# Patient Record
Sex: FEMALE | Race: WHITE | Employment: OTHER | URBAN - METROPOLITAN AREA
[De-identification: names, ages, dates, MRNs, and addresses within clinical notes are randomized per-mention and may not be internally consistent; named-entity substitution may affect disease eponyms.]

---

## 2017-01-19 RX ORDER — METHADONE HYDROCHLORIDE 10 MG/1
50 TABLET ORAL EVERY 8 HOURS PRN
Qty: 450 TABLET | Refills: 0 | Status: SHIPPED | OUTPATIENT
Start: 2017-01-20 | End: 2017-02-19

## 2017-01-24 ENCOUNTER — APPOINTMENT (OUTPATIENT)
Dept: LAB | Age: 77
End: 2017-01-24
Attending: INTERNAL MEDICINE
Payer: MEDICARE

## 2017-01-24 DIAGNOSIS — E78.00 HYPERCHOLESTEREMIA: ICD-10-CM

## 2017-01-24 DIAGNOSIS — E87.6 HYPOKALEMIA: ICD-10-CM

## 2017-01-24 DIAGNOSIS — E11.9 TYPE 2 DIABETES MELLITUS WITHOUT COMPLICATION, WITHOUT LONG-TERM CURRENT USE OF INSULIN (HCC): ICD-10-CM

## 2017-01-24 DIAGNOSIS — E55.9 VITAMIN D DEFICIENCY: ICD-10-CM

## 2017-01-24 LAB
ALT SERPL-CCNC: 26 U/L (ref 14–54)
ANION GAP SERPL CALC-SCNC: 8 MMOL/L (ref 0–18)
AST SERPL-CCNC: 26 U/L (ref 15–41)
BUN SERPL-MCNC: 13 MG/DL (ref 8–20)
BUN/CREAT SERPL: 16.9 (ref 10–20)
CALCIUM SERPL-MCNC: 9.3 MG/DL (ref 8.5–10.5)
CHLORIDE SERPL-SCNC: 102 MMOL/L (ref 95–110)
CHOLEST SERPL-MCNC: 156 MG/DL (ref 110–200)
CO2 SERPL-SCNC: 30 MMOL/L (ref 22–32)
CREAT SERPL-MCNC: 0.77 MG/DL (ref 0.5–1.5)
GLUCOSE SERPL-MCNC: 118 MG/DL (ref 70–99)
HBA1C MFR BLD: 6.8 % (ref 4–6)
HDLC SERPL-MCNC: 35 MG/DL
LDLC SERPL CALC-MCNC: 82 MG/DL (ref 0–99)
NONHDLC SERPL-MCNC: 121 MG/DL
OSMOLALITY UR CALC.SUM OF ELEC: 291 MOSM/KG (ref 275–295)
POTASSIUM SERPL-SCNC: 4.4 MMOL/L (ref 3.3–5.1)
SODIUM SERPL-SCNC: 140 MMOL/L (ref 136–144)
TRIGL SERPL-MCNC: 196 MG/DL (ref 1–149)

## 2017-01-24 PROCEDURE — 84450 TRANSFERASE (AST) (SGOT): CPT

## 2017-01-24 PROCEDURE — 80048 BASIC METABOLIC PNL TOTAL CA: CPT

## 2017-01-24 PROCEDURE — 84460 ALANINE AMINO (ALT) (SGPT): CPT

## 2017-01-24 PROCEDURE — 80061 LIPID PANEL: CPT

## 2017-01-24 PROCEDURE — 82306 VITAMIN D 25 HYDROXY: CPT

## 2017-01-24 PROCEDURE — 36415 COLL VENOUS BLD VENIPUNCTURE: CPT

## 2017-01-24 PROCEDURE — 83036 HEMOGLOBIN GLYCOSYLATED A1C: CPT

## 2017-01-25 LAB — 25(OH)D3 SERPL-MCNC: 23.8 NG/ML

## 2017-02-01 ENCOUNTER — OFFICE VISIT (OUTPATIENT)
Dept: INTERNAL MEDICINE CLINIC | Facility: CLINIC | Age: 77
End: 2017-02-01

## 2017-02-01 VITALS
TEMPERATURE: 98 F | WEIGHT: 204 LBS | BODY MASS INDEX: 40 KG/M2 | SYSTOLIC BLOOD PRESSURE: 120 MMHG | HEART RATE: 84 BPM | OXYGEN SATURATION: 95 % | DIASTOLIC BLOOD PRESSURE: 58 MMHG

## 2017-02-01 DIAGNOSIS — E11.9 TYPE 2 DIABETES MELLITUS WITHOUT COMPLICATION, WITHOUT LONG-TERM CURRENT USE OF INSULIN (HCC): ICD-10-CM

## 2017-02-01 DIAGNOSIS — M15.9 PRIMARY OSTEOARTHRITIS INVOLVING MULTIPLE JOINTS: Primary | ICD-10-CM

## 2017-02-01 DIAGNOSIS — E78.1 HYPERTRIGLYCERIDEMIA: ICD-10-CM

## 2017-02-01 DIAGNOSIS — E55.9 VITAMIN D DEFICIENCY: ICD-10-CM

## 2017-02-01 DIAGNOSIS — I87.2 VENOUS INSUFFICIENCY: ICD-10-CM

## 2017-02-01 DIAGNOSIS — K22.70 BARRETT'S ESOPHAGUS WITHOUT DYSPLASIA: ICD-10-CM

## 2017-02-01 DIAGNOSIS — E87.6 HYPOKALEMIA: ICD-10-CM

## 2017-02-01 DIAGNOSIS — R53.83 FATIGUE, UNSPECIFIED TYPE: ICD-10-CM

## 2017-02-01 DIAGNOSIS — E78.00 HYPERCHOLESTEREMIA: ICD-10-CM

## 2017-02-01 DIAGNOSIS — Z98.890 HISTORY OF BACK SURGERY: ICD-10-CM

## 2017-02-01 DIAGNOSIS — G47.30 SLEEP APNEA, UNSPECIFIED TYPE: ICD-10-CM

## 2017-02-01 PROCEDURE — 99214 OFFICE O/P EST MOD 30 MIN: CPT | Performed by: INTERNAL MEDICINE

## 2017-02-01 PROCEDURE — G0463 HOSPITAL OUTPT CLINIC VISIT: HCPCS | Performed by: INTERNAL MEDICINE

## 2017-02-01 NOTE — PATIENT INSTRUCTIONS
1.  Patient is to continue her current diet, medications and activity. 2.  Patient is to increase her vitamin D to 2000 units orally every morning.   3.  I will see the patient back in 3 months with blood tests which will include a BMP, hemoglobin A1c, lip

## 2017-02-01 NOTE — PROGRESS NOTES
Pamela Davison is a 78year old female. Patient presents with:  Checkup: 3 mo f/u  Arthritis  Hyperlipidemia  Diabetes  Fatigue    HPI:   Patient presents with:  Checkup: 3 mo f/u  Arthritis  Hyperlipidemia  Diabetes  Fatigue    Pt feels OK.   She c/o her v (VITAMIN D) 1000 UNITS Oral Tab 1000 units   #100  Sig-1000 units orally daily     3 Refills Disp: 100 tablet Rfl: 3   HYDROcodone-acetaminophen (NORCO)  MG Oral Tab Take 1 tablet by mouth every 6 (six) hours as needed.    Disp:  Rfl: 0   Polyethylene breasts were not examined today. LUNGS: clear to auscultation  CARDIO: RRR, normal S1S2, without murmur   GI:Protuberant, BS are present, no organomegaly or palpable masses  EXTREMITIES: no edema  NEURO: alert and oriented  ASSESSMENT AND PLAN:   1.  Prima lead reading was 196. Patient to continue to watch her diet especially her carbs and sweets. I will see the patient back in 3 months with blood tests as noted above. The patient indicates understanding of these issues and agrees to the plan.   The pa

## 2017-02-22 RX ORDER — METHADONE HYDROCHLORIDE 10 MG/1
50 TABLET ORAL EVERY 8 HOURS PRN
Qty: 450 TABLET | Refills: 0 | Status: SHIPPED | OUTPATIENT
Start: 2017-02-22 | End: 2017-03-24

## 2017-02-28 ENCOUNTER — TELEPHONE (OUTPATIENT)
Dept: INTERNAL MEDICINE CLINIC | Facility: CLINIC | Age: 77
End: 2017-02-28

## 2017-02-28 NOTE — PROGRESS NOTES
-------------------------------------------------------------------------------------------------------------------------   Patient's Personal History/Story    PT OF [facility] SINCE 2004 FOR LEFT GROIN AND L HIP PAIN. HAD BACK SURGERY WITH   HARDWARE.  RE-OPERATE

## 2017-02-28 NOTE — TELEPHONE ENCOUNTER
Spoke with patient. She reports flu-like symptoms x 4 days--cough, sneezing, mild fevers, achy. Appetite poor--just \"picking at things. \" Denies SOB. States today she broke out in a cold sweat and had diarrhea. Reports feeling nauseous.  Recommended patien

## 2017-02-28 NOTE — TELEPHONE ENCOUNTER
Patient has been sick for about 4 days with flu  Using over the counter thera flu - today feels worse & wanted to be seen

## 2017-03-01 RX ORDER — AMOXICILLIN AND CLAVULANATE POTASSIUM 875; 125 MG/1; MG/1
1 TABLET, FILM COATED ORAL 2 TIMES DAILY
Qty: 20 TABLET | Refills: 1 | Status: SHIPPED | OUTPATIENT
Start: 2017-03-01 | End: 2017-03-11

## 2017-03-02 NOTE — TELEPHONE ENCOUNTER
Noted.  Discussed with patient. Patient did not go to urgent care. I recommended that she come and see me tomorrow. She does not think that she can make it here tomorrow. I have told her to push fluids.   She can use Tylenol as necessary for any aches o

## 2017-03-07 NOTE — TELEPHONE ENCOUNTER
I spoke to the patient who states that she is feeling better since starting the Augmentin but still has a cough and for the last couple of days also having diarrhea.  Patient states that the stool is soft, not liquidy but yesterday it was worse and when she

## 2017-03-07 NOTE — TELEPHONE ENCOUNTER
Pt had left a message with the service that she would like to speak to  regarding the meds he prescribed for the virus. T: [phone].                 Tasked to Nursing

## 2017-03-07 NOTE — TELEPHONE ENCOUNTER
Discussed with patient. I have advised the patient to stop her Augmentin due to the diarrhea. She may  some Imodium a.d. over-the-counter and take 2 tablets every 4-6 hours as she needs to for the diarrhea.   Patient is to continue to take the Jeris Blood

## 2017-03-10 RX ORDER — FLUCONAZOLE 150 MG/1
150 TABLET ORAL ONCE
Qty: 1 TABLET | Refills: 1 | Status: SHIPPED | OUTPATIENT
Start: 2017-03-10 | End: 2017-03-10

## 2017-03-10 NOTE — TELEPHONE ENCOUNTER
Patient feels she has a yeast infection     Possibly from the Augmentin and is asking for something     Pharm using - [facility] [address]     [phone]

## 2017-03-10 NOTE — TELEPHONE ENCOUNTER
Telephone call to patient. Patient feels she is getting over her recent cough, chest congestion and head congestion. She now feels that she may have a yeast infection. She also feels fatigued.   I told her that it may take a few more weeks to get over th

## 2017-03-13 NOTE — TELEPHONE ENCOUNTER
Discussed with patient. I told her that is okay for her to take her second pill of Diflucan. She will call me back if the yeast infection does not improve.

## 2017-03-13 NOTE — TELEPHONE ENCOUNTER
[phone]  Pt took pill on Saturday. Pt had a little relief but not completely healed.  Pt wants to know when she can take another pill since Dr TOTH gave her a refill  To clinical

## 2017-03-22 ENCOUNTER — TELEPHONE (OUTPATIENT)
Dept: PAIN CLINIC | Facility: HOSPITAL | Age: 77
End: 2017-03-22

## 2017-03-22 ENCOUNTER — TELEPHONE (OUTPATIENT)
Dept: INTERNAL MEDICINE CLINIC | Facility: CLINIC | Age: 77
End: 2017-03-22

## 2017-03-22 RX ORDER — OMEPRAZOLE 20 MG/1
20 CAPSULE, DELAYED RELEASE ORAL EVERY MORNING
Qty: 90 CAPSULE | Refills: 3 | Status: SHIPPED | OUTPATIENT
Start: 2017-03-22 | End: 2017-04-04

## 2017-03-22 NOTE — TELEPHONE ENCOUNTER
Discussed with patient. I told her that her Lansoprazole is no longer covered by her insurance. I told her that I would put her on omeprazole 20 mg orally daily and see how that goes. She is fine with doing that.   I will send a prescription to her pharm

## 2017-03-22 NOTE — TELEPHONE ENCOUNTER
Maryanne is calling to inform Dr. TOTH that Lansoprazole is no longer on pt.'s formulary  They are requesting a refill as well ph.  # [phone]  Routed to Rx

## 2017-03-27 NOTE — TELEPHONE ENCOUNTER
Maryanne faxed a note - \"pt is requesting capsule po bid, please call/fax a new rx\". Placed in purple folder.                    Tasked to Rx

## 2017-04-03 RX ORDER — METHADONE HYDROCHLORIDE 10 MG/1
50 TABLET ORAL EVERY 8 HOURS PRN
Qty: 450 TABLET | Refills: 0 | Status: SHIPPED | OUTPATIENT
Start: 2017-04-03 | End: 2017-05-05

## 2017-04-04 RX ORDER — OMEPRAZOLE 20 MG/1
20 CAPSULE, DELAYED RELEASE ORAL
Qty: 180 CAPSULE | Refills: 3 | Status: ON HOLD | OUTPATIENT
Start: 2017-04-04 | End: 2017-08-28

## 2017-04-06 ENCOUNTER — OFFICE VISIT (OUTPATIENT)
Dept: PAIN CLINIC | Facility: HOSPITAL | Age: 77
End: 2017-04-06
Attending: NURSE PRACTITIONER
Payer: MEDICARE

## 2017-04-06 VITALS
HEIGHT: 60 IN | RESPIRATION RATE: 18 BRPM | BODY MASS INDEX: 36.32 KG/M2 | WEIGHT: 185 LBS | HEART RATE: 71 BPM | SYSTOLIC BLOOD PRESSURE: 116 MMHG | DIASTOLIC BLOOD PRESSURE: 66 MMHG

## 2017-04-06 DIAGNOSIS — M96.1 FAILED BACK SURGICAL SYNDROME: ICD-10-CM

## 2017-04-06 DIAGNOSIS — Z98.890 HISTORY OF BACK SURGERY: Primary | ICD-10-CM

## 2017-04-06 PROCEDURE — 99211 OFF/OP EST MAY X REQ PHY/QHP: CPT

## 2017-04-06 RX ORDER — HYDROCODONE BITARTRATE AND ACETAMINOPHEN 10; 325 MG/1; MG/1
1 TABLET ORAL EVERY 6 HOURS PRN
Qty: 30 TABLET | Refills: 0 | Status: SHIPPED | OUTPATIENT
Start: 2017-04-06 | End: 2017-05-17

## 2017-04-06 NOTE — CHRONIC PAIN
Follow-up Note    HISTORY OF PRESENT ILLNESS:  Raffaele Benton is a 78year old old female with history of chronic back pain returns for medication evaluation. She continues to report back pain. She reports a recent fall.  She now reports left side thoracic by mouth nightly. Disp:  Rfl:    HYDROcodone-acetaminophen 5-325 MG Oral Tab Take 1 tablet by mouth every 6 (six) hours as needed for Pain. Disp:  Rfl:    Levothyroxine Sodium (LEVOTHROID) 100 MCG Oral Tab Take 1 tablet (100 mcg total) by mouth once daily. 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

## 2017-04-06 NOTE — PROGRESS NOTES
RECOMMENDATIONS: 1) Will provide RX for PRN norco for breakthrough pain.  Will consider Xrays if acute pain continues or worsens 2) recommended decreasing afternoon and PM dose of Methadone.  3) Continue Voltaren gel

## 2017-04-25 ENCOUNTER — APPOINTMENT (OUTPATIENT)
Dept: LAB | Age: 77
End: 2017-04-25
Attending: INTERNAL MEDICINE
Payer: MEDICARE

## 2017-04-25 DIAGNOSIS — E55.9 VITAMIN D DEFICIENCY: ICD-10-CM

## 2017-04-25 DIAGNOSIS — E87.6 HYPOKALEMIA: ICD-10-CM

## 2017-04-25 DIAGNOSIS — E11.9 TYPE 2 DIABETES MELLITUS WITHOUT COMPLICATION, WITHOUT LONG-TERM CURRENT USE OF INSULIN (HCC): ICD-10-CM

## 2017-04-25 DIAGNOSIS — E78.00 HYPERCHOLESTEREMIA: ICD-10-CM

## 2017-04-25 PROCEDURE — 36415 COLL VENOUS BLD VENIPUNCTURE: CPT

## 2017-04-25 PROCEDURE — 83036 HEMOGLOBIN GLYCOSYLATED A1C: CPT

## 2017-04-25 PROCEDURE — 80048 BASIC METABOLIC PNL TOTAL CA: CPT

## 2017-04-25 PROCEDURE — 84460 ALANINE AMINO (ALT) (SGPT): CPT

## 2017-04-25 PROCEDURE — 82306 VITAMIN D 25 HYDROXY: CPT

## 2017-04-25 PROCEDURE — 84450 TRANSFERASE (AST) (SGOT): CPT

## 2017-04-25 PROCEDURE — 80061 LIPID PANEL: CPT

## 2017-05-06 RX ORDER — METHADONE HYDROCHLORIDE 10 MG/1
50 TABLET ORAL EVERY 8 HOURS PRN
Qty: 450 TABLET | Refills: 0 | OUTPATIENT
Start: 2017-05-08 | End: 2017-05-08

## 2017-05-08 RX ORDER — METHADONE HYDROCHLORIDE 10 MG/1
50 TABLET ORAL EVERY 8 HOURS PRN
Qty: 450 TABLET | Refills: 0 | Status: SHIPPED | OUTPATIENT
Start: 2017-05-08 | End: 2017-06-12

## 2017-05-12 ENCOUNTER — OFFICE VISIT (OUTPATIENT)
Dept: INTERNAL MEDICINE CLINIC | Facility: CLINIC | Age: 77
End: 2017-05-12

## 2017-05-12 VITALS
SYSTOLIC BLOOD PRESSURE: 160 MMHG | OXYGEN SATURATION: 95 % | HEART RATE: 76 BPM | WEIGHT: 211.19 LBS | BODY MASS INDEX: 41.46 KG/M2 | TEMPERATURE: 98 F | DIASTOLIC BLOOD PRESSURE: 76 MMHG | HEIGHT: 59.75 IN

## 2017-05-12 DIAGNOSIS — Z00.00 ANNUAL PHYSICAL EXAM: ICD-10-CM

## 2017-05-12 DIAGNOSIS — K22.70 BARRETT'S ESOPHAGUS WITHOUT DYSPLASIA: ICD-10-CM

## 2017-05-12 DIAGNOSIS — E11.9 TYPE 2 DIABETES MELLITUS WITHOUT COMPLICATION, WITHOUT LONG-TERM CURRENT USE OF INSULIN (HCC): ICD-10-CM

## 2017-05-12 DIAGNOSIS — I87.2 VENOUS INSUFFICIENCY: ICD-10-CM

## 2017-05-12 DIAGNOSIS — R53.83 FATIGUE, UNSPECIFIED TYPE: ICD-10-CM

## 2017-05-12 DIAGNOSIS — E78.00 HYPERCHOLESTEREMIA: ICD-10-CM

## 2017-05-12 DIAGNOSIS — E78.1 HYPERTRIGLYCERIDEMIA: ICD-10-CM

## 2017-05-12 DIAGNOSIS — Z98.890 HISTORY OF BACK SURGERY: ICD-10-CM

## 2017-05-12 DIAGNOSIS — E87.6 HYPOKALEMIA: ICD-10-CM

## 2017-05-12 DIAGNOSIS — G47.30 SLEEP APNEA, UNSPECIFIED TYPE: ICD-10-CM

## 2017-05-12 DIAGNOSIS — M15.9 PRIMARY OSTEOARTHRITIS INVOLVING MULTIPLE JOINTS: Primary | ICD-10-CM

## 2017-05-12 DIAGNOSIS — M25.512 ACUTE PAIN OF LEFT SHOULDER: ICD-10-CM

## 2017-05-12 DIAGNOSIS — E55.9 VITAMIN D DEFICIENCY: ICD-10-CM

## 2017-05-12 PROCEDURE — 99214 OFFICE O/P EST MOD 30 MIN: CPT | Performed by: INTERNAL MEDICINE

## 2017-05-12 PROCEDURE — G0463 HOSPITAL OUTPT CLINIC VISIT: HCPCS | Performed by: INTERNAL MEDICINE

## 2017-05-12 NOTE — PROGRESS NOTES
Marcio Ascencio is a 78year old female. Patient presents with:  Checkup: 3 month checkup  Fall: Patient had two falls about a month ago. Both times patient fell onto her back. She denies hitting her head  Shoulder Pain: Left shoulder pain since falls.  Diff Oral Tab Take 40 mg by mouth nightly. Disp:  Rfl:    HYDROcodone-acetaminophen 5-325 MG Oral Tab Take 1 tablet by mouth every 6 (six) hours as needed for Pain.  Disp:  Rfl:    Levothyroxine Sodium (LEVOTHROID) 100 MCG Oral Tab Take 1 tablet (100 mcg total) of wine per week       Comment: occasionally       REVIEW OF SYSTEMS:   GENERAL HEALTH: feels well otherwise  RESPIRATORY:No cough or SOB  CARDIOVASCULAR: No chest pain  GI: No abdominal pain, nausea, vomiting, diarrhea, or constipation  :No Urinary comp CPM.  As above. 6. Fatigue, unspecified type  Stable. CPM.    7. Venous insufficiency  Stable. CPM.    8. Sleep apnea, unspecified type  Stable. CPM.    9. Urban's esophagus without dysplasia  Stable. CPM.    10. History of back surgery  Stable.

## 2017-05-12 NOTE — PATIENT INSTRUCTIONS
1.  Patient is to continue her current diet, medications and activity. 2.  I will obtain an x-ray of patient's left shoulder to evaluate her left shoulder pain.   3.  I will plan to see the patient back in 3 months with blood tests, urinalysis and EKG for

## 2017-05-15 ENCOUNTER — HOSPITAL ENCOUNTER (OUTPATIENT)
Dept: GENERAL RADIOLOGY | Age: 77
Discharge: HOME | End: 2017-05-15
Attending: INTERNAL MEDICINE
Payer: MEDICARE

## 2017-05-15 DIAGNOSIS — M25.512 ACUTE PAIN OF LEFT SHOULDER: ICD-10-CM

## 2017-05-15 PROCEDURE — 73030 X-RAY EXAM OF SHOULDER: CPT | Performed by: INTERNAL MEDICINE

## 2017-05-18 RX ORDER — HYDROCODONE BITARTRATE AND ACETAMINOPHEN 10; 325 MG/1; MG/1
1 TABLET ORAL EVERY 6 HOURS PRN
Qty: 30 TABLET | Refills: 0 | Status: SHIPPED | OUTPATIENT
Start: 2017-05-18 | End: 2017-07-12

## 2017-05-19 ENCOUNTER — TELEPHONE (OUTPATIENT)
Dept: INTERNAL MEDICINE CLINIC | Facility: CLINIC | Age: 77
End: 2017-05-19

## 2017-05-20 NOTE — TELEPHONE ENCOUNTER
Telephone call to patient and message left. The patient's recent x-ray of of her left shoulder shows her to have significant arthritis in the left shoulder. She also has evidence of prior rotator cuff injury.   I have left a message for the patient to aliyah

## 2017-06-01 NOTE — TELEPHONE ENCOUNTER
Discussed with patient. She got my previous message and has been following up with Dr. Tariq Muse, an orthopedic physician.

## 2017-06-13 ENCOUNTER — TELEPHONE (OUTPATIENT)
Dept: PAIN CLINIC | Facility: HOSPITAL | Age: 77
End: 2017-06-13

## 2017-06-13 RX ORDER — METHADONE HYDROCHLORIDE 10 MG/1
50 TABLET ORAL EVERY 8 HOURS PRN
Qty: 450 TABLET | Refills: 0 | Status: SHIPPED | OUTPATIENT
Start: 2017-06-13 | End: 2017-07-12

## 2017-06-21 RX ORDER — FUROSEMIDE 40 MG/1
TABLET ORAL
Qty: 180 TABLET | Refills: 3 | Status: ON HOLD | OUTPATIENT
Start: 2017-06-21 | End: 2017-08-28

## 2017-06-21 RX ORDER — ATORVASTATIN CALCIUM 40 MG/1
TABLET, FILM COATED ORAL
Qty: 90 TABLET | Refills: 3 | Status: SHIPPED | OUTPATIENT
Start: 2017-06-21 | End: 2018-02-21 | Stop reason: ALTCHOICE

## 2017-07-12 RX ORDER — METHADONE HYDROCHLORIDE 10 MG/1
50 TABLET ORAL EVERY 8 HOURS PRN
Qty: 450 TABLET | Refills: 0 | Status: SHIPPED | OUTPATIENT
Start: 2017-07-13 | End: 2017-08-14

## 2017-07-12 RX ORDER — HYDROCODONE BITARTRATE AND ACETAMINOPHEN 10; 325 MG/1; MG/1
1 TABLET ORAL EVERY 6 HOURS PRN
Qty: 30 TABLET | Refills: 0 | Status: SHIPPED | OUTPATIENT
Start: 2017-07-13 | End: 2017-08-14

## 2017-08-14 RX ORDER — METHADONE HYDROCHLORIDE 10 MG/1
50 TABLET ORAL EVERY 8 HOURS PRN
Qty: 450 TABLET | Refills: 0 | Status: ON HOLD | OUTPATIENT
Start: 2017-08-15 | End: 2017-09-15

## 2017-08-14 RX ORDER — POTASSIUM CHLORIDE 20 MEQ/1
TABLET, EXTENDED RELEASE ORAL
Qty: 120 TABLET | Refills: 5 | Status: SHIPPED | OUTPATIENT
Start: 2017-08-14 | End: 2017-10-09

## 2017-08-14 RX ORDER — HYDROCODONE BITARTRATE AND ACETAMINOPHEN 10; 325 MG/1; MG/1
1 TABLET ORAL EVERY 6 HOURS PRN
Qty: 30 TABLET | Refills: 0 | Status: SHIPPED | OUTPATIENT
Start: 2017-08-15 | End: 2017-09-15

## 2017-08-21 ENCOUNTER — TELEPHONE (OUTPATIENT)
Dept: INTERNAL MEDICINE CLINIC | Facility: CLINIC | Age: 77
End: 2017-08-21

## 2017-08-21 NOTE — TELEPHONE ENCOUNTER
Patient states she does not feel good and wishes to speak to doctor     Pt can not sleep and in pain and does not feel good     Pt is nauseated     [phone]

## 2017-08-21 NOTE — TELEPHONE ENCOUNTER
Please advise - called patient who states she has pain all over ( but comes from her back pain) , feeling nauseated , can,t eat, no diarrhea , denies fever , no vomiting , cant sleep , cant come to office today - to DR. TOTH

## 2017-08-22 ENCOUNTER — APPOINTMENT (OUTPATIENT)
Dept: GENERAL RADIOLOGY | Facility: HOSPITAL | Age: 77
DRG: 418 | End: 2017-08-22
Attending: EMERGENCY MEDICINE
Payer: MEDICARE

## 2017-08-22 ENCOUNTER — HOSPITAL ENCOUNTER (INPATIENT)
Facility: HOSPITAL | Age: 77
LOS: 3 days | Discharge: HOME HEALTH | DRG: 418 | End: 2017-08-28
Attending: EMERGENCY MEDICINE | Admitting: HOSPITALIST
Payer: MEDICARE

## 2017-08-22 ENCOUNTER — APPOINTMENT (OUTPATIENT)
Dept: CT IMAGING | Facility: HOSPITAL | Age: 77
DRG: 418 | End: 2017-08-22
Attending: EMERGENCY MEDICINE
Payer: MEDICARE

## 2017-08-22 DIAGNOSIS — R11.0 NAUSEA: Primary | ICD-10-CM

## 2017-08-22 DIAGNOSIS — I49.9 CARDIAC ARRHYTHMIA, UNSPECIFIED CARDIAC ARRHYTHMIA TYPE: ICD-10-CM

## 2017-08-22 DIAGNOSIS — K81.0 ACUTE CHOLECYSTITIS: ICD-10-CM

## 2017-08-22 DIAGNOSIS — R79.89 ELEVATED SERUM CREATININE: ICD-10-CM

## 2017-08-22 DIAGNOSIS — R42 DIZZINESS: ICD-10-CM

## 2017-08-22 DIAGNOSIS — R11.2 NAUSEA & VOMITING: ICD-10-CM

## 2017-08-22 PROBLEM — R73.9 HYPERGLYCEMIA: Status: ACTIVE | Noted: 2017-08-22

## 2017-08-22 LAB
ALBUMIN SERPL BCP-MCNC: 4 G/DL (ref 3.5–4.8)
ALP SERPL-CCNC: 79 U/L (ref 32–100)
ALT SERPL-CCNC: 30 U/L (ref 14–54)
ANION GAP SERPL CALC-SCNC: 9 MMOL/L (ref 0–18)
AST SERPL-CCNC: 32 U/L (ref 15–41)
BACTERIA UR QL AUTO: NEGATIVE /HPF
BASOPHILS # BLD: 0.1 K/UL (ref 0–0.2)
BASOPHILS NFR BLD: 1 %
BILIRUB DIRECT SERPL-MCNC: 0.2 MG/DL (ref 0–0.2)
BILIRUB SERPL-MCNC: 0.8 MG/DL (ref 0.3–1.2)
BILIRUB UR QL: NEGATIVE
BUN SERPL-MCNC: 10 MG/DL (ref 8–20)
BUN/CREAT SERPL: 11.1 (ref 10–20)
CALCIUM SERPL-MCNC: 9.5 MG/DL (ref 8.5–10.5)
CHLORIDE SERPL-SCNC: 98 MMOL/L (ref 95–110)
CLARITY UR: CLEAR
CO2 SERPL-SCNC: 30 MMOL/L (ref 22–32)
COLOR UR: YELLOW
CREAT SERPL-MCNC: 0.9 MG/DL (ref 0.5–1.5)
EOSINOPHIL # BLD: 0 K/UL (ref 0–0.7)
EOSINOPHIL NFR BLD: 0 %
ERYTHROCYTE [DISTWIDTH] IN BLOOD BY AUTOMATED COUNT: 14.5 % (ref 11–15)
GLUCOSE BLDC GLUCOMTR-MCNC: 122 MG/DL (ref 70–99)
GLUCOSE BLDC GLUCOMTR-MCNC: 183 MG/DL (ref 70–99)
GLUCOSE SERPL-MCNC: 133 MG/DL (ref 70–99)
GLUCOSE UR-MCNC: NEGATIVE MG/DL
HCT VFR BLD AUTO: 43.8 % (ref 35–48)
HGB BLD-MCNC: 14.2 G/DL (ref 12–16)
HGB UR QL STRIP.AUTO: NEGATIVE
KETONES UR-MCNC: NEGATIVE MG/DL
LIPASE SERPL-CCNC: 19 U/L (ref 22–51)
LYMPHOCYTES # BLD: 1.1 K/UL (ref 1–4)
LYMPHOCYTES NFR BLD: 11 %
MAGNESIUM SERPL-MCNC: 1.7 MG/DL (ref 1.8–2.5)
MCH RBC QN AUTO: 28.4 PG (ref 27–32)
MCHC RBC AUTO-ENTMCNC: 32.5 G/DL (ref 32–37)
MCV RBC AUTO: 87.4 FL (ref 80–100)
MONOCYTES # BLD: 0.4 K/UL (ref 0–1)
MONOCYTES NFR BLD: 4 %
NEUTROPHILS # BLD AUTO: 8.5 K/UL (ref 1.8–7.7)
NEUTROPHILS NFR BLD: 85 %
NITRITE UR QL STRIP.AUTO: NEGATIVE
OSMOLALITY UR CALC.SUM OF ELEC: 285 MOSM/KG (ref 275–295)
PH UR: 8 [PH] (ref 5–8)
PLATELET # BLD AUTO: 229 K/UL (ref 140–400)
PMV BLD AUTO: 9.6 FL (ref 7.4–10.3)
POTASSIUM SERPL-SCNC: 3.2 MMOL/L (ref 3.3–5.1)
PROT SERPL-MCNC: 7 G/DL (ref 5.9–8.4)
PROT UR-MCNC: NEGATIVE MG/DL
RBC # BLD AUTO: 5.01 M/UL (ref 3.7–5.4)
RBC #/AREA URNS AUTO: 1 /HPF
SODIUM SERPL-SCNC: 137 MMOL/L (ref 136–144)
SP GR UR STRIP: 1 (ref 1–1.03)
TROPONIN I SERPL-MCNC: 0.01 NG/ML (ref ?–0.03)
UROBILINOGEN UR STRIP-ACNC: <2
VIT C UR-MCNC: NEGATIVE MG/DL
WBC # BLD AUTO: 10 K/UL (ref 4–11)
WBC #/AREA URNS AUTO: 1 /HPF

## 2017-08-22 PROCEDURE — 70450 CT HEAD/BRAIN W/O DYE: CPT | Performed by: EMERGENCY MEDICINE

## 2017-08-22 PROCEDURE — 71010 XR CHEST AP PORTABLE  (CPT=71010): CPT | Performed by: EMERGENCY MEDICINE

## 2017-08-22 RX ORDER — MAGNESIUM OXIDE 400 MG (241.3 MG MAGNESIUM) TABLET
400 TABLET ONCE
Status: COMPLETED | OUTPATIENT
Start: 2017-08-22 | End: 2017-08-22

## 2017-08-22 RX ORDER — ALLOPURINOL 300 MG/1
300 TABLET ORAL
Status: DISCONTINUED | OUTPATIENT
Start: 2017-08-22 | End: 2017-08-25

## 2017-08-22 RX ORDER — ONDANSETRON 2 MG/ML
4 INJECTION INTRAMUSCULAR; INTRAVENOUS ONCE
Status: COMPLETED | OUTPATIENT
Start: 2017-08-22 | End: 2017-08-22

## 2017-08-22 RX ORDER — SODIUM CHLORIDE 0.9 % (FLUSH) 0.9 %
3 SYRINGE (ML) INJECTION AS NEEDED
Status: DISCONTINUED | OUTPATIENT
Start: 2017-08-22 | End: 2017-08-28

## 2017-08-22 RX ORDER — DEXTROSE MONOHYDRATE 25 G/50ML
50 INJECTION, SOLUTION INTRAVENOUS AS NEEDED
Status: DISCONTINUED | OUTPATIENT
Start: 2017-08-22 | End: 2017-08-28

## 2017-08-22 RX ORDER — CETIRIZINE HYDROCHLORIDE 10 MG/1
10 TABLET ORAL DAILY
Status: DISCONTINUED | OUTPATIENT
Start: 2017-08-23 | End: 2017-08-23

## 2017-08-22 RX ORDER — LISINOPRIL 20 MG/1
20 TABLET ORAL DAILY
Status: DISCONTINUED | OUTPATIENT
Start: 2017-08-22 | End: 2017-08-28

## 2017-08-22 RX ORDER — FUROSEMIDE 10 MG/ML
60 INJECTION INTRAMUSCULAR; INTRAVENOUS
Status: DISCONTINUED | OUTPATIENT
Start: 2017-08-22 | End: 2017-08-24

## 2017-08-22 RX ORDER — HEPARIN SODIUM 5000 [USP'U]/ML
5000 INJECTION, SOLUTION INTRAVENOUS; SUBCUTANEOUS EVERY 12 HOURS SCHEDULED
Status: DISPENSED | OUTPATIENT
Start: 2017-08-22 | End: 2017-08-24

## 2017-08-22 RX ORDER — ACETAMINOPHEN 325 MG/1
650 TABLET ORAL EVERY 6 HOURS PRN
Status: DISCONTINUED | OUTPATIENT
Start: 2017-08-22 | End: 2017-08-25

## 2017-08-22 RX ORDER — HYDROCODONE BITARTRATE AND ACETAMINOPHEN 5; 325 MG/1; MG/1
1 TABLET ORAL EVERY 6 HOURS PRN
Status: DISCONTINUED | OUTPATIENT
Start: 2017-08-22 | End: 2017-08-24

## 2017-08-22 RX ORDER — SODIUM CHLORIDE 9 MG/ML
INJECTION, SOLUTION INTRAVENOUS CONTINUOUS
Status: DISCONTINUED | OUTPATIENT
Start: 2017-08-22 | End: 2017-08-22

## 2017-08-22 RX ORDER — HYDROCODONE BITARTRATE AND ACETAMINOPHEN 5; 325 MG/1; MG/1
2 TABLET ORAL EVERY 4 HOURS PRN
Status: DISCONTINUED | OUTPATIENT
Start: 2017-08-22 | End: 2017-08-24

## 2017-08-22 RX ORDER — ONDANSETRON 2 MG/ML
4 INJECTION INTRAMUSCULAR; INTRAVENOUS EVERY 6 HOURS PRN
Status: DISCONTINUED | OUTPATIENT
Start: 2017-08-22 | End: 2017-08-25

## 2017-08-22 RX ORDER — LABETALOL HYDROCHLORIDE 5 MG/ML
10 INJECTION, SOLUTION INTRAVENOUS ONCE
Status: COMPLETED | OUTPATIENT
Start: 2017-08-22 | End: 2017-08-22

## 2017-08-22 RX ORDER — ONDANSETRON 4 MG/1
4 TABLET, FILM COATED ORAL 2 TIMES DAILY PRN
Status: DISCONTINUED | OUTPATIENT
Start: 2017-08-22 | End: 2017-08-25

## 2017-08-22 RX ORDER — LEVOTHYROXINE SODIUM 0.1 MG/1
100 TABLET ORAL
Status: DISCONTINUED | OUTPATIENT
Start: 2017-08-23 | End: 2017-08-28

## 2017-08-22 RX ORDER — HYDROMORPHONE HYDROCHLORIDE 1 MG/ML
0.5 INJECTION, SOLUTION INTRAMUSCULAR; INTRAVENOUS; SUBCUTANEOUS ONCE
Status: COMPLETED | OUTPATIENT
Start: 2017-08-22 | End: 2017-08-22

## 2017-08-22 RX ORDER — POTASSIUM CHLORIDE 20 MEQ/1
40 TABLET, EXTENDED RELEASE ORAL EVERY 4 HOURS
Status: COMPLETED | OUTPATIENT
Start: 2017-08-22 | End: 2017-08-23

## 2017-08-22 RX ORDER — HYDROCODONE BITARTRATE AND ACETAMINOPHEN 5; 325 MG/1; MG/1
1 TABLET ORAL EVERY 4 HOURS PRN
Status: DISCONTINUED | OUTPATIENT
Start: 2017-08-22 | End: 2017-08-24

## 2017-08-22 RX ORDER — SENNOSIDES 8.6 MG
17.2 TABLET ORAL DAILY PRN
Status: DISCONTINUED | OUTPATIENT
Start: 2017-08-22 | End: 2017-08-28

## 2017-08-22 RX ORDER — METHADONE HYDROCHLORIDE 10 MG/1
50 TABLET ORAL EVERY 8 HOURS PRN
Status: DISCONTINUED | OUTPATIENT
Start: 2017-08-22 | End: 2017-08-24

## 2017-08-22 RX ORDER — ATORVASTATIN CALCIUM 40 MG/1
40 TABLET, FILM COATED ORAL
Status: DISCONTINUED | OUTPATIENT
Start: 2017-08-23 | End: 2017-08-25

## 2017-08-22 RX ORDER — HYDROCODONE BITARTRATE AND ACETAMINOPHEN 10; 325 MG/1; MG/1
1 TABLET ORAL EVERY 6 HOURS PRN
Status: DISCONTINUED | OUTPATIENT
Start: 2017-08-22 | End: 2017-08-25

## 2017-08-22 RX ORDER — SODIUM CHLORIDE 9 MG/ML
INJECTION, SOLUTION INTRAVENOUS ONCE
Status: COMPLETED | OUTPATIENT
Start: 2017-08-22 | End: 2017-08-22

## 2017-08-22 RX ORDER — ACETAMINOPHEN 325 MG/1
650 TABLET ORAL EVERY 4 HOURS PRN
Status: DISCONTINUED | OUTPATIENT
Start: 2017-08-22 | End: 2017-08-24

## 2017-08-22 NOTE — H&P
[facility]    PATIENT'S NAME: Mayra Garrison   ATTENDING PHYSICIAN: Amadou Nassar MD   PATIENT ACCOUNT#:   [de-identified]    LOCATION:  [place] [address]  MEDICAL RECORD #:   [id number]       YOB: 1940  ADMISSION DATE:       08/22/ diarrhea. No abdominal pain. No chest pain. Other 12-point review of systems negative. Symptoms have been going on for the last week. The patient reported taking Naprosyn 2 to 3 times a week because of headaches. She denies any other NSAID use.

## 2017-08-22 NOTE — CONSULTS
[facility]    Report of Consultation    Jing Hamilton Patient Status:  Observation    11/3/1940 MRN [id number]   Location [facility] 3W/SW Attending Maday Covington MD   Hosp Day # 0 PCP Rani Solis MD     Reason for Con Heart Disease Other    • Diabetes Other    • Eye Problems Neg       reports that she has never smoked. She has never used smokeless tobacco. She reports that she drinks about 1.2 oz of alcohol per week . She reports that she does not use drugs.     Nikkie Sodium (SYNTHROID) tab 100 mcg, 100 mcg, Oral, Daily  •  Methadone HCl (DOLOPHINE) tab 50 mg, 50 mg, Oral, Q8H PRN  •  Ondansetron HCl (ZOFRAN) tab 4 mg, 4 mg, Oral, BID PRN  •  sennosides (SENOKOT) tab 2 tablet, 2 tablet, Oral, Daily PRN  •  Potassium Chl 08/22/2017   CL 98 08/22/2017   CO2 30 08/22/2017    08/22/2017   CA 9.5 08/22/2017   ALB 4.0 08/22/2017   ALKPHO 79 08/22/2017   BILT 0.8 08/22/2017   TP 7.0 08/22/2017   AST 32 08/22/2017   ALT 30 08/22/2017   LIP 19 08/22/2017   TROP 0.01 08/22/2

## 2017-08-22 NOTE — ED NOTES
Pt up to the washroom again to void, steady gait with walker. Pt requesting pain medication for chronic shoulder and back pain. MD aware. Orders received.

## 2017-08-22 NOTE — CONSULTS
[facility] - [facility]    Cardiology Consultation    Monse Stevenson Location: [address]    11/3/1940 MRN [id number]   Consulting Date 2017 [facility] [id number]   Consulting Physician Dandre Dubois MD Attending Physician B never smoked. She has never used smokeless tobacco. She reports that she drinks about 1.2 oz of alcohol per week . She reports that she does not use drugs.     Allergies:    Compazine               Anaphylaxis    Medications:    No current facility-administ ) Disp: 40 tablet Rfl: 3   Ondansetron HCl (ZOFRAN) 4 mg tablet Take 1 tablet (4 mg total) by mouth 2 (two) times daily as needed for Nausea.  Disp: 30 tablet Rfl: 3   Cholecalciferol (VITAMIN D) 1000 UNITS Oral Tab 1000 units   #100  Sig-1000 units orally 08/22/2017   BUN 10 08/22/2017    08/22/2017   K 3.2 08/22/2017   CL 98 08/22/2017   CO2 30 08/22/2017    08/22/2017   CA 9.5 08/22/2017   ALB 4.0 08/22/2017   ALKPHO 79 08/22/2017   BILT 0.8 08/22/2017   TP 7.0 08/22/2017   AST 32 08/22/2017

## 2017-08-22 NOTE — TELEPHONE ENCOUNTER
Discussed with patient at length. Patient feels weak. Her appetite is poor. She has been drinking tea. Patient has order for blood tests which she was given in May. She is planning to do these tomorrow.   I have told the patient that I should see her l

## 2017-08-22 NOTE — ED INITIAL ASSESSMENT (HPI)
Pt c/o nausea, vomiting and just not feeling well x 3 wks, was scheduled to have have outpatient lab work done today and PMD appt this afternoon but was no feeling well.

## 2017-08-22 NOTE — PLAN OF CARE
Problem: CARDIOVASCULAR - ADULT  Goal: Maintains optimal cardiac output and hemodynamic stability  INTERVENTIONS:  - Monitor vital signs, rhythm, and trends  - Monitor for bleeding, hypotension and signs of decreased cardiac output  - Evaluate effectivenes optimal renal function maintained  INTERVENTIONS:  - Monitor labs and assess for signs and symptoms of volume excess or deficit  - Monitor intake, output and patient weight  - Monitor urine specific gravity, serum osmolarity and serum sodium as indicated o

## 2017-08-22 NOTE — HISTORICAL OFFICE NOTE
Dmitriy Tay  : 1940  ACCOUNT:  [id number]  [phone]  PCP: Dr. Farhan Pride     TODAY'S DATE: 10/17/2011  DICTATED BY:  Real Santoro M.D.]    CHIEF COMPLAINT: [Followup of .  Heart failure, diastolic, Followup of Hypertension and benign normal rate and rhythm, clear to auscultation. MS: inadequate gait for exercise/testing. NEURO/PSYCH: alert and oriented to time, place and person and normal affect. CV: PALP: PMI not displaced, no lifts and thrills or rub.  AUSC:  regular rhythm, norm 08/19/10 Prevacid              30MG      1 by mouth daily                         08/19/10 Lyrica                50MG      1 by mouth daily                         02/22/10 Synthroid             100MCG    1 by mouth daily                         12/17/09

## 2017-08-22 NOTE — ED PROVIDER NOTES
Patient Seen in: [facility] AND [facility] Emergency Department    History   Patient presents with:  Nausea/Vomiting/Diarrhea (gastrointestinal)    Stated Complaint: nausea and vomiting x 3 wks.      HPI    Patient presents the emergency department complaining of needed for Pain. FUROSEMIDE 40 MG Oral Tab,  TAKE 1 TABLET BY MOUTH TWICE DAILY   ATORVASTATIN 40 MG Oral Tab,  TAKE 1 TABLET BY MOUTH DAILY   Diclofenac Sodium (VOLTAREN) 1 % Transdermal Gel,  Apply 2 g topically 3 (three) times daily as needed.    omepr Alcohol use: Yes           1.2 oz/week     Glasses of wine: 2 per week     Comment: occasionally      Review of Systems    Positive for stated complaint: nausea and vomiting x 3 wks. Other systems are as noted in HPI.   Constitutional and vital signs All other components within normal limits   CBC W/ DIFFERENTIAL - Abnormal; Notable for the following:     Neutrophil Absolute 8.5 (*)     All other components within normal limits   TROPONIN I, 0 HOUR - Normal   HEPATIC FUNCTION PANEL (7) - Normal   CBC W patient including need for follow up  Discussed with Dr. Talia Pineda will evaluate the patient as well for irregular heart rhythm.     Disposition and Plan     Clinical Impression:  Nausea  (primary encounter diagnosis)  Dizziness  Card

## 2017-08-23 ENCOUNTER — APPOINTMENT (OUTPATIENT)
Dept: CV DIAGNOSTICS | Facility: HOSPITAL | Age: 77
DRG: 418 | End: 2017-08-23
Attending: HOSPITALIST
Payer: MEDICARE

## 2017-08-23 ENCOUNTER — APPOINTMENT (OUTPATIENT)
Dept: ULTRASOUND IMAGING | Facility: HOSPITAL | Age: 77
DRG: 418 | End: 2017-08-23
Attending: INTERNAL MEDICINE
Payer: MEDICARE

## 2017-08-23 ENCOUNTER — SURGERY (OUTPATIENT)
Age: 77
End: 2017-08-23

## 2017-08-23 LAB
ANION GAP SERPL CALC-SCNC: 9 MMOL/L (ref 0–18)
BASOPHILS # BLD: 0.1 K/UL (ref 0–0.2)
BASOPHILS NFR BLD: 1 %
BUN SERPL-MCNC: 12 MG/DL (ref 8–20)
BUN/CREAT SERPL: 10.2 (ref 10–20)
CALCIUM SERPL-MCNC: 9.5 MG/DL (ref 8.5–10.5)
CHLORIDE SERPL-SCNC: 100 MMOL/L (ref 95–110)
CO2 SERPL-SCNC: 30 MMOL/L (ref 22–32)
CREAT SERPL-MCNC: 1.18 MG/DL (ref 0.5–1.5)
EOSINOPHIL # BLD: 0.1 K/UL (ref 0–0.7)
EOSINOPHIL NFR BLD: 1 %
ERYTHROCYTE [DISTWIDTH] IN BLOOD BY AUTOMATED COUNT: 14.3 % (ref 11–15)
GLUCOSE BLDC GLUCOMTR-MCNC: 102 MG/DL (ref 70–99)
GLUCOSE BLDC GLUCOMTR-MCNC: 112 MG/DL (ref 70–99)
GLUCOSE BLDC GLUCOMTR-MCNC: 121 MG/DL (ref 70–99)
GLUCOSE BLDC GLUCOMTR-MCNC: 136 MG/DL (ref 70–99)
GLUCOSE SERPL-MCNC: 134 MG/DL (ref 70–99)
HBA1C MFR BLD: 7.2 % (ref 4–6)
HCT VFR BLD AUTO: 40.2 % (ref 35–48)
HGB BLD-MCNC: 13.2 G/DL (ref 12–16)
LYMPHOCYTES # BLD: 2.2 K/UL (ref 1–4)
LYMPHOCYTES NFR BLD: 23 %
MAGNESIUM SERPL-MCNC: 2 MG/DL (ref 1.8–2.5)
MCH RBC QN AUTO: 28.7 PG (ref 27–32)
MCHC RBC AUTO-ENTMCNC: 32.8 G/DL (ref 32–37)
MCV RBC AUTO: 87.4 FL (ref 80–100)
MONOCYTES # BLD: 0.6 K/UL (ref 0–1)
MONOCYTES NFR BLD: 7 %
NEUTROPHILS # BLD AUTO: 6.6 K/UL (ref 1.8–7.7)
NEUTROPHILS NFR BLD: 69 %
OSMOLALITY UR CALC.SUM OF ELEC: 290 MOSM/KG (ref 275–295)
PLATELET # BLD AUTO: 234 K/UL (ref 140–400)
PMV BLD AUTO: 9.3 FL (ref 7.4–10.3)
POTASSIUM SERPL-SCNC: 4.8 MMOL/L (ref 3.3–5.1)
RBC # BLD AUTO: 4.6 M/UL (ref 3.7–5.4)
SODIUM SERPL-SCNC: 139 MMOL/L (ref 136–144)
TSH SERPL-ACNC: 1.44 UIU/ML (ref 0.45–5.33)
WBC # BLD AUTO: 9.7 K/UL (ref 4–11)

## 2017-08-23 PROCEDURE — 76700 US EXAM ABDOM COMPLETE: CPT | Performed by: INTERNAL MEDICINE

## 2017-08-23 PROCEDURE — 93306 TTE W/DOPPLER COMPLETE: CPT | Performed by: HOSPITALIST

## 2017-08-23 PROCEDURE — 0DB68ZX EXCISION OF STOMACH, VIA NATURAL OR ARTIFICIAL OPENING ENDOSCOPIC, DIAGNOSTIC: ICD-10-PCS | Performed by: INTERNAL MEDICINE

## 2017-08-23 PROCEDURE — 99233 SBSQ HOSP IP/OBS HIGH 50: CPT | Performed by: HOSPITALIST

## 2017-08-23 RX ORDER — 0.9 % SODIUM CHLORIDE 0.9 %
VIAL (ML) INJECTION
Status: COMPLETED
Start: 2017-08-23 | End: 2017-08-23

## 2017-08-23 RX ORDER — PANTOPRAZOLE SODIUM 40 MG/1
40 TABLET, DELAYED RELEASE ORAL
Status: DISCONTINUED | OUTPATIENT
Start: 2017-08-23 | End: 2017-08-28

## 2017-08-23 RX ORDER — ENALAPRILAT 2.5 MG/2ML
1.25 INJECTION INTRAVENOUS EVERY 6 HOURS PRN
Status: DISCONTINUED | OUTPATIENT
Start: 2017-08-23 | End: 2017-08-28

## 2017-08-23 RX ORDER — MIDAZOLAM HYDROCHLORIDE 1 MG/ML
INJECTION INTRAMUSCULAR; INTRAVENOUS
Status: DISCONTINUED | OUTPATIENT
Start: 2017-08-23 | End: 2017-08-23 | Stop reason: HOSPADM

## 2017-08-23 RX ORDER — AMLODIPINE BESYLATE 5 MG/1
5 TABLET ORAL DAILY
Status: DISCONTINUED | OUTPATIENT
Start: 2017-08-23 | End: 2017-08-25

## 2017-08-23 RX ORDER — CETIRIZINE HYDROCHLORIDE 10 MG/1
5 TABLET ORAL DAILY
Status: DISCONTINUED | OUTPATIENT
Start: 2017-08-24 | End: 2017-08-28

## 2017-08-23 NOTE — H&P
[facility] Patient Status:  Observation    11/3/1940 MRN [id number]   Location [facility] ENDOSCOPY LAB SUITES Attending Yanci Mejias MD   Hosp Day # 0 PCP Rani Solis MD     Gt Neg        Allergies/Medications:    Allergies:   Compazine               Anaphylaxis    Prescriptions Prior to Admission:  POTASSIUM CHLORIDE ER 20 MEQ Oral Tab CR TAKE TWO TABLETS BY MOUTH TWICE DAILY   HYDROcodone-acetaminophen  MG Oral Tab Take 1 auscultation bilaterally. Cardiovascular: S1, S2 normal, no murmur, click, rub or gallop, regular rate and rhythm  Abdominal: normal findings.        Results:     Lab Results  Component Value Date   WBC 9.7 08/23/2017   HGB 13.2 08/23/2017   HCT 40.2 08/2

## 2017-08-23 NOTE — PLAN OF CARE
Patient had an abdominal US that shows cholelithiasis and findings to suggest chronic cholecystitis. Bile ducts slightly dilated as well. CONCLUSION:             1. Limited study. 2. Aorta obscured. 3. Fatty liver. 4. Cholelithiasis.   5. Thickened ga

## 2017-08-23 NOTE — PROGRESS NOTES
[facility] Pharmacy Note:  Renal Dose Adjustment for Cetirizine (ZYRTEC)    Pamela Davison has been prescribed Cetirizine (Zyrtec) 10 mg orally daily. Estimated Creatinine Clearance: 30 mL/min (based on SCr of 1.18 mg/dL).     Her calculated creatinine clearan

## 2017-08-23 NOTE — PLAN OF CARE
Problem: CARDIOVASCULAR - ADULT  Goal: Maintains optimal cardiac output and hemodynamic stability  INTERVENTIONS:  - Monitor vital signs, rhythm, and trends  - Monitor for bleeding, hypotension and signs of decreased cardiac output  - Evaluate effectivenes and optimal renal function maintained  INTERVENTIONS:  - Monitor labs and assess for signs and symptoms of volume excess or deficit  - Monitor intake, output and patient weight  - Monitor urine specific gravity, serum osmolarity and serum sodium as indicat

## 2017-08-23 NOTE — PROGRESS NOTES
[facility]    Progress Note    Anna Vaughn Patient Status:  Observation    11/3/1940 MRN [id number]   Location [facility] 3W/SW Attending Nithya Massey MD   Hosp Day # 0 PCP Angie Knight MD         Assessment and Plan lisinopril  20 mg Oral Daily   • allopurinol  300 mg Oral Daily   • atorvastatin  40 mg Oral Daily   • Levothyroxine Sodium  100 mcg Oral Daily             Results:     Lab Results  Component Value Date   WBC 9.7 08/23/2017   HGB 13.2 08/23/2017   HCT 40. 2 08/22/2017 at 14:27 by Tc Mota MD    Ekg 12-lead    Result Date: 8/22/2017  ECG Report  Interpretation  -------------------------- Sinus Rhythm - Poor R wave progression consider old anterior wall MI; may be normal variant or related to lead placem

## 2017-08-23 NOTE — OPERATIVE REPORT
[facility]    Esophagogastroduodenoscopy Report    Daniel Shepard Patient Status:  Observation    11/3/1940 MRN [id number]   Location [facility] ENDOSCOPY LAB SUITES Attending David Mancia MD      DATE OF OPERATION:

## 2017-08-23 NOTE — DIETARY NOTE
ADULT NUTRITION INITIAL ASSESSMENT    Pt is at moderate nutrition risk. Pt does not meet malnutrition criteria.       RECOMMENDATIONS TO MD: order follow up in [address] given hx of DM and morbid obesity     NUTRITION DIAGNOSIS/PROBLEM:  Terrence Sidhu is 39.06 kg/m².   BMI CLASSIFICATION: 35-39.9 kg/m2 - obesity class II  IBW: 100 lbs        200% IBW  Usual Body Wt: patient unsure    WEIGHT HISTORY:  Patient Weight(s) for the past 336 hrs:   Weight   08/23/17 0947 90.7 kg (200 lb)   08/23/17 0700 92.9 kg

## 2017-08-24 ENCOUNTER — APPOINTMENT (OUTPATIENT)
Dept: NUCLEAR MEDICINE | Facility: HOSPITAL | Age: 77
DRG: 418 | End: 2017-08-24
Attending: INTERNAL MEDICINE
Payer: MEDICARE

## 2017-08-24 LAB
ANION GAP SERPL CALC-SCNC: 7 MMOL/L (ref 0–18)
BUN SERPL-MCNC: 10 MG/DL (ref 8–20)
BUN/CREAT SERPL: 8.5 (ref 10–20)
CALCIUM SERPL-MCNC: 9.4 MG/DL (ref 8.5–10.5)
CHLORIDE SERPL-SCNC: 101 MMOL/L (ref 95–110)
CO2 SERPL-SCNC: 31 MMOL/L (ref 22–32)
CREAT SERPL-MCNC: 1.17 MG/DL (ref 0.5–1.5)
GLUCOSE BLDC GLUCOMTR-MCNC: 111 MG/DL (ref 70–99)
GLUCOSE BLDC GLUCOMTR-MCNC: 128 MG/DL (ref 70–99)
GLUCOSE BLDC GLUCOMTR-MCNC: 133 MG/DL (ref 70–99)
GLUCOSE BLDC GLUCOMTR-MCNC: 93 MG/DL (ref 70–99)
GLUCOSE SERPL-MCNC: 116 MG/DL (ref 70–99)
OSMOLALITY UR CALC.SUM OF ELEC: 288 MOSM/KG (ref 275–295)
POTASSIUM SERPL-SCNC: 3.5 MMOL/L (ref 3.3–5.1)
SODIUM SERPL-SCNC: 139 MMOL/L (ref 136–144)

## 2017-08-24 PROCEDURE — 78227 HEPATOBIL SYST IMAGE W/DRUG: CPT | Performed by: INTERNAL MEDICINE

## 2017-08-24 PROCEDURE — 99233 SBSQ HOSP IP/OBS HIGH 50: CPT | Performed by: HOSPITALIST

## 2017-08-24 RX ORDER — FUROSEMIDE 40 MG/1
40 TABLET ORAL 2 TIMES DAILY
Status: DISCONTINUED | OUTPATIENT
Start: 2017-08-25 | End: 2017-08-28

## 2017-08-24 RX ORDER — LORAZEPAM 2 MG/ML
1 INJECTION INTRAMUSCULAR ONCE
Status: COMPLETED | OUTPATIENT
Start: 2017-08-24 | End: 2017-08-24

## 2017-08-24 RX ORDER — METOCLOPRAMIDE HYDROCHLORIDE 5 MG/ML
10 INJECTION INTRAMUSCULAR; INTRAVENOUS EVERY 6 HOURS PRN
Status: DISCONTINUED | OUTPATIENT
Start: 2017-08-24 | End: 2017-08-27

## 2017-08-24 RX ORDER — POTASSIUM CHLORIDE 20 MEQ/1
40 TABLET, EXTENDED RELEASE ORAL EVERY 4 HOURS
Status: COMPLETED | OUTPATIENT
Start: 2017-08-24 | End: 2017-08-24

## 2017-08-24 NOTE — PROGRESS NOTES
[facility] Heart Cardiology  Progress Note    Jemaljosh Yap Patient Status:  Observation    11/3/1940 MRN [id number]   Location [facility] 3W/SW Attending Carlin Choi MD   Hosp Day # 0 ROXIE Murrell 08/22/2017   CRP 2.5 (H) 03/16/2016   MG 2.0 08/23/2017   TROP 0.01 08/22/2017       Wt Readings from Last 4 Encounters:  08/24/17 : 203 lb 8 oz (92.3 kg)  05/12/17 : 211 lb 3.2 oz (95.8 kg)  04/06/17 : 185 lb (83.9 kg)  02/01/17 : 204 lb (92.5 kg)      Us

## 2017-08-24 NOTE — CONSULTS
[facility] REPORT    Refugioelio Collins  ZPE:[birth year]  TZL:[id number]  LOS:0    Date of Admission:  8/22/2017  Date of Consult:  8/24/2017     Reason for Consultation: Nausea, cholelithiasis      History of Present Illness: TOTAL HIP REPLACEMENT  No date: TOTAL KNEE REPLACEMENT   Family History   Problem Relation Age of Onset   • Heart Disorder Father    • Heart Disorder Mother    • Heart Disease Sister    • Hypertension Brother    • Melanoma Other    • Cancer Other    • Janis Massey PRN  •  Levothyroxine Sodium (SYNTHROID) tab 100 mcg, 100 mcg, Oral, Daily  •  Ondansetron HCl (ZOFRAN) tab 4 mg, 4 mg, Oral, BID PRN  •  Senna (SENOKOT) tab TABS 17.2 mg, 17.2 mg, Oral, Daily PRN    Review of Systems:   Pertinent items are noted in HPI.  08/24/2017   CA 9.4 08/24/2017       Us Abdomen Complete (cpt=76700)    Result Date: 8/23/2017  CONCLUSION:  1. Limited study. 2. Aorta obscured. 3. Fatty liver. 4. Cholelithiasis. 5. Thickened gallbladder wall.  6. Mildly dilated common bile duct

## 2017-08-24 NOTE — PROGRESS NOTES
[facility]    GI Progress Note      Refugio Dennis Patient Status:  Observation    11/3/1940 MRN [id number]   Location [facility] 3W/SW Attending Checo Calhoun MD   Hosp Day # 0 PCP Farhan Pride MD          SUBJECTIVE:

## 2017-08-24 NOTE — PROGRESS NOTES
[facility]    Progress Note    Myron Fall Patient Status:  Observation    11/3/1940 MRN [id number]   Location [facility] 3W/SW Attending Sandy Alexandre MD   Hosp Day # 0 PCP Jenni Gold MD       Subjective:     Pt no demonstrating no radiographically evident acute intrathoracic process.          Ekg 12-lead    Result Date: 8/22/2017  ECG Report  Interpretation  -------------------------- Normal sinus rhythm with frequnt PAC's Poor R wave progression consider old anterio

## 2017-08-24 NOTE — PROGRESS NOTES
[facility]    Progress Note    Margot Correa Patient Status:  Observation    11/3/1940 MRN [id number]   Location [facility] 3W/SW Attending Rc Mueller MD   Hosp Day # 0 PCP Stacy Segura MD       Subjective:     Pt co lasix     Hypokalemia.    - replace per protocol     HTN  - cont norvasc, lasix, lisinopril, metoprolol     dvt proph:   heparin     Code status:   Full     >35 minutes spent     Ryan Zaidi MD  8/24/2017

## 2017-08-25 ENCOUNTER — ANESTHESIA EVENT (OUTPATIENT)
Dept: SURGERY | Facility: HOSPITAL | Age: 77
DRG: 418 | End: 2017-08-25
Payer: MEDICARE

## 2017-08-25 ENCOUNTER — APPOINTMENT (OUTPATIENT)
Dept: MRI IMAGING | Facility: HOSPITAL | Age: 77
DRG: 418 | End: 2017-08-25
Attending: SURGERY
Payer: MEDICARE

## 2017-08-25 ENCOUNTER — ANESTHESIA (OUTPATIENT)
Dept: SURGERY | Facility: HOSPITAL | Age: 77
DRG: 418 | End: 2017-08-25
Payer: MEDICARE

## 2017-08-25 ENCOUNTER — APPOINTMENT (OUTPATIENT)
Dept: GENERAL RADIOLOGY | Facility: HOSPITAL | Age: 77
DRG: 418 | End: 2017-08-25
Attending: SURGERY
Payer: MEDICARE

## 2017-08-25 ENCOUNTER — SURGERY (OUTPATIENT)
Age: 77
End: 2017-08-25

## 2017-08-25 LAB
ALBUMIN SERPL BCP-MCNC: 3.9 G/DL (ref 3.5–4.8)
ALP SERPL-CCNC: 77 U/L (ref 32–100)
ALT SERPL-CCNC: 26 U/L (ref 14–54)
ANION GAP SERPL CALC-SCNC: 6 MMOL/L (ref 0–18)
APTT PPP: 25.8 SECONDS (ref 23.2–35.3)
AST SERPL-CCNC: 22 U/L (ref 15–41)
BASOPHILS # BLD: 0.1 K/UL (ref 0–0.2)
BASOPHILS NFR BLD: 1 %
BILIRUB DIRECT SERPL-MCNC: 0.2 MG/DL (ref 0–0.2)
BILIRUB SERPL-MCNC: 1 MG/DL (ref 0.3–1.2)
BUN SERPL-MCNC: 12 MG/DL (ref 8–20)
BUN/CREAT SERPL: 10.2 (ref 10–20)
CALCIUM SERPL-MCNC: 10.1 MG/DL (ref 8.5–10.5)
CHLORIDE SERPL-SCNC: 101 MMOL/L (ref 95–110)
CO2 SERPL-SCNC: 33 MMOL/L (ref 22–32)
CREAT SERPL-MCNC: 1.18 MG/DL (ref 0.5–1.5)
EOSINOPHIL # BLD: 0.2 K/UL (ref 0–0.7)
EOSINOPHIL NFR BLD: 2 %
ERYTHROCYTE [DISTWIDTH] IN BLOOD BY AUTOMATED COUNT: 14.9 % (ref 11–15)
GLUCOSE BLDC GLUCOMTR-MCNC: 116 MG/DL (ref 70–99)
GLUCOSE BLDC GLUCOMTR-MCNC: 117 MG/DL (ref 70–99)
GLUCOSE BLDC GLUCOMTR-MCNC: 141 MG/DL (ref 70–99)
GLUCOSE BLDC GLUCOMTR-MCNC: 146 MG/DL (ref 70–99)
GLUCOSE SERPL-MCNC: 127 MG/DL (ref 70–99)
HCT VFR BLD AUTO: 44.6 % (ref 35–48)
HGB BLD-MCNC: 14.3 G/DL (ref 12–16)
INR BLD: 1 (ref 0.9–1.2)
LYMPHOCYTES # BLD: 2.4 K/UL (ref 1–4)
LYMPHOCYTES NFR BLD: 25 %
MAGNESIUM SERPL-MCNC: 1.9 MG/DL (ref 1.8–2.5)
MCH RBC QN AUTO: 28.2 PG (ref 27–32)
MCHC RBC AUTO-ENTMCNC: 32 G/DL (ref 32–37)
MCV RBC AUTO: 88.1 FL (ref 80–100)
MONOCYTES # BLD: 0.6 K/UL (ref 0–1)
MONOCYTES NFR BLD: 6 %
NEUTROPHILS # BLD AUTO: 6.4 K/UL (ref 1.8–7.7)
NEUTROPHILS NFR BLD: 67 %
OSMOLALITY UR CALC.SUM OF ELEC: 291 MOSM/KG (ref 275–295)
PHOSPHATE SERPL-MCNC: 3.8 MG/DL (ref 2.4–4.7)
PLATELET # BLD AUTO: 237 K/UL (ref 140–400)
PMV BLD AUTO: 9.9 FL (ref 7.4–10.3)
POTASSIUM SERPL-SCNC: 4.5 MMOL/L (ref 3.3–5.1)
PROT SERPL-MCNC: 7.1 G/DL (ref 5.9–8.4)
PROTHROMBIN TIME: 12.9 SECONDS (ref 11.8–14.5)
RBC # BLD AUTO: 5.06 M/UL (ref 3.7–5.4)
SODIUM SERPL-SCNC: 140 MMOL/L (ref 136–144)
WBC # BLD AUTO: 9.6 K/UL (ref 4–11)

## 2017-08-25 PROCEDURE — 99233 SBSQ HOSP IP/OBS HIGH 50: CPT | Performed by: HOSPITALIST

## 2017-08-25 PROCEDURE — BF101ZZ FLUOROSCOPY OF BILE DUCTS USING LOW OSMOLAR CONTRAST: ICD-10-PCS | Performed by: SURGERY

## 2017-08-25 PROCEDURE — 0FT44ZZ RESECTION OF GALLBLADDER, PERCUTANEOUS ENDOSCOPIC APPROACH: ICD-10-PCS | Performed by: SURGERY

## 2017-08-25 PROCEDURE — 74300 X-RAY BILE DUCTS/PANCREAS: CPT | Performed by: SURGERY

## 2017-08-25 RX ORDER — ONDANSETRON 2 MG/ML
4 INJECTION INTRAMUSCULAR; INTRAVENOUS ONCE AS NEEDED
Status: DISCONTINUED | OUTPATIENT
Start: 2017-08-25 | End: 2017-08-25 | Stop reason: HOSPADM

## 2017-08-25 RX ORDER — SODIUM CHLORIDE, SODIUM LACTATE, POTASSIUM CHLORIDE, CALCIUM CHLORIDE 600; 310; 30; 20 MG/100ML; MG/100ML; MG/100ML; MG/100ML
INJECTION, SOLUTION INTRAVENOUS CONTINUOUS PRN
Status: DISCONTINUED | OUTPATIENT
Start: 2017-08-25 | End: 2017-08-25 | Stop reason: SURG

## 2017-08-25 RX ORDER — HYDROCODONE BITARTRATE AND ACETAMINOPHEN 7.5; 325 MG/1; MG/1
2 TABLET ORAL EVERY 4 HOURS PRN
Status: DISCONTINUED | OUTPATIENT
Start: 2017-08-25 | End: 2017-08-28

## 2017-08-25 RX ORDER — MORPHINE SULFATE 2 MG/ML
2 INJECTION, SOLUTION INTRAMUSCULAR; INTRAVENOUS EVERY 10 MIN PRN
Status: DISCONTINUED | OUTPATIENT
Start: 2017-08-25 | End: 2017-08-25 | Stop reason: HOSPADM

## 2017-08-25 RX ORDER — NEOSTIGMINE METHYLSULFATE 0.5 MG/ML
INJECTION INTRAVENOUS AS NEEDED
Status: DISCONTINUED | OUTPATIENT
Start: 2017-08-25 | End: 2017-08-25 | Stop reason: SURG

## 2017-08-25 RX ORDER — HYDROMORPHONE HYDROCHLORIDE 1 MG/ML
0.8 INJECTION, SOLUTION INTRAMUSCULAR; INTRAVENOUS; SUBCUTANEOUS EVERY 2 HOUR PRN
Status: DISCONTINUED | OUTPATIENT
Start: 2017-08-25 | End: 2017-08-26

## 2017-08-25 RX ORDER — HEPARIN SODIUM 5000 [USP'U]/ML
5000 INJECTION, SOLUTION INTRAVENOUS; SUBCUTANEOUS EVERY 8 HOURS SCHEDULED
Status: DISCONTINUED | OUTPATIENT
Start: 2017-08-25 | End: 2017-08-28

## 2017-08-25 RX ORDER — SCOLOPAMINE TRANSDERMAL SYSTEM 1 MG/1
1 PATCH, EXTENDED RELEASE TRANSDERMAL
Status: DISCONTINUED | OUTPATIENT
Start: 2017-08-25 | End: 2017-08-28

## 2017-08-25 RX ORDER — SODIUM CHLORIDE AND POTASSIUM CHLORIDE .9; .15 G/100ML; G/100ML
SOLUTION INTRAVENOUS CONTINUOUS
Status: DISCONTINUED | OUTPATIENT
Start: 2017-08-25 | End: 2017-08-26

## 2017-08-25 RX ORDER — NALOXONE HYDROCHLORIDE 0.4 MG/ML
80 INJECTION, SOLUTION INTRAMUSCULAR; INTRAVENOUS; SUBCUTANEOUS AS NEEDED
Status: DISCONTINUED | OUTPATIENT
Start: 2017-08-25 | End: 2017-08-25 | Stop reason: HOSPADM

## 2017-08-25 RX ORDER — GLYCOPYRROLATE 0.2 MG/ML
INJECTION INTRAMUSCULAR; INTRAVENOUS AS NEEDED
Status: DISCONTINUED | OUTPATIENT
Start: 2017-08-25 | End: 2017-08-25 | Stop reason: SURG

## 2017-08-25 RX ORDER — BUPIVACAINE HYDROCHLORIDE AND EPINEPHRINE 2.5; 5 MG/ML; UG/ML
INJECTION, SOLUTION INFILTRATION; PERINEURAL AS NEEDED
Status: DISCONTINUED | OUTPATIENT
Start: 2017-08-25 | End: 2017-08-25 | Stop reason: HOSPADM

## 2017-08-25 RX ORDER — SODIUM CHLORIDE, SODIUM LACTATE, POTASSIUM CHLORIDE, CALCIUM CHLORIDE 600; 310; 30; 20 MG/100ML; MG/100ML; MG/100ML; MG/100ML
INJECTION, SOLUTION INTRAVENOUS CONTINUOUS
Status: DISCONTINUED | OUTPATIENT
Start: 2017-08-25 | End: 2017-08-25

## 2017-08-25 RX ORDER — HYDROMORPHONE HYDROCHLORIDE 1 MG/ML
0.6 INJECTION, SOLUTION INTRAMUSCULAR; INTRAVENOUS; SUBCUTANEOUS EVERY 5 MIN PRN
Status: DISCONTINUED | OUTPATIENT
Start: 2017-08-25 | End: 2017-08-25 | Stop reason: HOSPADM

## 2017-08-25 RX ORDER — HEPARIN SODIUM 5000 [USP'U]/ML
5000 INJECTION, SOLUTION INTRAVENOUS; SUBCUTANEOUS EVERY 12 HOURS SCHEDULED
Status: DISCONTINUED | OUTPATIENT
Start: 2017-08-25 | End: 2017-08-25

## 2017-08-25 RX ORDER — DEXAMETHASONE SODIUM PHOSPHATE 4 MG/ML
VIAL (ML) INJECTION AS NEEDED
Status: DISCONTINUED | OUTPATIENT
Start: 2017-08-25 | End: 2017-08-25 | Stop reason: SURG

## 2017-08-25 RX ORDER — ONDANSETRON 2 MG/ML
INJECTION INTRAMUSCULAR; INTRAVENOUS AS NEEDED
Status: DISCONTINUED | OUTPATIENT
Start: 2017-08-25 | End: 2017-08-25 | Stop reason: SURG

## 2017-08-25 RX ORDER — ROCURONIUM BROMIDE 10 MG/ML
INJECTION, SOLUTION INTRAVENOUS AS NEEDED
Status: DISCONTINUED | OUTPATIENT
Start: 2017-08-25 | End: 2017-08-25 | Stop reason: SURG

## 2017-08-25 RX ORDER — HYDROCODONE BITARTRATE AND ACETAMINOPHEN 7.5; 325 MG/1; MG/1
1 TABLET ORAL EVERY 4 HOURS PRN
Status: DISCONTINUED | OUTPATIENT
Start: 2017-08-25 | End: 2017-08-28

## 2017-08-25 RX ORDER — HYDROMORPHONE HYDROCHLORIDE 1 MG/ML
0.4 INJECTION, SOLUTION INTRAMUSCULAR; INTRAVENOUS; SUBCUTANEOUS EVERY 5 MIN PRN
Status: DISCONTINUED | OUTPATIENT
Start: 2017-08-25 | End: 2017-08-25 | Stop reason: HOSPADM

## 2017-08-25 RX ORDER — MORPHINE SULFATE 4 MG/ML
4 INJECTION, SOLUTION INTRAMUSCULAR; INTRAVENOUS EVERY 10 MIN PRN
Status: DISCONTINUED | OUTPATIENT
Start: 2017-08-25 | End: 2017-08-25 | Stop reason: HOSPADM

## 2017-08-25 RX ORDER — ACETAMINOPHEN 325 MG/1
650 TABLET ORAL EVERY 4 HOURS PRN
Status: DISCONTINUED | OUTPATIENT
Start: 2017-08-25 | End: 2017-08-28

## 2017-08-25 RX ORDER — HYDROCODONE BITARTRATE AND ACETAMINOPHEN 5; 325 MG/1; MG/1
1 TABLET ORAL AS NEEDED
Status: DISCONTINUED | OUTPATIENT
Start: 2017-08-25 | End: 2017-08-25 | Stop reason: HOSPADM

## 2017-08-25 RX ORDER — LIDOCAINE HYDROCHLORIDE 10 MG/ML
INJECTION, SOLUTION EPIDURAL; INFILTRATION; INTRACAUDAL; PERINEURAL AS NEEDED
Status: DISCONTINUED | OUTPATIENT
Start: 2017-08-25 | End: 2017-08-25 | Stop reason: SURG

## 2017-08-25 RX ORDER — HYDROCODONE BITARTRATE AND ACETAMINOPHEN 5; 325 MG/1; MG/1
2 TABLET ORAL AS NEEDED
Status: DISCONTINUED | OUTPATIENT
Start: 2017-08-25 | End: 2017-08-25 | Stop reason: HOSPADM

## 2017-08-25 RX ORDER — HYDROMORPHONE HYDROCHLORIDE 1 MG/ML
0.2 INJECTION, SOLUTION INTRAMUSCULAR; INTRAVENOUS; SUBCUTANEOUS EVERY 5 MIN PRN
Status: DISCONTINUED | OUTPATIENT
Start: 2017-08-25 | End: 2017-08-25 | Stop reason: HOSPADM

## 2017-08-25 RX ORDER — HYDROMORPHONE HYDROCHLORIDE 1 MG/ML
1.2 INJECTION, SOLUTION INTRAMUSCULAR; INTRAVENOUS; SUBCUTANEOUS EVERY 2 HOUR PRN
Status: DISCONTINUED | OUTPATIENT
Start: 2017-08-25 | End: 2017-08-26

## 2017-08-25 RX ORDER — ONDANSETRON 2 MG/ML
4 INJECTION INTRAMUSCULAR; INTRAVENOUS EVERY 4 HOURS PRN
Status: DISCONTINUED | OUTPATIENT
Start: 2017-08-25 | End: 2017-08-28

## 2017-08-25 RX ORDER — MORPHINE SULFATE 4 MG/ML
6 INJECTION, SOLUTION INTRAMUSCULAR; INTRAVENOUS EVERY 10 MIN PRN
Status: DISCONTINUED | OUTPATIENT
Start: 2017-08-25 | End: 2017-08-25 | Stop reason: HOSPADM

## 2017-08-25 RX ORDER — HYDROMORPHONE HYDROCHLORIDE 1 MG/ML
0.4 INJECTION, SOLUTION INTRAMUSCULAR; INTRAVENOUS; SUBCUTANEOUS EVERY 2 HOUR PRN
Status: DISCONTINUED | OUTPATIENT
Start: 2017-08-25 | End: 2017-08-26

## 2017-08-25 RX ADMIN — GLYCOPYRROLATE 0.6 MG: 0.2 INJECTION INTRAMUSCULAR; INTRAVENOUS at 15:16:00

## 2017-08-25 RX ADMIN — SODIUM CHLORIDE, SODIUM LACTATE, POTASSIUM CHLORIDE, CALCIUM CHLORIDE: 600; 310; 30; 20 INJECTION, SOLUTION INTRAVENOUS at 13:53:00

## 2017-08-25 RX ADMIN — ONDANSETRON 4 MG: 2 INJECTION INTRAMUSCULAR; INTRAVENOUS at 14:02:00

## 2017-08-25 RX ADMIN — NEOSTIGMINE METHYLSULFATE 4 MG: 0.5 INJECTION INTRAVENOUS at 15:16:00

## 2017-08-25 RX ADMIN — DEXAMETHASONE SODIUM PHOSPHATE 4 MG: 4 MG/ML VIAL (ML) INJECTION at 14:02:00

## 2017-08-25 RX ADMIN — ROCURONIUM BROMIDE 40 MG: 10 INJECTION, SOLUTION INTRAVENOUS at 13:58:00

## 2017-08-25 RX ADMIN — SODIUM CHLORIDE, SODIUM LACTATE, POTASSIUM CHLORIDE, CALCIUM CHLORIDE: 600; 310; 30; 20 INJECTION, SOLUTION INTRAVENOUS at 15:22:00

## 2017-08-25 RX ADMIN — LIDOCAINE HYDROCHLORIDE 50 MG: 10 INJECTION, SOLUTION EPIDURAL; INFILTRATION; INTRACAUDAL; PERINEURAL at 13:55:00

## 2017-08-25 NOTE — PROGRESS NOTES
[facility]OSP - [facility]    GI Progress Note      Richard Allen Patient Status:  Observation    11/3/1940 MRN [id number]   Location [facility] 3W/SW Attending Boom Hedrick MD   Hosp Day # 0 PCP Adrián Aragon MD          SUBJECTIVE: secondary to the above    PLAN:    Agree with Abe Gillespie with IOC per Dr. Jeanette Michelle  Need cardiology clearance  Will follow peripherally.  Please contact me if further assistance needed    Stefania Mondragon MD  8/25/2017  10:22 AM

## 2017-08-25 NOTE — PROGRESS NOTES
08/25/17 1044   Clinical Encounter Type   Visited With Patient   Routine Visit Introduction   Referral From Nurse   Referral To    Patient Spiritual Encounters   Spiritual Assessment Completed 1   Spiritual Interventions exploration and affirmat

## 2017-08-25 NOTE — OPERATIVE REPORT
OPERATIVE REPORT:     PATIENT NAME: Chani Everett  : 11/3/1940    [facility]: [id number]     DATE OF OPERATION:   17    PREOPERATIVE DIAGNOSIS: Chronic Cholecystitis, Cholelithiasis      POSTOPERATIVE DIAGNOSIS: Chronic Cholecystitis, Cholelithiasis markedly dilated CBD but with flow into the duodenum. The patient had spine hardware that obscured the cholangiogram but no filling defects were noted. The cholangiogram catheter was advanced into the duodenum to ensure no obstruction to flow of bile.   The

## 2017-08-25 NOTE — PROGRESS NOTES
[facility]    Progress Note    Jose Maria Buchanan Patient Status:  Observation    11/3/1940 MRN [id number]   Location [facility] 3W/SW Attending Kenneth Jaime MD   Hosp Day # 0 PCP Chandrakant Marin MD       Subjective:     Pt c/ ultrasound showed gallstones. Cholelithiasis and chronic cholecystitis likely causing above. - HIDA scan is negative  - pt refused MRCP  - discussed with general surgery on consult. Pt to go for lap anatoliy today.   - cont scopolamine patch  - GI on consu

## 2017-08-25 NOTE — PROGRESS NOTES
[facility]    General Surgery Progress Note  Chani Everett  VXB:[id number]  HD# 0       Subjective:   Complains of nausea, denies abdominal pain and vomiting , poor appetite  Passing flatus and BM(s)      Exam:     General: awake and alert Lab  08/23/17   0505  08/24/17   0522  08/25/17   0534   GLU  134*  116*  127*   BUN  12  10  12   CREATSERUM  1.18  1.17  1.18   GFRAA  54*  55*  54*   GFRNAA  45*  45*  45*   CA  9.5  9.4  10.1   NA  139  139  140   K  4.8  3.5  4.5   CL  100  101  101

## 2017-08-25 NOTE — ANESTHESIA PREPROCEDURE EVALUATION
Anesthesia PreOp Note    HPI:     Elena Swanson is a 78year old female who presents for preoperative consultation requested by: Michelle Abel MD    Date of Surgery: 8/22/2017 - 8/25/2017    Procedure(s):  LAPAROSCOPIC CHOLECYSTECTOMY  Indication: A site    • Other and unspecified hyperlipidemia    • PONV (postoperative nausea and vomiting)    • Renal insufficiency    • Scoliosis    • SEASONAL ALLERGIES    • Type II or unspecified type diabetes mellitus without mention of complication, not stated as u Wednesday and Friday (Patient taking differently: 2.5 mg    #40  Sig-1 tab PO on  Monday, Wednesday and Friday if needed ) Disp: 40 tablet Rfl: 3 Taking   Ondansetron HCl (ZOFRAN) 4 mg tablet Take 1 tablet (4 mg total) by mouth 2 (two) times daily as neede 1186   [MAR Hold] furosemide (LASIX) tab 40 mg 40 mg Oral BID CHRIS Slater 40 mg at 08/25/17 1022   [MAR Hold] enalaprilat (VASOTEC) injection 1.25 mg 1.25 mg Intravenous Q6H PRN Yanci Mejias MD 1.25 mg at 08/24/17 0650   Pantoprazole Sodium (DC Problem Relation Age of Onset   • Heart Disorder Father    • Heart Disorder Mother    • Heart Disease Sister    • Hypertension Brother    • Melanoma Other    • Cancer Other    • Stroke Other    • Heart Disease Other    • Diabetes Other    • Eye Problems Temp: 97.7 °F (36.5 °C)  98 °F (36.7 °C) 97.8 °F (36.6 °C)   TempSrc: Oral  Oral Oral   SpO2: 95%  96% 96%   Weight:  90.8 kg (200 lb 2 oz)     Height:            Anesthesia ROS/Med Hx and Physical Exam     Patient summary reviewed and Nursing notes revi

## 2017-08-25 NOTE — ANESTHESIA POSTPROCEDURE EVALUATION
Patient:  Jackie Garrison    Procedure Summary     Date:  08/25/17 Room / Location:  [facility] 08 / [facility]    Anesthesia Start:  1345 Anesthesia Stop:      Procedure:  LAPAROSCOPIC CHOLECYSTECTOMY (N/A ) Diagnosis:       Acute cholecystitis      (chr

## 2017-08-25 NOTE — PLAN OF CARE
Patient/Family Goals    • Patient/Family Short Term Goal Not Progressing          CARDIOVASCULAR - ADULT    • Maintains optimal cardiac output and hemodynamic stability Progressing    • Absence of cardiac arrhythmias or at baseline Progressing        Diabe

## 2017-08-25 NOTE — PROGRESS NOTES
[facility]    Progress Note    Pamela Davison Patient Status:  Observation    11/3/1940 MRN [id number]   Location [facility] 3W/SW Attending Sharon Doyle MD   Hosp Day # 0 PCP Hugo Topete MD         Assessment and Plan Besylate  5 mg Oral Daily   • Insulin Aspart Pen  1-5 Units Subcutaneous TID CC   • metoprolol Tartrate  12.5 mg Oral 2x Daily(Beta Blocker)   • lisinopril  20 mg Oral Daily   • allopurinol  300 mg Oral Daily   • atorvastatin  40 mg Oral Daily   • Levothyr

## 2017-08-26 LAB
ANION GAP SERPL CALC-SCNC: 9 MMOL/L (ref 0–18)
BASOPHILS # BLD: 0.1 K/UL (ref 0–0.2)
BASOPHILS NFR BLD: 1 %
BUN SERPL-MCNC: 12 MG/DL (ref 8–20)
BUN/CREAT SERPL: 10.5 (ref 10–20)
CALCIUM SERPL-MCNC: 9.2 MG/DL (ref 8.5–10.5)
CHLORIDE SERPL-SCNC: 100 MMOL/L (ref 95–110)
CO2 SERPL-SCNC: 30 MMOL/L (ref 22–32)
CREAT SERPL-MCNC: 1.14 MG/DL (ref 0.5–1.5)
EOSINOPHIL # BLD: 0 K/UL (ref 0–0.7)
EOSINOPHIL NFR BLD: 0 %
ERYTHROCYTE [DISTWIDTH] IN BLOOD BY AUTOMATED COUNT: 14.4 % (ref 11–15)
GLUCOSE BLDC GLUCOMTR-MCNC: 118 MG/DL (ref 70–99)
GLUCOSE BLDC GLUCOMTR-MCNC: 127 MG/DL (ref 70–99)
GLUCOSE BLDC GLUCOMTR-MCNC: 129 MG/DL (ref 70–99)
GLUCOSE BLDC GLUCOMTR-MCNC: 97 MG/DL (ref 70–99)
GLUCOSE SERPL-MCNC: 100 MG/DL (ref 70–99)
HCT VFR BLD AUTO: 38.4 % (ref 35–48)
HGB BLD-MCNC: 12.6 G/DL (ref 12–16)
LYMPHOCYTES # BLD: 2 K/UL (ref 1–4)
LYMPHOCYTES NFR BLD: 18 %
MAGNESIUM SERPL-MCNC: 1.7 MG/DL (ref 1.8–2.5)
MCH RBC QN AUTO: 28.5 PG (ref 27–32)
MCHC RBC AUTO-ENTMCNC: 32.9 G/DL (ref 32–37)
MCV RBC AUTO: 86.5 FL (ref 80–100)
MONOCYTES # BLD: 0.6 K/UL (ref 0–1)
MONOCYTES NFR BLD: 6 %
NEUTROPHILS # BLD AUTO: 8.3 K/UL (ref 1.8–7.7)
NEUTROPHILS NFR BLD: 76 %
OSMOLALITY UR CALC.SUM OF ELEC: 288 MOSM/KG (ref 275–295)
PHOSPHATE SERPL-MCNC: 5.2 MG/DL (ref 2.4–4.7)
PLATELET # BLD AUTO: 210 K/UL (ref 140–400)
PMV BLD AUTO: 9.3 FL (ref 7.4–10.3)
POTASSIUM SERPL-SCNC: 3.3 MMOL/L (ref 3.3–5.1)
RBC # BLD AUTO: 4.44 M/UL (ref 3.7–5.4)
SODIUM SERPL-SCNC: 139 MMOL/L (ref 136–144)
WBC # BLD AUTO: 11.1 K/UL (ref 4–11)

## 2017-08-26 PROCEDURE — 99233 SBSQ HOSP IP/OBS HIGH 50: CPT | Performed by: HOSPITALIST

## 2017-08-26 RX ORDER — POTASSIUM CHLORIDE 20 MEQ/1
40 TABLET, EXTENDED RELEASE ORAL EVERY 4 HOURS
Status: COMPLETED | OUTPATIENT
Start: 2017-08-26 | End: 2017-08-26

## 2017-08-26 RX ORDER — MAGNESIUM OXIDE 400 MG (241.3 MG MAGNESIUM) TABLET
400 TABLET ONCE
Status: COMPLETED | OUTPATIENT
Start: 2017-08-26 | End: 2017-08-26

## 2017-08-26 RX ORDER — MAGNESIUM SULFATE HEPTAHYDRATE 40 MG/ML
2 INJECTION, SOLUTION INTRAVENOUS ONCE
Status: DISCONTINUED | OUTPATIENT
Start: 2017-08-26 | End: 2017-08-26

## 2017-08-26 RX ORDER — 0.9 % SODIUM CHLORIDE 0.9 %
VIAL (ML) INJECTION
Status: DISPENSED
Start: 2017-08-26 | End: 2017-08-27

## 2017-08-26 RX ORDER — DIAZEPAM 5 MG/1
5 TABLET ORAL EVERY 6 HOURS PRN
Status: DISCONTINUED | OUTPATIENT
Start: 2017-08-26 | End: 2017-08-28

## 2017-08-26 RX ORDER — ZOLPIDEM TARTRATE 5 MG/1
5 TABLET ORAL NIGHTLY PRN
Status: DISCONTINUED | OUTPATIENT
Start: 2017-08-26 | End: 2017-08-28

## 2017-08-26 RX ORDER — ONDANSETRON 4 MG/1
4 TABLET, ORALLY DISINTEGRATING ORAL EVERY 6 HOURS PRN
Status: DISCONTINUED | OUTPATIENT
Start: 2017-08-26 | End: 2017-08-28

## 2017-08-26 NOTE — PROGRESS NOTES
Cardio  Pt seen  Tolerated surgery well  On po metoprolol. bp a little labile but ok.  Rhythm stable.; will follow

## 2017-08-26 NOTE — PROGRESS NOTES
[place] [facility] - [facility]    Progress Note    Willkushalan Gamma Patient Status:  Inpatient    11/3/1940 MRN [id number]   Location [facility] 3W/SW Attending Holly Nettles MD   Hosp Day # 1 PCP Davida Olivarez MD            Subjective:     Pt CO2  31  33*  30   ALKPHO   --   77   --    AST   --   22   --    ALT   --   26   --    BILT   --   1.0   --    TP   --   7.1   --              Xr Oper Cholangiogram (cpt=74300)    Result Date: 8/25/2017  CONCLUSION:  1.  Dilatation of the common bile grady

## 2017-08-26 NOTE — PROGRESS NOTES
[facility]    Progress Note    Chani Everett Patient Status:  Inpatient    11/3/1940 MRN [id number]   Location [facility] 3W/SW Attending Brook Samuel MD   Hosp Day # 1 PCP Onesimo Patrick MD       Subjective:     Pt stil Mobilize pt. - cont scopolamine patch  - GI was on consult  - stop IVF  - cardiology on consult  - cont protonix  - zofran and reglan prn. Try zofran ODT.     Abnormal rhythm.  PACs.     - cardiology on consult  - cont metoprolol  - echo with EF of 50-55

## 2017-08-26 NOTE — PHYSICAL THERAPY NOTE
PHYSICAL THERAPY EVALUATION - INPATIENT     Room Number: 502/640-X  Evaluation Date: 8/26/2017  Type of Evaluation: Initial  Physician Order: PT Eval and Treat    Presenting Problem: Nausea, Vomitting, Cholelithiasis   Reason for Therapy: Mobility Dysf diverticulosis    • Congestive heart disease ([facility] [address]    • Disorder of thyroid    • Esophageal reflux    • Fatigue    • High blood pressure    • HYPOTHYROIDISM    • Iron deficiency anemia    • Osteoarthrosis, unspecified whether generalized or localized, unsp Sitting: Good  Static Standing: Fair -  Dynamic Standing: Poor +    ADDITIONAL TESTS  Additional Tests: None                                 NEUROLOGICAL FINDINGS  Neurological Findings: None                   ACTIVITY TOLERANCE  O2 Saturation: 96%  Room a Stand at assistance level: modified independent with none     Goal #2  Current Status    Goal #3 Patient is able to ambulate 400 feet with assist device: walker - rolling at assistance level: modified independent   Goal #3   Current Status    Goal #4 Humberto

## 2017-08-27 LAB
ANION GAP SERPL CALC-SCNC: 9 MMOL/L (ref 0–18)
BASOPHILS # BLD: 0.1 K/UL (ref 0–0.2)
BASOPHILS NFR BLD: 1 %
BUN SERPL-MCNC: 12 MG/DL (ref 8–20)
BUN/CREAT SERPL: 9.2 (ref 10–20)
CALCIUM SERPL-MCNC: 9.6 MG/DL (ref 8.5–10.5)
CHLORIDE SERPL-SCNC: 100 MMOL/L (ref 95–110)
CO2 SERPL-SCNC: 30 MMOL/L (ref 22–32)
CREAT SERPL-MCNC: 1.31 MG/DL (ref 0.5–1.5)
EOSINOPHIL # BLD: 0.2 K/UL (ref 0–0.7)
EOSINOPHIL NFR BLD: 3 %
ERYTHROCYTE [DISTWIDTH] IN BLOOD BY AUTOMATED COUNT: 14.8 % (ref 11–15)
GLUCOSE BLDC GLUCOMTR-MCNC: 105 MG/DL (ref 70–99)
GLUCOSE BLDC GLUCOMTR-MCNC: 125 MG/DL (ref 70–99)
GLUCOSE BLDC GLUCOMTR-MCNC: 130 MG/DL (ref 70–99)
GLUCOSE BLDC GLUCOMTR-MCNC: 136 MG/DL (ref 70–99)
GLUCOSE SERPL-MCNC: 105 MG/DL (ref 70–99)
HCT VFR BLD AUTO: 40.4 % (ref 35–48)
HGB BLD-MCNC: 13.2 G/DL (ref 12–16)
LYMPHOCYTES # BLD: 2.3 K/UL (ref 1–4)
LYMPHOCYTES NFR BLD: 27 %
MAGNESIUM SERPL-MCNC: 1.9 MG/DL (ref 1.8–2.5)
MCH RBC QN AUTO: 28.5 PG (ref 27–32)
MCHC RBC AUTO-ENTMCNC: 32.8 G/DL (ref 32–37)
MCV RBC AUTO: 86.8 FL (ref 80–100)
MONOCYTES # BLD: 0.5 K/UL (ref 0–1)
MONOCYTES NFR BLD: 6 %
NEUTROPHILS # BLD AUTO: 5.4 K/UL (ref 1.8–7.7)
NEUTROPHILS NFR BLD: 63 %
OSMOLALITY UR CALC.SUM OF ELEC: 288 MOSM/KG (ref 275–295)
PLATELET # BLD AUTO: 223 K/UL (ref 140–400)
PMV BLD AUTO: 9.3 FL (ref 7.4–10.3)
POTASSIUM SERPL-SCNC: 3.7 MMOL/L (ref 3.3–5.1)
POTASSIUM SERPL-SCNC: 3.7 MMOL/L (ref 3.3–5.1)
RBC # BLD AUTO: 4.65 M/UL (ref 3.7–5.4)
SODIUM SERPL-SCNC: 139 MMOL/L (ref 136–144)
WBC # BLD AUTO: 8.6 K/UL (ref 4–11)

## 2017-08-27 PROCEDURE — 99233 SBSQ HOSP IP/OBS HIGH 50: CPT | Performed by: HOSPITALIST

## 2017-08-27 RX ORDER — METHADONE HYDROCHLORIDE 10 MG/1
50 TABLET ORAL EVERY 8 HOURS PRN
Status: DISCONTINUED | OUTPATIENT
Start: 2017-08-27 | End: 2017-08-28

## 2017-08-27 RX ORDER — POTASSIUM CHLORIDE 20 MEQ/1
40 TABLET, EXTENDED RELEASE ORAL ONCE
Status: COMPLETED | OUTPATIENT
Start: 2017-08-27 | End: 2017-08-27

## 2017-08-27 RX ORDER — METOCLOPRAMIDE HYDROCHLORIDE 5 MG/ML
5 INJECTION INTRAMUSCULAR; INTRAVENOUS EVERY 6 HOURS PRN
Status: DISCONTINUED | OUTPATIENT
Start: 2017-08-27 | End: 2017-08-28

## 2017-08-27 NOTE — PROGRESS NOTES
[facility]    Cardiology Progress Note  [facility]    Lainey Mo Patient Status:  Inpatient    11/3/1940 MRN [id number]   Location [facility] 3W/SW Attending Cee Yadav, [address] [facility] Se Day # 2 PCP Michael 08/25/2017   TP 7.1 08/25/2017   AST 22 08/25/2017   ALT 26 08/25/2017   PTT 25.8 08/25/2017   INR 1.0 08/25/2017   TSH 1.44 08/23/2017   LIP 19 (L) 08/22/2017   CRP 2.5 (H) 03/16/2016   MG 1.9 08/27/2017   PHOS 5.2 (H) 08/26/2017   TROP 0.01 08/22/2017

## 2017-08-27 NOTE — PROGRESS NOTES
[facility]    Progress Note    Jorge Schneider Patient Status:  Inpatient    11/3/1940 MRN [id number]   Location [facility] 3W/SW Attending Samia Vázquez MD   Hosp Day # 2 PCP Mona Coy MD       Subjective:     Pt c/o echo with EF of 33-49%, grade 3 diastolic dysfunction     Chronic diastolic CHF  - cont po lasix  - home zaroxolyn on hold     Hypokalemia.    - replace per protocol     HTN  - cont norvasc, lasix, lisinopril, metoprolol     dvt proph:   heparin     Code st

## 2017-08-27 NOTE — PROGRESS NOTES
Pt tolerating prescribed diet, ate one half of roast beef sandwich, 1/4 cup of cottage cheese w/ peach and all liquids.

## 2017-08-27 NOTE — PROGRESS NOTES
[facility] Pharm[facility] Note:  Renal Dose Adjustment for Metoclopramide (REGLAN)    Richard Allen has been prescribed Metoclopramide (REGLAN) 10 mg every 6 hours as needed for nausea/vomiting.     Estimated Creatinine Clearance: 27.1 mL/min (based on SCr of 1.31 mg/

## 2017-08-27 NOTE — PLAN OF CARE
Patient tolerating prescribed diet, ate one banana, 1 half of grilled cheese sandwich and all liquids

## 2017-08-27 NOTE — PROGRESS NOTES
Patient expressed not wanting to go home due to living alone and will not have the same \"great care\" as she does while an in patient at [place].

## 2017-08-28 VITALS
TEMPERATURE: 97 F | SYSTOLIC BLOOD PRESSURE: 133 MMHG | RESPIRATION RATE: 18 BRPM | OXYGEN SATURATION: 96 % | HEIGHT: 60 IN | WEIGHT: 198 LBS | HEART RATE: 61 BPM | BODY MASS INDEX: 38.87 KG/M2 | DIASTOLIC BLOOD PRESSURE: 53 MMHG

## 2017-08-28 LAB
ANION GAP SERPL CALC-SCNC: 9 MMOL/L (ref 0–18)
BASOPHILS # BLD: 0.1 K/UL (ref 0–0.2)
BASOPHILS NFR BLD: 1 %
BUN SERPL-MCNC: 20 MG/DL (ref 8–20)
BUN/CREAT SERPL: 11.7 (ref 10–20)
CALCIUM SERPL-MCNC: 9.6 MG/DL (ref 8.5–10.5)
CHLORIDE SERPL-SCNC: 99 MMOL/L (ref 95–110)
CO2 SERPL-SCNC: 28 MMOL/L (ref 22–32)
CREAT SERPL-MCNC: 1.71 MG/DL (ref 0.5–1.5)
EOSINOPHIL # BLD: 0.3 K/UL (ref 0–0.7)
EOSINOPHIL NFR BLD: 4 %
ERYTHROCYTE [DISTWIDTH] IN BLOOD BY AUTOMATED COUNT: 15.1 % (ref 11–15)
GLUCOSE BLDC GLUCOMTR-MCNC: 128 MG/DL (ref 70–99)
GLUCOSE BLDC GLUCOMTR-MCNC: 337 MG/DL (ref 70–99)
GLUCOSE BLDC GLUCOMTR-MCNC: 95 MG/DL (ref 70–99)
GLUCOSE SERPL-MCNC: 113 MG/DL (ref 70–99)
HCT VFR BLD AUTO: 40.5 % (ref 35–48)
HGB BLD-MCNC: 13.1 G/DL (ref 12–16)
LYMPHOCYTES # BLD: 2.4 K/UL (ref 1–4)
LYMPHOCYTES NFR BLD: 30 %
MCH RBC QN AUTO: 28.8 PG (ref 27–32)
MCHC RBC AUTO-ENTMCNC: 32.2 G/DL (ref 32–37)
MCV RBC AUTO: 89.3 FL (ref 80–100)
MONOCYTES # BLD: 0.7 K/UL (ref 0–1)
MONOCYTES NFR BLD: 9 %
NEUTROPHILS # BLD AUTO: 4.6 K/UL (ref 1.8–7.7)
NEUTROPHILS NFR BLD: 56 %
OSMOLALITY UR CALC.SUM OF ELEC: 285 MOSM/KG (ref 275–295)
PLATELET # BLD AUTO: 194 K/UL (ref 140–400)
PMV BLD AUTO: 9.8 FL (ref 7.4–10.3)
POTASSIUM SERPL-SCNC: 3.9 MMOL/L (ref 3.3–5.1)
POTASSIUM SERPL-SCNC: 3.9 MMOL/L (ref 3.3–5.1)
RBC # BLD AUTO: 4.54 M/UL (ref 3.7–5.4)
SODIUM SERPL-SCNC: 136 MMOL/L (ref 136–144)
WBC # BLD AUTO: 8.1 K/UL (ref 4–11)

## 2017-08-28 PROCEDURE — 99239 HOSP IP/OBS DSCHRG MGMT >30: CPT | Performed by: HOSPITALIST

## 2017-08-28 RX ORDER — FUROSEMIDE 40 MG/1
TABLET ORAL
Qty: 180 TABLET | Refills: 3 | Status: ON HOLD | OUTPATIENT
Start: 2017-08-28 | End: 2017-09-12

## 2017-08-28 RX ORDER — ONDANSETRON 4 MG/1
4 TABLET, ORALLY DISINTEGRATING ORAL EVERY 6 HOURS PRN
Qty: 30 TABLET | Refills: 0 | Status: SHIPPED | OUTPATIENT
Start: 2017-08-28 | End: 2017-11-13

## 2017-08-28 RX ORDER — LISINOPRIL 20 MG/1
20 TABLET ORAL DAILY
Qty: 30 TABLET | Refills: 1 | Status: SHIPPED | OUTPATIENT
Start: 2017-08-28 | End: 2017-10-17

## 2017-08-28 RX ORDER — DIAZEPAM 5 MG/1
5 TABLET ORAL EVERY 6 HOURS PRN
Qty: 20 TABLET | Refills: 0 | Status: SHIPPED | OUTPATIENT
Start: 2017-08-28 | End: 2017-09-15

## 2017-08-28 RX ORDER — SCOLOPAMINE TRANSDERMAL SYSTEM 1 MG/1
1 PATCH, EXTENDED RELEASE TRANSDERMAL
Qty: 10 PATCH | Refills: 0 | Status: SHIPPED | OUTPATIENT
Start: 2017-08-28 | End: 2017-08-31

## 2017-08-28 RX ORDER — PANTOPRAZOLE SODIUM 40 MG/1
40 TABLET, DELAYED RELEASE ORAL
Qty: 30 TABLET | Refills: 0 | Status: SHIPPED | OUTPATIENT
Start: 2017-08-28 | End: 2017-11-17

## 2017-08-28 NOTE — PROGRESS NOTES
[facility]    Progress Note    Grace Messing Patient Status:  Inpatient    11/3/1940 MRN [id number]   Location [facility] 3W/SW Attending Kaleigh Cespedes MD   Hosp Day # 2 PCP Neto Karimi MD            Subjective:     Pt --    --    NA  140  139  139   K  4.5  3.3  3.7  3.7   CL  101  100  100   CO2  33*  30  30   ALKPHO  77   --    --    AST  22   --    --    ALT  26   --    --    BILT  1.0   --    --    TP  7.1   --    --                          Assessment and Plan:

## 2017-08-28 NOTE — PROGRESS NOTES
GI  PROGRESS NOTE    SUBJECTIVE: tolerating diet per staff. C/o nausea.      OBJECTIVE:  Temp:  [97.6 °F (36.4 °C)] 97.6 °F (36.4 °C)  Pulse:  [58-77] 58  Resp:  [18-20] 20  BP: (126-161)/(47-71) 161/71  Exam  Gen: No acute distress, alert and oriented x3 No objective evidence of organic pathology presently to explain persistent nausea except for gastritis noted at EGD. PLAN: 1.) Favor no further w/u presently as pt is tolerating cardiac diet.     2.) advised pt judicious use of anti-nausea meds -->

## 2017-08-28 NOTE — DISCHARGE PLANNING
8/2/17 CM Discharge planning / MDO for home health   Pt s/p reyes cope.  Referral made to [facility] for home RN and Horacio Stevenson  [phone]

## 2017-08-28 NOTE — PROGRESS NOTES
[facility]    Progress Note    Elena Swanson Patient Status:  Inpatient    11/3/1940 MRN [id number]   Location [facility] 3W/SW Attending Shawanda Campos, [address] [facility] Se Day # 3 PCP Steve Cruz MD         Assessment and Plan: CREATSERUM 1.71 (H) 08/28/2017   BUN 20 08/28/2017    08/28/2017   K 3.9 08/28/2017   K 3.9 08/28/2017   CL 99 08/28/2017   CO2 28 08/28/2017    (H) 08/28/2017   CA 9.6 08/28/2017   ALB 3.9 08/25/2017   ALKPHO 77 08/25/2017   BILT 1.0 08/25/

## 2017-08-28 NOTE — PROGRESS NOTES
[facility]    Progress Note    Michelle Jordan Patient Status:  Inpatient    11/3/1940 MRN [id number]   Location [facility] 3W/SW Attending Waldemar Sharma MD   Hosp Day # 3 PCP Jamil Barragan MD            Subjective:     Pt NA  139  139  136   K  3.3  3.7  3.7  3.9  3.9   CL  100  100  99   CO2  30  30  28                         Assessment and Plan:   Nausea    Pt post op lap choly  Still nauseated but improved   No complication of cholecystectomy    OK to d/c home from [facility]

## 2017-08-29 ENCOUNTER — TELEPHONE (OUTPATIENT)
Dept: INTERNAL MEDICINE UNIT | Facility: HOSPITAL | Age: 77
End: 2017-08-29

## 2017-08-29 ENCOUNTER — TELEPHONE (OUTPATIENT)
Dept: CARDIOLOGY UNIT | Facility: HOSPITAL | Age: 77
End: 2017-08-29

## 2017-08-29 NOTE — DISCHARGE SUMMARY
[place] [facility]    Discharge Summary    Layla Booth Patient Status:  Inpatient    11/3/1940 MRN [id number]   Location [facility] 3W/SW Attending No att. providers found   Hosp Day # 3 PCP Charmayne Applebaum, MD     Date of Admissi headache and lightheadedness. CT scan of the brain and chest x-ray revealed no acute findings. The patient was started on IV Protonix and she will be admitted to the hospital for further management.   Reviewing her EKG, there is a regular rhythm with some by mouth daily. Quantity:  30 tablet  Refills:  1     metoprolol Tartrate 25 MG Tabs  Commonly known as:  LOPRESSOR      Take 0.5 tablets (12.5 mg total) by mouth 2x Daily(Beta Blocker).    Quantity:  30 tablet  Refills:  1     ondansetron 4 MG Tbdp  Comm once daily. Quantity:  90 tablet  Refills:  3     Methadone HCl 10 MG Tabs  Commonly known as:  DOLOPHINE      Take 5 tablets (50 mg total) by mouth every 8 (eight) hours as needed for Pain.    Stop taking on:  9/14/2017  Quantity:  450 tablet  Refills: information:  [facility] [address] [place] [address]  [phone]             Rogelio Schofield MD In 2 weeks.     Specialty:  GASTROENTEROLOGY  Contact information:  [address] [facility]  [address] [place] [address] [phone]

## 2017-08-29 NOTE — PROGRESS NOTES
Patient was cleared for discharge per Dr. Sima Ascencio this afternoon.  Pt tolerated ambulation with 1 assist and her walker that she currently uses at home quite well with internittent periods of rest and she stated her \"knees felt weak at time

## 2017-08-29 NOTE — TELEPHONE ENCOUNTER
Pt discharged from [facility] on 8/28/17 . Please call to schedule follow up with Primary Care Physician.    Thanks

## 2017-08-29 NOTE — PROGRESS NOTES
Spoke with pt regarding test results as noted per Dr. Artur Estrada. Pt agreed. Pt verbalized understanding.

## 2017-08-30 ENCOUNTER — PATIENT OUTREACH (OUTPATIENT)
Dept: INTERNAL MEDICINE CLINIC | Facility: CLINIC | Age: 77
End: 2017-08-30

## 2017-08-30 ENCOUNTER — TELEPHONE (OUTPATIENT)
Dept: INTERNAL MEDICINE CLINIC | Facility: CLINIC | Age: 77
End: 2017-08-30

## 2017-08-30 NOTE — TELEPHONE ENCOUNTER
[facility] Re: Scopolamine 72hr patch. Placed in purple folder.                Tasked to [facility]

## 2017-08-30 NOTE — TELEPHONE ENCOUNTER
HH like to know if  will be signing orders for this pt. Like to add [facility] aid to help with showers. There is a medication interaction Potasium / scopolamine patch.   Methadone/ Zofran

## 2017-08-30 NOTE — PROGRESS NOTES
Called patient to initiate TCM encounter. Patient asked that she call our office back to review meds/questions and also to schedule a f/u hospital visit with PCP. Patient discharged 8/28/17.

## 2017-08-31 ENCOUNTER — LAB REQUISITION (OUTPATIENT)
Dept: LAB | Facility: HOSPITAL | Age: 77
End: 2017-08-31
Payer: MEDICARE

## 2017-08-31 DIAGNOSIS — Z48.815 ENCOUNTER FOR SURGICAL AFTERCARE FOLLOWING SURGERY OF DIGESTIVE SYSTEM: ICD-10-CM

## 2017-08-31 DIAGNOSIS — K81.0 ACUTE CHOLECYSTITIS: ICD-10-CM

## 2017-08-31 DIAGNOSIS — Z90.49 ACQUIRED ABSENCE OF OTHER SPECIFIED PARTS OF DIGESTIVE TRACT: ICD-10-CM

## 2017-08-31 LAB
ANION GAP SERPL CALC-SCNC: 10 MMOL/L (ref 0–18)
BUN SERPL-MCNC: 21 MG/DL (ref 8–20)
BUN/CREAT SERPL: 16.5 (ref 10–20)
CALCIUM SERPL-MCNC: 9.7 MG/DL (ref 8.5–10.5)
CHLORIDE SERPL-SCNC: 100 MMOL/L (ref 95–110)
CO2 SERPL-SCNC: 28 MMOL/L (ref 22–32)
CREAT SERPL-MCNC: 1.27 MG/DL (ref 0.5–1.5)
GLUCOSE SERPL-MCNC: 101 MG/DL (ref 70–99)
OSMOLALITY UR CALC.SUM OF ELEC: 289 MOSM/KG (ref 275–295)
POTASSIUM SERPL-SCNC: 4.2 MMOL/L (ref 3.3–5.1)
SODIUM SERPL-SCNC: 138 MMOL/L (ref 136–144)

## 2017-08-31 PROCEDURE — 80048 BASIC METABOLIC PNL TOTAL CA: CPT | Performed by: INTERNAL MEDICINE

## 2017-08-31 RX ORDER — SCOLOPAMINE TRANSDERMAL SYSTEM 1 MG/1
1 PATCH, EXTENDED RELEASE TRANSDERMAL
Qty: 10 PATCH | Refills: 0 | Status: ON HOLD | OUTPATIENT
Start: 2017-08-31 | End: 2017-09-12

## 2017-08-31 NOTE — TELEPHONE ENCOUNTER
Yelena / Sourav Rodas calling pt didn't receive script for Scopolamine 1 mg, please send to Gulshan Mcbride    Tasked to nursing

## 2017-08-31 NOTE — TELEPHONE ENCOUNTER
See additional 8/30/17 encounter - PA needed for scopolamine - this was relayed to [address] [place] [address] Tino Molina who will relay to [place].    To Rebekah Ricketts

## 2017-08-31 NOTE — TELEPHONE ENCOUNTER
Verified message with Lin Cunningham in [facility]. She verbalized understanding and states she will send orders in the next few days. To Dr. Henry Torres.

## 2017-08-31 NOTE — TELEPHONE ENCOUNTER
Telephone message left to home health that I will be signing the patient's home health orders as requested. I also told her that I think is fine for her to get home health aide for the patient.   I will route this to nursing the call the the nurse on Thurs

## 2017-08-31 NOTE — TELEPHONE ENCOUNTER
[phone] Yelena from [address] on Rx status.  Pt uses [facility]  I explained the PA process and they would like to be notified once the PA is complete  To Rx

## 2017-09-01 ENCOUNTER — TELEPHONE (OUTPATIENT)
Dept: INTERNAL MEDICINE CLINIC | Facility: CLINIC | Age: 77
End: 2017-09-01

## 2017-09-01 RX ORDER — FLUCONAZOLE 100 MG/1
100 TABLET ORAL DAILY
Qty: 1 TABLET | Refills: 0 | Status: ON HOLD | OUTPATIENT
Start: 2017-09-01 | End: 2017-09-12

## 2017-09-01 NOTE — TELEPHONE ENCOUNTER
PATIENT JUST CAME OUT OF THE HOSPITAL BUT NOTICED SHE IS NOW HAVING A YEAST INFECTION       SHE IS ASKING FOR SOMETHING TO TAKE CARE OF THIS AND ASKING THE DOCTOR ON CALL SINCE DR TOTH NOT HERE     PT STATES SHE USUALLY NEEDS TWO FOR THIS TO [address] [place] [address]

## 2017-09-01 NOTE — TELEPHONE ENCOUNTER
Done.  I approved the order that was initially requested. I will route this to [employer] to check up on this.   Thank you!!

## 2017-09-01 NOTE — TELEPHONE ENCOUNTER
Letter from [facility] in purple folder     To further review request scopolamine patch 72hr   Need more information

## 2017-09-01 NOTE — TELEPHONE ENCOUNTER
To Dr. Franck Aguilera - pt discharged Wednesday (cholecystectomy). Vaginal itching, no discharge. Pt states she has had these sx before, successfully treated with diflucan - usually takes 2. No fever/chills.

## 2017-09-04 ENCOUNTER — TELEPHONE (OUTPATIENT)
Dept: INTERNAL MEDICINE CLINIC | Facility: CLINIC | Age: 77
End: 2017-09-04

## 2017-09-05 ENCOUNTER — TELEPHONE (OUTPATIENT)
Dept: INTERNAL MEDICINE CLINIC | Facility: CLINIC | Age: 77
End: 2017-09-05

## 2017-09-05 NOTE — TELEPHONE ENCOUNTER
Yelena / Delia calling they need order for medical social worker to give pt information for a caregiver, pt had 2 falls over the weekend.     They are trying to get her medical bracelet to work if they can't they will put in an order for their medical

## 2017-09-05 NOTE — TELEPHONE ENCOUNTER
[facility]     [facility]    Form in purple folder   Should be faxed back to [phone]    ID # [id number]

## 2017-09-05 NOTE — TELEPHONE ENCOUNTER
Discussed with pt. Her recent BMP shows improved renal function from when she was hospitalized. Pt is going to see me later this week.

## 2017-09-05 NOTE — TELEPHONE ENCOUNTER
[facility] sent a response for scopolamine patch     PA been approved from 6/3/17 - 9/1/18     Sent to scanning

## 2017-09-06 ENCOUNTER — TELEPHONE (OUTPATIENT)
Dept: INTERNAL MEDICINE CLINIC | Facility: CLINIC | Age: 77
End: 2017-09-06

## 2017-09-06 NOTE — TELEPHONE ENCOUNTER
As FYI to DR. TOTH - called Yelena from [facility] and relayed DR. TOTH message - she verbalized understanding and stated that she will also put in order for speech therapy

## 2017-09-06 NOTE — TELEPHONE ENCOUNTER
Please call Yelena as requested and give her an order to have a medical social worker see pt to assist her as requested. Will route to nursing.   Thank you!!

## 2017-09-06 NOTE — TELEPHONE ENCOUNTER
Marilyn Verdin, physical therapist with [facility], called requesting orders for Occupational Therapy and Speech Therapy. Per Marilyn Verdin, the pt lives alone with no family in the area and has been experiencing increasing difficulty with ADLs.  She report

## 2017-09-06 NOTE — TELEPHONE ENCOUNTER
Noted. Please call Jacklyn White back and tell her that it is fine with me to order occupational therapy and speech therapy to see the patient in addition to physical therapy and a visiting nurse.   I will route this to nursing who can call Jacklyn White back on [facility]

## 2017-09-07 ENCOUNTER — TELEPHONE (OUTPATIENT)
Dept: INTERNAL MEDICINE CLINIC | Facility: CLINIC | Age: 77
End: 2017-09-07

## 2017-09-07 NOTE — TELEPHONE ENCOUNTER
Relayed MD message to [facility] PT. [facility] verbalized understanding.

## 2017-09-07 NOTE — TELEPHONE ENCOUNTER
Brother in law marii calling     plz call him back at [phone] or      Wants to discuss about patient with him

## 2017-09-08 NOTE — TELEPHONE ENCOUNTER
Noted.  Discussed with patient's brother-in-law, Mr. Macy Patel. He is concerned about patient and what is going on with visiting nurses etc.  Patient has an appointment to see me tomorrow.   I will evaluate patient tomorrow and discuss with mirta

## 2017-09-12 ENCOUNTER — APPOINTMENT (OUTPATIENT)
Dept: GENERAL RADIOLOGY | Facility: HOSPITAL | Age: 77
DRG: 291 | End: 2017-09-12
Attending: EMERGENCY MEDICINE
Payer: MEDICARE

## 2017-09-12 ENCOUNTER — TELEPHONE (OUTPATIENT)
Dept: INTERNAL MEDICINE CLINIC | Facility: CLINIC | Age: 77
End: 2017-09-12

## 2017-09-12 ENCOUNTER — HOSPITAL ENCOUNTER (INPATIENT)
Facility: HOSPITAL | Age: 77
LOS: 3 days | Discharge: NURSING, REHAB OR LONG-TERM CARE | DRG: 291 | End: 2017-09-15
Attending: EMERGENCY MEDICINE | Admitting: HOSPITALIST
Payer: MEDICARE

## 2017-09-12 ENCOUNTER — APPOINTMENT (OUTPATIENT)
Dept: ULTRASOUND IMAGING | Facility: HOSPITAL | Age: 77
DRG: 291 | End: 2017-09-12
Attending: EMERGENCY MEDICINE
Payer: MEDICARE

## 2017-09-12 DIAGNOSIS — R26.2 INABILITY TO AMBULATE DUE TO ANKLE OR FOOT: ICD-10-CM

## 2017-09-12 DIAGNOSIS — R60.0 LOWER EXTREMITY EDEMA: Primary | ICD-10-CM

## 2017-09-12 LAB
ANION GAP SERPL CALC-SCNC: 12 MMOL/L (ref 0–18)
BASOPHILS # BLD: 0.1 K/UL (ref 0–0.2)
BASOPHILS NFR BLD: 1 %
BILIRUB UR QL: NEGATIVE
BNP SERPL-MCNC: 174 PG/ML (ref 0–100)
BUN SERPL-MCNC: 21 MG/DL (ref 8–20)
BUN/CREAT SERPL: 16.2 (ref 10–20)
CALCIUM SERPL-MCNC: 9.5 MG/DL (ref 8.5–10.5)
CHLORIDE SERPL-SCNC: 98 MMOL/L (ref 95–110)
CLARITY UR: CLEAR
CO2 SERPL-SCNC: 26 MMOL/L (ref 22–32)
COLOR UR: YELLOW
CREAT SERPL-MCNC: 1.3 MG/DL (ref 0.5–1.5)
EOSINOPHIL # BLD: 0.2 K/UL (ref 0–0.7)
EOSINOPHIL NFR BLD: 2 %
ERYTHROCYTE [DISTWIDTH] IN BLOOD BY AUTOMATED COUNT: 14.1 % (ref 11–15)
GLUCOSE BLDC GLUCOMTR-MCNC: 97 MG/DL (ref 70–99)
GLUCOSE SERPL-MCNC: 112 MG/DL (ref 70–99)
HCT VFR BLD AUTO: 37 % (ref 35–48)
HGB BLD-MCNC: 12 G/DL (ref 12–16)
KETONES UR-MCNC: NEGATIVE MG/DL
LEUKOCYTE ESTERASE UR QL STRIP.AUTO: NEGATIVE
LYMPHOCYTES # BLD: 1.6 K/UL (ref 1–4)
LYMPHOCYTES NFR BLD: 15 %
MCH RBC QN AUTO: 28.7 PG (ref 27–32)
MCHC RBC AUTO-ENTMCNC: 32.5 G/DL (ref 32–37)
MCV RBC AUTO: 88.3 FL (ref 80–100)
MONOCYTES # BLD: 0.8 K/UL (ref 0–1)
MONOCYTES NFR BLD: 8 %
NEUTROPHILS # BLD AUTO: 7.6 K/UL (ref 1.8–7.7)
NEUTROPHILS NFR BLD: 75 %
NITRITE UR QL STRIP.AUTO: NEGATIVE
OSMOLALITY UR CALC.SUM OF ELEC: 286 MOSM/KG (ref 275–295)
PH UR: 6 [PH] (ref 5–8)
PLATELET # BLD AUTO: 204 K/UL (ref 140–400)
PMV BLD AUTO: 9.3 FL (ref 7.4–10.3)
POTASSIUM SERPL-SCNC: 4.9 MMOL/L (ref 3.3–5.1)
PROT UR-MCNC: NEGATIVE MG/DL
RBC # BLD AUTO: 4.19 M/UL (ref 3.7–5.4)
RBC #/AREA URNS AUTO: 2 /HPF
SODIUM SERPL-SCNC: 136 MMOL/L (ref 136–144)
SP GR UR STRIP: 1.01 (ref 1–1.03)
UROBILINOGEN UR STRIP-ACNC: <2
VIT C UR-MCNC: NEGATIVE MG/DL
WBC # BLD AUTO: 10.2 K/UL (ref 4–11)
WBC #/AREA URNS AUTO: <1 /HPF

## 2017-09-12 PROCEDURE — 99223 1ST HOSP IP/OBS HIGH 75: CPT | Performed by: HOSPITALIST

## 2017-09-12 PROCEDURE — 93970 EXTREMITY STUDY: CPT | Performed by: EMERGENCY MEDICINE

## 2017-09-12 PROCEDURE — 71020 XR CHEST PA + LAT CHEST (CPT=71020): CPT | Performed by: EMERGENCY MEDICINE

## 2017-09-12 RX ORDER — DEXTROSE MONOHYDRATE 25 G/50ML
50 INJECTION, SOLUTION INTRAVENOUS AS NEEDED
Status: DISCONTINUED | OUTPATIENT
Start: 2017-09-12 | End: 2017-09-15

## 2017-09-12 RX ORDER — ACETAMINOPHEN 325 MG/1
650 TABLET ORAL EVERY 6 HOURS PRN
Status: DISCONTINUED | OUTPATIENT
Start: 2017-09-12 | End: 2017-09-15

## 2017-09-12 RX ORDER — FUROSEMIDE 10 MG/ML
40 INJECTION INTRAMUSCULAR; INTRAVENOUS ONCE
Status: COMPLETED | OUTPATIENT
Start: 2017-09-12 | End: 2017-09-12

## 2017-09-12 RX ORDER — ONDANSETRON 2 MG/ML
4 INJECTION INTRAMUSCULAR; INTRAVENOUS EVERY 6 HOURS PRN
Status: DISCONTINUED | OUTPATIENT
Start: 2017-09-12 | End: 2017-09-15

## 2017-09-12 RX ORDER — SODIUM CHLORIDE 0.9 % (FLUSH) 0.9 %
3 SYRINGE (ML) INJECTION AS NEEDED
Status: DISCONTINUED | OUTPATIENT
Start: 2017-09-12 | End: 2017-09-15

## 2017-09-12 RX ORDER — HEPARIN SODIUM 5000 [USP'U]/ML
5000 INJECTION, SOLUTION INTRAVENOUS; SUBCUTANEOUS EVERY 12 HOURS SCHEDULED
Status: DISCONTINUED | OUTPATIENT
Start: 2017-09-12 | End: 2017-09-15

## 2017-09-12 NOTE — ED PROVIDER NOTES
Patient Seen in: [facility] AND [facility] Emergency Department    History   Patient presents with:  Swelling Edema (cardiovascular, metabolic)    Stated Complaint:     HPI  Patient is a 53-year-old female status post cholecystectomy 2 weeks ago who presents wit Relation Age of Onset   • Heart Disorder Father    • Heart Disorder Mother    • Heart Disease Sister    • Hypertension Brother    • Melanoma Other    • Cancer Other    • Stroke Other    • Heart Disease Other    • Diabetes Other    • Eye Problems Neg Peptide 174 (*)     All other components within normal limits   URINALYSIS WITH CULTURE REFLEX - Abnormal; Notable for the following:     Glucose Urine Trace (*)     Blood Urine Trace (*)     Bacteria Urine Few (*)     All other components within normal li unable to ambulate well at home due to lower extremity edema. D/w dr Effie Carey, states that her diuretic was discontinued while in hospital last month. Will start IV lasix. D/w patient for admission for further diuresis and therapy.        Disposition and Pl

## 2017-09-12 NOTE — TELEPHONE ENCOUNTER
HAYDEE This is the home health nurse who was covering for the regular nurse. She found patient sitting on the toilet with lid down & had been there for quite some time.   Tried to get patient to take a few steps, but patient needed a chair to rest.    Tom Ruiz

## 2017-09-12 NOTE — TELEPHONE ENCOUNTER
To Dr. Cathy Baker - see below - LMTCB for pt, both phones, malena Salas (lives out of [place]) and [facility].   Corrine Cervantes (friend listed above who just got out of hospital) states since gallbladder removed she hasn't been herself, \"kind

## 2017-09-12 NOTE — ED INITIAL ASSESSMENT (HPI)
Pt arrived per EMS. Pt states legs are more swollen than usual. Rt leg is weeping. Redness in Biliateral legs. Has been having trouble walking and getting out of her chair. Pain in back. Denies cough, fevers, SOB.

## 2017-09-13 LAB
ANION GAP SERPL CALC-SCNC: 10 MMOL/L (ref 0–18)
BASOPHILS # BLD: 0 K/UL (ref 0–0.2)
BASOPHILS NFR BLD: 1 %
BUN SERPL-MCNC: 18 MG/DL (ref 8–20)
BUN/CREAT SERPL: 13.5 (ref 10–20)
CALCIUM SERPL-MCNC: 9.3 MG/DL (ref 8.5–10.5)
CHLORIDE SERPL-SCNC: 100 MMOL/L (ref 95–110)
CO2 SERPL-SCNC: 29 MMOL/L (ref 22–32)
CREAT SERPL-MCNC: 1.33 MG/DL (ref 0.5–1.5)
EOSINOPHIL # BLD: 0.2 K/UL (ref 0–0.7)
EOSINOPHIL NFR BLD: 3 %
ERYTHROCYTE [DISTWIDTH] IN BLOOD BY AUTOMATED COUNT: 14.3 % (ref 11–15)
GLUCOSE BLDC GLUCOMTR-MCNC: 104 MG/DL (ref 70–99)
GLUCOSE BLDC GLUCOMTR-MCNC: 120 MG/DL (ref 70–99)
GLUCOSE BLDC GLUCOMTR-MCNC: 162 MG/DL (ref 70–99)
GLUCOSE BLDC GLUCOMTR-MCNC: 96 MG/DL (ref 70–99)
GLUCOSE SERPL-MCNC: 97 MG/DL (ref 70–99)
HBA1C MFR BLD: 6.8 % (ref 4–6)
HCT VFR BLD AUTO: 32.6 % (ref 35–48)
HGB BLD-MCNC: 10.7 G/DL (ref 12–16)
LYMPHOCYTES # BLD: 1.7 K/UL (ref 1–4)
LYMPHOCYTES NFR BLD: 23 %
MCH RBC QN AUTO: 28.5 PG (ref 27–32)
MCHC RBC AUTO-ENTMCNC: 32.7 G/DL (ref 32–37)
MCV RBC AUTO: 87.1 FL (ref 80–100)
MONOCYTES # BLD: 0.6 K/UL (ref 0–1)
MONOCYTES NFR BLD: 8 %
NEUTROPHILS # BLD AUTO: 4.9 K/UL (ref 1.8–7.7)
NEUTROPHILS NFR BLD: 65 %
OSMOLALITY UR CALC.SUM OF ELEC: 290 MOSM/KG (ref 275–295)
PLATELET # BLD AUTO: 161 K/UL (ref 140–400)
PMV BLD AUTO: 10 FL (ref 7.4–10.3)
POTASSIUM SERPL-SCNC: 3.7 MMOL/L (ref 3.3–5.1)
RBC # BLD AUTO: 3.74 M/UL (ref 3.7–5.4)
SODIUM SERPL-SCNC: 139 MMOL/L (ref 136–144)
WBC # BLD AUTO: 7.5 K/UL (ref 4–11)

## 2017-09-13 PROCEDURE — 99233 SBSQ HOSP IP/OBS HIGH 50: CPT | Performed by: HOSPITALIST

## 2017-09-13 RX ORDER — LEVOTHYROXINE SODIUM 0.05 MG/1
100 TABLET ORAL DAILY
Status: DISCONTINUED | OUTPATIENT
Start: 2017-09-13 | End: 2017-09-15

## 2017-09-13 RX ORDER — CETIRIZINE HYDROCHLORIDE 10 MG/1
10 TABLET ORAL DAILY
Status: DISCONTINUED | OUTPATIENT
Start: 2017-09-13 | End: 2017-09-15

## 2017-09-13 RX ORDER — FUROSEMIDE 10 MG/ML
40 INJECTION INTRAMUSCULAR; INTRAVENOUS ONCE
Status: COMPLETED | OUTPATIENT
Start: 2017-09-13 | End: 2017-09-13

## 2017-09-13 RX ORDER — PANTOPRAZOLE SODIUM 40 MG/1
40 TABLET, DELAYED RELEASE ORAL
Status: DISCONTINUED | OUTPATIENT
Start: 2017-09-13 | End: 2017-09-15

## 2017-09-13 RX ORDER — LISINOPRIL 20 MG/1
20 TABLET ORAL DAILY
Status: DISCONTINUED | OUTPATIENT
Start: 2017-09-13 | End: 2017-09-15

## 2017-09-13 RX ORDER — ATORVASTATIN CALCIUM 40 MG/1
40 TABLET, FILM COATED ORAL
Status: DISCONTINUED | OUTPATIENT
Start: 2017-09-13 | End: 2017-09-15

## 2017-09-13 NOTE — DISCHARGE PLANNING
2:17pm Update- ADELA confirmed with [facility] that the pt meets inpatient status. Therapy recommendation is for snf. SW informed that a preference was indicated for [facility] or [facility]. ADELA initiated referrals via [employer], awaiting review.  DON screen requ

## 2017-09-13 NOTE — PHYSICAL THERAPY NOTE
PHYSICAL THERAPY EVALUATION - INPATIENT     Room Number: 562/562-A  Evaluation Date: 9/13/2017  Type of Evaluation: Initial  Physician Order: PT Eval and Treat    Presenting Problem:  (LE Edema, Inability to ambulate)  Reason for Therapy: Mobility Dysf discharge. Recommend continuation of skilled PT in a rehab setting so pt can maximize functional potential and achieve PLF. Pt is not safe to be home alone due to decreased cognition, being forgetful and decrease safety.  If pt discharges home, recommend Medical History:   Diagnosis Date   • Anemia    • Anesthesia complication    • Arthritis     feet   • BACK PAIN    • Back problem    • Urban's esophagus    • Blood disorder    • CHF (congestive heart failure) (HCC)    • Colon, diverticulosis    • Congest BEARING RESTRICTION  Weight Bearing Restriction: None                PAIN ASSESSMENT  Ratin  Location:  (denies pain and seemed comfortable)       COGNITION  · Pt is alert,oriented to name and place and was able to follow commands but seemed forgetful training  Patient education and PT evaluation    Patient End of Session: Up in chair;Needs met;Call light within reach;RN aware of session/findings; All patient questions and concerns addressed; Alarm set    CURRENT GOALS    Goals to be met by: 9/20/2017  Pa

## 2017-09-13 NOTE — TELEPHONE ENCOUNTER
Patient noted to be admitted to hospital. Will follow up with patient after discharge from hospital.  Will close this encounter.

## 2017-09-13 NOTE — PLAN OF CARE
Problem: Patient/Family Goals  Goal: Patient/Family Long Term Goal  Patient's Long Term Goal: maintain pain below 2/10 for 6 months  Interventions:  - prescribed medications  - See additional Care Plan goals for specific interventions    Outcome: Progressi nutrition restrictions as appropriate   Outcome: Progressing      Problem: SKIN/TISSUE INTEGRITY - ADULT  Goal: Incision(s), wounds(s) or drain site(s) healing without S/S of infection  INTERVENTIONS:  - Assess and document risk factors for pressure ulcer fall precautions as indicated by assessment.  - Educate pt/family on patient safety including physical limitations  - Instruct pt to call for assistance with activity based on assessment  - Modify environment to reduce risk of injury  - Provide assistive d

## 2017-09-13 NOTE — OCCUPATIONAL THERAPY NOTE
OCCUPATIONAL THERAPY EVALUATION - INPATIENT     Room Number: 562/562-A  Evaluation Date: 9/13/2017  Type of Evaluation: Initial  Presenting Problem: recent lap anatoliy, general weakness    Physician Order: IP Consult to Occupational Therapy  Reason for KOMAL R [facility] Device Recommendations: TBD    PLAN  OT Treatment Plan: Balance activities; ADL training;Functional transfer training;UE strengthening/ROM; Endurance training;Cognitive reorientation;Patient/Family education         OCCUPATIONAL THERAPY MEDICAL/SOCIAL HISTOR of Assistive Device(s): rollator     Prior Level of [place]: Pt lives alone in a condo. She reports independence with ADLs and ambulation using rollator. She reports she does her own laundry, grocery shopping (with a group of people from her condo).  Donn Edward Short Form  How much help from another person does the patient currently need…  -   Putting on and taking off regular lower body clothing?: A Little  -   Bathing (including washing, rinsing, drying)?: A Little  -   Toileting, which includes using toilet, b

## 2017-09-13 NOTE — H&P
[facility]    PATIENT'S NAME: Urban Minder   ATTENDING PHYSICIAN: Kaya Tirado MD   PATIENT ACCOUNT#:   [id number]    LOCATION:  [place] [address]  MEDICAL RECORD #:   [id number]       YOB: 1940  ADMISSION DATE:       09/12/ reconciliation list.    ALLERGIES:  No known drug allergies. Side effects to Compazine. FAMILY HISTORY:  Both parents  of heart disease. SOCIAL HISTORY:  No tobacco, alcohol, or drug use. Uses a walker to ambulate around. She lives by herself. daily and perhaps resume her Zaroxolyn for her lower extremity edema. Physical and occupational therapy. Continue her home medications.  to evaluate the patient for short rehab stay. Fall precautions. Further recommendations to follow.

## 2017-09-13 NOTE — PROGRESS NOTES
[facility]  Progress Note     Richard Allen  : 11/3/1940    Status: Inpatient  Day #: 1    Attending: Jere Cronin MD  PCP: Adrián Aragon MD      Assessment and Plan     Acute on chronic diastolic CHF  - LE combo of CHF and LE stasis 9.5  9.3   NA  136  139   K  4.9  3.7   CL  98  100   CO2  26  29   GLU  112*  97       Xr Chest Pa + Lat Chest ([id number]=[id number])    Result Date: 9/12/2017  CONCLUSION: Underinflated lungs, otherwise no acute cardiopulmonary abnormality and unchanged since 8/22/

## 2017-09-14 LAB
ANION GAP SERPL CALC-SCNC: 8 MMOL/L (ref 0–18)
BUN SERPL-MCNC: 23 MG/DL (ref 8–20)
BUN/CREAT SERPL: 18.1 (ref 10–20)
CALCIUM SERPL-MCNC: 9 MG/DL (ref 8.5–10.5)
CHLORIDE SERPL-SCNC: 99 MMOL/L (ref 95–110)
CO2 SERPL-SCNC: 31 MMOL/L (ref 22–32)
CREAT SERPL-MCNC: 1.27 MG/DL (ref 0.5–1.5)
GLUCOSE BLDC GLUCOMTR-MCNC: 114 MG/DL (ref 70–99)
GLUCOSE BLDC GLUCOMTR-MCNC: 138 MG/DL (ref 70–99)
GLUCOSE BLDC GLUCOMTR-MCNC: 143 MG/DL (ref 70–99)
GLUCOSE BLDC GLUCOMTR-MCNC: 156 MG/DL (ref 70–99)
GLUCOSE SERPL-MCNC: 100 MG/DL (ref 70–99)
OSMOLALITY UR CALC.SUM OF ELEC: 290 MOSM/KG (ref 275–295)
POTASSIUM SERPL-SCNC: 3.4 MMOL/L (ref 3.3–5.1)
POTASSIUM SERPL-SCNC: 4.4 MMOL/L (ref 3.3–5.1)
SODIUM SERPL-SCNC: 138 MMOL/L (ref 136–144)

## 2017-09-14 PROCEDURE — 99233 SBSQ HOSP IP/OBS HIGH 50: CPT | Performed by: HOSPITALIST

## 2017-09-14 RX ORDER — METHADONE HYDROCHLORIDE 10 MG/1
50 TABLET ORAL EVERY 8 HOURS PRN
Status: DISCONTINUED | OUTPATIENT
Start: 2017-09-14 | End: 2017-09-15

## 2017-09-14 RX ORDER — POTASSIUM CHLORIDE 20 MEQ/1
40 TABLET, EXTENDED RELEASE ORAL EVERY 4 HOURS
Status: COMPLETED | OUTPATIENT
Start: 2017-09-14 | End: 2017-09-14

## 2017-09-14 NOTE — PHYSICAL THERAPY NOTE
PHYSICAL THERAPY TREATMENT NOTE - INPATIENT    Room Number: 562/562-A       Presenting Problem:  (LE Edema, Inability to ambulate)    Problem List  Principal Problem:    Lower extremity edema  Active Problems:    Inability to ambulate due to ankle or foot Good  Dynamic Sitting: Good           Static Standing: Fair -  Dynamic Standing: Poor +    ACTIVITY TOLERANCE  Room air    AM-PAC '6-Clicks' INPATIENT SHORT FORM - BASIC MOBILITY  How much difficulty does the patient currently have. ..  -   Turning over in SBAx1   Goal #3   Current Status Pt AMB 60' with the RW min A for safety. Goal #4   Current Status    Goal #4 Patient to demonstrate independence with home activity/exercise instructions provided to patient in preparation for discharge.    Goal #5

## 2017-09-14 NOTE — PROGRESS NOTES
[facility]  Progress Note     Shelly Yap  : 11/3/1940    Status: Inpatient  Day #: 2    Attending: Norma Vela MD  PCP: Dean Prasad MD      Assessment and Plan     Acute on chronic diastolic CHF  - LE combo of CHF and LE stasis 1. 27   GFRAA  48*  47*  50*   GFRNAA  40*  39*  41*   CA  9.5  9.3  9.0   NA  136  139  138   K  4.9  3.7  3.4   CL  98  100  99   CO2  26  29  31   GLU  112*  97  100*       Xr Chest Pa + Lat Chest (bxt=[id number])    Result Date: 9/12/2017  CONCLUSION: Perry Kelly

## 2017-09-14 NOTE — DISCHARGE PLANNING
[facility]    Magnus [place], [address]

## 2017-09-14 NOTE — PLAN OF CARE
Problem: Patient/Family Goals  Goal: Patient/Family Long Term Goal  Patient's Long Term Goal: maintain pain below 2/10 for 6 months  Interventions:  - prescribed medications  - See additional Care Plan goals for specific interventions    Outcome: Progressi appropriate   Outcome: Progressing      Problem: SKIN/TISSUE INTEGRITY - ADULT  Goal: Incision(s), wounds(s) or drain site(s) healing without S/S of infection  INTERVENTIONS:  - Assess and document risk factors for pressure ulcer development  - Assess and limitations  - Instruct pt to call for assistance with activity based on assessment  - Modify environment to reduce risk of injury  - Provide assistive devices as appropriate  - Consider OT/PT consult to assist with strengthening/mobility  - Encourage toil

## 2017-09-14 NOTE — PLAN OF CARE
METABOLIC/FLUID AND ELECTROLYTES - ADULT    • Glucose maintained within prescribed range Progressing    • Electrolytes maintained within normal limits Progressing    • Hemodynamic stability and optimal renal function maintained Progressing        MUSCULOSK

## 2017-09-15 ENCOUNTER — SNF VISIT (OUTPATIENT)
Dept: INTERNAL MEDICINE CLINIC | Facility: SKILLED NURSING FACILITY | Age: 77
End: 2017-09-15

## 2017-09-15 VITALS
TEMPERATURE: 98 F | HEART RATE: 62 BPM | RESPIRATION RATE: 18 BRPM | DIASTOLIC BLOOD PRESSURE: 54 MMHG | SYSTOLIC BLOOD PRESSURE: 155 MMHG | WEIGHT: 207.81 LBS | BODY MASS INDEX: 41 KG/M2 | OXYGEN SATURATION: 92 %

## 2017-09-15 DIAGNOSIS — E03.9 HYPOTHYROIDISM, UNSPECIFIED TYPE: ICD-10-CM

## 2017-09-15 DIAGNOSIS — E11.9 TYPE 2 DIABETES MELLITUS WITHOUT COMPLICATION, WITHOUT LONG-TERM CURRENT USE OF INSULIN (HCC): ICD-10-CM

## 2017-09-15 DIAGNOSIS — I50.9 CONGESTIVE HEART FAILURE, UNSPECIFIED CONGESTIVE HEART FAILURE CHRONICITY, UNSPECIFIED CONGESTIVE HEART FAILURE TYPE: ICD-10-CM

## 2017-09-15 DIAGNOSIS — I87.2 VENOUS INSUFFICIENCY: ICD-10-CM

## 2017-09-15 DIAGNOSIS — R60.0 LOWER EXTREMITY EDEMA: ICD-10-CM

## 2017-09-15 LAB
GLUCOSE BLDC GLUCOMTR-MCNC: 108 MG/DL (ref 70–99)
GLUCOSE BLDC GLUCOMTR-MCNC: 111 MG/DL (ref 70–99)

## 2017-09-15 PROCEDURE — 99239 HOSP IP/OBS DSCHRG MGMT >30: CPT | Performed by: HOSPITALIST

## 2017-09-15 PROCEDURE — 99310 SBSQ NF CARE HIGH MDM 45: CPT | Performed by: NURSE PRACTITIONER

## 2017-09-15 RX ORDER — METHADONE HYDROCHLORIDE 10 MG/1
50 TABLET ORAL EVERY 8 HOURS PRN
Qty: 450 TABLET | Refills: 0 | Status: SHIPPED | COMMUNITY
Start: 2017-09-15 | End: 2017-09-27

## 2017-09-15 NOTE — DISCHARGE SUMMARY
[facility]  Discharge Summary     Mavis Gan  : 11/3/1940    Status: Inpatient  Day #: 3    Attending: Luc Moe MD  PCP: Heidi Abdullahi MD     Date of Admission: 2017  Date of Discharge: 9/15/2017     [facility] Discharge Anahi Em lesion         Discharge Medications      CONTINUE taking these medications      Instructions Prescription details   atorvastatin 40 MG Tabs  Commonly known as:  LIPITOR      TAKE 1 TABLET BY MOUTH DAILY   Quantity:  90 tablet  Refills:  3     cetirizine 1 MG Tabs  Commonly known as:  VALIUM        HYDROcodone-acetaminophen  MG Tabs  Commonly known as:  [facility] Discharge Diagnoses: CHF    TCM Diagnosis at discharge from Hospital: Congestive Heart Failure    Please note that only

## 2017-09-15 NOTE — DISCHARGE PLANNING
RN told SW that pt is cleared to discharge today and will need medicar transport. SW spoke w/ Zunilda Solano from [employer] and arranged medicar tranpsort to [facility] at 1330. SW spoke w/ pt and explained medicar fees; pt agreeable w/ the fees.  . Pt s

## 2017-09-15 NOTE — PROGRESS NOTES
HPI: Hugo Hurst is a 78year old female who was recently hospitalized for acute cholecystitis who underwent laparoscopic cholecystectomy. Prior to her discharge her zaroxolyn was discontinued due to hypokalemia.  She presented to [address] [place] [address] 9/12/17 with Emily Awad edematous but improved per patient. She has not been discharged to the rehab on diuretics. Reports that she takes lasix 40mg po qd at home in addition to zaroxolyn which has been discontinued. No hematuria/dysuria/frequency.      PHYSICAL EXAM:  GENERAL H qhs    9. Mild thalassemia. Monitor. F/U with hematology outpatient. 10. Hx. Gout. Stable. Monitor. 11. Gastroesophageal reflux disease. Continue protonix 40mg po qd    12. Hypothyroidism. Continue levothyroxine 100mcg po qd    13.  Chronic kidney di

## 2017-09-15 NOTE — PLAN OF CARE
Problem: METABOLIC/FLUID AND ELECTROLYTES - ADULT  Goal: Glucose maintained within prescribed range  INTERVENTIONS:  - Monitor Blood Glucose as ordered  - Assess for signs and symptoms of hyperglycemia and hypoglycemia  - Administer ordered medications to Initiate Pressure Ulcer prevention bundle as indicated   Outcome: Progressing    Goal: Oral mucous membranes remain intact  INTERVENTIONS  - Assess oral mucosa and hygiene practices  - Implement preventative oral hygiene regimen  - Implement oral medicated to know as we care for you?  Patient wants to be kept updated  - Provide timely, complete, and accurate information to patient/family  - Incorporate patient and family knowledge, values, beliefs, and cultural backgrounds into the planning and delivery of ca

## 2017-09-15 NOTE — PROGRESS NOTES
Report [facility] to [facility] [employer]. No c/o voiced.

## 2017-09-16 ENCOUNTER — TELEPHONE (OUTPATIENT)
Dept: MEDSURG UNIT | Facility: HOSPITAL | Age: 77
End: 2017-09-16

## 2017-09-16 PROCEDURE — 99305 1ST NF CARE MODERATE MDM 35: CPT | Performed by: INTERNAL MEDICINE

## 2017-09-17 ENCOUNTER — TELEPHONE (OUTPATIENT)
Dept: MEDSURG UNIT | Facility: HOSPITAL | Age: 77
End: 2017-09-17

## 2017-09-18 PROCEDURE — 99307 SBSQ NF CARE SF MDM 10: CPT | Performed by: INTERNAL MEDICINE

## 2017-09-19 ENCOUNTER — SNF VISIT (OUTPATIENT)
Dept: INTERNAL MEDICINE CLINIC | Facility: SKILLED NURSING FACILITY | Age: 77
End: 2017-09-19

## 2017-09-19 ENCOUNTER — SNF VISIT (OUTPATIENT)
Dept: INTERNAL MEDICINE CLINIC | Facility: CLINIC | Age: 77
End: 2017-09-19

## 2017-09-19 DIAGNOSIS — R53.83 FATIGUE, UNSPECIFIED TYPE: ICD-10-CM

## 2017-09-19 DIAGNOSIS — E11.9 TYPE 2 DIABETES MELLITUS WITHOUT COMPLICATION, WITHOUT LONG-TERM CURRENT USE OF INSULIN (HCC): ICD-10-CM

## 2017-09-19 DIAGNOSIS — R60.0 LOWER EXTREMITY EDEMA: ICD-10-CM

## 2017-09-19 DIAGNOSIS — G47.30 SLEEP APNEA, UNSPECIFIED TYPE: ICD-10-CM

## 2017-09-19 DIAGNOSIS — R03.0 ELEVATED BLOOD PRESSURE READING: ICD-10-CM

## 2017-09-19 DIAGNOSIS — I50.9 CONGESTIVE HEART FAILURE, UNSPECIFIED CONGESTIVE HEART FAILURE CHRONICITY, UNSPECIFIED CONGESTIVE HEART FAILURE TYPE: ICD-10-CM

## 2017-09-19 DIAGNOSIS — I15.9 SECONDARY HYPERTENSION: ICD-10-CM

## 2017-09-19 DIAGNOSIS — M96.1 FAILED BACK SURGICAL SYNDROME: ICD-10-CM

## 2017-09-19 PROCEDURE — 99309 SBSQ NF CARE MODERATE MDM 30: CPT | Performed by: NURSE PRACTITIONER

## 2017-09-19 NOTE — PROGRESS NOTES
HPI: Elena Swanson is a 78year old female who was recently hospitalized for acute cholecystitis who underwent laparoscopic cholecystectomy. Prior to her discharge her zaroxolyn was discontinued due to hypokalemia.  She presented to [address] [place] [address] 9/12/17 with Cindy Bean cardiology 9/18/17 and norvasc was added. BP elevated again this am. Denies headache. Denies acute vision changes. Denies dizziness/lightheadedness. She denies sob/chest pain/palpitations. Lower extremities edema improved. Reports normal BMs. Denies n/v/ not take insulin/antyglycemics. Diet controlled. Continue with Novolog sliding scale TID   -180 1u, 181-210 2u, 211-240 3u, 241-270 4u, 271-300 5u, 301-330 6u, 331-360 7u, >361 notify MD/NP   Accuchecks TID for now and if WNL we can DC     6.  Chron

## 2017-09-20 PROCEDURE — 99308 SBSQ NF CARE LOW MDM 20: CPT | Performed by: INTERNAL MEDICINE

## 2017-09-21 ENCOUNTER — SNF VISIT (OUTPATIENT)
Dept: INTERNAL MEDICINE CLINIC | Facility: CLINIC | Age: 77
End: 2017-09-21

## 2017-09-21 DIAGNOSIS — R60.0 LOWER EXTREMITY EDEMA: ICD-10-CM

## 2017-09-21 DIAGNOSIS — I10 ESSENTIAL HYPERTENSION: ICD-10-CM

## 2017-09-21 DIAGNOSIS — E11.9 TYPE 2 DIABETES MELLITUS WITHOUT COMPLICATION, WITHOUT LONG-TERM CURRENT USE OF INSULIN (HCC): ICD-10-CM

## 2017-09-21 DIAGNOSIS — K22.70 BARRETT'S ESOPHAGUS WITHOUT DYSPLASIA: ICD-10-CM

## 2017-09-22 ENCOUNTER — SNF VISIT (OUTPATIENT)
Dept: INTERNAL MEDICINE CLINIC | Facility: SKILLED NURSING FACILITY | Age: 77
End: 2017-09-22

## 2017-09-22 DIAGNOSIS — R60.0 LOWER EXTREMITY EDEMA: ICD-10-CM

## 2017-09-22 DIAGNOSIS — I50.9 CONGESTIVE HEART FAILURE, UNSPECIFIED CONGESTIVE HEART FAILURE CHRONICITY, UNSPECIFIED CONGESTIVE HEART FAILURE TYPE: ICD-10-CM

## 2017-09-22 DIAGNOSIS — I10 HYPERTENSION, UNSPECIFIED TYPE: ICD-10-CM

## 2017-09-22 DIAGNOSIS — E11.9 TYPE 2 DIABETES MELLITUS WITHOUT COMPLICATION, WITHOUT LONG-TERM CURRENT USE OF INSULIN (HCC): ICD-10-CM

## 2017-09-22 PROCEDURE — 99308 SBSQ NF CARE LOW MDM 20: CPT | Performed by: NURSE PRACTITIONER

## 2017-09-22 NOTE — PROGRESS NOTES
HPI: Suzan Crespo a 78year old female who was recently hospitalized for acute cholecystitis who underwent laparoscopic cholecystectomy. Prior to her discharge her zaroxolyn was discontinued due to hypokalemia.  She presented to [address] [place] [address] 9/12/17 with Araceli Brooks dizziness/lightheadedness. She denies sob/chest pain/palpitations. Lower extremity edema improved. Reports normal BMs. Denies n/v/d.  Denies hematuria/dysuria/frequency.         PHYSICAL EXAM:  GENERAL HEALTH: NAD  HEENT: atraumatic/normocephalic, mucous me venous stasis dermatitis. Stable. Continue present management.      7. Hypertension.  Continue metoprolol tartrate 12.5mg po bid, increased dosing of  lisinopril 40mg po qd, and norvasc 5mg po bid added per cardiology 9/18/17.      8. Hyperlipidemia.  Stabl

## 2017-09-25 ENCOUNTER — TELEPHONE (OUTPATIENT)
Dept: INTERNAL MEDICINE CLINIC | Facility: CLINIC | Age: 77
End: 2017-09-25

## 2017-09-25 ENCOUNTER — SNF VISIT (OUTPATIENT)
Dept: INTERNAL MEDICINE CLINIC | Facility: CLINIC | Age: 77
End: 2017-09-25

## 2017-09-25 ENCOUNTER — SNF/IP PROF CHARGE ONLY (OUTPATIENT)
Dept: INTERNAL MEDICINE CLINIC | Facility: CLINIC | Age: 77
End: 2017-09-25

## 2017-09-25 DIAGNOSIS — I50.32 CHRONIC DIASTOLIC CONGESTIVE HEART FAILURE (HCC): ICD-10-CM

## 2017-09-25 DIAGNOSIS — E11.9 TYPE 2 DIABETES MELLITUS WITHOUT COMPLICATION, WITHOUT LONG-TERM CURRENT USE OF INSULIN (HCC): ICD-10-CM

## 2017-09-25 DIAGNOSIS — M96.1 FAILED BACK SURGICAL SYNDROME: ICD-10-CM

## 2017-09-25 DIAGNOSIS — M15.9 PRIMARY OSTEOARTHRITIS INVOLVING MULTIPLE JOINTS: ICD-10-CM

## 2017-09-25 DIAGNOSIS — Z98.890 HISTORY OF BACK SURGERY: ICD-10-CM

## 2017-09-25 DIAGNOSIS — R26.2 INABILITY TO AMBULATE DUE TO ANKLE OR FOOT: ICD-10-CM

## 2017-09-25 DIAGNOSIS — Z91.81 RISK FOR FALLS: ICD-10-CM

## 2017-09-25 DIAGNOSIS — M79.7 FIBROMYALGIA: ICD-10-CM

## 2017-09-25 DIAGNOSIS — E78.00 HYPERCHOLESTEREMIA: ICD-10-CM

## 2017-09-25 DIAGNOSIS — I10 ESSENTIAL HYPERTENSION: ICD-10-CM

## 2017-09-25 DIAGNOSIS — N17.9 AKI (ACUTE KIDNEY INJURY) (HCC): ICD-10-CM

## 2017-09-25 PROCEDURE — 99308 SBSQ NF CARE LOW MDM 20: CPT | Performed by: INTERNAL MEDICINE

## 2017-09-25 NOTE — TELEPHONE ENCOUNTER
Pt was released from the hospital on 9/15. She is now in [facility]. She will be released on 10/5. Does she need to follow up with ?     Tasked to Nursing

## 2017-09-25 NOTE — TELEPHONE ENCOUNTER
Pt discharged from [place] [address] on 9-15  Now in [facility] will be released 10-5    Does she need a f/u ? And what would be available with you?

## 2017-09-26 ENCOUNTER — SNF VISIT (OUTPATIENT)
Dept: INTERNAL MEDICINE CLINIC | Facility: SKILLED NURSING FACILITY | Age: 77
End: 2017-09-26

## 2017-09-26 DIAGNOSIS — N18.9 CHRONIC KIDNEY DISEASE, UNSPECIFIED CKD STAGE: ICD-10-CM

## 2017-09-26 DIAGNOSIS — I50.9 CONGESTIVE HEART FAILURE, UNSPECIFIED CONGESTIVE HEART FAILURE CHRONICITY, UNSPECIFIED CONGESTIVE HEART FAILURE TYPE: ICD-10-CM

## 2017-09-26 DIAGNOSIS — E11.9 TYPE 2 DIABETES MELLITUS WITHOUT COMPLICATION, WITHOUT LONG-TERM CURRENT USE OF INSULIN (HCC): ICD-10-CM

## 2017-09-26 DIAGNOSIS — R60.0 LOWER EXTREMITY EDEMA: ICD-10-CM

## 2017-09-26 PROCEDURE — 99308 SBSQ NF CARE LOW MDM 20: CPT | Performed by: NURSE PRACTITIONER

## 2017-09-26 NOTE — TELEPHONE ENCOUNTER
Dr. Stalin Morales message relayed to [facility] after visit  Mauricio Quach had gone home), understanding verbalized.

## 2017-09-26 NOTE — PROGRESS NOTES
HPI: Ike Holt a 78year old female who was recently hospitalized for acute cholecystitis who underwent laparoscopic cholecystectomy. Prior to her discharge her zaroxolyn was discontinued due to hypokalemia.  She presented to [address] [place] [address] 9/12/17 with Virginia Mcarthur yesterday due to elevated bun/creatinine. Patient's weights are stable. LE with improved edema. She denies She denies sob/chest pain/palpitations. Reports normal BMs. Denies n/v/d. Denies hematuria/dysuria/frequency.  Scheduled to go home next week but cary controlled. hgb a1c 6.8 9/12/17  Continue with Novolog sliding scale TID , DC on discharge      6. Chronic lower extremity venous stasis dermatitis. Stable. Continue present management.      7. Hypertension.  Controlled.  Continue metoprolol tartrate 12.5mg

## 2017-09-27 ENCOUNTER — TELEPHONE (OUTPATIENT)
Dept: PAIN CLINIC | Facility: HOSPITAL | Age: 77
End: 2017-09-27

## 2017-09-27 PROCEDURE — 99308 SBSQ NF CARE LOW MDM 20: CPT | Performed by: INTERNAL MEDICINE

## 2017-09-28 RX ORDER — HYDROCODONE BITARTRATE AND ACETAMINOPHEN 10; 325 MG/1; MG/1
1 TABLET ORAL EVERY 6 HOURS PRN
Qty: 30 TABLET | Refills: 0 | Status: ON HOLD | OUTPATIENT
Start: 2017-09-28 | End: 2017-10-16

## 2017-09-28 RX ORDER — METHADONE HYDROCHLORIDE 10 MG/1
50 TABLET ORAL EVERY 8 HOURS PRN
Qty: 450 TABLET | Refills: 0 | Status: SHIPPED | OUTPATIENT
Start: 2017-09-28 | End: 2017-10-17

## 2017-09-29 ENCOUNTER — SNF VISIT (OUTPATIENT)
Dept: INTERNAL MEDICINE CLINIC | Facility: CLINIC | Age: 77
End: 2017-09-29

## 2017-09-29 ENCOUNTER — SNF VISIT (OUTPATIENT)
Dept: INTERNAL MEDICINE CLINIC | Facility: SKILLED NURSING FACILITY | Age: 77
End: 2017-09-29

## 2017-09-29 DIAGNOSIS — E11.9 TYPE 2 DIABETES MELLITUS WITHOUT COMPLICATION, WITHOUT LONG-TERM CURRENT USE OF INSULIN (HCC): ICD-10-CM

## 2017-09-29 DIAGNOSIS — I10 ESSENTIAL HYPERTENSION: ICD-10-CM

## 2017-09-29 DIAGNOSIS — I50.9 CONGESTIVE HEART FAILURE, UNSPECIFIED CONGESTIVE HEART FAILURE CHRONICITY, UNSPECIFIED CONGESTIVE HEART FAILURE TYPE: ICD-10-CM

## 2017-09-29 DIAGNOSIS — N18.9 CHRONIC KIDNEY DISEASE, UNSPECIFIED CKD STAGE: ICD-10-CM

## 2017-09-29 DIAGNOSIS — K22.70 BARRETT'S ESOPHAGUS WITHOUT DYSPLASIA: ICD-10-CM

## 2017-09-29 DIAGNOSIS — R26.2 INABILITY TO AMBULATE DUE TO ANKLE OR FOOT: ICD-10-CM

## 2017-09-29 DIAGNOSIS — R60.0 LOWER EXTREMITY EDEMA: ICD-10-CM

## 2017-09-29 PROCEDURE — 99308 SBSQ NF CARE LOW MDM 20: CPT | Performed by: NURSE PRACTITIONER

## 2017-09-29 NOTE — PROGRESS NOTES
HPI: Rao Share a 78year old female who was recently hospitalized for acute cholecystitis who underwent laparoscopic cholecystectomy. Prior to her discharge her zaroxolyn was discontinued due to hypokalemia.  She presented to [facility] 9/12/17 with Francia Jones denies She denies sob/chest pain/palpitations. Reports normal BMs.  Denies n/v/d. Denies hematuria/dysuria/frequency.       PHYSICAL EXAM:  GENERAL HEALTH: NAD  HEENT: atraumatic/normocephalic, mucous membranes pink and moist, PERRLA, EOMI, sclera anicteric metoprolol tartrate 12.5mg po bid, increased dosing of  lisinopril 40mg po qd, and norvasc 5mg po bid added per cardiology 9/18/17. Monitor BP     8. Hyperlipidemia. Stable. Continue atorvastatin 40mg po qhs     9. Mild thalassemia.  Monitor.  F/U with he

## 2017-10-02 PROCEDURE — 99307 SBSQ NF CARE SF MDM 10: CPT | Performed by: INTERNAL MEDICINE

## 2017-10-04 ENCOUNTER — SNF/IP PROF CHARGE ONLY (OUTPATIENT)
Dept: INTERNAL MEDICINE CLINIC | Facility: CLINIC | Age: 77
End: 2017-10-04

## 2017-10-04 DIAGNOSIS — K22.70 BARRETT'S ESOPHAGUS WITHOUT DYSPLASIA: ICD-10-CM

## 2017-10-04 DIAGNOSIS — Z90.49 S/P LAPAROSCOPIC CHOLECYSTECTOMY: ICD-10-CM

## 2017-10-04 DIAGNOSIS — M79.7 FIBROMYALGIA: ICD-10-CM

## 2017-10-04 DIAGNOSIS — R60.0 LOWER EXTREMITY EDEMA: ICD-10-CM

## 2017-10-04 DIAGNOSIS — E78.00 HYPERCHOLESTEREMIA: ICD-10-CM

## 2017-10-04 PROCEDURE — 99307 SBSQ NF CARE SF MDM 10: CPT | Performed by: INTERNAL MEDICINE

## 2017-10-09 ENCOUNTER — OFFICE VISIT (OUTPATIENT)
Dept: INTERNAL MEDICINE CLINIC | Facility: CLINIC | Age: 77
End: 2017-10-09

## 2017-10-09 VITALS
HEART RATE: 60 BPM | TEMPERATURE: 98 F | HEIGHT: 59 IN | DIASTOLIC BLOOD PRESSURE: 60 MMHG | OXYGEN SATURATION: 96 % | SYSTOLIC BLOOD PRESSURE: 140 MMHG

## 2017-10-09 DIAGNOSIS — E55.9 VITAMIN D DEFICIENCY: ICD-10-CM

## 2017-10-09 DIAGNOSIS — Z98.890 HISTORY OF BACK SURGERY: ICD-10-CM

## 2017-10-09 DIAGNOSIS — I87.2 VENOUS INSUFFICIENCY: ICD-10-CM

## 2017-10-09 DIAGNOSIS — Z90.49 S/P LAPAROSCOPIC CHOLECYSTECTOMY: ICD-10-CM

## 2017-10-09 DIAGNOSIS — M96.1 FAILED BACK SURGICAL SYNDROME: ICD-10-CM

## 2017-10-09 DIAGNOSIS — E87.6 HYPOKALEMIA: ICD-10-CM

## 2017-10-09 DIAGNOSIS — R53.83 FATIGUE, UNSPECIFIED TYPE: Primary | ICD-10-CM

## 2017-10-09 DIAGNOSIS — E78.1 HYPERTRIGLYCERIDEMIA: ICD-10-CM

## 2017-10-09 DIAGNOSIS — E78.00 HYPERCHOLESTEREMIA: ICD-10-CM

## 2017-10-09 DIAGNOSIS — K22.70 BARRETT'S ESOPHAGUS WITHOUT DYSPLASIA: ICD-10-CM

## 2017-10-09 DIAGNOSIS — E11.9 TYPE 2 DIABETES MELLITUS WITHOUT COMPLICATION, WITHOUT LONG-TERM CURRENT USE OF INSULIN (HCC): ICD-10-CM

## 2017-10-09 DIAGNOSIS — R41.3 MEMORY PROBLEM: ICD-10-CM

## 2017-10-09 PROBLEM — I50.41 ACUTE COMBINED SYSTOLIC AND DIASTOLIC CONGESTIVE HEART FAILURE (HCC): Status: RESOLVED | Noted: 2017-10-09 | Resolved: 2017-10-09

## 2017-10-09 PROBLEM — I50.41 ACUTE COMBINED SYSTOLIC AND DIASTOLIC CONGESTIVE HEART FAILURE (HCC): Status: ACTIVE | Noted: 2017-10-09

## 2017-10-09 PROCEDURE — G0463 HOSPITAL OUTPT CLINIC VISIT: HCPCS | Performed by: INTERNAL MEDICINE

## 2017-10-09 PROCEDURE — 99214 OFFICE O/P EST MOD 30 MIN: CPT | Performed by: INTERNAL MEDICINE

## 2017-10-09 RX ORDER — AMLODIPINE BESYLATE 5 MG/1
1 TABLET ORAL DAILY
COMMUNITY
Start: 2017-10-04 | End: 2017-10-09

## 2017-10-09 RX ORDER — ALLOPURINOL 300 MG/1
1 TABLET ORAL DAILY
Refills: 3 | COMMUNITY
Start: 2017-08-07 | End: 2018-04-03

## 2017-10-09 RX ORDER — FUROSEMIDE 20 MG/1
1 TABLET ORAL DAILY
COMMUNITY
Start: 2017-10-04 | End: 2018-01-19

## 2017-10-09 RX ORDER — POTASSIUM CHLORIDE 750 MG/1
1 CAPSULE, EXTENDED RELEASE ORAL 2 TIMES DAILY
Status: ON HOLD | COMMUNITY
Start: 2017-10-04 | End: 2017-10-14

## 2017-10-09 RX ORDER — METOLAZONE 2.5 MG/1
TABLET ORAL
Refills: 3 | COMMUNITY
Start: 2017-08-13 | End: 2017-10-09

## 2017-10-09 NOTE — PROGRESS NOTES
Richard Allen is a 78year old female. Patient presents with:  TCM (Transition Of Care Management): Hospital follow up. admitted 9/12-9/15 for weakness and increased edema to BLE. Recieved IV lasix for CHF. Completed rehab at [facility].   Fatigue  Diabete Take 1 tablet by mouth daily. Disp:  Rfl: 3   furosemide 20 MG Oral Tab Take 1 tablet by mouth. 3 times a week  Disp:  Rfl:    Methadone HCl 10 MG Oral Tab Take 5 tablets (50 mg total) by mouth every 8 (eight) hours as needed for Pain.  Disp: 450 tablet Rfl disease ([facility].)    • Disorder of thyroid    • Esophageal reflux    • Fatigue    • High blood pressure    • HYPOTHYROIDISM    • Iron deficiency anemia    • Osteoarthrosis, unspecified whether generalized or localized, unspecified site    • Other and unspecifi having troubles with her memory over the past few weeks following to hospitalization at [facility] in rehabilitation on 2 occasions at [facility]. Patient is seen today with her sister.   I reviewed the patient's medications and attempted update the follow up with us in the future. 9. Urban's esophagus without dysplasia  Stable. CPM.    10. History of back surgery  Stable. CPM.    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   CPM.    12. Memory Problem  Patient's family is concerned because of he

## 2017-10-09 NOTE — PATIENT INSTRUCTIONS
1.  Patient is to continue her current diet and medications as adjusted today. She is also to continue her activity. 2.  I have stopped the patient's amlodipine metolazone and some of her potassium tablets.   3.  I have given the patient order to have her

## 2017-10-10 ENCOUNTER — LAB REQUISITION (OUTPATIENT)
Dept: LAB | Facility: HOSPITAL | Age: 77
DRG: 558 | End: 2017-10-10
Payer: MEDICARE

## 2017-10-10 ENCOUNTER — TELEPHONE (OUTPATIENT)
Dept: INTERNAL MEDICINE CLINIC | Facility: CLINIC | Age: 77
End: 2017-10-10

## 2017-10-10 DIAGNOSIS — E11.9 TYPE 2 DIABETES MELLITUS WITHOUT COMPLICATIONS (HCC): ICD-10-CM

## 2017-10-10 DIAGNOSIS — I11.0 HYPERTENSIVE HEART DISEASE WITH HEART FAILURE (HCC): ICD-10-CM

## 2017-10-10 DIAGNOSIS — I50.9 HEART FAILURE (HCC): ICD-10-CM

## 2017-10-10 PROCEDURE — 83036 HEMOGLOBIN GLYCOSYLATED A1C: CPT | Performed by: INTERNAL MEDICINE

## 2017-10-10 PROCEDURE — 80053 COMPREHEN METABOLIC PANEL: CPT | Performed by: INTERNAL MEDICINE

## 2017-10-10 PROCEDURE — 85025 COMPLETE CBC W/AUTO DIFF WBC: CPT | Performed by: INTERNAL MEDICINE

## 2017-10-11 ENCOUNTER — TELEPHONE (OUTPATIENT)
Dept: INTERNAL MEDICINE CLINIC | Facility: CLINIC | Age: 77
End: 2017-10-11

## 2017-10-11 NOTE — TELEPHONE ENCOUNTER
Yelena from [facility] called back. She has not been able to reach the pt. She has called her three times and left a voicemail, and has not received a return call. She will stop by the pt's home today and see if she can catch her at home.   She wanted Dr. Edy Aguillon.

## 2017-10-11 NOTE — TELEPHONE ENCOUNTER
Yelena - [facility]  Requesting an order for an extra nurse visit - to help pt with meds - she dropped pills on the floor and needs assistance with her meds

## 2017-10-11 NOTE — TELEPHONE ENCOUNTER
Yelena / Delia calling pt dropped her pills on the floor, Lauren Pickens is requesting an order for an extra nurse visit to go and help her pt is unable to get her medication ready alone.    Tasked to nursing

## 2017-10-11 NOTE — TELEPHONE ENCOUNTER
Discussed with Carolyn Orta, visiting nurse. As noted below. I have given her permission to have an extra nurse visit to assist the patient with her medications and evaluate the patient as to how she is doing.

## 2017-10-12 ENCOUNTER — TELEPHONE (OUTPATIENT)
Dept: INTERNAL MEDICINE CLINIC | Facility: CLINIC | Age: 77
End: 2017-10-12

## 2017-10-12 NOTE — TELEPHONE ENCOUNTER
Please call Ida Party @ [facility]  Needs approval to have occupational therapy evaluation moved to next week  Call naye at [phone] - ok to leave voice mail message

## 2017-10-13 ENCOUNTER — APPOINTMENT (OUTPATIENT)
Dept: CT IMAGING | Facility: HOSPITAL | Age: 77
DRG: 558 | End: 2017-10-13
Attending: EMERGENCY MEDICINE
Payer: MEDICARE

## 2017-10-13 ENCOUNTER — HOSPITAL ENCOUNTER (INPATIENT)
Facility: HOSPITAL | Age: 77
LOS: 4 days | Discharge: NURSING, REHAB OR LONG-TERM CARE | DRG: 558 | End: 2017-10-17
Attending: EMERGENCY MEDICINE | Admitting: HOSPITALIST
Payer: MEDICARE

## 2017-10-13 DIAGNOSIS — R82.71 BACTERIURIA: ICD-10-CM

## 2017-10-13 DIAGNOSIS — T07.XXXA ABRASIONS OF MULTIPLE SITES: ICD-10-CM

## 2017-10-13 DIAGNOSIS — M62.82 NON-TRAUMATIC RHABDOMYOLYSIS: Primary | ICD-10-CM

## 2017-10-13 PROBLEM — N39.0 UTI (URINARY TRACT INFECTION): Status: ACTIVE | Noted: 2017-10-13

## 2017-10-13 PROCEDURE — 70486 CT MAXILLOFACIAL W/O DYE: CPT | Performed by: EMERGENCY MEDICINE

## 2017-10-13 PROCEDURE — 72125 CT NECK SPINE W/O DYE: CPT | Performed by: EMERGENCY MEDICINE

## 2017-10-13 PROCEDURE — 99223 1ST HOSP IP/OBS HIGH 75: CPT | Performed by: HOSPITALIST

## 2017-10-13 PROCEDURE — 70450 CT HEAD/BRAIN W/O DYE: CPT | Performed by: EMERGENCY MEDICINE

## 2017-10-13 RX ORDER — LISINOPRIL 20 MG/1
20 TABLET ORAL DAILY
Status: DISCONTINUED | OUTPATIENT
Start: 2017-10-14 | End: 2017-10-17

## 2017-10-13 RX ORDER — DEXTROSE MONOHYDRATE 25 G/50ML
50 INJECTION, SOLUTION INTRAVENOUS AS NEEDED
Status: DISCONTINUED | OUTPATIENT
Start: 2017-10-13 | End: 2017-10-17

## 2017-10-13 RX ORDER — SODIUM CHLORIDE 0.9 % (FLUSH) 0.9 %
3 SYRINGE (ML) INJECTION AS NEEDED
Status: DISCONTINUED | OUTPATIENT
Start: 2017-10-13 | End: 2017-10-17

## 2017-10-13 RX ORDER — ALLOPURINOL 300 MG/1
300 TABLET ORAL DAILY
Status: DISCONTINUED | OUTPATIENT
Start: 2017-10-14 | End: 2017-10-17

## 2017-10-13 RX ORDER — CIPROFLOXACIN HYDROCHLORIDE 3.5 MG/ML
1 SOLUTION/ DROPS TOPICAL
Status: DISCONTINUED | OUTPATIENT
Start: 2017-10-13 | End: 2017-10-16

## 2017-10-13 RX ORDER — ONDANSETRON 4 MG/1
4 TABLET, ORALLY DISINTEGRATING ORAL EVERY 6 HOURS PRN
Status: DISCONTINUED | OUTPATIENT
Start: 2017-10-13 | End: 2017-10-17

## 2017-10-13 RX ORDER — HEPARIN SODIUM 5000 [USP'U]/ML
5000 INJECTION, SOLUTION INTRAVENOUS; SUBCUTANEOUS EVERY 12 HOURS SCHEDULED
Status: DISCONTINUED | OUTPATIENT
Start: 2017-10-13 | End: 2017-10-17

## 2017-10-13 RX ORDER — LEVOTHYROXINE SODIUM 0.1 MG/1
100 TABLET ORAL
Status: DISCONTINUED | OUTPATIENT
Start: 2017-10-14 | End: 2017-10-17

## 2017-10-13 RX ORDER — CETIRIZINE HYDROCHLORIDE 10 MG/1
10 TABLET ORAL DAILY
Status: DISCONTINUED | OUTPATIENT
Start: 2017-10-14 | End: 2017-10-17

## 2017-10-13 RX ORDER — HYDROCODONE BITARTRATE AND ACETAMINOPHEN 5; 325 MG/1; MG/1
2 TABLET ORAL EVERY 4 HOURS PRN
Status: DISCONTINUED | OUTPATIENT
Start: 2017-10-13 | End: 2017-10-17

## 2017-10-13 RX ORDER — ONDANSETRON 2 MG/ML
4 INJECTION INTRAMUSCULAR; INTRAVENOUS EVERY 6 HOURS PRN
Status: DISCONTINUED | OUTPATIENT
Start: 2017-10-13 | End: 2017-10-17

## 2017-10-13 RX ORDER — PANTOPRAZOLE SODIUM 40 MG/1
40 TABLET, DELAYED RELEASE ORAL
Status: DISCONTINUED | OUTPATIENT
Start: 2017-10-14 | End: 2017-10-17

## 2017-10-13 RX ORDER — SODIUM CHLORIDE 9 MG/ML
INJECTION, SOLUTION INTRAVENOUS CONTINUOUS
Status: DISCONTINUED | OUTPATIENT
Start: 2017-10-13 | End: 2017-10-15

## 2017-10-13 RX ORDER — HYDROCODONE BITARTRATE AND ACETAMINOPHEN 5; 325 MG/1; MG/1
1 TABLET ORAL EVERY 4 HOURS PRN
Status: DISCONTINUED | OUTPATIENT
Start: 2017-10-13 | End: 2017-10-17

## 2017-10-13 RX ORDER — POLYETHYLENE GLYCOL 3350 17 G/17G
17 POWDER, FOR SOLUTION ORAL AS NEEDED
Status: DISCONTINUED | OUTPATIENT
Start: 2017-10-13 | End: 2017-10-17

## 2017-10-13 RX ORDER — ACETAMINOPHEN 325 MG/1
650 TABLET ORAL EVERY 4 HOURS PRN
Status: DISCONTINUED | OUTPATIENT
Start: 2017-10-13 | End: 2017-10-17

## 2017-10-13 RX ORDER — ACETAMINOPHEN 325 MG/1
650 TABLET ORAL EVERY 6 HOURS PRN
Status: DISCONTINUED | OUTPATIENT
Start: 2017-10-13 | End: 2017-10-17

## 2017-10-13 RX ORDER — ATORVASTATIN CALCIUM 40 MG/1
40 TABLET, FILM COATED ORAL NIGHTLY
Status: DISCONTINUED | OUTPATIENT
Start: 2017-10-14 | End: 2017-10-17

## 2017-10-13 RX ORDER — SENNOSIDES 8.6 MG
17.2 TABLET ORAL DAILY PRN
Status: DISCONTINUED | OUTPATIENT
Start: 2017-10-13 | End: 2017-10-17

## 2017-10-13 NOTE — H&P
[facility]    PATIENT'S NAME: Urban Minder   ATTENDING PHYSICIAN: Kaya Tirado MD   PATIENT ACCOUNT#:   [id number]    LOCATION:  [place] [address]  MEDICAL RECORD #:   [id number]       YOB: 1940  ADMISSION DATE:       10/13/ No known drug allergies. Side effects to Compazine. FAMILY HISTORY:  Both parents  of heart disease. SOCIAL HISTORY:  Lives by herself, uses a walker to ambulate around, usually independent for basic activities of daily living.   Mobility has b conjunctivitis noted on exam.      PLAN:  Patient will be admitted to the general medical floor. Remote telemetry, IV fluids, IV Rocephin, Cipro ophthalmic drops. Monitor hemodynamic status closely. Place her on fall precautions.   Physical and Occupatio

## 2017-10-13 NOTE — TELEPHONE ENCOUNTER
Noted. Please call Travis Sevilla at [facility] and tell her that it is okay for her to do her OT assessment next week as requested. I will route this to nursing.   Thank you!!

## 2017-10-13 NOTE — ED PROVIDER NOTES
Patient Seen in: [facility] AND [facility] Emergency Department    History   Patient presents with:  Contusion (musculoskeletal)    Stated Complaint: Fall     HPI    78year old female with multiple past medical problems including anemia, CHF, diverticulosis, th No date: HIP REPLACEMENT SURGERY Left  No date: HYSTERECTOMY  2006: KNEE REPLACEMENT SURGERY  No date: SPINE SURGERY PROCEDURE UNLISTED  No date: TOTAL HIP REPLACEMENT  No date: TOTAL KNEE REPLACEMENT    Family History   Problem Relation Age of Onset   • H Pulmonary/Chest: Effort normal and breath sounds normal. No respiratory distress. She has no wheezes. Abdominal: Soft. She exhibits no distension. There is no tenderness. There is no guarding. Musculoskeletal: Normal range of motion.  She exhibits no ed All other components within normal limits   CBC WITH DIFFERENTIAL WITH PLATELET    Narrative: The following orders were created for panel order CBC WITH DIFFERENTIAL WITH PLATELET.   Procedure                               Abnormality         Status Ct Spine Cervical (cpt=72125)    Result Date: 10/13/2017  CONCLUSION:  1. No acute fracture or subluxation of the cervical spine. 2. Multilevel degenerative changes are apparent. 3. Lesser incidental findings as above.           Radiology exams  Viewed an
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
## 2017-10-13 NOTE — ED INITIAL ASSESSMENT (HPI)
Pt from home, found on floor by sister. Pt states she fell out of bed trying to reach for glasses. Pt currently lives alone but family is trying to set up home care.   Found laying on left side, swelling and bruising noted to left face, c/o left sided liu

## 2017-10-13 NOTE — TELEPHONE ENCOUNTER
Called Travis Close and Relayed MD's message---verbalized understanding. OT eval to be done next week.

## 2017-10-14 ENCOUNTER — APPOINTMENT (OUTPATIENT)
Dept: CV DIAGNOSTICS | Facility: HOSPITAL | Age: 77
DRG: 558 | End: 2017-10-14
Attending: HOSPITALIST
Payer: MEDICARE

## 2017-10-14 ENCOUNTER — APPOINTMENT (OUTPATIENT)
Dept: ULTRASOUND IMAGING | Facility: HOSPITAL | Age: 77
DRG: 558 | End: 2017-10-14
Attending: HOSPITALIST
Payer: MEDICARE

## 2017-10-14 PROCEDURE — 93306 TTE W/DOPPLER COMPLETE: CPT | Performed by: HOSPITALIST

## 2017-10-14 PROCEDURE — 99233 SBSQ HOSP IP/OBS HIGH 50: CPT | Performed by: HOSPITALIST

## 2017-10-14 PROCEDURE — 93880 EXTRACRANIAL BILAT STUDY: CPT | Performed by: HOSPITALIST

## 2017-10-14 RX ORDER — HYDRALAZINE HYDROCHLORIDE 20 MG/ML
10 INJECTION INTRAMUSCULAR; INTRAVENOUS EVERY 4 HOURS PRN
Status: DISCONTINUED | OUTPATIENT
Start: 2017-10-14 | End: 2017-10-17

## 2017-10-14 RX ORDER — 0.9 % SODIUM CHLORIDE 0.9 %
VIAL (ML) INJECTION
Status: COMPLETED
Start: 2017-10-14 | End: 2017-10-14

## 2017-10-14 RX ORDER — AMLODIPINE BESYLATE 5 MG/1
5 TABLET ORAL DAILY
Status: DISCONTINUED | OUTPATIENT
Start: 2017-10-14 | End: 2017-10-16

## 2017-10-14 NOTE — CM/SW NOTE
SW confirmed that pt is current w/ [facility]. Will need orders to resume services prior to d/c from hospital.    PT/OT to evaluate - SW to follow up on recommendations.     Stacy Price, [address]

## 2017-10-14 NOTE — PLAN OF CARE
78year old female patient admitted for Rhabdomyolysis and UTI. Patient drowsy but arousable to touch. No SOB nor distress noted. Denies any pain but has some grimacing while moving the arms. Patient has existing skin tears, bruising and open blisters.

## 2017-10-14 NOTE — PLAN OF CARE
Problem: Diabetes/Glucose Control  Goal: Glucose maintained within prescribed range  INTERVENTIONS:  - Monitor Blood Glucose as ordered  - Assess for signs and symptoms of hyperglycemia and hypoglycemia  - Administer ordered medications to maintain glucose Administer ordered medications to maintain glucose within target range  - Assess barriers to adequate nutritional intake and initiate nutrition consult as needed  - Instruct patient on self management of diabetes    Outcome: Progressing   this laurel PT/OT consults as needed  - Advance activity as appropriate  - Communicate ordered activity level and limitations with patient/family   Outcome: Progressing  Pt will work with PT/OT today to determine stability/mobility   Goal: Maintain proper alignment of non-pharmacological measures as appropriate and evaluate response  - Consider cultural and social influences on pain and pain management  - Manage/alleviate anxiety  - Utilize distraction and/or relaxation techniques  - Monitor for opioid side effects  - N Outcome: Progressing  Plans for D/C will include [facility], awaiting PT/OT for further evaluation

## 2017-10-14 NOTE — RESPIRATORY THERAPY NOTE
MISTY ASSESSMENT:    Pt does not have a previous diagnosis of MISTY. Pt does not routinely use a CPAP device at home. This pt is not suspected to be at high risk for MISTY and sleep lab packet was not provided to patient for outpatient follow-up.     CPAP INITIAT

## 2017-10-14 NOTE — PHYSICAL THERAPY NOTE
PHYSICAL THERAPY EVALUATION - INPATIENT     Room Number: 543/543-A  Evaluation Date: 10/14/2017  Type of Evaluation: Initial  Physician Order: PT Eval and Treat    Presenting Problem: UTI, rhabdo  Reason for Therapy: Mobility Dysfunction and Discharge rhabdomyolysis  Active Problems:    Rhabdomyolysis    Bacteriuria    Abrasions of multiple sites    UTI (urinary tract infection)      Past Medical History  Past Medical History:   Diagnosis Date   • Anemia    • Anesthesia complication    • Arthritis     f arms.     PHYSICAL THERAPY EXAMINATION     OBJECTIVE     Fall Risk: High fall risk    WEIGHT BEARING RESTRICTION  Weight Bearing Restriction: None              PAIN ASSESSMENT  Rating: Unable to rate  Location: both arms  Management Techniques:  Activity pr placement    Exercise/Education Provided:  Bed mobility  Energy conservation  Gait training  ROM  Strengthening  Transfer training    Patient End of Session: Up in chair;Needs met;Call light within reach;RN aware of session/findings; All patient questions a

## 2017-10-14 NOTE — PROGRESS NOTES
[facility]  Hospitalist Progress  Note     Radhames Gomez Patient Status:  Inpatient      78year old [facility] [id number]   Location 543/543-A Attending Marquis Chris MD   Hosp Day # 1 PCP Neri Amador MD     ASSESSMENT/PLAN [18-22] 18  BP: (155-198)/() 155/66    Physical Exam:    Gen: A+Ox3. No distress. HEENT: NCAT, neck supple, no carotid bruit.   Significant abrasion on the left side of her face along with mild conjunctival discharge of the left  CV: RRR, S1S2, and acetaminophen **OR** HYDROcodone-acetaminophen **OR** HYDROcodone-acetaminophen, ondansetron HCl, dextrose 50%, Glucose-Vitamin C, ondansetron, PEG 3350, Senna    >35min spent, >50% spent counseling and coordinating care in the form of educating pt/family

## 2017-10-15 PROCEDURE — 99233 SBSQ HOSP IP/OBS HIGH 50: CPT | Performed by: HOSPITALIST

## 2017-10-15 RX ORDER — POTASSIUM CHLORIDE 20 MEQ/1
40 TABLET, EXTENDED RELEASE ORAL EVERY 4 HOURS
Status: COMPLETED | OUTPATIENT
Start: 2017-10-15 | End: 2017-10-15

## 2017-10-15 RX ORDER — MAGNESIUM OXIDE 400 MG (241.3 MG MAGNESIUM) TABLET
400 TABLET ONCE
Status: COMPLETED | OUTPATIENT
Start: 2017-10-15 | End: 2017-10-15

## 2017-10-15 NOTE — PROGRESS NOTES
[facility]  Hospitalist Progress  Note     Gene Hint Patient Status:  Inpatient      78year old CSN [id number]   Location 543/543-A Attending Oxana Vázquez MD   Hosp Day # 2 PCP Alma Rosa Mcghee MD     ASSESSMENT/PLAN discharge of the left  CV: RRR, S1S2, and intact distal pulses. No gallop, rub, murmur. Pulm: Effort and breath sounds normal. No distress, wheezes, rales, rhonchi. Abd: Soft, NTND, BS normal, no mass, no HSM, no rebound/guarding.    Neuro: Normal reflexe Flush, acetaminophen, acetaminophen **OR** HYDROcodone-acetaminophen **OR** HYDROcodone-acetaminophen, ondansetron HCl, dextrose 50%, Glucose-Vitamin C, ondansetron, PEG 3350, Senna    >35min spent, >50% spent counseling and coordinating care in the form o

## 2017-10-15 NOTE — PLAN OF CARE
Problem: Diabetes/Glucose Control  Goal: Glucose maintained within prescribed range  INTERVENTIONS:  - Monitor Blood Glucose as ordered  - Assess for signs and symptoms of hyperglycemia and hypoglycemia  - Administer ordered medications to maintain glucose intact  INTERVENTIONS  - Assess and document risk factors for pressure ulcer development  - Assess and document skin integrity  - Monitor for areas of redness and/or skin breakdown  - Initiate interventions, skin care algorithm/standards of care as needed patient's functional ability and stability  - Promote increasing activity/tolerance for mobility and gait  - Educate and engage patient/family in tolerated activity level and precautions    Outcome: Progressing      Problem: Impaired Activities of Daily Robby Munoz DISCHARGE PLANNING  Goal: Discharge to home or other facility with appropriate resources  INTERVENTIONS:  - Identify barriers to discharge w/pt and caregiver  - Include patient/family/discharge partner in discharge planning  - Arrange for needed discharge

## 2017-10-15 NOTE — OCCUPATIONAL THERAPY NOTE
OCCUPATIONAL THERAPY EVALUATION - INPATIENT     Room Number: 543/543-A  Evaluation Date: 10/15/2017  Type of Evaluation: Initial  Presenting Problem: s/p fall, UTI, rhabdo    Physician Order: IP Consult to Occupational Therapy  Reason for Therapy: ADL/IADL inpatient OT to address the above deficits, maximizing patient's ability to return to prior level of function.     DISCHARGE RECOMMENDATIONS  OT Discharge Recommendations: Cont skilled therapy in a supervised setting;Sub-acute rehabilitation  OT Device Everett date:  TOTAL HIP REPLACEMENT  No date: TOTAL KNEE REPLACEMENT    HOME SITUATION  Type of Home: House  Home Layout: One level  Lives With: Alone     Toilet and Equipment: Standard height toilet     Other Equipment:  (RW)          Drives: No  Patient Regularl person does the patient currently need…  -   Putting on and taking off regular lower body clothing?: A Lot  -   Bathing (including washing, rinsing, drying)?: A Lot  -   Toileting, which includes using toilet, bedpan or urinal? : A Little  -   Putting on a

## 2017-10-15 NOTE — PLAN OF CARE
Problem: Diabetes/Glucose Control  Goal: Glucose maintained within prescribed range  INTERVENTIONS:  - Monitor Blood Glucose as ordered  - Assess for signs and symptoms of hyperglycemia and hypoglycemia  - Administer ordered medications to maintain glucose intake and initiate nutrition consult as needed  - Instruct patient on self management of diabetes    Outcome: Progressing  BS 76 this morning, no insulin needed.     Problem: SKIN/TISSUE INTEGRITY - ADULT  Goal: Skin integrity remains intact  INTERVENTIONS with patient/family   Outcome: Progressing  Pt able to stand with walker and shuffle.  PT/OT needed for further eval.  Goal: Maintain proper alignment of affected body part  INTERVENTIONS:  - Support and protect limb and body alignment per provider's orders patient reports new pain   Outcome: Progressing  Pt reports no pain at this time    Problem: SAFETY ADULT - FALL  Goal: Free from fall injury  INTERVENTIONS:  - Assess pt frequently for physical needs  - Identify cognitive and physical deficits and behavio

## 2017-10-16 ENCOUNTER — TELEPHONE (OUTPATIENT)
Dept: INTERNAL MEDICINE CLINIC | Facility: CLINIC | Age: 77
End: 2017-10-16

## 2017-10-16 PROCEDURE — 99233 SBSQ HOSP IP/OBS HIGH 50: CPT | Performed by: HOSPITALIST

## 2017-10-16 RX ORDER — SODIUM CHLORIDE 9 MG/ML
INJECTION, SOLUTION INTRAVENOUS CONTINUOUS
Status: DISPENSED | OUTPATIENT
Start: 2017-10-16 | End: 2017-10-17

## 2017-10-16 RX ORDER — MAGNESIUM OXIDE 400 MG (241.3 MG MAGNESIUM) TABLET
800 TABLET ONCE
Status: COMPLETED | OUTPATIENT
Start: 2017-10-16 | End: 2017-10-16

## 2017-10-16 RX ORDER — CEPHALEXIN 500 MG/1
500 CAPSULE ORAL 3 TIMES DAILY
Qty: 12 CAPSULE | Refills: 0 | Status: SHIPPED | OUTPATIENT
Start: 2017-10-16 | End: 2017-10-20

## 2017-10-16 RX ORDER — METHADONE HYDROCHLORIDE 10 MG/1
50 TABLET ORAL EVERY 8 HOURS PRN
Status: DISCONTINUED | OUTPATIENT
Start: 2017-10-16 | End: 2017-10-17

## 2017-10-16 RX ORDER — AMLODIPINE BESYLATE 10 MG/1
10 TABLET ORAL DAILY
Qty: 30 TABLET | Refills: 0 | Status: SHIPPED | OUTPATIENT
Start: 2017-10-17 | End: 2017-11-17

## 2017-10-16 RX ORDER — HYDROCODONE BITARTRATE AND ACETAMINOPHEN 10; 325 MG/1; MG/1
1 TABLET ORAL EVERY 6 HOURS PRN
Qty: 30 TABLET | Refills: 0 | Status: SHIPPED | OUTPATIENT
Start: 2017-10-16 | End: 2017-11-13

## 2017-10-16 RX ORDER — CIPROFLOXACIN HYDROCHLORIDE 3.5 MG/ML
1 SOLUTION/ DROPS TOPICAL EVERY 6 HOURS
Status: DISCONTINUED | OUTPATIENT
Start: 2017-10-16 | End: 2017-10-17

## 2017-10-16 RX ORDER — CIPROFLOXACIN HYDROCHLORIDE 3.5 MG/ML
1 SOLUTION/ DROPS TOPICAL EVERY 6 HOURS
Refills: 0 | Status: SHIPPED | COMMUNITY
Start: 2017-10-16 | End: 2017-11-03 | Stop reason: ALTCHOICE

## 2017-10-16 RX ORDER — LISINOPRIL 20 MG/1
20 TABLET ORAL DAILY
Qty: 30 TABLET | Refills: 0 | Status: SHIPPED | OUTPATIENT
Start: 2017-10-17 | End: 2017-11-22

## 2017-10-16 RX ORDER — AMLODIPINE BESYLATE 10 MG/1
10 TABLET ORAL DAILY
Status: DISCONTINUED | OUTPATIENT
Start: 2017-10-17 | End: 2017-10-17

## 2017-10-16 NOTE — PHYSICAL THERAPY NOTE
PHYSICAL THERAPY TREATMENT NOTE - INPATIENT    Room Number: 085/857-L       Presenting Problem: UTI, rhabdo    Problem List  Principal Problem:    Non-traumatic rhabdomyolysis  Active Problems:    Rhabdomyolysis    Bacteriuria    Abrasions of multiple sit Static Sitting: Fair -  Dynamic Sitting: Poor +           Static Standing: Poor  Dynamic Standing: Poor    AM-PAC '6-Clicks' INPATIENT SHORT FORM - BASIC MOBILITY  How much difficulty does the patient currently have facilitate hip extension and proper postural awareness   Goal #3 Patient is able to ambulate 400 feet with assist device: walker - rolling at assistance level: modified independent   Goal #3   Current Status Min A for ambulation with a RW for 5'x 2 attempt

## 2017-10-16 NOTE — PLAN OF CARE
Problem: Diabetes/Glucose Control  Goal: Glucose maintained within prescribed range  INTERVENTIONS:  - Monitor Blood Glucose as ordered  - Assess for signs and symptoms of hyperglycemia and hypoglycemia  - Administer ordered medications to maintain glucose Glucose as ordered  - Assess for signs and symptoms of hyperglycemia and hypoglycemia  - Administer ordered medications to maintain glucose within target range  - Assess barriers to adequate nutritional intake and initiate nutrition consult as needed  - In protection for wounds/incisions during mobilization  - Obtain PT/OT consults as needed  - Advance activity as appropriate  - Communicate ordered activity level and limitations with patient/family   Outcome: Progressing  Working with Pt.  2x assist with walk Utilize distraction and/or relaxation techniques  - Monitor for opioid side effects  - Notify MD/LIP if interventions unsuccessful or patient reports new pain   Outcome: Progressing  Pain managed with scheduled and PRN pain medications.      Problem: SAFETY

## 2017-10-16 NOTE — PLAN OF CARE
Problem: Diabetes/Glucose Control  Goal: Glucose maintained within prescribed range  INTERVENTIONS:  - Monitor Blood Glucose as ordered  - Assess for signs and symptoms of hyperglycemia and hypoglycemia  - Administer ordered medications to maintain glucose intact  INTERVENTIONS  - Assess and document risk factors for pressure ulcer development  - Assess and document skin integrity  - Monitor for areas of redness and/or skin breakdown  - Initiate interventions, skin care algorithm/standards of care as needed patient's functional ability and stability  - Promote increasing activity/tolerance for mobility and gait  - Educate and engage patient/family in tolerated activity level and precautions     Outcome: Progressing      Problem: Impaired Activities of Daily L Progressing      Problem: DISCHARGE PLANNING  Goal: Discharge to home or other facility with appropriate resources  INTERVENTIONS:  - Identify barriers to discharge w/pt and caregiver  - Include patient/family/discharge partner in discharge planning  - Enriqueta Cooper

## 2017-10-17 ENCOUNTER — APPOINTMENT (OUTPATIENT)
Dept: GENERAL RADIOLOGY | Facility: HOSPITAL | Age: 77
DRG: 558 | End: 2017-10-17
Attending: HOSPITALIST
Payer: MEDICARE

## 2017-10-17 ENCOUNTER — APPOINTMENT (OUTPATIENT)
Dept: ULTRASOUND IMAGING | Facility: HOSPITAL | Age: 77
DRG: 558 | End: 2017-10-17
Attending: HOSPITALIST
Payer: MEDICARE

## 2017-10-17 VITALS
WEIGHT: 200.81 LBS | BODY MASS INDEX: 39.43 KG/M2 | TEMPERATURE: 98 F | DIASTOLIC BLOOD PRESSURE: 58 MMHG | HEART RATE: 68 BPM | HEIGHT: 60 IN | OXYGEN SATURATION: 95 % | RESPIRATION RATE: 18 BRPM | SYSTOLIC BLOOD PRESSURE: 163 MMHG

## 2017-10-17 PROCEDURE — 93971 EXTREMITY STUDY: CPT | Performed by: HOSPITALIST

## 2017-10-17 PROCEDURE — 99239 HOSP IP/OBS DSCHRG MGMT >30: CPT | Performed by: HOSPITALIST

## 2017-10-17 PROCEDURE — 73090 X-RAY EXAM OF FOREARM: CPT | Performed by: HOSPITALIST

## 2017-10-17 PROCEDURE — 73060 X-RAY EXAM OF HUMERUS: CPT | Performed by: HOSPITALIST

## 2017-10-17 RX ORDER — MAGNESIUM OXIDE 400 MG (241.3 MG MAGNESIUM) TABLET
400 TABLET ONCE
Status: COMPLETED | OUTPATIENT
Start: 2017-10-17 | End: 2017-10-17

## 2017-10-17 RX ORDER — POTASSIUM CHLORIDE 20 MEQ/1
40 TABLET, EXTENDED RELEASE ORAL EVERY 4 HOURS
Status: COMPLETED | OUTPATIENT
Start: 2017-10-17 | End: 2017-10-17

## 2017-10-17 NOTE — DISCHARGE SUMMARY
[facility] HOSPITALIST  DISCHARGE SUMMARY     Cami Washburn Patient Status:  Inpatient    11/3/1940 MRN [id number]   Location [facility] 5SW/SE Attending Jonathan Jimenez MD   Hosp Day # 4 PCP Val Sagastume MD     DATE OF ADMISSION: 10/13/2017 °F (36.8 °C)  Pulse:  [68-90] 68  Resp:  [16-18] 18  BP: (148-163)/(51-58) 163/58  Gen: A+Ox3. No distress. HEENT: NCAT, neck supple, no carotid bruit.   Significant abrasion on the left side of her face along with mild conjunctival discharge of the left 1 tablet by mouth. 3 times a week   Refills:  0     HYDROcodone-acetaminophen  MG Tabs  Commonly known as:  NORCO      Take 1 tablet by mouth every 6 (six) hours as needed for Pain.    Quantity:  30 tablet  Refills:  0     Levothyroxine Sodium 100 MCG appointment. Please call Dr. STARKS________ within 2-3 days (list the date) for a ______(Diabetes,) management follow-up appointment in 1-2 weeks. \"      The above plan and follow-up instructions were reviewed with the patient and they verbalized under

## 2017-10-17 NOTE — PROGRESS NOTES
[facility]  Hospitalist Progress  Note     Fatuma Delude Patient Status:  Inpatient      78year old [facility] [id number]   Location 543/543-A Attending Marilyn Bowie MD   Hosp Day # 4 PCP Neel Renteria MD     ASSESSMENT/PLAN murmur. Pulm: Effort and breath sounds normal. No distress, wheezes, rales, rhonchi. Abd: Soft, NTND, BS normal, no mass, no HSM, no rebound/guarding. Neuro: Normal reflexes, CN. Sensory/motor exams grossly normal deficit.  Coordination  and gait normal Glucose-Vitamin C, ondansetron, PEG 3350, Senna    >35min spent, >50% spent counseling and coordinating care in the form of educating pt/family and d/w consultants and staff. Most of the time spent discussing need for rehab.

## 2017-10-17 NOTE — PLAN OF CARE
Problem: Diabetes/Glucose Control  Goal: Glucose maintained within prescribed range  INTERVENTIONS:  - Monitor Blood Glucose as ordered  - Assess for signs and symptoms of hyperglycemia and hypoglycemia  - Administer ordered medications to maintain glucose medications to maintain glucose within target range  - Assess barriers to adequate nutritional intake and initiate nutrition consult as needed  - Instruct patient on self management of diabetes    Outcome: Adequate for Discharge  No s/s of hyper/hypoglycem appropriate  - Communicate ordered activity level and limitations with patient/family   Outcome: Adequate for Discharge  Patient working with PT/OT. Turned as tolerated. Up to the chair with 2x assistance and walker.   Goal: Maintain proper alignment of aff relaxation techniques  - Monitor for opioid side effects  - Notify MD/LIP if interventions unsuccessful or patient reports new pain   Outcome: Adequate for Discharge  Patient's pain controlled with PO medication and repositioning.      Problem: SAFETY ADULT

## 2017-10-17 NOTE — PLAN OF CARE
Problem: Diabetes/Glucose Control  Goal: Glucose maintained within prescribed range  INTERVENTIONS:  - Monitor Blood Glucose as ordered  - Assess for signs and symptoms of hyperglycemia and hypoglycemia  - Administer ordered medications to maintain glucose intact  INTERVENTIONS  - Assess and document risk factors for pressure ulcer development  - Assess and document skin integrity  - Monitor for areas of redness and/or skin breakdown  - Initiate interventions, skin care algorithm/standards of care as needed patient's functional ability and stability  - Promote increasing activity/tolerance for mobility and gait  - Educate and engage patient/family in tolerated activity level and precautions    Outcome: Not Progressing      Problem: Impaired Activities of Finesse Progressing      Problem: DISCHARGE PLANNING  Goal: Discharge to home or other facility with appropriate resources  INTERVENTIONS:  - Identify barriers to discharge w/pt and caregiver  - Include patient/family/discharge partner in discharge planning  - Kenya Martinez

## 2017-10-17 NOTE — CM/SW NOTE
Met with patient at bedside to explain the BPCI/Medicare program. Patient agreed with phone follow up for 3 months from [address] [place] [address] after discharge from [address] [place] [address]. Patient was enrolled under DR[facility]      558  . BPCI/Medicare letter and brochure provided.

## 2017-10-17 NOTE — TELEPHONE ENCOUNTER
Okay to change speech therapy evaluation from last week to this week. Please notify the visiting nurse and speech therapist.  I will route this to nursing.   Thank you!!

## 2017-10-17 NOTE — CM/SW NOTE
1:48pm Update- Pt has been arranged for dc to [facility] today 10/17 at 3:15pm via [address], per pt's request. Cost is $30 plus $3/mile. SW placed call to the pt's primary contact, Sherry Dominguez [phone] with the transfer arrangements.  Update given to DCS

## 2017-10-17 NOTE — OCCUPATIONAL THERAPY NOTE
Pt's OT session held this am as pt with UE xrays pending. Pt is tentatively scheduled for discharge to subacute today.

## 2017-10-18 PROCEDURE — 99308 SBSQ NF CARE LOW MDM 20: CPT | Performed by: NURSE PRACTITIONER

## 2017-10-20 ENCOUNTER — SNF VISIT (OUTPATIENT)
Dept: INTERNAL MEDICINE CLINIC | Facility: SKILLED NURSING FACILITY | Age: 77
End: 2017-10-20

## 2017-10-20 DIAGNOSIS — Z91.81 RISK FOR FALLS: ICD-10-CM

## 2017-10-20 DIAGNOSIS — E11.9 TYPE 2 DIABETES MELLITUS WITHOUT COMPLICATION, WITHOUT LONG-TERM CURRENT USE OF INSULIN (HCC): ICD-10-CM

## 2017-10-20 DIAGNOSIS — T07.XXXA ABRASIONS OF MULTIPLE SITES: ICD-10-CM

## 2017-10-20 DIAGNOSIS — N39.0 URINARY TRACT INFECTION WITHOUT COMPLICATION: ICD-10-CM

## 2017-10-20 NOTE — PROGRESS NOTES
APN visit at [facility] on 10/18/17    Patient was seen at bedside with her long time friend, Ashley Barone. Patient was enrolled in the Medicare BPCI program at [address] [place] [address] under  (tendonitis) and Urinary Tract Infection. Estimated LOS 15-20 Rehab days.     PCP:  Dr. Mahin Soliman

## 2017-10-24 ENCOUNTER — TELEPHONE (OUTPATIENT)
Dept: INTERNAL MEDICINE CLINIC | Facility: CLINIC | Age: 77
End: 2017-10-24

## 2017-10-25 PROCEDURE — 99308 SBSQ NF CARE LOW MDM 20: CPT | Performed by: NURSE PRACTITIONER

## 2017-10-27 ENCOUNTER — SNF VISIT (OUTPATIENT)
Dept: INTERNAL MEDICINE CLINIC | Facility: SKILLED NURSING FACILITY | Age: 77
End: 2017-10-27

## 2017-10-27 DIAGNOSIS — R60.0 LOWER EXTREMITY EDEMA: ICD-10-CM

## 2017-10-27 DIAGNOSIS — M62.82 NON-TRAUMATIC RHABDOMYOLYSIS: ICD-10-CM

## 2017-10-27 DIAGNOSIS — T07.XXXA ABRASIONS OF MULTIPLE SITES: ICD-10-CM

## 2017-10-27 DIAGNOSIS — E11.9 TYPE 2 DIABETES MELLITUS WITHOUT COMPLICATION, WITHOUT LONG-TERM CURRENT USE OF INSULIN (HCC): ICD-10-CM

## 2017-10-27 DIAGNOSIS — Z91.81 RISK FOR FALLS: ICD-10-CM

## 2017-10-27 DIAGNOSIS — R53.83 FATIGUE, UNSPECIFIED TYPE: Primary | ICD-10-CM

## 2017-10-27 NOTE — PROGRESS NOTES
APN visit at [facility] on 10/25/17    Patient seen and examined for assessment, progression of care and discharge planning. Patient is enrolled in the Medicare BPCI program under  (ortho) and UTI.   Estimated length of stay is 15-20 salena

## 2017-11-01 PROCEDURE — 99308 SBSQ NF CARE LOW MDM 20: CPT | Performed by: NURSE PRACTITIONER

## 2017-11-03 ENCOUNTER — TELEPHONE (OUTPATIENT)
Dept: INTERNAL MEDICINE CLINIC | Facility: CLINIC | Age: 77
End: 2017-11-03

## 2017-11-03 DIAGNOSIS — G56.92 NEUROPATHY, ARM, LEFT: Primary | ICD-10-CM

## 2017-11-05 ENCOUNTER — SNF VISIT (OUTPATIENT)
Dept: INTERNAL MEDICINE CLINIC | Facility: SKILLED NURSING FACILITY | Age: 77
End: 2017-11-05

## 2017-11-05 DIAGNOSIS — E11.9 TYPE 2 DIABETES MELLITUS WITHOUT COMPLICATION, WITHOUT LONG-TERM CURRENT USE OF INSULIN (HCC): ICD-10-CM

## 2017-11-05 DIAGNOSIS — M62.82 NON-TRAUMATIC RHABDOMYOLYSIS: ICD-10-CM

## 2017-11-05 DIAGNOSIS — T07.XXXA ABRASIONS OF MULTIPLE SITES: ICD-10-CM

## 2017-11-05 DIAGNOSIS — R41.3 MEMORY PROBLEM: ICD-10-CM

## 2017-11-05 DIAGNOSIS — Z91.81 RISK FOR FALLS: ICD-10-CM

## 2017-11-05 NOTE — PROGRESS NOTES
APN visit at [facility] on 11/1/17 approximately 11:00 am; late entry. Mrs. Mario Ramos was seen with her friend Rob Carvajalan then later in her room privately to discuss her progression of care and discharge plans. PCP: Dr. Kiley Chamorro.  Ortho per patient: Dr. Ivory Zuñiga PT/OT  Local Wound Care    Therapy and  recommendin hour supervision or  Assisted Living or Long Term Care. All options reviewed with the patient and her friend Destiny Figueroa. [facility]    n

## 2017-11-06 ENCOUNTER — TELEPHONE (OUTPATIENT)
Dept: INTERNAL MEDICINE CLINIC | Facility: CLINIC | Age: 77
End: 2017-11-06

## 2017-11-06 NOTE — TELEPHONE ENCOUNTER
Shaylee williamson wishes to speak to dr. Wilburt Ahumada     Wants to talk to dr. kenny in regards to pt at [facility]    [phone]

## 2017-11-07 NOTE — TELEPHONE ENCOUNTER
Discussed at length with patient's sister. Patient's sister feels that patient is having some psychological issues.   Patient was supposed to see Dr. Amalia Martínez for neurology evaluation and some person from a home care situation was with the patient and aske

## 2017-11-07 NOTE — TELEPHONE ENCOUNTER
Noted, good, be very careful discussing anything on this patient with anyone besides herself, there is some issue between the sister and her, in which there is a change in power of , and access to records, this is all very new just from yesterday, f

## 2017-11-08 PROCEDURE — 99308 SBSQ NF CARE LOW MDM 20: CPT | Performed by: NURSE PRACTITIONER

## 2017-11-09 ENCOUNTER — TELEPHONE (OUTPATIENT)
Dept: INTERNAL MEDICINE CLINIC | Facility: CLINIC | Age: 77
End: 2017-11-09

## 2017-11-09 DIAGNOSIS — G56.92 NEUROPATHY, ARM, LEFT: Primary | ICD-10-CM

## 2017-11-10 NOTE — TELEPHONE ENCOUNTER
Patient has a conflict of interest with [address] [place] [address], will need outside neurologist, order created    nrusing mail

## 2017-11-13 ENCOUNTER — PATIENT OUTREACH (OUTPATIENT)
Dept: INTERNAL MEDICINE CLINIC | Facility: CLINIC | Age: 77
End: 2017-11-13

## 2017-11-13 RX ORDER — METHADONE HYDROCHLORIDE 10 MG/1
10 TABLET ORAL EVERY 8 HOURS
COMMUNITY
End: 2017-11-28

## 2017-11-13 RX ORDER — CETIRIZINE HYDROCHLORIDE 10 MG/1
10 TABLET ORAL DAILY
COMMUNITY
End: 2017-11-16 | Stop reason: ALTCHOICE

## 2017-11-13 NOTE — PROGRESS NOTES
Was speaking on phone with patient to complete TCM questions. Patient asked me if she could call me back. She was out in the hallway when I called and did not realize I had a lot of questions for her.    I told patient this would be no problem and gave her

## 2017-11-14 ENCOUNTER — TELEPHONE (OUTPATIENT)
Dept: INTERNAL MEDICINE CLINIC | Facility: CLINIC | Age: 77
End: 2017-11-14

## 2017-11-15 ENCOUNTER — SNF VISIT (OUTPATIENT)
Dept: INTERNAL MEDICINE CLINIC | Facility: SKILLED NURSING FACILITY | Age: 77
End: 2017-11-15

## 2017-11-15 DIAGNOSIS — Z91.81 RISK FOR FALLS: ICD-10-CM

## 2017-11-15 DIAGNOSIS — R60.0 LOWER EXTREMITY EDEMA: ICD-10-CM

## 2017-11-15 DIAGNOSIS — T07.XXXA ABRASIONS OF MULTIPLE SITES: ICD-10-CM

## 2017-11-15 DIAGNOSIS — N39.0 URINARY TRACT INFECTION WITHOUT HEMATURIA, SITE UNSPECIFIED: ICD-10-CM

## 2017-11-15 DIAGNOSIS — M62.82 NON-TRAUMATIC RHABDOMYOLYSIS: ICD-10-CM

## 2017-11-15 DIAGNOSIS — E11.9 TYPE 2 DIABETES MELLITUS WITHOUT COMPLICATION, WITHOUT LONG-TERM CURRENT USE OF INSULIN (HCC): ICD-10-CM

## 2017-11-15 NOTE — PROGRESS NOTES
APN Visit 11/8/17 (late entry)    APN visit at [facility] on 11/8/17 in the patient's room. Patient seen for assessment, progression of care and discharge plans.     Patient is enrolled in the Medicare BPCI program under  (ortho injury) a 11/11/17    Debbie ([address]) [facility] APN  [phone] /

## 2017-11-16 ENCOUNTER — OFFICE VISIT (OUTPATIENT)
Dept: INTERNAL MEDICINE CLINIC | Facility: CLINIC | Age: 77
End: 2017-11-16

## 2017-11-16 VITALS
WEIGHT: 200 LBS | BODY MASS INDEX: 39.27 KG/M2 | TEMPERATURE: 98 F | SYSTOLIC BLOOD PRESSURE: 136 MMHG | DIASTOLIC BLOOD PRESSURE: 60 MMHG | HEART RATE: 72 BPM | HEIGHT: 60 IN

## 2017-11-16 DIAGNOSIS — I87.2 VENOUS INSUFFICIENCY: ICD-10-CM

## 2017-11-16 DIAGNOSIS — E55.9 VITAMIN D DEFICIENCY: ICD-10-CM

## 2017-11-16 DIAGNOSIS — E87.6 HYPOKALEMIA: ICD-10-CM

## 2017-11-16 DIAGNOSIS — E11.9 TYPE 2 DIABETES MELLITUS WITHOUT COMPLICATION, WITHOUT LONG-TERM CURRENT USE OF INSULIN (HCC): ICD-10-CM

## 2017-11-16 DIAGNOSIS — R29.898 LEFT HAND WEAKNESS: ICD-10-CM

## 2017-11-16 DIAGNOSIS — M15.9 PRIMARY OSTEOARTHRITIS INVOLVING MULTIPLE JOINTS: ICD-10-CM

## 2017-11-16 DIAGNOSIS — K22.70 BARRETT'S ESOPHAGUS WITHOUT DYSPLASIA: ICD-10-CM

## 2017-11-16 DIAGNOSIS — R53.83 FATIGUE, UNSPECIFIED TYPE: Primary | ICD-10-CM

## 2017-11-16 DIAGNOSIS — E78.1 HYPERTRIGLYCERIDEMIA: ICD-10-CM

## 2017-11-16 DIAGNOSIS — E78.00 HYPERCHOLESTEREMIA: ICD-10-CM

## 2017-11-16 DIAGNOSIS — Z90.49 S/P LAPAROSCOPIC CHOLECYSTECTOMY: ICD-10-CM

## 2017-11-16 DIAGNOSIS — N39.0 URINARY TRACT INFECTION WITHOUT HEMATURIA, SITE UNSPECIFIED: ICD-10-CM

## 2017-11-16 DIAGNOSIS — Z98.890 HISTORY OF BACK SURGERY: ICD-10-CM

## 2017-11-16 PROCEDURE — 99496 TRANSJ CARE MGMT HIGH F2F 7D: CPT | Performed by: INTERNAL MEDICINE

## 2017-11-16 RX ORDER — POTASSIUM CHLORIDE 20 MEQ/1
20 TABLET, EXTENDED RELEASE ORAL DAILY
Qty: 30 TABLET | Refills: 5 | Status: ON HOLD | OUTPATIENT
Start: 2017-11-16 | End: 2017-12-07

## 2017-11-16 NOTE — PROGRESS NOTES
Richard Allen is a 78year old female. Patient presents with:  Hospital F/U: patient had a fall ,states she is feeling better today   Fatigue  Arthritis  UTI  Diabetes    HPI:   Patient presents with:  Hospital F/U: patient had a fall ,states she is feeli Oral Tab Take 1 tablet (10 mg total) by mouth daily. Disp: 30 tablet Rfl: 0   allopurinol 300 MG Oral Tab Take 1 tablet by mouth daily. Disp:  Rfl: 3   furosemide 20 MG Oral Tab Take 1 tablet by mouth. 3 times a week.  Monday, Wed, Fri  Disp:  Rfl:    Panto tobacco: Never Used                      Alcohol use: Yes           1.2 oz/week     Glasses of wine: 2 per week     Comment: occasionally       REVIEW OF SYSTEMS:   GENERAL HEALTH: feels well otherwise  RESPIRATORY:No cough or SOB  CARDIOVASCULAR: No chest necessary. 2. Primary osteoarthritis involving multiple joints  Stable. CPM.  Patient is gradually improving following her recent hospitalization and rehab stay.   Much was due to profound weakness when she was found on the floor after an unknown period today for hospital follow up.    She was discharged from [facility], [address] [place] [address] [place] to Home   Admission Date: 10/17/17  Discharge Date: 11/11/17  Hospital Discharge Diagnosis: CHF  TCM Diagnosis:  Other: Rhabdomyolysis ; still recommend for TCM follow-up    I November 11, 2017. She presents to the office today for follow-up evaluation. Patient is seen with the ladbabar who is the head of the home care agency that is helping provide in home health care assistance for the patient.   This is for nonmedical things suc Anemia; Anesthesia complication; Arthritis; BACK PAIN; Back problem; Urban's esophagus; Blood disorder; CHF (congestive heart failure) ([facility] Colon, diverticulosis; Congestive heart disease ([facility] Disorder of thyroid; Esophageal reflux; Fatigue;  High blo extremities  NEURO: denies headaches, denies dizziness, denies weakness  PSYCHE: denies depression or anxiety  HEMATOLOGIC: denies hx of anemia, or bruising, denies bleeding  ENDOCRINE: denies thyroid history  ALL/ASTHMA: denies hx of allergy or asthma METABOLIC PANEL (14); Future  -     LIPID PANEL; Future    Hypertriglyceridemia  -     COMP METABOLIC PANEL (14); Future  -     LIPID PANEL;  Future    S/P laparoscopic cholecystectomy    Hypokalemia    Type 2 diabetes mellitus without complication, without discharge to 30 days:   · Number of Possible Diagnoses and/or Management Options: severe  · Amount and/or Complexity of Data to Be Reviewed: severe  · Risk of Significant Complications, Morbidity, and/or Mortality: severe    Overall Risk:   severe    Patie

## 2017-11-16 NOTE — PATIENT INSTRUCTIONS
1.  Patient is to continue her current diet, medications and activity. 2.  Patient is to continue her rehab with her visiting nurse, physical therapy and Occupational Therapy.   Patient is also to continue her with daily activities as she is receiving from

## 2017-11-17 ENCOUNTER — TELEPHONE (OUTPATIENT)
Dept: INTERNAL MEDICINE CLINIC | Facility: CLINIC | Age: 77
End: 2017-11-17

## 2017-11-17 RX ORDER — AMLODIPINE BESYLATE 10 MG/1
TABLET ORAL
Qty: 30 TABLET | Refills: 11 | Status: SHIPPED | OUTPATIENT
Start: 2017-11-17 | End: 2018-02-21 | Stop reason: DRUGHIGH

## 2017-11-17 RX ORDER — OMEPRAZOLE 20 MG/1
20 CAPSULE, DELAYED RELEASE ORAL
Qty: 90 CAPSULE | Refills: 3 | Status: ON HOLD | OUTPATIENT
Start: 2017-11-17 | End: 2017-12-07

## 2017-11-17 NOTE — TELEPHONE ENCOUNTER
Telephone call to Noah Gilmore, supervisor for occupational therapy at [place] [address]. I have left a message for Noah Gilmore that it is fine with me for the patient, Jacobo Chowdhury, to have occupational therapy starting next week.

## 2017-11-17 NOTE — TELEPHONE ENCOUNTER
Noted.  I have returned the call to Erica Hoyos, visiting nurse, with [facility]. I left a message for Erica Hoyos when I called. Patient has been receiving methadone from the [facility] pain clinic for some time.   According to my records she currently

## 2017-11-17 NOTE — TELEPHONE ENCOUNTER
[phone] [facility]  Would like verbal order for OT next week  To clinical

## 2017-11-17 NOTE — TELEPHONE ENCOUNTER
In light of change in Rx from ; spoke to pt and she confirmed she is taking Amlodipine 10 mg daily  Rx sent

## 2017-11-20 ENCOUNTER — TELEPHONE (OUTPATIENT)
Dept: INTERNAL MEDICINE CLINIC | Facility: CLINIC | Age: 77
End: 2017-11-20

## 2017-11-20 ENCOUNTER — LAB ENCOUNTER (OUTPATIENT)
Dept: LAB | Age: 77
End: 2017-11-20
Attending: INTERNAL MEDICINE
Payer: MEDICARE

## 2017-11-20 DIAGNOSIS — E78.1 HYPERTRIGLYCERIDEMIA: ICD-10-CM

## 2017-11-20 DIAGNOSIS — R53.83 FATIGUE, UNSPECIFIED TYPE: ICD-10-CM

## 2017-11-20 DIAGNOSIS — E11.9 TYPE 2 DIABETES MELLITUS WITHOUT COMPLICATION, WITHOUT LONG-TERM CURRENT USE OF INSULIN (HCC): ICD-10-CM

## 2017-11-20 DIAGNOSIS — E78.00 HYPERCHOLESTEREMIA: ICD-10-CM

## 2017-11-20 PROCEDURE — 85025 COMPLETE CBC W/AUTO DIFF WBC: CPT

## 2017-11-20 PROCEDURE — 36415 COLL VENOUS BLD VENIPUNCTURE: CPT

## 2017-11-20 PROCEDURE — 80061 LIPID PANEL: CPT

## 2017-11-20 PROCEDURE — 80053 COMPREHEN METABOLIC PANEL: CPT

## 2017-11-20 NOTE — TELEPHONE ENCOUNTER
Nika Mathews from [facility].  is calling to report /70 pt.  Is doing fine her lower legs look tight and dry  Ph. # [phone]    Routed to clinical

## 2017-11-21 ENCOUNTER — TELEPHONE (OUTPATIENT)
Dept: INTERNAL MEDICINE CLINIC | Facility: CLINIC | Age: 77
End: 2017-11-21

## 2017-11-21 NOTE — TELEPHONE ENCOUNTER
Please call pt and notify her that her recent blood tests have turned out well. I will discuss them with her at her next office visit.   I will route this to nursing,  Thank you!!

## 2017-11-22 NOTE — TELEPHONE ENCOUNTER
Spoke with pharmacist who states they wanted to clarify how many capsules patient is to take per day. The bottle that patient brought in was for 2 capsules daily.  Advise to follow direction on most recent script sent 11/17 which was 1 capsule before breakf

## 2017-11-24 RX ORDER — LISINOPRIL 20 MG/1
TABLET ORAL
Qty: 30 TABLET | Refills: 11 | Status: SHIPPED | OUTPATIENT
Start: 2017-11-24 | End: 2018-02-21 | Stop reason: DRUGHIGH

## 2017-11-24 RX ORDER — LEVOTHYROXINE SODIUM 0.1 MG/1
TABLET ORAL
Qty: 30 TABLET | Refills: 11 | Status: SHIPPED | OUTPATIENT
Start: 2017-11-24 | End: 2018-12-11

## 2017-11-28 ENCOUNTER — TELEPHONE (OUTPATIENT)
Dept: INTERNAL MEDICINE CLINIC | Facility: CLINIC | Age: 77
End: 2017-11-28

## 2017-11-28 ENCOUNTER — TELEPHONE (OUTPATIENT)
Dept: PAIN CLINIC | Facility: HOSPITAL | Age: 77
End: 2017-11-28

## 2017-11-28 RX ORDER — ACETAMINOPHEN 325 MG/1
325 TABLET ORAL EVERY 6 HOURS PRN
COMMUNITY
End: 2017-12-07

## 2017-11-28 NOTE — TELEPHONE ENCOUNTER
Candance Hymen / Delia calling pt B/P today was 140/70, pt weights 196  Lower legs are tight & hard.  Pt is taking water pill Mon, Wed & Friday,  Pt is also complaining of pain in left arm & shoulder    tasked to nursing

## 2017-11-28 NOTE — TELEPHONE ENCOUNTER
Spoke with [address] - we have not prescribed methadone. Pharmacist will further investigate - states disregard this message.   [facility] is \"packaging\" pt's meds for her now which means they are making it easy for her to take prescribed me

## 2017-11-28 NOTE — TELEPHONE ENCOUNTER
In [facility] at times I had to go to every day on the diuretics, she did not require potassium during the treatment with daily diuretics, so I do not think we need it now.   But only blood work and talus, nursing please instruct home health to have her go t

## 2017-11-28 NOTE — TELEPHONE ENCOUNTER
To Dr. Chloe Gardiner - Dr. Neeta Morejon message relayed to [facility] who verbalized understanding. Furosemide will be daily, no K+. Tylenol?

## 2017-11-28 NOTE — TELEPHONE ENCOUNTER
To Dr. Niranjan Pace. Jan - pain L arm and shoulder due to prior injury. Per Gabbie/Sanjay - should pt be on K+? - Kenyetta Hall states she has not been taking. OK for tylenol 325mg to take every 6 hours PRN?   Pt states she has not been elevating her legs - pe

## 2017-11-29 RX ORDER — METHADONE HYDROCHLORIDE 10 MG/1
50 TABLET ORAL EVERY 8 HOURS
Qty: 450 TABLET | Refills: 0 | Status: SHIPPED | OUTPATIENT
Start: 2017-12-01 | End: 2017-11-29

## 2017-11-29 RX ORDER — METHADONE HYDROCHLORIDE 10 MG/1
50 TABLET ORAL EVERY 8 HOURS
Qty: 450 TABLET | Refills: 0 | Status: SHIPPED | OUTPATIENT
Start: 2017-11-29 | End: 2017-12-12

## 2017-11-29 NOTE — TELEPHONE ENCOUNTER
Discussed with Corinne Carvajal, visiting nurse. Patient will take Lasix 40 mg a day for a week. We will hold potassium at this time. [employer] will draw a BMP next Wednesday.   Thank you!!

## 2017-12-01 ENCOUNTER — TELEPHONE (OUTPATIENT)
Dept: INTERNAL MEDICINE CLINIC | Facility: CLINIC | Age: 77
End: 2017-12-01

## 2017-12-01 NOTE — TELEPHONE ENCOUNTER
Noted.  Message left for AUNDREA Hills, concerning patient. I approve her request as noted below.   Thank you!!

## 2017-12-01 NOTE — TELEPHONE ENCOUNTER
Requesting verbal order for two addt'l visits and to move cancellation from this week to next week   Tasked to nursing

## 2017-12-04 ENCOUNTER — TELEPHONE (OUTPATIENT)
Dept: INTERNAL MEDICINE CLINIC | Facility: CLINIC | Age: 77
End: 2017-12-04

## 2017-12-04 ENCOUNTER — APPOINTMENT (OUTPATIENT)
Dept: CT IMAGING | Facility: HOSPITAL | Age: 77
End: 2017-12-04
Attending: EMERGENCY MEDICINE
Payer: MEDICARE

## 2017-12-04 ENCOUNTER — HOSPITAL ENCOUNTER (EMERGENCY)
Facility: HOSPITAL | Age: 77
Discharge: HOME | End: 2017-12-04
Attending: EMERGENCY MEDICINE
Payer: MEDICARE

## 2017-12-04 VITALS
OXYGEN SATURATION: 96 % | DIASTOLIC BLOOD PRESSURE: 79 MMHG | SYSTOLIC BLOOD PRESSURE: 137 MMHG | BODY MASS INDEX: 39.27 KG/M2 | WEIGHT: 200 LBS | TEMPERATURE: 99 F | HEART RATE: 79 BPM | RESPIRATION RATE: 16 BRPM | HEIGHT: 60 IN

## 2017-12-04 DIAGNOSIS — K59.00 CONSTIPATION, UNSPECIFIED CONSTIPATION TYPE: ICD-10-CM

## 2017-12-04 DIAGNOSIS — R10.30 LOWER ABDOMINAL PAIN: Primary | ICD-10-CM

## 2017-12-04 PROCEDURE — 80048 BASIC METABOLIC PNL TOTAL CA: CPT | Performed by: EMERGENCY MEDICINE

## 2017-12-04 PROCEDURE — 99285 EMERGENCY DEPT VISIT HI MDM: CPT

## 2017-12-04 PROCEDURE — 85025 COMPLETE CBC W/AUTO DIFF WBC: CPT

## 2017-12-04 PROCEDURE — 74176 CT ABD & PELVIS W/O CONTRAST: CPT | Performed by: EMERGENCY MEDICINE

## 2017-12-04 PROCEDURE — 36415 COLL VENOUS BLD VENIPUNCTURE: CPT

## 2017-12-04 PROCEDURE — 80048 BASIC METABOLIC PNL TOTAL CA: CPT

## 2017-12-04 PROCEDURE — 83690 ASSAY OF LIPASE: CPT

## 2017-12-04 PROCEDURE — 85025 COMPLETE CBC W/AUTO DIFF WBC: CPT | Performed by: EMERGENCY MEDICINE

## 2017-12-04 PROCEDURE — 83690 ASSAY OF LIPASE: CPT | Performed by: EMERGENCY MEDICINE

## 2017-12-04 PROCEDURE — 80076 HEPATIC FUNCTION PANEL: CPT

## 2017-12-04 PROCEDURE — 81003 URINALYSIS AUTO W/O SCOPE: CPT | Performed by: EMERGENCY MEDICINE

## 2017-12-04 PROCEDURE — 80076 HEPATIC FUNCTION PANEL: CPT | Performed by: EMERGENCY MEDICINE

## 2017-12-04 NOTE — ED INITIAL ASSESSMENT (HPI)
Pt came in for nausea, constipation and abdominal pain for 2 days. Last BM- unable to remember. Tried suppository with no relief. RR even and nonlabored, speaking in full sentences, afebrile. Denies vomiting, diarrhea.

## 2017-12-04 NOTE — TELEPHONE ENCOUNTER
Telephone call to patient. Patient called office today wanted to be seen. Patient's had abdominal pain for 3 days. She has had nausea and vomiting. She has been unable to keep down very much food or liquids.   I have advised the patient that she should

## 2017-12-05 ENCOUNTER — TELEPHONE (OUTPATIENT)
Dept: INTERNAL MEDICINE CLINIC | Facility: CLINIC | Age: 77
End: 2017-12-05

## 2017-12-05 NOTE — TELEPHONE ENCOUNTER
Called patient and relayed DR. ALVA message - patient townsend snot have abdominal pain , a little nausea. She also has suppositories at home and RN instructed patient to try maybe one today and then F/U with DR. TOTH - verbalized understanding

## 2017-12-05 NOTE — TELEPHONE ENCOUNTER
Renee Diane /  calling for a new order once a week for 4 more weeks, pt was in ER yesterday she needs to monitor pt.      There is a conflict with the owner of [address], she will be with the pt at her jacobo with Dr Epi Lin

## 2017-12-05 NOTE — ED PROVIDER NOTES
Patient Seen in: [facility] AND [facility] Emergency Department    History   Patient presents with:  Abdomen/Flank Pain (GI/)    Stated Complaint:     HPI    The patient is a 51-year-old female who presents with 2 days of lower sharp intermittent abdominal pati Packs/day: 0.00      Years: 0.00         Quit date: 1/1/1970  Smokeless tobacco: Never Used                      Alcohol use: Yes           1.2 oz/week     Glasses of wine: 2 per week     Comment: occasionally constipation, appendicitis        ED Course     Labs Reviewed   BASIC METABOLIC PANEL (8) - Abnormal; Notable for the following:        Result Value    Glucose 130 (*)     Sodium 134 (*)     BUN/CREA Ratio 7.1 (*)     GFR, Non- 36 (*)     G with patient including need for follow up    Patient feeling better, states she is very hungry and would like to go home. Patient understands return if any symptoms worsen and to follow-up with her doctor tomorrow for recheck.    notified of patient

## 2017-12-05 NOTE — TELEPHONE ENCOUNTER
Patient has finished her Magnesium Citrate & still has not had a bowel movement. It has been about 4 days since her last bowel movement. Should she start another bottle? Get a medication?     Patient has an appt with Dr. Myles Frias on Thursday, but Silver

## 2017-12-05 NOTE — TELEPHONE ENCOUNTER
Called Myles Hernández from [facility] who states [facility] RN Sherry Horton has DX CHF which is not on discharge list from rehab , also not in problem list/ Myles Hernández will call back with Sherry Horton from [facility] number so she can be called .  Patient is getting upset that she is

## 2017-12-05 NOTE — TELEPHONE ENCOUNTER
Could you please advise for DR. TOTH patient? She was in ER with abdominal pain ( CT and blood work were done ) has F/U with  on Thursday - thanks to DR. ALVA

## 2017-12-05 NOTE — TELEPHONE ENCOUNTER
Spoke with Corinne Carvajal from [facility] who needs orders and also  Confirmation if patient has CHF or not - see other TT

## 2017-12-05 NOTE — TELEPHONE ENCOUNTER
Did to clarify diagnosis codes, as they differ between what home health has & what rehab has. Would like an answer today from a nurse.

## 2017-12-05 NOTE — TELEPHONE ENCOUNTER
Please advise - called Rebekah Ricketts from [facility] who needs order , also wants to confirm does patient have CHF or not - to DR. TOTH

## 2017-12-05 NOTE — TELEPHONE ENCOUNTER
As FYI to DR. TOTH  - called patient who was seen in ER for abdominal pain . Had labs and  CT of abdomen done.  Transferred to [facility] to schedule F/U with

## 2017-12-05 NOTE — TELEPHONE ENCOUNTER
Unless she is feeling uncomfortable in the abdomen to the point where she has pain, she should take a less aggressive approach with moving the bowels, and follow-up with Dr. Ree Kelly as planned.   I do not like the idea of more magnesium citrate, may be if

## 2017-12-06 ENCOUNTER — TELEPHONE (OUTPATIENT)
Dept: INTERNAL MEDICINE CLINIC | Facility: CLINIC | Age: 77
End: 2017-12-06

## 2017-12-06 ENCOUNTER — LAB REQUISITION (OUTPATIENT)
Dept: LAB | Facility: HOSPITAL | Age: 77
End: 2017-12-06
Payer: MEDICARE

## 2017-12-06 DIAGNOSIS — N18.9 CHRONIC KIDNEY DISEASE: ICD-10-CM

## 2017-12-06 PROCEDURE — 80048 BASIC METABOLIC PNL TOTAL CA: CPT | Performed by: INTERNAL MEDICINE

## 2017-12-06 NOTE — TELEPHONE ENCOUNTER
Noted. Please call Santi Francois, from [place] [address], and give her the okay to continue to see the patient weekly for 4 more weeks as requested. I will route this to nursing.   Thank you!!

## 2017-12-06 NOTE — TELEPHONE ENCOUNTER
Message relayed to patient who verbalized understanding. Frances Kessler requested confirmation whether or not the patient has CHF from Dr. Mota So. Not listed on problem list.    Dr. Mota So - can you confirm whether the patient has CHF?

## 2017-12-06 NOTE — TELEPHONE ENCOUNTER
Called Sunil Brian from [facility] and relayed Dr Harshad Zaman message to her. She verbalized understanding.

## 2017-12-06 NOTE — TELEPHONE ENCOUNTER
From residential     Saw pt this morning     Yesterday pt fell in kitchen - called paramedics to help her up     to her chair - some soreness on left arm, but no apparent bruises, will see Dr. Lashell Sorensen tomorrow morning     Just a heads up for Dr. Lashell Sorensen

## 2017-12-06 NOTE — TELEPHONE ENCOUNTER
Noted.  I reviewed the chart concerning this patient. I have not known the patient have congestive heart failure. Her echocardiogram in the hospital appeared normal.  I do not feel that the patient has congestive heart failure.   Okay to notify visiting nydia

## 2017-12-07 ENCOUNTER — OFFICE VISIT (OUTPATIENT)
Dept: INTERNAL MEDICINE CLINIC | Facility: CLINIC | Age: 77
End: 2017-12-07

## 2017-12-07 ENCOUNTER — HOSPITAL ENCOUNTER (OUTPATIENT)
Facility: HOSPITAL | Age: 77
Setting detail: OBSERVATION
LOS: 1 days | Discharge: NURSING, REHAB OR LONG-TERM CARE | End: 2017-12-12
Attending: HOSPITALIST | Admitting: INTERNAL MEDICINE
Payer: MEDICARE

## 2017-12-07 VITALS
DIASTOLIC BLOOD PRESSURE: 60 MMHG | WEIGHT: 195 LBS | SYSTOLIC BLOOD PRESSURE: 140 MMHG | OXYGEN SATURATION: 97 % | TEMPERATURE: 98 F | BODY MASS INDEX: 38.28 KG/M2 | HEIGHT: 60 IN | HEART RATE: 80 BPM

## 2017-12-07 DIAGNOSIS — N28.9 RENAL INSUFFICIENCY: ICD-10-CM

## 2017-12-07 DIAGNOSIS — R11.0 NAUSEA: ICD-10-CM

## 2017-12-07 DIAGNOSIS — E11.9 TYPE 2 DIABETES MELLITUS WITHOUT COMPLICATION, WITHOUT LONG-TERM CURRENT USE OF INSULIN (HCC): ICD-10-CM

## 2017-12-07 DIAGNOSIS — Z98.890 HISTORY OF BACK SURGERY: ICD-10-CM

## 2017-12-07 DIAGNOSIS — M15.9 PRIMARY OSTEOARTHRITIS INVOLVING MULTIPLE JOINTS: ICD-10-CM

## 2017-12-07 DIAGNOSIS — I87.2 VENOUS INSUFFICIENCY: ICD-10-CM

## 2017-12-07 DIAGNOSIS — R10.10 PAIN OF UPPER ABDOMEN: Primary | ICD-10-CM

## 2017-12-07 DIAGNOSIS — R53.1 WEAKNESS: ICD-10-CM

## 2017-12-07 PROBLEM — R10.9 ABDOMINAL PAIN: Status: ACTIVE | Noted: 2017-12-07

## 2017-12-07 LAB
ALBUMIN SERPL BCP-MCNC: 3.6 G/DL (ref 3.5–4.8)
ALBUMIN/GLOB SERPL: 1 {RATIO} (ref 1–2)
ALP SERPL-CCNC: 72 U/L (ref 32–100)
ALT SERPL-CCNC: 16 U/L (ref 14–54)
ANION GAP SERPL CALC-SCNC: 10 MMOL/L (ref 0–18)
AST SERPL-CCNC: 18 U/L (ref 15–41)
BASOPHILS # BLD: 0.1 K/UL (ref 0–0.2)
BASOPHILS NFR BLD: 1 %
BILIRUB SERPL-MCNC: 0.6 MG/DL (ref 0.3–1.2)
BUN SERPL-MCNC: 11 MG/DL (ref 8–20)
BUN/CREAT SERPL: 8.3 (ref 10–20)
CALCIUM SERPL-MCNC: 9.5 MG/DL (ref 8.5–10.5)
CHLORIDE SERPL-SCNC: 98 MMOL/L (ref 95–110)
CO2 SERPL-SCNC: 29 MMOL/L (ref 22–32)
CREAT SERPL-MCNC: 1.32 MG/DL (ref 0.5–1.5)
EOSINOPHIL # BLD: 0.3 K/UL (ref 0–0.7)
EOSINOPHIL NFR BLD: 3 %
ERYTHROCYTE [DISTWIDTH] IN BLOOD BY AUTOMATED COUNT: 14.3 % (ref 11–15)
GLOBULIN PLAS-MCNC: 3.6 G/DL (ref 2.5–3.7)
GLUCOSE BLDC GLUCOMTR-MCNC: 102 MG/DL (ref 70–99)
GLUCOSE BLDC GLUCOMTR-MCNC: 106 MG/DL (ref 70–99)
GLUCOSE BLDC GLUCOMTR-MCNC: 117 MG/DL (ref 70–99)
GLUCOSE SERPL-MCNC: 111 MG/DL (ref 70–99)
HCT VFR BLD AUTO: 35.4 % (ref 35–48)
HGB BLD-MCNC: 11.7 G/DL (ref 12–16)
LYMPHOCYTES # BLD: 1.2 K/UL (ref 1–4)
LYMPHOCYTES NFR BLD: 12 %
MCH RBC QN AUTO: 28.5 PG (ref 27–32)
MCHC RBC AUTO-ENTMCNC: 32.9 G/DL (ref 32–37)
MCV RBC AUTO: 86.5 FL (ref 80–100)
MONOCYTES # BLD: 0.6 K/UL (ref 0–1)
MONOCYTES NFR BLD: 6 %
NEUTROPHILS # BLD AUTO: 7.7 K/UL (ref 1.8–7.7)
NEUTROPHILS NFR BLD: 79 %
OSMOLALITY UR CALC.SUM OF ELEC: 284 MOSM/KG (ref 275–295)
PLATELET # BLD AUTO: 262 K/UL (ref 140–400)
PMV BLD AUTO: 9.4 FL (ref 7.4–10.3)
POTASSIUM SERPL-SCNC: 3.6 MMOL/L (ref 3.3–5.1)
PROT SERPL-MCNC: 7.2 G/DL (ref 5.9–8.4)
RBC # BLD AUTO: 4.09 M/UL (ref 3.7–5.4)
SODIUM SERPL-SCNC: 137 MMOL/L (ref 136–144)
WBC # BLD AUTO: 9.8 K/UL (ref 4–11)

## 2017-12-07 PROCEDURE — G0463 HOSPITAL OUTPT CLINIC VISIT: HCPCS | Performed by: INTERNAL MEDICINE

## 2017-12-07 PROCEDURE — 99222 1ST HOSP IP/OBS MODERATE 55: CPT | Performed by: HOSPITALIST

## 2017-12-07 PROCEDURE — 99214 OFFICE O/P EST MOD 30 MIN: CPT | Performed by: INTERNAL MEDICINE

## 2017-12-07 RX ORDER — POTASSIUM CHLORIDE 20 MEQ/1
40 TABLET, EXTENDED RELEASE ORAL EVERY 4 HOURS
Status: COMPLETED | OUTPATIENT
Start: 2017-12-07 | End: 2017-12-08

## 2017-12-07 RX ORDER — BISACODYL 10 MG
10 SUPPOSITORY, RECTAL RECTAL
Status: DISCONTINUED | OUTPATIENT
Start: 2017-12-07 | End: 2017-12-12

## 2017-12-07 RX ORDER — MORPHINE SULFATE 2 MG/ML
1 INJECTION, SOLUTION INTRAMUSCULAR; INTRAVENOUS EVERY 2 HOUR PRN
Status: DISCONTINUED | OUTPATIENT
Start: 2017-12-07 | End: 2017-12-12

## 2017-12-07 RX ORDER — ALLOPURINOL 300 MG/1
300 TABLET ORAL DAILY
Status: DISCONTINUED | OUTPATIENT
Start: 2017-12-08 | End: 2017-12-12

## 2017-12-07 RX ORDER — METHADONE HYDROCHLORIDE 10 MG/1
20 TABLET ORAL NIGHTLY
COMMUNITY
End: 2017-12-12

## 2017-12-07 RX ORDER — ACETAMINOPHEN 325 MG/1
650 TABLET ORAL EVERY 6 HOURS PRN
Status: DISCONTINUED | OUTPATIENT
Start: 2017-12-07 | End: 2017-12-12

## 2017-12-07 RX ORDER — MORPHINE SULFATE 2 MG/ML
2 INJECTION, SOLUTION INTRAMUSCULAR; INTRAVENOUS EVERY 2 HOUR PRN
Status: DISCONTINUED | OUTPATIENT
Start: 2017-12-07 | End: 2017-12-12

## 2017-12-07 RX ORDER — PANTOPRAZOLE SODIUM 40 MG/1
40 TABLET, DELAYED RELEASE ORAL
Status: DISCONTINUED | OUTPATIENT
Start: 2017-12-08 | End: 2017-12-12

## 2017-12-07 RX ORDER — DEXTROSE MONOHYDRATE 25 G/50ML
50 INJECTION, SOLUTION INTRAVENOUS AS NEEDED
Status: DISCONTINUED | OUTPATIENT
Start: 2017-12-07 | End: 2017-12-12

## 2017-12-07 RX ORDER — ONDANSETRON 2 MG/ML
4 INJECTION INTRAMUSCULAR; INTRAVENOUS EVERY 6 HOURS PRN
Status: DISCONTINUED | OUTPATIENT
Start: 2017-12-07 | End: 2017-12-12

## 2017-12-07 RX ORDER — LEVOTHYROXINE SODIUM 0.05 MG/1
100 TABLET ORAL
Status: DISCONTINUED | OUTPATIENT
Start: 2017-12-08 | End: 2017-12-12

## 2017-12-07 RX ORDER — SODIUM CHLORIDE 0.9 % (FLUSH) 0.9 %
3 SYRINGE (ML) INJECTION AS NEEDED
Status: DISCONTINUED | OUTPATIENT
Start: 2017-12-07 | End: 2017-12-12

## 2017-12-07 RX ORDER — DOCUSATE SODIUM 100 MG/1
100 CAPSULE, LIQUID FILLED ORAL
COMMUNITY
End: 2018-08-15 | Stop reason: ALTCHOICE

## 2017-12-07 RX ORDER — MORPHINE SULFATE 4 MG/ML
4 INJECTION, SOLUTION INTRAMUSCULAR; INTRAVENOUS EVERY 2 HOUR PRN
Status: DISCONTINUED | OUTPATIENT
Start: 2017-12-07 | End: 2017-12-12

## 2017-12-07 RX ORDER — LISINOPRIL 20 MG/1
20 TABLET ORAL DAILY
Status: DISCONTINUED | OUTPATIENT
Start: 2017-12-08 | End: 2017-12-12

## 2017-12-07 RX ORDER — HYDRALAZINE HYDROCHLORIDE 20 MG/ML
10 INJECTION INTRAMUSCULAR; INTRAVENOUS EVERY 4 HOURS PRN
Status: DISCONTINUED | OUTPATIENT
Start: 2017-12-07 | End: 2017-12-12

## 2017-12-07 RX ORDER — POLYETHYLENE GLYCOL 3350 17 G/17G
17 POWDER, FOR SOLUTION ORAL DAILY PRN
Status: DISCONTINUED | OUTPATIENT
Start: 2017-12-07 | End: 2017-12-12

## 2017-12-07 RX ORDER — ATORVASTATIN CALCIUM 40 MG/1
40 TABLET, FILM COATED ORAL DAILY
Status: DISCONTINUED | OUTPATIENT
Start: 2017-12-08 | End: 2017-12-12

## 2017-12-07 RX ORDER — PANTOPRAZOLE SODIUM 40 MG/1
40 TABLET, DELAYED RELEASE ORAL
COMMUNITY
End: 2018-02-21 | Stop reason: ALTCHOICE

## 2017-12-07 RX ORDER — AMLODIPINE BESYLATE 10 MG/1
10 TABLET ORAL
Status: DISCONTINUED | OUTPATIENT
Start: 2017-12-07 | End: 2017-12-12

## 2017-12-07 RX ORDER — POLYETHYLENE GLYCOL 3350 17 G/17G
17 POWDER, FOR SOLUTION ORAL DAILY
Status: DISCONTINUED | OUTPATIENT
Start: 2017-12-07 | End: 2017-12-12

## 2017-12-07 RX ORDER — METHADONE HYDROCHLORIDE 10 MG/1
20 TABLET ORAL 2 TIMES DAILY
Status: DISCONTINUED | OUTPATIENT
Start: 2017-12-07 | End: 2017-12-12

## 2017-12-07 NOTE — H&P
[facility]  : 11/3/1940    Status: Inpatient  Day #: 0    Attending: Elaine Irby MD  PCP: Will Snell MD     Date of Encounter:  2017  Date of Admission:  2017     Chief Complaint: A deficiency anemia    • Osteoarthrosis, unspecified whether generalized or localized, unspecified site    • Other and unspecified hyperlipidemia    • PONV (postoperative nausea and vomiting)    • Renal insufficiency    • Scoliosis    • SEASONAL ALLERGIES differently: No sig reported   AMLODIPINE BESYLATE 10 MG Oral Tab,  TAKE 1 TABLET BY MOUTH DAILY   omeprazole 20 MG Oral Capsule Delayed Release,  Take 1 capsule (20 mg total) by mouth every morning before breakfast.   Potassium Chloride ER 20 MEQ Oral Tab PUD  - EGD 8/23/17 - moderate non-erosive gasritis with small hiatal hernia  - s/p cholecystectomy on 8/25/17 fr chronic cholecystitis  - clear liquid diet for now  - GI consult    Leukocytosis  - repeat CBC    DM2  - monitor BS  - ISS    Chronic pain    -

## 2017-12-07 NOTE — CONSULTS
[facility]OSP - [facility]    Report of Consultation    Refugio Collins Patient Status:  Inpatient    11/3/1940 MRN [id number]   Location [facility] 1W Attending Wallis Lombard, MD   Hosp Day # 0 PCP Farhan Pride MD     Reason for Consultation: Other and unspecified hyperlipidemia    • PONV (postoperative nausea and vomiting)    • Renal insufficiency    • Scoliosis    • SEASONAL ALLERGIES    • Type II or unspecified type diabetes mellitus without mention of complication, not stated as uncontrolle AC  •  Normal Saline Flush 0.9 % injection 3 mL, 3 mL, Intravenous, PRN  •  acetaminophen (TYLENOL) tab 650 mg, 650 mg, Oral, Q6H PRN  •  morphINE sulfate (PF) 2 MG/ML injection 1 mg, 1 mg, Intravenous, Q2H PRN **OR** morphINE sulfate (PF) 2 MG/ML injectio masses, no organomegaly. Reducible umbilical hernia.  Locally tender    Genitalia:     Rectal:     Extremities:   Extremities normal, atraumatic, no cyanosis, chronic LE edema with stasis changes   Pulses:   2+ and symmetric all extremities   Skin:   Stasis ampulla. SPLEEN:           Normal size. PANCREAS:      Limited assessment by noncontrast CT. No obvious ductal dilatation. ADRENALS:      No mass or enlargement. KIDNEYS:          Renal cortical atrophy. No calculi. No hydronephrosis.   GI/MESENT Assessment/Plan:  1. Abdominal pain of uncertain etiology-Hx of chronic pain syndrome, fibromyalgia-methadone dependent  2. Fat containing umbilical hernia, possibly playing a role in the above  3.  Gastric wall thickening with hx of erosive gastrit

## 2017-12-07 NOTE — PATIENT INSTRUCTIONS
1.  Patient is to go to the hospital to be admitted for evaluation and treatment. 2.  I have placed a call to admitting department to make arrangements for admission. 3.  I have placed a call to the hospitalist let her know about the patient.   4.  I ronny

## 2017-12-07 NOTE — PROGRESS NOTES
Belinda Dukes is a 78year old female. Patient presents with:  Checkup  Fatigue  Arthritis  Diabetes    HPI:   Patient presents with:  Checkup  Fatigue  Arthritis  Diabetes    Patient presents to the office today for follow-up evaluation after being seen Disp: 30 tablet Rfl: 11   AMLODIPINE BESYLATE 10 MG Oral Tab TAKE 1 TABLET BY MOUTH DAILY Disp: 30 tablet Rfl: 11   omeprazole 20 MG Oral Capsule Delayed Release Take 1 capsule (20 mg total) by mouth every morning before breakfast. Disp: 90 capsule Rfl: 3 Quit date: 1/1/1970  Smokeless tobacco: Never Used                      Alcohol use: Yes           1.2 oz/week     Glasses of wine: 2 per week     Comment: occasionally       REVIEW OF SYSTEMS:   GENERAL HEALTH: feels well otherwise  RESPIRATORY:No co further evaluation and treatment. I have contacted me department to let them know. I will discuss the situation with the admitting hospitalist to make them aware of why the patient is being admitted.   I will see the patient for follow-up following her di

## 2017-12-08 ENCOUNTER — APPOINTMENT (OUTPATIENT)
Dept: ULTRASOUND IMAGING | Facility: HOSPITAL | Age: 77
End: 2017-12-08
Attending: INTERNAL MEDICINE
Payer: MEDICARE

## 2017-12-08 ENCOUNTER — APPOINTMENT (OUTPATIENT)
Dept: GENERAL RADIOLOGY | Facility: HOSPITAL | Age: 77
End: 2017-12-08
Attending: HOSPITALIST
Payer: MEDICARE

## 2017-12-08 ENCOUNTER — SURGERY (OUTPATIENT)
Age: 77
End: 2017-12-08

## 2017-12-08 LAB
ANION GAP SERPL CALC-SCNC: 8 MMOL/L (ref 0–18)
BASOPHILS # BLD: 0.1 K/UL (ref 0–0.2)
BASOPHILS NFR BLD: 1 %
BUN SERPL-MCNC: 8 MG/DL (ref 8–20)
BUN/CREAT SERPL: 6.2 (ref 10–20)
CALCIUM SERPL-MCNC: 9.2 MG/DL (ref 8.5–10.5)
CHLORIDE SERPL-SCNC: 96 MMOL/L (ref 95–110)
CO2 SERPL-SCNC: 28 MMOL/L (ref 22–32)
CREAT SERPL-MCNC: 1.3 MG/DL (ref 0.5–1.5)
EOSINOPHIL # BLD: 0.5 K/UL (ref 0–0.7)
EOSINOPHIL NFR BLD: 6 %
ERYTHROCYTE [DISTWIDTH] IN BLOOD BY AUTOMATED COUNT: 14.1 % (ref 11–15)
GLUCOSE BLDC GLUCOMTR-MCNC: 104 MG/DL (ref 70–99)
GLUCOSE BLDC GLUCOMTR-MCNC: 118 MG/DL (ref 70–99)
GLUCOSE BLDC GLUCOMTR-MCNC: 138 MG/DL (ref 70–99)
GLUCOSE BLDC GLUCOMTR-MCNC: 154 MG/DL (ref 70–99)
GLUCOSE SERPL-MCNC: 110 MG/DL (ref 70–99)
HCT VFR BLD AUTO: 30.7 % (ref 35–48)
HGB BLD-MCNC: 10 G/DL (ref 12–16)
LYMPHOCYTES # BLD: 1.5 K/UL (ref 1–4)
LYMPHOCYTES NFR BLD: 20 %
MCH RBC QN AUTO: 28.4 PG (ref 27–32)
MCHC RBC AUTO-ENTMCNC: 32.4 G/DL (ref 32–37)
MCV RBC AUTO: 87.5 FL (ref 80–100)
MONOCYTES # BLD: 0.7 K/UL (ref 0–1)
MONOCYTES NFR BLD: 9 %
NEUTROPHILS # BLD AUTO: 4.8 K/UL (ref 1.8–7.7)
NEUTROPHILS NFR BLD: 64 %
OSMOLALITY UR CALC.SUM OF ELEC: 273 MOSM/KG (ref 275–295)
PLATELET # BLD AUTO: 234 K/UL (ref 140–400)
PMV BLD AUTO: 9.3 FL (ref 7.4–10.3)
POTASSIUM SERPL-SCNC: 3.8 MMOL/L (ref 3.3–5.1)
POTASSIUM SERPL-SCNC: 3.8 MMOL/L (ref 3.3–5.1)
RBC # BLD AUTO: 3.51 M/UL (ref 3.7–5.4)
SODIUM SERPL-SCNC: 132 MMOL/L (ref 136–144)
WBC # BLD AUTO: 7.5 K/UL (ref 4–11)

## 2017-12-08 PROCEDURE — 72100 X-RAY EXAM L-S SPINE 2/3 VWS: CPT | Performed by: HOSPITALIST

## 2017-12-08 PROCEDURE — 76705 ECHO EXAM OF ABDOMEN: CPT | Performed by: INTERNAL MEDICINE

## 2017-12-08 PROCEDURE — 99233 SBSQ HOSP IP/OBS HIGH 50: CPT | Performed by: HOSPITALIST

## 2017-12-08 PROCEDURE — 0DB68ZX EXCISION OF STOMACH, VIA NATURAL OR ARTIFICIAL OPENING ENDOSCOPIC, DIAGNOSTIC: ICD-10-PCS | Performed by: INTERNAL MEDICINE

## 2017-12-08 PROCEDURE — 72072 X-RAY EXAM THORAC SPINE 3VWS: CPT | Performed by: HOSPITALIST

## 2017-12-08 RX ORDER — SODIUM CHLORIDE 9 MG/ML
INJECTION, SOLUTION INTRAVENOUS
Status: COMPLETED
Start: 2017-12-08 | End: 2017-12-08

## 2017-12-08 RX ORDER — MIDAZOLAM HYDROCHLORIDE 1 MG/ML
INJECTION INTRAMUSCULAR; INTRAVENOUS
Status: DISCONTINUED | OUTPATIENT
Start: 2017-12-08 | End: 2017-12-08 | Stop reason: HOSPADM

## 2017-12-08 RX ORDER — POTASSIUM CHLORIDE 20 MEQ/1
40 TABLET, EXTENDED RELEASE ORAL ONCE
Status: COMPLETED | OUTPATIENT
Start: 2017-12-08 | End: 2017-12-08

## 2017-12-08 RX ORDER — SUCRALFATE 1 G/1
1 TABLET ORAL
Status: DISCONTINUED | OUTPATIENT
Start: 2017-12-08 | End: 2017-12-12

## 2017-12-08 NOTE — PLAN OF CARE
Problem: Diabetes/Glucose Control  Goal: Glucose maintained within prescribed range  INTERVENTIONS:  - Monitor Blood Glucose as ordered  - Assess for signs and symptoms of hyperglycemia and hypoglycemia  - Administer ordered medications to maintain glucose as needed  - Establish a toileting routine/schedule  - Consider collaborating with pharmacy to review patient's medication profile   Outcome: Progressing

## 2017-12-08 NOTE — PROGRESS NOTES
[facility] - [facility]  Progress Note     Grace Joyce  : 11/3/1940    Status: Inpatient  Day #: 1    Attending: Tye Luu MD  PCP: Neto Karimi MD      Assessment and Plan     Abdominal pain  - possible gastroenteritis vs gastritis vs PUD 12/06/17   1049  12/07/17   1415  12/08/17   0526   BUN  13  11  8   CREATSERUM  1.42  1.32  1.30   GFRAA  44*  47*  48*   GFRNAA  36*  39*  40*   CA  9.1  9.5  9.2   ALB   --   3.6   --    NA  133*  137  132*   K  3.6  3.6  3.8  3.8   CL  95  98  96   CO2

## 2017-12-08 NOTE — PLAN OF CARE
Problem: Patient/Family Goals  Goal: Patient/Family Long Term Goal  Patient's Long Term Goal: to return home    Interventions:  - manage pain  - monitor labs, vitals  - See additional Care Plan goals for specific interventions   Outcome: [address]

## 2017-12-08 NOTE — H&P
[facility] Patient Status:  Inpatient    11/3/1940 MRN [id number]   Location [facility] ENDOSCOPY LAB SUITES Attending Aggie Ascencio MD   Hosp Day # 1 PCP Cherie Garcia MD     Date:   Disorder Father    • Heart Disorder Mother    • Heart Disease Sister    • Hypertension Brother    • Melanoma Other    • Cancer Other    • Stroke Other    • Heart Disease Other    • Diabetes Other    • Eye Problems Neg        Allergies/Medications:    Allerg normal, no murmur, click, rub or gallop, regular rate and rhythm  Abdominal: normal findings. Results:     Lab Results  Component Value Date   WBC 7.5 12/08/2017   HGB 10.0 (L) 12/08/2017   HCT 30.7 (L) 12/08/2017    12/08/2017   CREATSERUM 1.

## 2017-12-08 NOTE — CONSULTS
[facility] REPORT    Anna Vaughn  [facility]:[birth year]  FLD:[id number]  LOS:1    Date of Admission:  12/7/2017  Date of Consult:  12/8/2017     Reason for Consultation: abdominal pain, hernia       History of Present Illness: hypertension       Past Surgical History:  No date: BACK SURGERY      Comment: 5 surgeries last one in dec 2011  08/25/2017: CHOLECYSTECTOMY  08/22/2017: COLONOSCOPY  08/2017: EGD  11-: ELECTROCARDIOGRAM, COMPLETE      Comment: Scanned to media tab injection 4 mg, 4 mg, Intravenous, Q2H PRN  •  ondansetron HCl (ZOFRAN) injection 4 mg, 4 mg, Intravenous, Q6H PRN  •  PEG 3350 (MIRALAX) powder packet 17 g, 17 g, Oral, Daily PRN  •  bisacodyl (DULCOLAX) rectal suppository 10 mg, 10 mg, Rectal, Daily PRN 86.5  87.5   MCH  28.5  28.4   MCHC  32.9  32.4   RDW  14.3  14.1   WBC  9.8  7.5   PLT  262  234       Recent Labs   Lab  12/06/17   1049  12/07/17   1415  12/08/17   0526   GLU  206*  111*  110*   BUN  13  11  8   CREATSERUM  1.42  1.32  1.30   GFRAA  44 recent onset. Consider possible compression fracture of her back. We will obtained thoracolumbar spine series. Continue pain control.     The patient should follow-up with me after discharge from the hospital to discuss ventral hernia repair with mesh as

## 2017-12-08 NOTE — OPERATIVE REPORT
[facility] HOSP - [facility]    Esophagogastroduodenoscopy Report    Margotchristina KwanMandaen Patient Status:  Inpatient    11/3/1940 MRN [id number]   Location [facility] ENDOSCOPY LAB SUITES Attending Eliezer Iraheta MD      DATE OF OPERATION: 2017     REF PM

## 2017-12-09 LAB
ANION GAP SERPL CALC-SCNC: 7 MMOL/L (ref 0–18)
BASOPHILS # BLD: 0 K/UL (ref 0–0.2)
BASOPHILS NFR BLD: 1 %
BUN SERPL-MCNC: 10 MG/DL (ref 8–20)
BUN/CREAT SERPL: 7.5 (ref 10–20)
CALCIUM SERPL-MCNC: 9.3 MG/DL (ref 8.5–10.5)
CHLORIDE SERPL-SCNC: 102 MMOL/L (ref 95–110)
CLO TEST: NEGATIVE
CO2 SERPL-SCNC: 28 MMOL/L (ref 22–32)
CREAT SERPL-MCNC: 1.34 MG/DL (ref 0.5–1.5)
EOSINOPHIL # BLD: 0.4 K/UL (ref 0–0.7)
EOSINOPHIL NFR BLD: 5 %
ERYTHROCYTE [DISTWIDTH] IN BLOOD BY AUTOMATED COUNT: 14.3 % (ref 11–15)
GLUCOSE BLDC GLUCOMTR-MCNC: 111 MG/DL (ref 70–99)
GLUCOSE BLDC GLUCOMTR-MCNC: 123 MG/DL (ref 70–99)
GLUCOSE BLDC GLUCOMTR-MCNC: 135 MG/DL (ref 70–99)
GLUCOSE BLDC GLUCOMTR-MCNC: 227 MG/DL (ref 70–99)
GLUCOSE SERPL-MCNC: 109 MG/DL (ref 70–99)
HCT VFR BLD AUTO: 32.9 % (ref 35–48)
HGB BLD-MCNC: 10.7 G/DL (ref 12–16)
LYMPHOCYTES # BLD: 1.1 K/UL (ref 1–4)
LYMPHOCYTES NFR BLD: 14 %
MCH RBC QN AUTO: 28.3 PG (ref 27–32)
MCHC RBC AUTO-ENTMCNC: 32.5 G/DL (ref 32–37)
MCV RBC AUTO: 87.2 FL (ref 80–100)
MONOCYTES # BLD: 0.6 K/UL (ref 0–1)
MONOCYTES NFR BLD: 8 %
NEUTROPHILS # BLD AUTO: 5.9 K/UL (ref 1.8–7.7)
NEUTROPHILS NFR BLD: 73 %
OSMOLALITY UR CALC.SUM OF ELEC: 284 MOSM/KG (ref 275–295)
PLATELET # BLD AUTO: 254 K/UL (ref 140–400)
PMV BLD AUTO: 8.7 FL (ref 7.4–10.3)
POTASSIUM SERPL-SCNC: 4.6 MMOL/L (ref 3.3–5.1)
POTASSIUM SERPL-SCNC: 4.6 MMOL/L (ref 3.3–5.1)
RBC # BLD AUTO: 3.77 M/UL (ref 3.7–5.4)
SODIUM SERPL-SCNC: 137 MMOL/L (ref 136–144)
WBC # BLD AUTO: 8.2 K/UL (ref 4–11)

## 2017-12-09 PROCEDURE — 99233 SBSQ HOSP IP/OBS HIGH 50: CPT | Performed by: HOSPITALIST

## 2017-12-09 NOTE — PROGRESS NOTES
[facility]  Progress Note     Carla Degree  : 11/3/1940    Status: Inpatient  Day #: 1    Attending: Arely Escobar MD  PCP: Anselmo Saba MD      Assessment and Plan     Abdominal pain  - possible gastroenteritis vs gastritis vs PUD Recent Labs   Lab  12/07/17   1415  12/08/17   0526  12/09/17   0608   WBC  9.8  7.5  8.2   HGB  11.7*  10.0*  10.7*   HCT  35.4  30.7*  32.9*   PLT  262  234  254   RBC  4.09  3.51*  3.77   MCV  86.5  87.5  87.2   MCH  28.5  28.4  28.3   MCHC  32. allopurinol  300 mg Oral Daily   • AmLODIPine Besylate  10 mg Oral Daily   • atorvastatin  40 mg Oral Daily   • Levothyroxine Sodium  100 mcg Oral Before breakfast   • lisinopril  20 mg Oral Daily   • Methadone HCl  20 mg Oral BID   • Pantoprazole Sodium

## 2017-12-09 NOTE — OCCUPATIONAL THERAPY NOTE
OCCUPATIONAL THERAPY EVALUATION - INPATIENT     Room Number: 573/573-A  Evaluation Date: 12/9/2017  Type of Evaluation: Initial       Physician Order: IP Consult to Occupational Therapy  Reason for Therapy: ADL/IADL Dysfunction and Discharge Planning    OC Recommendations: Cont skilled therapy in a supervised setting       PLAN  OT Treatment Plan: Energy conservation/work simplification techniques;Balance activities; ADL training;IADL training;Functional transfer training;UE strengthening/ROM; Endurance traini building. )                         Drives: No  Patient Regularly Uses:  (pt has caregiver 2 x day to assist )    Stairs in Home: 0  Use of Assistive Device(s): rollator    Prior Level of [place]: receiving caregiver assistance on a daily basis    SUB toilet, bedpan or urinal? : A Little  -   Putting on and taking off regular upper body clothing?: A Lot  -   Taking care of personal grooming such as brushing teeth?: A Little  -   Eating meals?: A Little    AM-PAC Score:  Score: 16  Approx Degree of Impai

## 2017-12-09 NOTE — CONSULTS
Patient comes in with abdominal pain.   I am asked to see her because she has had left arm weakness since she fell out of bed last October  CT head was negative    She has moderate weakness distal worse than proximal, but no pain  Positive Carreon's on left

## 2017-12-09 NOTE — PHYSICAL THERAPY NOTE
PHYSICAL THERAPY EVALUATION - INPATIENT     Room Number: 573/573-A  Evaluation Date: 12/9/2017  Type of Evaluation: Physician Order: PT Eval and Treat    Presenting Problem: Pt admitted w/ abdominal pain, progressive nausea s/p fall at home  Reason for discharge. DISCHARGE RECOMMENDATIONS  PT Discharge Recommendations: 24 hour care/supervision;Home with home health PT;Sub-acute rehabilitation; Undetermined    PLAN  PT Treatment Plan: Bed mobility;Gait training;Transfer training;Balance training  Rehab REPLACEMENT    HOME SITUATION  Type of Home: Condo   Home Layout:  (elevator access building. )                   Drives: No     Patient Regularly Uses:  Other (Comment) (Pt had 4 hr cg twice a day)    Prior Level of [place]: Pt reported living in a co another person does the patient currently need. ..   -   Moving to and from a bed to a chair (including a wheelchair)?: A Lot   -   Need to walk in hospital room?: A Little   -   Climbing 3-5 steps with a railing?: Total     AM-PAC Score:  Raw Score: 13   P

## 2017-12-09 NOTE — PROGRESS NOTES
[facility]    GI Progress Note      [place]  Patient Status:  Observation    11/3/1940 MRN [id number]   Location [facility] 5SW/SE Attending Lino Perez MD   Hosp Day # 1 PCP Yue Gibbons MD          SUBJECTIVE:     No uncertain etiology-Hx of chronic pain syndrome, fibromyalgia-methadone dependent  2. Fat containing umbilical hernia, possibly playing a role in the above  3. Mild non erosive gastritis on recent EGD  4.  Hx of GERD and non dysplastic Urban's esophagus-no

## 2017-12-09 NOTE — CM/SW NOTE
12/9CM-MD orders received in regards to discharge planning. The Patient is current with [facility]. MD Olmedo [address] resumption orders are needed prior to discharge.      -[facility] [id number]

## 2017-12-10 ENCOUNTER — APPOINTMENT (OUTPATIENT)
Dept: MRI IMAGING | Facility: HOSPITAL | Age: 77
End: 2017-12-10
Attending: Other
Payer: MEDICARE

## 2017-12-10 LAB
GLUCOSE BLDC GLUCOMTR-MCNC: 116 MG/DL (ref 70–99)
GLUCOSE BLDC GLUCOMTR-MCNC: 130 MG/DL (ref 70–99)
GLUCOSE BLDC GLUCOMTR-MCNC: 154 MG/DL (ref 70–99)
GLUCOSE BLDC GLUCOMTR-MCNC: 165 MG/DL (ref 70–99)

## 2017-12-10 PROCEDURE — 70551 MRI BRAIN STEM W/O DYE: CPT | Performed by: OTHER

## 2017-12-10 PROCEDURE — 99233 SBSQ HOSP IP/OBS HIGH 50: CPT | Performed by: HOSPITALIST

## 2017-12-10 PROCEDURE — 72141 MRI NECK SPINE W/O DYE: CPT | Performed by: OTHER

## 2017-12-10 RX ORDER — METHADONE HYDROCHLORIDE 10 MG/1
20 TABLET ORAL 2 TIMES DAILY
Refills: 0 | Status: SHIPPED | COMMUNITY
Start: 2017-12-10 | End: 2018-02-13

## 2017-12-10 NOTE — PROGRESS NOTES
[facility] HOSP - [facility]  Progress Note     Jacobo Patino  : 11/3/1940    Status: Inpatient  Day #: 1    Attending: Leanne Weiss MD  PCP: John Baires MD      Assessment and Plan     Abdominal pain  - possible gastroenteritis vs gastritis vs PUD Recent Labs   Lab  12/07/17   1415  12/08/17   0526  12/09/17   0608   WBC  9.8  7.5  8.2   HGB  11.7*  10.0*  10.7*   HCT  35.4  30.7*  32.9*   PLT  262  234  254   RBC  4.09  3.51*  3.77   MCV  86.5  87.5  87.2   MCH  28.5  28.4  28.3   MCHC  32. PRN Meds: Normal Saline Flush, acetaminophen, morphINE sulfate **OR** morphINE sulfate **OR** morphINE sulfate, ondansetron HCl, PEG 3350, bisacodyl, dextrose 50%, Glucose-Vitamin C, hydrALAzine HCl      Maria L Nugent MD

## 2017-12-10 NOTE — PROGRESS NOTES
[facility]    GI Progress Note      Kathytta Parent Patient Status:  Observation    11/3/1940 MRN [id number]   Location [facility] 5SW/SE Attending Geronimo Soriano MD   Hosp Day # 1 PCP Heidi Abdullahi MD          SUBJECTIVE:     Ap

## 2017-12-10 NOTE — CONSULTS
[facility]    PATIENT'S NAME: Polina Brock   ATTENDING PHYSICIAN: Cami Hastings MD   CONSULTING PHYSICIAN: Greg Kaur.  Rose Bower MD   PATIENT ACCOUNT#:   [id number]    LOCATION:  [address] [place] [address] #:   [id number]       DATE OF BIRTH:  [birth year] EXAMINATION:    GENERAL:  She is awake, alert, oriented x3. HEENT:  Her extraocular movements are intact. Pupils are equally round, reactive to light. Face is symmetric. Her throat is clear.   NECK:  Supple, without JVD, lymph nodes, bruits, or goiter

## 2017-12-11 ENCOUNTER — APPOINTMENT (OUTPATIENT)
Dept: CT IMAGING | Facility: HOSPITAL | Age: 77
End: 2017-12-11
Attending: NURSE PRACTITIONER
Payer: MEDICARE

## 2017-12-11 ENCOUNTER — APPOINTMENT (OUTPATIENT)
Dept: ULTRASOUND IMAGING | Facility: HOSPITAL | Age: 77
End: 2017-12-11
Attending: NURSE PRACTITIONER
Payer: MEDICARE

## 2017-12-11 PROBLEM — I63.9 CVA (CEREBRAL VASCULAR ACCIDENT) (HCC): Status: ACTIVE | Noted: 2017-12-11

## 2017-12-11 LAB
ANION GAP SERPL CALC-SCNC: 9 MMOL/L (ref 0–18)
BASOPHILS # BLD: 0.1 K/UL (ref 0–0.2)
BASOPHILS NFR BLD: 1 %
BUN SERPL-MCNC: 12 MG/DL (ref 8–20)
BUN/CREAT SERPL: 8.1 (ref 10–20)
CALCIUM SERPL-MCNC: 9.5 MG/DL (ref 8.5–10.5)
CHLORIDE SERPL-SCNC: 99 MMOL/L (ref 95–110)
CHOLEST SERPL-MCNC: 125 MG/DL (ref 110–200)
CO2 SERPL-SCNC: 28 MMOL/L (ref 22–32)
CREAT SERPL-MCNC: 1.49 MG/DL (ref 0.5–1.5)
EOSINOPHIL # BLD: 0.6 K/UL (ref 0–0.7)
EOSINOPHIL NFR BLD: 7 %
ERYTHROCYTE [DISTWIDTH] IN BLOOD BY AUTOMATED COUNT: 14.4 % (ref 11–15)
GLUCOSE BLDC GLUCOMTR-MCNC: 101 MG/DL (ref 70–99)
GLUCOSE BLDC GLUCOMTR-MCNC: 105 MG/DL (ref 70–99)
GLUCOSE BLDC GLUCOMTR-MCNC: 119 MG/DL (ref 70–99)
GLUCOSE BLDC GLUCOMTR-MCNC: 234 MG/DL (ref 70–99)
GLUCOSE SERPL-MCNC: 92 MG/DL (ref 70–99)
HCT VFR BLD AUTO: 32.3 % (ref 35–48)
HDLC SERPL-MCNC: 32 MG/DL
HGB BLD-MCNC: 10.6 G/DL (ref 12–16)
LDLC SERPL CALC-MCNC: 67 MG/DL (ref 0–99)
LYMPHOCYTES # BLD: 1.7 K/UL (ref 1–4)
LYMPHOCYTES NFR BLD: 20 %
MCH RBC QN AUTO: 28.3 PG (ref 27–32)
MCHC RBC AUTO-ENTMCNC: 32.8 G/DL (ref 32–37)
MCV RBC AUTO: 86.3 FL (ref 80–100)
MONOCYTES # BLD: 0.7 K/UL (ref 0–1)
MONOCYTES NFR BLD: 9 %
NEUTROPHILS # BLD AUTO: 5.2 K/UL (ref 1.8–7.7)
NEUTROPHILS NFR BLD: 63 %
NONHDLC SERPL-MCNC: 93 MG/DL
OSMOLALITY UR CALC.SUM OF ELEC: 281 MOSM/KG (ref 275–295)
PLATELET # BLD AUTO: 275 K/UL (ref 140–400)
PMV BLD AUTO: 8.3 FL (ref 7.4–10.3)
POTASSIUM SERPL-SCNC: 4.4 MMOL/L (ref 3.3–5.1)
RBC # BLD AUTO: 3.74 M/UL (ref 3.7–5.4)
SODIUM SERPL-SCNC: 136 MMOL/L (ref 136–144)
TRIGL SERPL-MCNC: 132 MG/DL (ref 1–149)
WBC # BLD AUTO: 8.2 K/UL (ref 4–11)

## 2017-12-11 PROCEDURE — 99233 SBSQ HOSP IP/OBS HIGH 50: CPT | Performed by: HOSPITALIST

## 2017-12-11 PROCEDURE — 70450 CT HEAD/BRAIN W/O DYE: CPT | Performed by: NURSE PRACTITIONER

## 2017-12-11 RX ORDER — ASPIRIN 81 MG/1
81 TABLET, CHEWABLE ORAL DAILY
Status: DISCONTINUED | OUTPATIENT
Start: 2017-12-11 | End: 2017-12-12

## 2017-12-11 NOTE — CM/SW NOTE
PMR on consult to evaluate pt. SW to follow up on recommendations. 3:30PM: PMR rec acute. SW met w/ pt who is agreeable w/ rehab at [facility]. Pt reported she has been there in the past. SW sent referral to [facility]. Pt lives at home alone w/ no stairs to use.  Pt

## 2017-12-11 NOTE — PROGRESS NOTES
[facility]    Progress Note    Kathyen Degree Patient Status:  Observation    11/3/1940 MRN [id number]   Location [facility] 5SW/SE Attending Shady Barber MD   Hosp Day # 1 PCP Anselmo Saba MD     Subjective:     [facility] lumbar spine, no acute finding  - Dr. 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 Discussed with RN Kamilah Gotti at [facility] on 12/11/2017. Mri Spine Cervical (cpt=72141)    Result Date: 12/11/2017  CONCLUSION:   C4-C5: Mild central stenosis. Mild bilateral foraminal narrowing.   C5-C6: Moderate cent

## 2017-12-11 NOTE — OCCUPATIONAL THERAPY NOTE
OCCUPATIONAL THERAPY TREATMENT NOTE - INPATIENT     Room Number: 573/573-A               Problem List  Active Problems:    Abdominal pain    CVA (cerebral vascular accident) [facility])      ASSESSMENT   MAURICIO Ruiz cleared pt for OT session.  Pt received up in c body clothing?: A Lot  -   Bathing (including washing, rinsing, drying)?: A Lot  -   Toileting, which includes using toilet, bedpan or urinal? : A Lot  -   Putting on and taking off regular upper body clothing?: A Little  -   Taking care of personal groomi

## 2017-12-11 NOTE — CONSULTS
PHYSICAL MEDICINE AND REHABILITATION CONSULTATION     CC: Impaired mobility and ADL dysfunction secondary to abd pain, gastritis, right frontoparietal infarct    HPI: This is a 77 y/o female with a PMH of fibromyalgia, chronic pain, who presented to [address] [place] [address] (TYLENOL) tab 650 mg, 650 mg, Oral, Q6H PRN  •  morphINE sulfate (PF) 2 MG/ML injection 1 mg, 1 mg, Intravenous, Q2H PRN **OR** morphINE sulfate (PF) 2 MG/ML injection 2 mg, 2 mg, Intravenous, Q2H PRN **OR** morphINE sulfate (PF) 4 MG/ML injection 4 mg, 4 COLONOSCOPY  08/2017: EGD  11-: ELECTROCARDIOGRAM, COMPLETE      Comment: Scanned to media tab - DOS 11-  No date: HIP REPLACEMENT SURGERY Left  No date: HYSTERECTOMY  2006: KNEE REPLACEMENT SURGERY  No date: REMOVAL GALLBLADDER  No date: SPI no subcutaneous nodules appreciated on hands  Psych: awake,alert, oriented; normal mood and affect  MSK: PROM of BUE full; MMT b/l LE 4/5 LUE C8/T1 2/5 C7 4/5 C6 3/5 C5 2/5   Neuro: CN 2-12 grossly intact, sensation intact to LT in BUE/BLE; left hand hoffm

## 2017-12-11 NOTE — PROGRESS NOTES
[facility]    General Surgery Progress Note  Ama Sharp  KDQ:[id number]  HD# 1       Subjective:   Complains of mild right shoulder discomfort, improving abdominal pain, nausea and vomiting  Passing flatus and BM(s)      Exam:     Gener 12/11/2017  5:45 AM

## 2017-12-11 NOTE — PROGRESS NOTES
12/10/17  1900 12/11/17  0500 12/11/17  0844 12/11/17  1335   BP: 125/48  126/44 99/45   Pulse: 61 59 62    Resp: 18 17 18 18   Temp: 98.3 °F (36.8 °C) 98.2 °F (36.8 °C) 98 °F (36.7 °C)    TempSrc: Oral Oral Oral    SpO2: 93% 96% 93% 95%   Weight:  191 lb

## 2017-12-11 NOTE — PLAN OF CARE
Minimal or absence of nausea and vomiting Progressing    Appetite good. Denies abd pain. Zofran  Given for nausea x 1 .   Continue to monitor GI status, labs, and vs.

## 2017-12-11 NOTE — PROGRESS NOTES
GI  PROGRESS NOTE    SUBJECTIVE: relates feels better today; denies abd pain now hungry.        OBJECTIVE:  Temp:  [98 °F (36.7 °C)-98.3 °F (36.8 °C)] 98 °F (36.7 °C)  Pulse:  [51-67] 62  Resp:  [17-18] 18  BP: (125-144)/(44-48) 126/44  Exam  Gen: No acut PPI/sucralfate. 2.) will defer further GI w/u at this point.      Leanne Sanchez MD  [facility]   _______________________________________________________________

## 2017-12-12 ENCOUNTER — APPOINTMENT (OUTPATIENT)
Dept: CV DIAGNOSTICS | Facility: HOSPITAL | Age: 77
End: 2017-12-12
Attending: NURSE PRACTITIONER
Payer: MEDICARE

## 2017-12-12 VITALS
HEART RATE: 132 BPM | OXYGEN SATURATION: 98 % | SYSTOLIC BLOOD PRESSURE: 129 MMHG | HEIGHT: 60 IN | BODY MASS INDEX: 37.77 KG/M2 | TEMPERATURE: 98 F | RESPIRATION RATE: 18 BRPM | WEIGHT: 192.38 LBS | DIASTOLIC BLOOD PRESSURE: 48 MMHG

## 2017-12-12 LAB
ANION GAP SERPL CALC-SCNC: 7 MMOL/L (ref 0–18)
BUN SERPL-MCNC: 18 MG/DL (ref 8–20)
BUN/CREAT SERPL: 10.2 (ref 10–20)
CALCIUM SERPL-MCNC: 9.2 MG/DL (ref 8.5–10.5)
CHLORIDE SERPL-SCNC: 100 MMOL/L (ref 95–110)
CO2 SERPL-SCNC: 27 MMOL/L (ref 22–32)
CREAT SERPL-MCNC: 1.76 MG/DL (ref 0.5–1.5)
GLUCOSE BLDC GLUCOMTR-MCNC: 118 MG/DL (ref 70–99)
GLUCOSE BLDC GLUCOMTR-MCNC: 163 MG/DL (ref 70–99)
GLUCOSE SERPL-MCNC: 100 MG/DL (ref 70–99)
OSMOLALITY UR CALC.SUM OF ELEC: 280 MOSM/KG (ref 275–295)
POTASSIUM SERPL-SCNC: 4.6 MMOL/L (ref 3.3–5.1)
SODIUM SERPL-SCNC: 134 MMOL/L (ref 136–144)

## 2017-12-12 PROCEDURE — 93306 TTE W/DOPPLER COMPLETE: CPT | Performed by: NURSE PRACTITIONER

## 2017-12-12 PROCEDURE — 99239 HOSP IP/OBS DSCHRG MGMT >30: CPT | Performed by: HOSPITALIST

## 2017-12-12 RX ORDER — SUCRALFATE 1 G/1
1 TABLET ORAL
Refills: 0 | Status: SHIPPED | COMMUNITY
Start: 2017-12-12 | End: 2018-02-08

## 2017-12-12 RX ORDER — ONDANSETRON 4 MG/1
4 TABLET, FILM COATED ORAL ONCE
Status: COMPLETED | OUTPATIENT
Start: 2017-12-12 | End: 2017-12-12

## 2017-12-12 RX ORDER — ASPIRIN 81 MG/1
81 TABLET, CHEWABLE ORAL DAILY
Refills: 0 | Status: ON HOLD | COMMUNITY
Start: 2017-12-12 | End: 2019-02-01

## 2017-12-12 NOTE — PHYSICAL THERAPY NOTE
PHYSICAL THERAPY TREATMENT NOTE - INPATIENT    Room Number: 573/573-A       Presenting Problem: Abdominal pain     Problem List  Active Problems:    Abdominal pain    CVA (cerebral vascular accident) ([facility] [address]      ASSESSMENT   Pt sitting UIC and stated that does the patient currently need. ..   -   Moving to and from a bed to a chair (including a wheelchair)?: A Little   -   Need to walk in hospital room?: A Little   -   Climbing 3-5 steps with a railing?: A Lot    AM-PAC Score:  Raw Score: 16   PT Approx Degr

## 2017-12-12 NOTE — CM/SW NOTE
[facility] informed [facility] that there is a bed available today. [facility] requesting 330p d/c time.   ADELA notified RN  Pt aware of d/c; aware of Medic[facility] cost  [facility]A contacted [facility] for [address]    [facility] # [phone]    [place], [place] ext [phone]

## 2017-12-12 NOTE — PLAN OF CARE
Problem: Diabetes/Glucose Control  Goal: Glucose maintained within prescribed range  INTERVENTIONS:  - Monitor Blood Glucose as ordered  - Assess for signs and symptoms of hyperglycemia and hypoglycemia  - Administer ordered medications to maintain glucose patient's stated pain goal  INTERVENTIONS:  - Encourage pt to monitor pain and request assistance  - Assess pain using appropriate pain scale  - Administer analgesics based on type and severity of pain and evaluate response  - Implement non-pharmacological coordinating discharge planning if the patient needs post-hospital services based on physician/LIP order or complex needs related to functional status, cognitive ability or social support system   Outcome: Progressing      Problem: NEUROLOGICAL - ADULT  Go

## 2017-12-12 NOTE — DISCHARGE SUMMARY
[facility]    Discharge Summary    David Dwyer Patient Status:  Observation    11/3/1940 MRN [id number]   Location [facility] 5SW/SE Attending Yancy Ventura MD   Hosp Day # 1 PCP Khang Calderon MD     Date of Admission:  normal respiratory effort  CV: Heart with regular rate and rhythm  Abd: Abdomen soft, nontender, nondistended, bowel sounds present  Neuro: No acute focal deficits  MSK: Full range of motion in extremities  Skin: Warm and dry  Psych: Normal affect  Ext: no acute finding  - Dr. Luda Alatorre will follow outpt re: umbilical hernia     Late subacute infarct involving the high right posterior frontoparietal white matter with associated microhemorrhage  - MRI reviewed  - Ct brain without contrast pending   - Carotid U you see here. Take 2 tablets (20 mg total) by mouth 2 (two) times daily.    Refills:  0     Vitamin D 1000 units Tabs  What changed:  · how much to take  · how to take this  · when to take this  · additional instructions      1000 units   #100  Sig-100

## 2017-12-12 NOTE — PLAN OF CARE
Problem: Diabetes/Glucose Control  Goal: Glucose maintained within prescribed range  INTERVENTIONS:  - Monitor Blood Glucose as ordered  - Assess for signs and symptoms of hyperglycemia and hypoglycemia  - Administer ordered medications to maintain glucose 12/12/17      Problem: PAIN - ADULT  Goal: Verbalizes/displays adequate comfort level or patient's stated pain goal  INTERVENTIONS:  - Encourage pt to monitor pain and request assistance  - Assess pain using appropriate pain scale  - Administer analgesics appropriate  - Assess patient's ability to be responsible for managing their own health  - Refer to Case Management Department for coordinating discharge planning if the patient needs post-hospital services based on physician/LIP order or complex needs rel

## 2017-12-12 NOTE — PROGRESS NOTES
GI  PROGRESS NOTE    SUBJECTIVE: no nausea or abd pain; tolerating diet.        OBJECTIVE:  Temp:  [98.1 °F (36.7 °C)-98.6 °F (37 °C)] 98.3 °F (36.8 °C)  Pulse:  [] 132  Resp:  [16-18] 18  BP: ()/(39-59) 129/48  Exam  Gen: No acute distress, a Nuris Hernández, MD [facility]   _______________________________________________________________

## 2017-12-12 NOTE — CM/SW NOTE
SW sent clinical updates to [facility] contacted [facility] Liaison - aware that pt is able to d/c today. MJ to call SW back w/ bed availability.     Steven Luther, [address]

## 2017-12-28 ENCOUNTER — TELEPHONE (OUTPATIENT)
Dept: INTERNAL MEDICINE CLINIC | Facility: CLINIC | Age: 77
End: 2017-12-28

## 2017-12-29 ENCOUNTER — OFFICE VISIT (OUTPATIENT)
Dept: PAIN CLINIC | Facility: HOSPITAL | Age: 77
End: 2017-12-29
Attending: ANESTHESIOLOGY
Payer: MEDICARE

## 2017-12-29 VITALS
WEIGHT: 201 LBS | HEART RATE: 91 BPM | SYSTOLIC BLOOD PRESSURE: 149 MMHG | HEIGHT: 60 IN | BODY MASS INDEX: 39.46 KG/M2 | DIASTOLIC BLOOD PRESSURE: 67 MMHG | RESPIRATION RATE: 16 BRPM

## 2017-12-29 DIAGNOSIS — Z98.890 HISTORY OF BACK SURGERY: ICD-10-CM

## 2017-12-29 DIAGNOSIS — M96.1 FAILED BACK SURGICAL SYNDROME: Primary | ICD-10-CM

## 2017-12-29 DIAGNOSIS — I69.339: ICD-10-CM

## 2017-12-29 PROCEDURE — 99211 OFF/OP EST MAY X REQ PHY/QHP: CPT

## 2017-12-29 RX ORDER — METHADONE HYDROCHLORIDE 10 MG/1
50 TABLET ORAL EVERY 8 HOURS PRN
Qty: 450 TABLET | Refills: 0 | Status: CANCELLED | OUTPATIENT
Start: 2017-12-29

## 2017-12-29 RX ORDER — HYDROCODONE BITARTRATE AND ACETAMINOPHEN 5; 325 MG/1; MG/1
1 TABLET ORAL 2 TIMES DAILY PRN
COMMUNITY
End: 2018-02-13

## 2017-12-29 RX ORDER — METHADONE HYDROCHLORIDE 10 MG/1
50 TABLET ORAL EVERY 8 HOURS PRN
Qty: 450 TABLET | Refills: 0 | Status: CANCELLED | OUTPATIENT
Start: 2018-01-28 | End: 2018-02-27

## 2017-12-29 RX ORDER — HYDROCODONE BITARTRATE AND ACETAMINOPHEN 5; 325 MG/1; MG/1
1 TABLET ORAL EVERY 12 HOURS PRN
Qty: 30 TABLET | Refills: 0 | Status: SHIPPED | OUTPATIENT
Start: 2017-12-29 | End: 2018-01-04

## 2017-12-29 NOTE — CHRONIC PAIN
Follow-up Note    HISTORY OF PRESENT ILLNESS:  Margot Correa is a 78year old old female, originally referred to the pain clinic by Dr. Miller ref.  provider found, returns to the clinic forFailed back surgical syndrome  (primary encounter diagnosis)  History Pantoprazole Sodium 40 MG Oral Tab EC Take 40 mg by mouth every morning before breakfast. Disp:  Rfl:    docusate sodium 100 MG Oral Cap Take 100 mg by mouth daily as needed for constipation.  Disp:  Rfl:    LEVOTHYROXINE SODIUM 100 MCG Oral Tab TAKE 1 TA insufficiency     Sleep apnea     Urban's esophagus     History of back surgery     Abnormal EKG     Hypokalemia     Fibromyalgia     Trigger finger, right ring finger     Vitamin D deficiency     Risk for falls     Hypertriglyceridemia     Failed back s SPINE SURGERY PROCEDURE UNLISTED  No date: TOTAL HIP REPLACEMENT  No date: TOTAL KNEE REPLACEMENT    FAMILY HISTORY:  Family History   Problem Relation Age of Onset   • Heart Disorder Father    • Heart Disorder Mother    • Heart Disease Sister    • Hyperte discharge documents.     PLAN:  RECOMMENDATIONS:  1) we will supply patient with bridge prescription of Norco 5/325 every 12 hours as needed #30 until she can investigate and determine the amount of medications that she currently has at home or waiting in h

## 2018-01-02 ENCOUNTER — TELEPHONE (OUTPATIENT)
Dept: INTERNAL MEDICINE CLINIC | Facility: CLINIC | Age: 78
End: 2018-01-02

## 2018-01-02 NOTE — TELEPHONE ENCOUNTER
[facility] calling pt has a small skin tear on back maybe where her bra rubbed. It is dry and very minor she instructed pt to use Vaseline.

## 2018-01-03 NOTE — TELEPHONE ENCOUNTER
Left message on Gabbie's ([facility]) confidential VM relaying Dr. Sunil Christine message to keep an eye on the skin tear and to notify Dr. Nichole Zaidi if the skin tear gets worse or has more problems.  Asked Krzysztof Gtz to call back to confirm receipt of the mes

## 2018-01-03 NOTE — TELEPHONE ENCOUNTER
Noted. Please call Sherry Horton, visiting nurse, and asked her to keep an eye on the skin tear. She is to notify me if the skin tear gets worse or has more problems. I will route this to nursing.   Thank you!!

## 2018-01-04 ENCOUNTER — OFFICE VISIT (OUTPATIENT)
Dept: INTERNAL MEDICINE CLINIC | Facility: CLINIC | Age: 78
End: 2018-01-04

## 2018-01-04 VITALS
SYSTOLIC BLOOD PRESSURE: 140 MMHG | OXYGEN SATURATION: 98 % | HEART RATE: 60 BPM | BODY MASS INDEX: 38.28 KG/M2 | WEIGHT: 195 LBS | DIASTOLIC BLOOD PRESSURE: 60 MMHG | HEIGHT: 60 IN | TEMPERATURE: 98 F

## 2018-01-04 DIAGNOSIS — K21.9 GASTROESOPHAGEAL REFLUX DISEASE WITHOUT ESOPHAGITIS: ICD-10-CM

## 2018-01-04 DIAGNOSIS — K29.60 EROSIVE GASTRITIS: ICD-10-CM

## 2018-01-04 DIAGNOSIS — M79.7 FIBROMYALGIA: ICD-10-CM

## 2018-01-04 DIAGNOSIS — N28.9 RENAL INSUFFICIENCY: ICD-10-CM

## 2018-01-04 DIAGNOSIS — G89.29 OTHER CHRONIC PAIN: ICD-10-CM

## 2018-01-04 DIAGNOSIS — R29.898 LEFT HAND WEAKNESS: ICD-10-CM

## 2018-01-04 DIAGNOSIS — M15.9 PRIMARY OSTEOARTHRITIS INVOLVING MULTIPLE JOINTS: ICD-10-CM

## 2018-01-04 DIAGNOSIS — E03.9 HYPOTHYROIDISM, UNSPECIFIED TYPE: ICD-10-CM

## 2018-01-04 DIAGNOSIS — R53.83 FATIGUE, UNSPECIFIED TYPE: Primary | ICD-10-CM

## 2018-01-04 DIAGNOSIS — K22.70 BARRETT'S ESOPHAGUS WITHOUT DYSPLASIA: ICD-10-CM

## 2018-01-04 DIAGNOSIS — I10 ESSENTIAL HYPERTENSION: ICD-10-CM

## 2018-01-04 DIAGNOSIS — E11.9 TYPE 2 DIABETES MELLITUS WITHOUT COMPLICATION, WITHOUT LONG-TERM CURRENT USE OF INSULIN (HCC): ICD-10-CM

## 2018-01-04 DIAGNOSIS — I63.9 CEREBROVASCULAR ACCIDENT (CVA), UNSPECIFIED MECHANISM (HCC): ICD-10-CM

## 2018-01-04 PROCEDURE — G0463 HOSPITAL OUTPT CLINIC VISIT: HCPCS | Performed by: INTERNAL MEDICINE

## 2018-01-04 PROCEDURE — 99214 OFFICE O/P EST MOD 30 MIN: CPT | Performed by: INTERNAL MEDICINE

## 2018-01-04 NOTE — PROGRESS NOTES
Grace Joyce is a 78year old female. Patient presents with: Follow - Up: Visit after completing Andrez buenrostro for 3 weeks.  patient states she is better with the mobility since the last office visit   CVA  Arthritis  Fatigue    HPI:   Patient presents with sodium 100 MG Oral Cap Take 100 mg by mouth daily as needed for constipation.  Disp:  Rfl:    LEVOTHYROXINE SODIUM 100 MCG Oral Tab TAKE 1 TABLET BY MOUTH EVERY MORNING Disp: 30 tablet Rfl: 11   LISINOPRIL 20 MG Oral Tab TAKE 1 TABLET BY MOUTH DAILY Fer • Unspecified essential hypertension       Social History:  Smoking status: Former Smoker                                                              Packs/day: 0.00      Years: 0.00         Quit date: 1/1/1970  Smokeless tobacco: Never Used given her an order to have her visiting nurse obtain a CBC, BMP and hemoglobin A1c. I will see the patient back sooner as necessary. 2. Primary osteoarthritis involving multiple joints  Stable.   CPM.    3. Cerebrovascular accident (CVA), unspecified me MD  1/4/2018  5:21 PM

## 2018-01-05 NOTE — TELEPHONE ENCOUNTER
Leonides James ([facility] nurse ) checking on pt's skin tear   Nurse states will be seeing pt next week and will call DR TOTH  with an update on how the wound is doing

## 2018-01-08 ENCOUNTER — TELEPHONE (OUTPATIENT)
Dept: INTERNAL MEDICINE CLINIC | Facility: CLINIC | Age: 78
End: 2018-01-08

## 2018-01-08 NOTE — TELEPHONE ENCOUNTER
Nurse wants to verify the order for blood work. When does it need to be done? She also needs to reconcile medications.     Tasked high to Nursing

## 2018-01-08 NOTE — TELEPHONE ENCOUNTER
Spoke with Yoselin [facility] RN) and notified her that CBC, CMP, and A1c are to be done before 1 month follow up. Yoselin will have labs done in the next week or 2.     Routed to Dr. Azul Carrillo-- Yoselin noted that some of patient's medications are different than what our lis

## 2018-01-08 NOTE — TELEPHONE ENCOUNTER
Noted. Please call visiting nurse and have her increase the patient's metoprolol to 25 mg orally twice daily.   The patient's blood pressure was in the upper range of normal at the last visit and that should be no trouble to increase it to 25 mg twice a da

## 2018-01-10 RX ORDER — LEVOTHYROXINE SODIUM 0.1 MG/1
100 TABLET ORAL
Qty: 30 TABLET | Refills: 11 | Status: CANCELLED | OUTPATIENT
Start: 2018-01-10

## 2018-01-10 RX ORDER — LISINOPRIL 20 MG/1
20 TABLET ORAL
Qty: 30 TABLET | Refills: 11 | Status: CANCELLED | OUTPATIENT
Start: 2018-01-10

## 2018-01-11 ENCOUNTER — TELEPHONE (OUTPATIENT)
Dept: INTERNAL MEDICINE CLINIC | Facility: CLINIC | Age: 78
End: 2018-01-11

## 2018-01-11 NOTE — TELEPHONE ENCOUNTER
Please call pt, has rash that just popped up  Went to urgent care and was advised to see primary, has funny design (nothing prescribed)  Terribly itchy, warm  Can pt be added to Dr Norah Villarreal schedule tomorrow morning  Pt used Benadryl spray, may have wor

## 2018-01-12 RX ORDER — SUCRALFATE 1 G/1
1 TABLET ORAL
Qty: 120 TABLET | Refills: 11 | Status: CANCELLED | OUTPATIENT
Start: 2018-01-12

## 2018-01-12 NOTE — TELEPHONE ENCOUNTER
Ida with [facility] called to verify dose of metoprolol. Pt was previously taking half tablet (12.5mg) and Rx was sent over for 25mg. See telephone encounter 1/8/18-Dr. TOTH increased dose of metoprolol to 25mg twice daily.  Relayed this to Ida--verbali

## 2018-01-12 NOTE — TELEPHONE ENCOUNTER
Noted. Please call patient and notify her that I can see her in about 12:00 noon tomorrow. Okay to add to my schedule at 12:00 noon. Please call the patient and notify her of this. I will route this to nursing. Who can share with the .   Than

## 2018-01-12 NOTE — TELEPHONE ENCOUNTER
Spoke to pt she can't make it at 12 she will not have a ride but she could be here at 10. Can pt be added at this time?   Pt said she is using Benadryl Spray on her arm appears to be helping  Please call pt [phone]      Placed on Dr Matthew Dooley desmckay

## 2018-01-12 NOTE — TELEPHONE ENCOUNTER
Levothyroxine and Lisinopril have active Rxs at pharmacy  Metoprolol refill reviewed and done    To DR. JANEY Zarco pls review sucralfate as this is new for pt

## 2018-01-15 ENCOUNTER — TELEPHONE (OUTPATIENT)
Dept: INTERNAL MEDICINE CLINIC | Facility: CLINIC | Age: 78
End: 2018-01-15

## 2018-01-15 NOTE — TELEPHONE ENCOUNTER
Candance Hymen from [facility] called to repor the pt. Had a weight gain. Last week she weighed 197. Today she weighs 199, however, the caregiver reported her weight to be 194.5 yesterday. Last week ankles measured 25 cm bilaterally.   Today the left ankle measures

## 2018-01-15 NOTE — TELEPHONE ENCOUNTER
Jeana from [facility].  Is calling to inform Dr. TOTH that pt.'s PT evaluation will be moved to this week  Ph. # [phone]    Routed to clinical

## 2018-01-16 NOTE — TELEPHONE ENCOUNTER
Spoke with Krzysztof Gtz. She confirmed information documented by Mikki Landsi and Jaren Serra below. Patient takes Lasix 20 mg M/W/F. Has continued to gain weight over the last week. No documented hx of CHF. For rash, patient is using a Benadryl/Hydrocortisone spray.      LMTCB f

## 2018-01-16 NOTE — TELEPHONE ENCOUNTER
Vannessa Balbuena notified of Dr. Isi Alford orders/message below. She verbalized understanding and was able to repeat back instructions. She will tell patient to continue Furosemide daily until she hears back from Dr. Myla Reyes re: BMP results/instructions. Med module updated.

## 2018-01-16 NOTE — TELEPHONE ENCOUNTER
[employer] from  calling to say patient gained another 2#, so she is 12 today. She takes Furosemide M - W - F only.

## 2018-01-17 ENCOUNTER — TELEPHONE (OUTPATIENT)
Dept: INTERNAL MEDICINE CLINIC | Facility: CLINIC | Age: 78
End: 2018-01-17

## 2018-01-17 NOTE — TELEPHONE ENCOUNTER
Ck Kaminski from [facility]  Is calling to check status of request for a rolling walker with a seat  Ph. # [phone]  She will re-fax request  Routed to clinical

## 2018-01-18 ENCOUNTER — TELEPHONE (OUTPATIENT)
Dept: INTERNAL MEDICINE CLINIC | Facility: CLINIC | Age: 78
End: 2018-01-18

## 2018-01-18 NOTE — TELEPHONE ENCOUNTER
Pt called, would like to cancel [address], pt does not feel that there is any benefit  Tasked to nursing

## 2018-01-19 ENCOUNTER — TELEPHONE (OUTPATIENT)
Dept: INTERNAL MEDICINE CLINIC | Facility: CLINIC | Age: 78
End: 2018-01-19

## 2018-01-19 RX ORDER — FUROSEMIDE 20 MG/1
20 TABLET ORAL DAILY
Qty: 30 TABLET | Refills: 5 | Status: SHIPPED | OUTPATIENT
Start: 2018-01-19 | End: 2018-06-12

## 2018-01-19 NOTE — TELEPHONE ENCOUNTER
Telephone call to patient. DIscussed with patient. Patient has just begun taking Lasix on a daily basis. We will see how she does with this.   Thank you!!

## 2018-01-19 NOTE — TELEPHONE ENCOUNTER
Noted.  I discussed this with pharmacy. I have subsequently sent a new prescription to patient's pharmacy for Lasix 20 mg daily #30 tablets with 5 refills.   Patient has an appointment to see me in the next 1-2 weeks we will follow-up about this at that ti

## 2018-01-19 NOTE — TELEPHONE ENCOUNTER
Called Yared from [facility] and relayed DR. TOTH message - she verbalized understanding

## 2018-01-19 NOTE — TELEPHONE ENCOUNTER
Noted.  Clay Bentley to cancel home health speech therapy as requested. I will route this to nursing.   Thank you!!

## 2018-01-19 NOTE — TELEPHONE ENCOUNTER
Felicia Cash from [facility] called. Per pt's daughter directions on Furosemide 20 mg have changed. Per daughter - pt is taking 1 every day; pharmacy has 1 on Mon, Wed, Fri. Pharmacy needs a new order.             To Rx

## 2018-01-23 ENCOUNTER — LAB REQUISITION (OUTPATIENT)
Dept: LAB | Facility: HOSPITAL | Age: 78
End: 2018-01-23
Payer: MEDICARE

## 2018-01-23 ENCOUNTER — TELEPHONE (OUTPATIENT)
Dept: INTERNAL MEDICINE CLINIC | Facility: CLINIC | Age: 78
End: 2018-01-23

## 2018-01-23 DIAGNOSIS — I13.0 HYPERTENSIVE HEART AND CHRONIC KIDNEY DISEASE WITH HEART FAILURE AND STAGE 1 THROUGH STAGE 4 CHRONIC KIDNEY DISEASE, OR CHRONIC KIDNEY DISEASE (HCC): ICD-10-CM

## 2018-01-23 LAB
BASOPHILS # BLD AUTO: 0.08 X10(3) UL (ref 0–0.1)
BASOPHILS NFR BLD AUTO: 0.8 %
BUN BLD-MCNC: 27 MG/DL (ref 8–20)
CALCIUM BLD-MCNC: 9.3 MG/DL (ref 8.3–10.3)
CHLORIDE: 101 MMOL/L (ref 101–111)
CO2: 30 MMOL/L (ref 22–32)
CREAT BLD-MCNC: 1.65 MG/DL (ref 0.55–1.02)
EOSINOPHIL # BLD AUTO: 0.35 X10(3) UL (ref 0–0.3)
EOSINOPHIL NFR BLD AUTO: 3.6 %
ERYTHROCYTE [DISTWIDTH] IN BLOOD BY AUTOMATED COUNT: 13.5 % (ref 11.5–16)
EST. AVERAGE GLUCOSE BLD GHB EST-MCNC: 131 MG/DL (ref 68–126)
GLUCOSE BLD-MCNC: 116 MG/DL (ref 70–99)
HBA1C MFR BLD HPLC: 6.2 % (ref ?–5.7)
HCT VFR BLD AUTO: 38.4 % (ref 34–50)
HGB BLD-MCNC: 12.2 G/DL (ref 12–16)
IMMATURE GRANULOCYTE COUNT: 0.02 X10(3) UL (ref 0–1)
IMMATURE GRANULOCYTE RATIO %: 0.2 %
LYMPHOCYTES # BLD AUTO: 1.75 X10(3) UL (ref 0.9–4)
LYMPHOCYTES NFR BLD AUTO: 17.8 %
MCH RBC QN AUTO: 27.7 PG (ref 27–33.2)
MCHC RBC AUTO-ENTMCNC: 31.8 G/DL (ref 31–37)
MCV RBC AUTO: 87.3 FL (ref 81–100)
MONOCYTES # BLD AUTO: 0.56 X10(3) UL (ref 0.1–0.6)
MONOCYTES NFR BLD AUTO: 5.7 %
NEUTROPHIL ABS PRELIM: 7.08 X10 (3) UL (ref 1.3–6.7)
NEUTROPHILS # BLD AUTO: 7.08 X10(3) UL (ref 1.3–6.7)
NEUTROPHILS NFR BLD AUTO: 71.9 %
PLATELET # BLD AUTO: 258 10(3)UL (ref 150–450)
POTASSIUM SERPL-SCNC: 4.7 MMOL/L (ref 3.6–5.1)
RBC # BLD AUTO: 4.4 X10(6)UL (ref 3.8–5.1)
RED CELL DISTRIBUTION WIDTH-SD: 43 FL (ref 35.1–46.3)
SODIUM SERPL-SCNC: 136 MMOL/L (ref 136–144)
WBC # BLD AUTO: 9.8 X10(3) UL (ref 4–13)

## 2018-01-23 PROCEDURE — 83036 HEMOGLOBIN GLYCOSYLATED A1C: CPT | Performed by: INTERNAL MEDICINE

## 2018-01-23 PROCEDURE — 85025 COMPLETE CBC W/AUTO DIFF WBC: CPT | Performed by: INTERNAL MEDICINE

## 2018-01-23 PROCEDURE — 80048 BASIC METABOLIC PNL TOTAL CA: CPT | Performed by: INTERNAL MEDICINE

## 2018-01-23 NOTE — TELEPHONE ENCOUNTER
[address] faxed refill requests for Sucralfate 1 GM tabs,  Lisinopril 20 mg tabs, Levothyroxine 100 MCG's.   These meds were originally prescribed by Dr. Jordyn Dukes DO.

## 2018-01-24 NOTE — TELEPHONE ENCOUNTER
Per Epic records, sucralfate has never been prescribed by this office. Lisinopril 20mg and levothyroxine refill authorization last sent 11/24/17 #90 with 11 refills.      Disp Refills Start End     LISINOPRIL 20 MG Oral Tab 30 tablet 11 11/24/2017     Sig:

## 2018-01-25 ENCOUNTER — TELEPHONE (OUTPATIENT)
Dept: INTERNAL MEDICINE CLINIC | Facility: CLINIC | Age: 78
End: 2018-01-25

## 2018-01-25 NOTE — TELEPHONE ENCOUNTER
Discussed with patient. She has enough Carafate on hand at home. She does not need a refill of her Carafate at this time.

## 2018-01-25 NOTE — TELEPHONE ENCOUNTER
Noted.  I discussed the situation with patient. Currently she has a large quantity of Carafate at home and does not require refill at this time. I have asked her to make an appoint with Dr. Mick Escamilla to see how much longer he wants her to take it.   Oftentime

## 2018-01-29 ENCOUNTER — TELEPHONE (OUTPATIENT)
Dept: INTERNAL MEDICINE CLINIC | Facility: CLINIC | Age: 78
End: 2018-01-29

## 2018-01-30 ENCOUNTER — TELEPHONE (OUTPATIENT)
Dept: INTERNAL MEDICINE CLINIC | Facility: CLINIC | Age: 78
End: 2018-01-30

## 2018-01-30 ENCOUNTER — LAB REQUISITION (OUTPATIENT)
Dept: LAB | Facility: HOSPITAL | Age: 78
End: 2018-01-30
Attending: INTERNAL MEDICINE
Payer: MEDICARE

## 2018-01-30 DIAGNOSIS — N18.9 CHRONIC KIDNEY DISEASE: ICD-10-CM

## 2018-01-30 LAB
BUN BLD-MCNC: 27 MG/DL (ref 8–20)
CALCIUM BLD-MCNC: 9.8 MG/DL (ref 8.3–10.3)
CHLORIDE: 104 MMOL/L (ref 101–111)
CO2: 29 MMOL/L (ref 22–32)
CREAT BLD-MCNC: 1.4 MG/DL (ref 0.55–1.02)
GLUCOSE BLD-MCNC: 95 MG/DL (ref 70–99)
POTASSIUM SERPL-SCNC: 4.6 MMOL/L (ref 3.6–5.1)
SODIUM SERPL-SCNC: 139 MMOL/L (ref 136–144)

## 2018-01-30 PROCEDURE — 80048 BASIC METABOLIC PNL TOTAL CA: CPT | Performed by: INTERNAL MEDICINE

## 2018-01-30 NOTE — TELEPHONE ENCOUNTER
Spoke with Humberto Orta, with [place]. She reports patient's BP today 150/60, HR 45 and irregular, RR 18. Asymptomatic.  Noted hx of cardiac arrhythmia in problem list. Patient has not taken her morning medications yet, since she is fasting for a blood draw schedul

## 2018-01-30 NOTE — TELEPHONE ENCOUNTER
Telephone call to home health person, Adriano Lala. Discussed with Adriano Lala and patient, Ana Laura Shepard. They are wondering about getting CRISTIAN hose. I have told him I have no objection to the patient using CRISTIAN hose.   I feel they should not get a high compress

## 2018-01-30 NOTE — TELEPHONE ENCOUNTER
Spoke with Autumn Castillo with [facility]. Filled her in on conversation with Marlene Dennis OT and relayed message from Dr. Margoth Henson verbalized understanding and agreement with MD instructions.  She states she will call patient now and make sure she takes all o

## 2018-01-30 NOTE — TELEPHONE ENCOUNTER
I recommend she takes all of the medication including the metoprolol, heart rate looks inaccurate to me, I do not know how her recording this, only accurate if done by EKG

## 2018-01-30 NOTE — TELEPHONE ENCOUNTER
HH is at pt home now her heart rate 45 pt have not taken her morning medications.  She have a blood draw later today like to know if she need to take her pills

## 2018-02-01 ENCOUNTER — TELEPHONE (OUTPATIENT)
Dept: INTERNAL MEDICINE CLINIC | Facility: CLINIC | Age: 78
End: 2018-02-01

## 2018-02-01 NOTE — TELEPHONE ENCOUNTER
Home Health wants to make sure that Dr. Gianni Poole has seen the labs from 1/30. BUN & Creatitine are both high. Patient use to take the diuretic 3 times a week & now takes it daily. Her weight is back to 195.5.     Patient has edema on lower leg 24.5 cm,

## 2018-02-02 NOTE — TELEPHONE ENCOUNTER
Noted.  I called [employer], visiting nurse, and left a message. I reviewed the labs from January 30. It is true that the BUN and creatinine are both elevated. The BUN was 27 and creatinine was 1.40.   These are actually improved from the blood tests that we

## 2018-02-06 ENCOUNTER — LAB REQUISITION (OUTPATIENT)
Dept: LAB | Facility: HOSPITAL | Age: 78
End: 2018-02-06
Payer: MEDICARE

## 2018-02-06 DIAGNOSIS — I73.00 RAYNAUD'S SYNDROME WITHOUT GANGRENE: ICD-10-CM

## 2018-02-06 DIAGNOSIS — I50.32 CHRONIC DIASTOLIC HEART FAILURE (HCC): ICD-10-CM

## 2018-02-06 DIAGNOSIS — N18.9 CHRONIC KIDNEY DISEASE: ICD-10-CM

## 2018-02-06 LAB
BUN BLD-MCNC: 38 MG/DL (ref 8–20)
CALCIUM BLD-MCNC: 9.5 MG/DL (ref 8.3–10.3)
CHLORIDE: 103 MMOL/L (ref 101–111)
CO2: 28 MMOL/L (ref 22–32)
CREAT BLD-MCNC: 1.5 MG/DL (ref 0.55–1.02)
GLUCOSE BLD-MCNC: 133 MG/DL (ref 70–99)
POTASSIUM SERPL-SCNC: 4.2 MMOL/L (ref 3.6–5.1)
SODIUM SERPL-SCNC: 138 MMOL/L (ref 136–144)

## 2018-02-06 PROCEDURE — 80048 BASIC METABOLIC PNL TOTAL CA: CPT | Performed by: INTERNAL MEDICINE

## 2018-02-08 ENCOUNTER — TELEPHONE (OUTPATIENT)
Dept: INTERNAL MEDICINE CLINIC | Facility: CLINIC | Age: 78
End: 2018-02-08

## 2018-02-08 NOTE — TELEPHONE ENCOUNTER
Pt saw her GI yesterday, Sucralfate was discontinued.   Please update pt chart with this information   Please call [phone] [facility] to confirm   Tasked to nursing

## 2018-02-08 NOTE — TELEPHONE ENCOUNTER
As FYI to DR. JANEY Carvajal who states Sucralfate was discontinued by DR. Hughes .  Med module updated

## 2018-02-08 NOTE — TELEPHONE ENCOUNTER
Noted. Laboratory tests reviewed. Patient's BUN and creatinine are similar to what they were last week and improve what they were previously. The patient's creatinine is 1.5 which is up from 1.4. The patient's BUN is about 38.   I have asked [facility] to d

## 2018-02-09 NOTE — TELEPHONE ENCOUNTER
Noted.  I have found the necessary form and signed it. Please fax it back as requested. I have left it in my pile of signed forms on my nurse's desk. I will route this to nursing.   Thank you!!

## 2018-02-12 ENCOUNTER — TELEPHONE (OUTPATIENT)
Dept: INTERNAL MEDICINE CLINIC | Facility: CLINIC | Age: 78
End: 2018-02-12

## 2018-02-12 NOTE — TELEPHONE ENCOUNTER
To nursing, please advise pt --should be seen for this. She has seen dermatologist Dr. Nadia Lin in past and can see her. Or can be seen here or [facility]. Thanks.

## 2018-02-12 NOTE — TELEPHONE ENCOUNTER
Called patient and relayed DR. NYE message - verbalized understanding .  [facility] number given [phone] , if no jacobo.  Available should go to  or be seen here

## 2018-02-12 NOTE — TELEPHONE ENCOUNTER
Patient has a sore/blister on her back that has opened up; is bleeding  Please call to advise who pt should see for this [phone]    Pt wanted recommendation for a dermatologist

## 2018-02-13 ENCOUNTER — OFFICE VISIT (OUTPATIENT)
Dept: PAIN CLINIC | Facility: HOSPITAL | Age: 78
End: 2018-02-13
Attending: ANESTHESIOLOGY
Payer: MEDICARE

## 2018-02-13 VITALS — HEIGHT: 63 IN | RESPIRATION RATE: 18 BRPM | WEIGHT: 196 LBS | BODY MASS INDEX: 34.73 KG/M2

## 2018-02-13 DIAGNOSIS — M96.1 FAILED BACK SURGICAL SYNDROME: Primary | ICD-10-CM

## 2018-02-13 DIAGNOSIS — Z79.891 ENCOUNTER FOR MONITORING OPIOID MAINTENANCE THERAPY: ICD-10-CM

## 2018-02-13 DIAGNOSIS — Z51.81 ENCOUNTER FOR MONITORING OPIOID MAINTENANCE THERAPY: ICD-10-CM

## 2018-02-13 LAB — METHADONE UR QL SCN: DETECTED

## 2018-02-13 PROCEDURE — 99212 OFFICE O/P EST SF 10 MIN: CPT

## 2018-02-13 PROCEDURE — 80358 DRUG SCREENING METHADONE: CPT | Performed by: ANESTHESIOLOGY

## 2018-02-13 PROCEDURE — 80307 DRUG TEST PRSMV CHEM ANLYZR: CPT | Performed by: ANESTHESIOLOGY

## 2018-02-13 RX ORDER — METHADONE HYDROCHLORIDE 10 MG/1
20 TABLET ORAL EVERY 12 HOURS
Qty: 120 TABLET | Refills: 0 | Status: SHIPPED | OUTPATIENT
Start: 2018-03-15 | End: 2018-03-15

## 2018-02-13 RX ORDER — HYDROCODONE BITARTRATE AND ACETAMINOPHEN 5; 325 MG/1; MG/1
1 TABLET ORAL DAILY PRN
Qty: 30 TABLET | Refills: 0 | Status: SHIPPED | OUTPATIENT
Start: 2018-02-13 | End: 2018-03-15

## 2018-02-13 RX ORDER — HYDROCODONE BITARTRATE AND ACETAMINOPHEN 5; 325 MG/1; MG/1
1 TABLET ORAL DAILY PRN
Qty: 30 TABLET | Refills: 0 | Status: SHIPPED | OUTPATIENT
Start: 2018-03-15 | End: 2018-02-21

## 2018-02-13 RX ORDER — METHADONE HYDROCHLORIDE 10 MG/1
20 TABLET ORAL EVERY 12 HOURS
Qty: 120 TABLET | Refills: 0 | Status: SHIPPED | OUTPATIENT
Start: 2018-02-13 | End: 2018-02-13

## 2018-02-13 NOTE — CHRONIC PAIN
Follow-up Note    HISTORY OF PRESENT ILLNESS:  Hugo Hurst is a 78year old old female, who returns to the clinic forFailed back surgical syndrome  (primary encounter diagnosis)  Encounter for monitoring opioid maintenance therapy  .    Sujatha Foster presents to Tab TAKE 1 TABLET BY MOUTH DAILY (Patient taking differently: No sig reported) Disp: 30 tablet Rfl: 11   AMLODIPINE BESYLATE 10 MG Oral Tab TAKE 1 TABLET BY MOUTH DAILY Disp: 30 tablet Rfl: 11   allopurinol 300 MG Oral Tab Take 1 tablet by mouth daily.  Dis foot     S/P laparoscopic cholecystectomy     Memory problem     Rhabdomyolysis     Non-traumatic rhabdomyolysis     Bacteriuria     Abrasions of multiple sites     UTI (urinary tract infection)     Left hand weakness     Abdominal pain     CVA (cerebral v History   Marital status: Single  Spouse name: N/A    Years of education: N/A  Number of children: N/A     Occupational History  None on file     Social History Main Topics   Smoking status: Former Smoker     Quit date: 1/1/1970    Smokeless tobacco: Never will return to clinic every 2 months

## 2018-02-15 ENCOUNTER — TELEPHONE (OUTPATIENT)
Dept: INTERNAL MEDICINE CLINIC | Facility: CLINIC | Age: 78
End: 2018-02-15

## 2018-02-15 NOTE — TELEPHONE ENCOUNTER
Yovanny Celaya from [facility]. Is calling Pt. Had an old Rx for Norco 5/325 twice a day as needed. Pt. Can only have one [place] a day Yovanny Celaya has changed her Rx for Norco 5/325 mg  Once a day as needed  Pt.'s heart rate 41-48 bpm it. Is below normal range  pt.

## 2018-02-15 NOTE — TELEPHONE ENCOUNTER
Rec decreasing metoprolol from 25mg BID to 12.5mg BID. Continue to check BP and let us know if BP goes up. May need to adjust BP meds.

## 2018-02-15 NOTE — TELEPHONE ENCOUNTER
ZAIRE for Ankita Harman with HH. Requested call back to ensure message was received. Also notified patient of Dr. Cuenca Drop message. She verbalized understanding of instructions. She requests new Rx for Metoprolol 12.5 mg be sent to [facility].  Attempted this, josiah

## 2018-02-19 LAB
Lab: 2155 NG/ML
Lab: 2849 NG/ML

## 2018-02-20 ENCOUNTER — LAB REQUISITION (OUTPATIENT)
Dept: LAB | Facility: HOSPITAL | Age: 78
End: 2018-02-20
Attending: INTERNAL MEDICINE
Payer: MEDICARE

## 2018-02-20 ENCOUNTER — TELEPHONE (OUTPATIENT)
Dept: INTERNAL MEDICINE CLINIC | Facility: CLINIC | Age: 78
End: 2018-02-20

## 2018-02-20 DIAGNOSIS — N18.9 CHRONIC KIDNEY DISEASE: ICD-10-CM

## 2018-02-20 LAB
BUN BLD-MCNC: 62 MG/DL (ref 8–20)
CALCIUM BLD-MCNC: 9.4 MG/DL (ref 8.3–10.3)
CHLORIDE: 104 MMOL/L (ref 101–111)
CO2: 26 MMOL/L (ref 22–32)
CREAT BLD-MCNC: 1.9 MG/DL (ref 0.55–1.02)
GLUCOSE BLD-MCNC: 130 MG/DL (ref 70–99)
POTASSIUM SERPL-SCNC: 4.6 MMOL/L (ref 3.6–5.1)
SODIUM SERPL-SCNC: 137 MMOL/L (ref 136–144)

## 2018-02-20 PROCEDURE — 80048 BASIC METABOLIC PNL TOTAL CA: CPT | Performed by: INTERNAL MEDICINE

## 2018-02-21 ENCOUNTER — OFFICE VISIT (OUTPATIENT)
Dept: INTERNAL MEDICINE CLINIC | Facility: CLINIC | Age: 78
End: 2018-02-21

## 2018-02-21 ENCOUNTER — TELEPHONE (OUTPATIENT)
Dept: INTERNAL MEDICINE CLINIC | Facility: CLINIC | Age: 78
End: 2018-02-21

## 2018-02-21 VITALS
OXYGEN SATURATION: 98 % | HEIGHT: 60 IN | WEIGHT: 195 LBS | HEART RATE: 56 BPM | BODY MASS INDEX: 38.28 KG/M2 | DIASTOLIC BLOOD PRESSURE: 60 MMHG | TEMPERATURE: 98 F | SYSTOLIC BLOOD PRESSURE: 130 MMHG

## 2018-02-21 DIAGNOSIS — I10 ESSENTIAL HYPERTENSION: ICD-10-CM

## 2018-02-21 DIAGNOSIS — K22.70 BARRETT'S ESOPHAGUS WITHOUT DYSPLASIA: ICD-10-CM

## 2018-02-21 DIAGNOSIS — I63.9 CEREBROVASCULAR ACCIDENT (CVA), UNSPECIFIED MECHANISM (HCC): ICD-10-CM

## 2018-02-21 DIAGNOSIS — R29.898 LEFT HAND WEAKNESS: ICD-10-CM

## 2018-02-21 DIAGNOSIS — M15.9 PRIMARY OSTEOARTHRITIS INVOLVING MULTIPLE JOINTS: ICD-10-CM

## 2018-02-21 DIAGNOSIS — N28.9 RENAL INSUFFICIENCY: ICD-10-CM

## 2018-02-21 DIAGNOSIS — G89.29 OTHER CHRONIC PAIN: ICD-10-CM

## 2018-02-21 DIAGNOSIS — E03.9 HYPOTHYROIDISM, UNSPECIFIED TYPE: ICD-10-CM

## 2018-02-21 DIAGNOSIS — M79.7 FIBROMYALGIA: ICD-10-CM

## 2018-02-21 DIAGNOSIS — K21.9 GASTROESOPHAGEAL REFLUX DISEASE, ESOPHAGITIS PRESENCE NOT SPECIFIED: ICD-10-CM

## 2018-02-21 DIAGNOSIS — R53.83 FATIGUE, UNSPECIFIED TYPE: Primary | ICD-10-CM

## 2018-02-21 DIAGNOSIS — E11.9 TYPE 2 DIABETES MELLITUS WITHOUT COMPLICATION, WITHOUT LONG-TERM CURRENT USE OF INSULIN (HCC): ICD-10-CM

## 2018-02-21 PROCEDURE — G0463 HOSPITAL OUTPT CLINIC VISIT: HCPCS | Performed by: INTERNAL MEDICINE

## 2018-02-21 PROCEDURE — 99214 OFFICE O/P EST MOD 30 MIN: CPT | Performed by: INTERNAL MEDICINE

## 2018-02-21 RX ORDER — LISINOPRIL 10 MG/1
10 TABLET ORAL EVERY MORNING
Qty: 30 TABLET | Refills: 11 | Status: SHIPPED | OUTPATIENT
Start: 2018-02-21 | End: 2018-02-21

## 2018-02-21 RX ORDER — AMLODIPINE BESYLATE 5 MG/1
5 TABLET ORAL DAILY
Qty: 30 TABLET | Refills: 6 | Status: SHIPPED | OUTPATIENT
Start: 2018-02-21 | End: 2018-08-15 | Stop reason: ALTCHOICE

## 2018-02-21 RX ORDER — AMLODIPINE BESYLATE 5 MG/1
5 TABLET ORAL DAILY
Qty: 30 TABLET | Refills: 6 | Status: SHIPPED | OUTPATIENT
Start: 2018-02-21 | End: 2018-02-21

## 2018-02-21 RX ORDER — LISINOPRIL 10 MG/1
10 TABLET ORAL EVERY MORNING
Qty: 30 TABLET | Refills: 11 | Status: SHIPPED | OUTPATIENT
Start: 2018-02-21 | End: 2018-08-15 | Stop reason: ALTCHOICE

## 2018-02-21 NOTE — PROGRESS NOTES
Jroge Schneider is a 78year old female. Patient presents with:  Checkup: 1 mo f/u  CVA  Fatigue  Arthritis    HPI:   Patient presents with:  Checkup: 1 mo f/u  CVA  Fatigue  Arthritis    Pt has been feeling fine. Pt still has some leg swelling.   Pt has de • BACK PAIN    • Back problem    • Urban's esophagus    • Blood disorder    • CHF (congestive heart failure) (HCC)    • Colon, diverticulosis    • Congestive heart disease (HCC)    • Disorder of thyroid    • Esophageal reflux    • Fatigue    • High blo present at this time. There is some stasis changes over the patient's lower legs. NEURO: alert and oriented  ASSESSMENT AND PLAN:   1. Fatigue, unspecified type  Patient feels that she is doing better. Her kidney function has gotten a little bit worse. the patient back in 1 month as noted above. The patient indicates understanding of these issues and agrees to the plan. The patient is asked to return in 1 month with blood tests as noted above. Magdalena Buchanan MD  2/21/2018  10:05 AM

## 2018-02-21 NOTE — PATIENT INSTRUCTIONS
1.  Patient is to continue her current diet, medication and activity. 2.  Patient is to decrease her lisinopril to 10 mg orally daily. 3.  Patient to decrease her Norvasc to 5 mg orally daily.   4.  Patient is to continue her Lasix/furosemide at 20 mg ora

## 2018-02-21 NOTE — TELEPHONE ENCOUNTER
Monico Mckinney from [address] called to verify dose changes for lisinopril and Norvasc. Per today's OV note:  10. Renal insufficiency  Patient's renal insufficiency has worsened. Patient's recent BMP has a BUN of 62, creatinine 1.90 and a GFR of 29.   He

## 2018-02-26 ENCOUNTER — TELEPHONE (OUTPATIENT)
Dept: PAIN CLINIC | Facility: HOSPITAL | Age: 78
End: 2018-02-26

## 2018-02-27 ENCOUNTER — TELEPHONE (OUTPATIENT)
Dept: INTERNAL MEDICINE CLINIC | Facility: CLINIC | Age: 78
End: 2018-02-27

## 2018-02-27 ENCOUNTER — LAB REQUISITION (OUTPATIENT)
Dept: LAB | Facility: HOSPITAL | Age: 78
End: 2018-02-27
Payer: MEDICARE

## 2018-02-27 DIAGNOSIS — N18.9 CHRONIC KIDNEY DISEASE: ICD-10-CM

## 2018-02-27 DIAGNOSIS — I50.32 CHRONIC DIASTOLIC HEART FAILURE (HCC): ICD-10-CM

## 2018-02-27 LAB
ANION GAP SERPL CALC-SCNC: 11 MMOL/L (ref 0–18)
BUN SERPL-MCNC: 28 MG/DL (ref 8–20)
BUN/CREAT SERPL: 20.6 (ref 10–20)
CALCIUM SERPL-MCNC: 9.6 MG/DL (ref 8.5–10.5)
CHLORIDE SERPL-SCNC: 97 MMOL/L (ref 95–110)
CO2 SERPL-SCNC: 29 MMOL/L (ref 22–32)
CREAT SERPL-MCNC: 1.36 MG/DL (ref 0.5–1.5)
GLUCOSE SERPL-MCNC: 76 MG/DL (ref 70–99)
OSMOLALITY UR CALC.SUM OF ELEC: 288 MOSM/KG (ref 275–295)
POTASSIUM SERPL-SCNC: 4.5 MMOL/L (ref 3.3–5.1)
SODIUM SERPL-SCNC: 137 MMOL/L (ref 136–144)

## 2018-02-27 PROCEDURE — 80048 BASIC METABOLIC PNL TOTAL CA: CPT | Performed by: INTERNAL MEDICINE

## 2018-02-28 ENCOUNTER — TELEPHONE (OUTPATIENT)
Dept: INTERNAL MEDICINE CLINIC | Facility: CLINIC | Age: 78
End: 2018-02-28

## 2018-02-28 NOTE — TELEPHONE ENCOUNTER
Trenton Hanson from [facility]. Is calling to inform Dr. Fatuma Hickey that pt. And owner of services is requesting discharge. Pt will be discharged next week and her blood draw will stop when she is discharged.  She needs a call back from the nurse with verbal discharge

## 2018-02-28 NOTE — TELEPHONE ENCOUNTER
Telephone call to pt. Discussed with pt that her repeat BMP is better with her renal function is improved. CPM.  Will repeat in 1 week as ordered.

## 2018-02-28 NOTE — TELEPHONE ENCOUNTER
To Dr Jac Morataya to advise on verbal order for discharge from [facility]

## 2018-03-01 NOTE — TELEPHONE ENCOUNTER
TR ( confidential VM) for [employer] okay to DC from [facility] next week. Advised to call back with any further questions.

## 2018-03-05 ENCOUNTER — TELEPHONE (OUTPATIENT)
Dept: PAIN CLINIC | Facility: HOSPITAL | Age: 78
End: 2018-03-05

## 2018-03-05 NOTE — TELEPHONE ENCOUNTER
Patient states she will increase dosage to 3 pills in the morning and 3 at night. I have moved appointment up to 03/15 to discuss.

## 2018-03-06 ENCOUNTER — LAB REQUISITION (OUTPATIENT)
Dept: LAB | Facility: HOSPITAL | Age: 78
End: 2018-03-06
Payer: MEDICARE

## 2018-03-06 DIAGNOSIS — N18.9 CHRONIC KIDNEY DISEASE: ICD-10-CM

## 2018-03-06 LAB
ANION GAP SERPL CALC-SCNC: 9 MMOL/L (ref 0–18)
BUN SERPL-MCNC: 23 MG/DL (ref 8–20)
BUN/CREAT SERPL: 17.4 (ref 10–20)
CALCIUM SERPL-MCNC: 9.3 MG/DL (ref 8.5–10.5)
CHLORIDE SERPL-SCNC: 100 MMOL/L (ref 95–110)
CO2 SERPL-SCNC: 30 MMOL/L (ref 22–32)
CREAT SERPL-MCNC: 1.32 MG/DL (ref 0.5–1.5)
GLUCOSE SERPL-MCNC: 85 MG/DL (ref 70–99)
OSMOLALITY UR CALC.SUM OF ELEC: 291 MOSM/KG (ref 275–295)
POTASSIUM SERPL-SCNC: 3.7 MMOL/L (ref 3.3–5.1)
SODIUM SERPL-SCNC: 139 MMOL/L (ref 136–144)

## 2018-03-06 PROCEDURE — 80048 BASIC METABOLIC PNL TOTAL CA: CPT | Performed by: INTERNAL MEDICINE

## 2018-03-12 ENCOUNTER — OFFICE VISIT (OUTPATIENT)
Dept: DERMATOLOGY CLINIC | Facility: CLINIC | Age: 78
End: 2018-03-12

## 2018-03-12 ENCOUNTER — TELEPHONE (OUTPATIENT)
Dept: INTERNAL MEDICINE CLINIC | Facility: CLINIC | Age: 78
End: 2018-03-12

## 2018-03-12 DIAGNOSIS — N39.0 URINARY TRACT INFECTION WITHOUT HEMATURIA, SITE UNSPECIFIED: Primary | ICD-10-CM

## 2018-03-12 DIAGNOSIS — D48.5 NEOPLASM OF UNCERTAIN BEHAVIOR OF SKIN: Primary | ICD-10-CM

## 2018-03-12 DIAGNOSIS — L82.1 SEBORRHEIC KERATOSES: ICD-10-CM

## 2018-03-12 PROCEDURE — 11100 BIOPSY OF SKIN LESION: CPT | Performed by: DERMATOLOGY

## 2018-03-12 PROCEDURE — 88305 TISSUE EXAM BY PATHOLOGIST: CPT | Performed by: DERMATOLOGY

## 2018-03-12 PROCEDURE — 99212 OFFICE O/P EST SF 10 MIN: CPT | Performed by: DERMATOLOGY

## 2018-03-12 NOTE — PROCEDURES
Procedural Report for Shave Biopsy    Procedure: With the patient is a seated position, the skin was scrubbed with alcohol. Anesthesia was obtained by injecting 1 mL of 1% Xylocaine with Epinephrine.   The skin surrounding the lession was placed under t

## 2018-03-12 NOTE — TELEPHONE ENCOUNTER
Onset: 3 weeks. Reports urinary sx as follows: +freq, +slight urgency, +odor. Denies fever or chills. No lower abd discomfort. To DR. TOTH pt would like order for urine test (pended), also inquiring if a standing order can be placed.  Has written orders fr

## 2018-03-12 NOTE — PROGRESS NOTES
HPI:     Chief Complaint     Lesion        HPI     Lesion    Additional comments: LOV 05/23/16 pt was seen for lesion to her right cheek pt here today for a non healing blister on her back that she noticed a month ago that will burst and come back pt ahs t Tab TAKE 1 TABLET BY MOUTH EVERY MORNING Disp: 30 tablet Rfl: 11   allopurinol 300 MG Oral Tab Take 1 tablet by mouth daily.  Disp:  Rfl: 3   Cholecalciferol (VITAMIN D) 1000 UNITS Oral Tab 1000 units   #100  Sig-1000 units orally daily     3 Refills (Humberto TOTAL HIP REPLACEMENT  No date: TOTAL KNEE REPLACEMENT    Social History  Social History   Marital status: Single  Spouse name: N/A    Years of education: N/A  Number of children: N/A     Occupational History  None on file     Social History Main Topics possible. Consider electrodesiccation curettage  Seborrheic keratoses-reassurance–no treatment    No orders of the defined types were placed in this encounter.       Results From Past 48 Hours:  No results found for this or any previous visit (from the pas

## 2018-03-12 NOTE — TELEPHONE ENCOUNTER
Noted.  I have approved the order for the urinalysis and urine culture to be done either today or tomorrow. I have called the patient left a message on her phone.   I will forward this to nursing who can also follow-up with the patient let her know that I

## 2018-03-12 NOTE — TELEPHONE ENCOUNTER
Would like to get have a urine test.  Could you also make it standing order? Urinating a lot with some slight discomfort, not painful.   Patient [phone]  Routed to clinical.

## 2018-03-14 ENCOUNTER — LAB ENCOUNTER (OUTPATIENT)
Dept: LAB | Age: 78
End: 2018-03-14
Attending: INTERNAL MEDICINE
Payer: MEDICARE

## 2018-03-14 DIAGNOSIS — R53.83 FATIGUE: Primary | ICD-10-CM

## 2018-03-14 DIAGNOSIS — N28.9 RENAL INSUFFICIENCY: ICD-10-CM

## 2018-03-14 DIAGNOSIS — R53.83 FATIGUE, UNSPECIFIED TYPE: ICD-10-CM

## 2018-03-14 DIAGNOSIS — I10 HYPERTENSION: ICD-10-CM

## 2018-03-14 DIAGNOSIS — Z00.00 ANNUAL PHYSICAL EXAM: ICD-10-CM

## 2018-03-14 DIAGNOSIS — E55.9 VITAMIN D DEFICIENCY: ICD-10-CM

## 2018-03-14 DIAGNOSIS — N39.0 URINARY TRACT INFECTION WITHOUT HEMATURIA, SITE UNSPECIFIED: ICD-10-CM

## 2018-03-14 LAB
ANION GAP SERPL CALC-SCNC: 9 MMOL/L (ref 0–18)
BACTERIA UR QL AUTO: NEGATIVE /HPF
BASOPHILS # BLD: 0.1 K/UL (ref 0–0.2)
BASOPHILS NFR BLD: 1 %
BILIRUB UR QL: NEGATIVE
BUN SERPL-MCNC: 21 MG/DL (ref 8–20)
BUN/CREAT SERPL: 14.2 (ref 10–20)
CALCIUM SERPL-MCNC: 9.5 MG/DL (ref 8.5–10.5)
CHLORIDE SERPL-SCNC: 100 MMOL/L (ref 95–110)
CLARITY UR: CLEAR
CO2 SERPL-SCNC: 31 MMOL/L (ref 22–32)
COLOR UR: YELLOW
CREAT SERPL-MCNC: 1.48 MG/DL (ref 0.5–1.5)
EOSINOPHIL # BLD: 0.2 K/UL (ref 0–0.7)
EOSINOPHIL NFR BLD: 3 %
ERYTHROCYTE [DISTWIDTH] IN BLOOD BY AUTOMATED COUNT: 15.9 % (ref 11–15)
GLUCOSE SERPL-MCNC: 122 MG/DL (ref 70–99)
GLUCOSE UR-MCNC: NEGATIVE MG/DL
HCT VFR BLD AUTO: 36.9 % (ref 35–48)
HGB BLD-MCNC: 12 G/DL (ref 12–16)
HGB UR QL STRIP.AUTO: NEGATIVE
KETONES UR-MCNC: NEGATIVE MG/DL
LYMPHOCYTES # BLD: 1.4 K/UL (ref 1–4)
LYMPHOCYTES NFR BLD: 18 %
MCH RBC QN AUTO: 27.6 PG (ref 27–32)
MCHC RBC AUTO-ENTMCNC: 32.5 G/DL (ref 32–37)
MCV RBC AUTO: 84.9 FL (ref 80–100)
MONOCYTES # BLD: 0.4 K/UL (ref 0–1)
MONOCYTES NFR BLD: 6 %
NEUTROPHILS # BLD AUTO: 5.5 K/UL (ref 1.8–7.7)
NEUTROPHILS NFR BLD: 73 %
NITRITE UR QL STRIP.AUTO: NEGATIVE
OSMOLALITY UR CALC.SUM OF ELEC: 294 MOSM/KG (ref 275–295)
PH UR: 7 [PH] (ref 5–8)
PLATELET # BLD AUTO: 224 K/UL (ref 140–400)
PMV BLD AUTO: 8.9 FL (ref 7.4–10.3)
POTASSIUM SERPL-SCNC: 3.5 MMOL/L (ref 3.3–5.1)
PROT UR-MCNC: 100 MG/DL
RBC # BLD AUTO: 4.34 M/UL (ref 3.7–5.4)
RBC #/AREA URNS AUTO: 2 /HPF
SODIUM SERPL-SCNC: 140 MMOL/L (ref 136–144)
SP GR UR STRIP: 1.02 (ref 1–1.03)
TSH SERPL-ACNC: 1.25 UIU/ML (ref 0.45–5.33)
UROBILINOGEN UR STRIP-ACNC: <2
VIT C UR-MCNC: NEGATIVE MG/DL
WBC # BLD AUTO: 7.6 K/UL (ref 4–11)
WBC #/AREA URNS AUTO: 6 /HPF

## 2018-03-14 PROCEDURE — 82306 VITAMIN D 25 HYDROXY: CPT

## 2018-03-14 PROCEDURE — 80048 BASIC METABOLIC PNL TOTAL CA: CPT

## 2018-03-14 PROCEDURE — 81001 URINALYSIS AUTO W/SCOPE: CPT

## 2018-03-14 PROCEDURE — 36415 COLL VENOUS BLD VENIPUNCTURE: CPT

## 2018-03-14 PROCEDURE — 87086 URINE CULTURE/COLONY COUNT: CPT

## 2018-03-14 PROCEDURE — 85025 COMPLETE CBC W/AUTO DIFF WBC: CPT

## 2018-03-14 PROCEDURE — 84443 ASSAY THYROID STIM HORMONE: CPT

## 2018-03-15 ENCOUNTER — OFFICE VISIT (OUTPATIENT)
Dept: PAIN CLINIC | Facility: HOSPITAL | Age: 78
End: 2018-03-15
Attending: ANESTHESIOLOGY
Payer: MEDICARE

## 2018-03-15 ENCOUNTER — TELEPHONE (OUTPATIENT)
Dept: INTERNAL MEDICINE CLINIC | Facility: CLINIC | Age: 78
End: 2018-03-15

## 2018-03-15 VITALS — OXYGEN SATURATION: 96 % | SYSTOLIC BLOOD PRESSURE: 211 MMHG | HEART RATE: 77 BPM | DIASTOLIC BLOOD PRESSURE: 77 MMHG

## 2018-03-15 DIAGNOSIS — M96.1 FAILED BACK SURGICAL SYNDROME: Primary | ICD-10-CM

## 2018-03-15 PROCEDURE — 99211 OFF/OP EST MAY X REQ PHY/QHP: CPT | Performed by: NURSE PRACTITIONER

## 2018-03-15 RX ORDER — METHADONE HYDROCHLORIDE 10 MG/1
20 TABLET ORAL EVERY 12 HOURS
Qty: 120 TABLET | Refills: 0 | Status: SHIPPED | OUTPATIENT
Start: 2018-03-15 | End: 2018-03-15

## 2018-03-15 RX ORDER — HYDROCODONE BITARTRATE AND ACETAMINOPHEN 5; 325 MG/1; MG/1
1 TABLET ORAL EVERY 8 HOURS PRN
Qty: 60 TABLET | Refills: 0 | Status: SHIPPED | OUTPATIENT
Start: 2018-03-15 | End: 2018-03-15

## 2018-03-15 RX ORDER — HYDROCODONE BITARTRATE AND ACETAMINOPHEN 5; 325 MG/1; MG/1
1 TABLET ORAL EVERY 8 HOURS PRN
Qty: 60 TABLET | Refills: 0 | Status: SHIPPED | OUTPATIENT
Start: 2018-04-15 | End: 2018-05-08 | Stop reason: CLARIF

## 2018-03-15 RX ORDER — HYDROCODONE BITARTRATE AND ACETAMINOPHEN 5; 325 MG/1; MG/1
1 TABLET ORAL EVERY 8 HOURS PRN
Qty: 60 TABLET | Refills: 0 | Status: SHIPPED | OUTPATIENT
Start: 2018-03-15 | End: 2018-04-14

## 2018-03-15 RX ORDER — METHADONE HYDROCHLORIDE 10 MG/1
20 TABLET ORAL EVERY 12 HOURS
Qty: 120 TABLET | Refills: 0 | Status: SHIPPED | OUTPATIENT
Start: 2018-04-15 | End: 2018-03-15

## 2018-03-15 RX ORDER — METHADONE HYDROCHLORIDE 10 MG/1
20 TABLET ORAL EVERY 12 HOURS
Qty: 120 TABLET | Refills: 0 | Status: SHIPPED | OUTPATIENT
Start: 2018-03-15 | End: 2018-04-14

## 2018-03-15 RX ORDER — METHADONE HYDROCHLORIDE 10 MG/1
20 TABLET ORAL EVERY 12 HOURS
Qty: 120 TABLET | Refills: 0 | Status: SHIPPED | OUTPATIENT
Start: 2018-04-15 | End: 2018-05-08 | Stop reason: CLARIF

## 2018-03-15 RX ORDER — SUCRALFATE 1 G/1
TABLET ORAL
Qty: 120 TABLET | OUTPATIENT
Start: 2018-03-15

## 2018-03-15 RX ORDER — HYDROCODONE BITARTRATE AND ACETAMINOPHEN 5; 325 MG/1; MG/1
1 TABLET ORAL EVERY 8 HOURS PRN
Qty: 60 TABLET | Refills: 0 | Status: SHIPPED | OUTPATIENT
Start: 2018-04-15 | End: 2018-03-15

## 2018-03-15 NOTE — TELEPHONE ENCOUNTER
Pt. Was at the pain clinic and was told her BP was high 211-210   Ph.  # [phone]    Routed high  to clinical

## 2018-03-15 NOTE — CHRONIC PAIN
[facility]  Pain Clinic  Follow Up Visit Report       Patient name: Jorge Schneider 78year old female  : 11/3/1940  MRN: [id number]  Referring MD: No ref.  provider found     COMPLAINT:  Patient presents with:  Medication Eval  Patient i KNEE REPLACEMENT  FAMILY HISTORY:  Family History   Problem Relation Age of Onset   • Heart Disorder Father    • Heart Disorder Mother    • Heart Disease Sister    • Hypertension Brother    • Melanoma Other    • Cancer Other    • Stroke Other    • Heart Boris Memory 3 Refills (Patient taking differently: Take 1,000 Units by mouth daily.  ), Disp: 100 tablet, Rfl: 3  •  Polyethylene Glycol 3350 (MIRALAX) Oral Powder, Take 17 g by mouth daily as needed.  take (17G)  by oral route  every day mixed with 8 oz. water, vijay

## 2018-03-15 NOTE — TELEPHONE ENCOUNTER
Please advise - called patient who states she was at pain clinic and her SBP was 211 and 210 , does not know bottom number - cannot go anywhere has no transportation , caregiver not back until evening. Has no headache or any other SX - to DR. TOTH

## 2018-03-15 NOTE — PROGRESS NOTES
Pt in for medication follow up. She is interested in possible trigger points and wants to discuss it with MD. No new medications and no new pain.    She states she had a stroke about 2 months ago and went to rehab with jase buenrostro and her medications were ad

## 2018-03-16 LAB — 25(OH)D3 SERPL-MCNC: 33.1 NG/ML

## 2018-03-16 NOTE — TELEPHONE ENCOUNTER
Telephone call to patient. Patient feels better at this time. Patient was wondering how her urine analysis and blood tests from yesterday turned out. Her CBC and BMP have turned out well. Her renal function is stable.   Patient's urinalysis shows her to

## 2018-03-18 ENCOUNTER — TELEPHONE (OUTPATIENT)
Dept: INTERNAL MEDICINE CLINIC | Facility: CLINIC | Age: 78
End: 2018-03-18

## 2018-03-18 DIAGNOSIS — N28.9 RENAL INSUFFICIENCY: ICD-10-CM

## 2018-03-18 DIAGNOSIS — R53.83 FATIGUE, UNSPECIFIED TYPE: Primary | ICD-10-CM

## 2018-03-19 ENCOUNTER — HOSPITAL ENCOUNTER (OUTPATIENT)
Age: 78
Discharge: HOME | End: 2018-03-19
Attending: PEDIATRICS
Payer: MEDICARE

## 2018-03-19 VITALS
HEART RATE: 61 BPM | SYSTOLIC BLOOD PRESSURE: 185 MMHG | RESPIRATION RATE: 18 BRPM | TEMPERATURE: 98 F | HEIGHT: 60 IN | WEIGHT: 190 LBS | DIASTOLIC BLOOD PRESSURE: 70 MMHG | BODY MASS INDEX: 37.3 KG/M2 | OXYGEN SATURATION: 100 %

## 2018-03-19 DIAGNOSIS — I10 ESSENTIAL HYPERTENSION: Primary | ICD-10-CM

## 2018-03-19 PROCEDURE — 99213 OFFICE O/P EST LOW 20 MIN: CPT

## 2018-03-19 NOTE — ED NOTES
Manual reading of 185/70 (right arm). Patient and daughter verbalized understanding of all discharge instructions.  Patient stable at time of discharge

## 2018-03-19 NOTE — ED PROVIDER NOTES
[facility]    History   Patient presents with:  Hypertension (cardiovascular)    Stated Complaint: high blood pleasure    HPI    79-year-old woman with a past medical history of hypertension, congestive h CHOLECYSTECTOMY  08/22/2017: COLONOSCOPY  12/17, 8/17: EGD  11-: ELECTROCARDIOGRAM, COMPLETE      Comment: Scanned to media tab - DOS 11-  No date: HIP REPLACEMENT SURGERY Left  No date: HYSTERECTOMY  2006: KNEE REPLACEMENT SURGERY  No date: Family History   Problem Relation Age of Onset   • Heart Disorder Father    • Heart Disorder Mother    • Heart Disease Sister    • Hypertension Brother    • Melanoma Other    • Cancer Other    • Stroke Other    • Heart Disease Other    • Diabetes Other and no mistakes been made. If she becomes symptomatic from her blood pressure I advised that they go to the emergency room.     Disposition:  Discharge    Follow-up:  Charon Boxer, MD  [address] [address] [phone]    Schedule an

## 2018-03-19 NOTE — TELEPHONE ENCOUNTER
Please call pt and notify her that her U/A and Urine C+S have turned out well. No UTI noted. Also her blood tests are stable including her renal function.   Please call pt and tell her that I have place and order in the system for pt to have a repeat BMP

## 2018-03-19 NOTE — ED INITIAL ASSESSMENT (HPI)
Patient presents with high blood pressure readings at home. Patient denies any chest pain or sob, no dizziness. Patient has been dealing with some life changes and moving into an assisted living facility.

## 2018-03-19 NOTE — ED NOTES
Patient presents with c/o high blood pressure readings at home. Patient has no associated symptoms, no chest pain or sob, no dizziness or lightheadedness. Patient is aox4 speech clear, carrasco. Patient has no complaints.

## 2018-03-20 NOTE — TELEPHONE ENCOUNTER
To Dr. Teofilo Guerra - see below - last fill by this office: 10/7/16.   See pended order above, check sig, #, RF.

## 2018-03-20 NOTE — TELEPHONE ENCOUNTER
Pt has been having some nausea - had ondansetron on hand & it helped - would like to see about getting a new Rx for this    Call pt to discuss/ advise [phone]

## 2018-03-23 RX ORDER — ATORVASTATIN CALCIUM 40 MG/1
40 TABLET, FILM COATED ORAL NIGHTLY
Qty: 90 TABLET | Refills: 3 | Status: ON HOLD | OUTPATIENT
Start: 2018-03-23 | End: 2019-02-01

## 2018-03-23 RX ORDER — ONDANSETRON 4 MG/1
4 TABLET, FILM COATED ORAL EVERY 8 HOURS PRN
Qty: 15 TABLET | Refills: 0 | Status: ON HOLD | OUTPATIENT
Start: 2018-03-23 | End: 2018-07-17

## 2018-03-23 RX ORDER — ATORVASTATIN CALCIUM 40 MG/1
TABLET, FILM COATED ORAL
COMMUNITY
Start: 2018-03-13 | End: 2018-03-23

## 2018-03-23 NOTE — TELEPHONE ENCOUNTER
Bob Mcconnell faxed a refill request for Atorvastatin 40 mg, QTY-30. Note - originally prescribed by Dr. Mariela Griffith, DO.       To Rx

## 2018-03-23 NOTE — TELEPHONE ENCOUNTER
Pt called back to check on the status of Zofran prescription. She is still having nausea.      To Nursing high

## 2018-03-23 NOTE — TELEPHONE ENCOUNTER
Spoke to pt ---nausea has crept up in the last few weeks; She has called  and he restarted Sucralfate and has OV with him 4/19  She had some ondansetron in the house and did find relief with it.   We discussed we do not want to mask something more

## 2018-04-06 RX ORDER — ALLOPURINOL 300 MG/1
300 TABLET ORAL DAILY
Qty: 90 TABLET | Refills: 3 | Status: ON HOLD | OUTPATIENT
Start: 2018-04-06 | End: 2019-02-01

## 2018-04-23 ENCOUNTER — OFFICE VISIT (OUTPATIENT)
Dept: DERMATOLOGY CLINIC | Facility: CLINIC | Age: 78
End: 2018-04-23

## 2018-04-23 DIAGNOSIS — C44.519 BASAL CELL CARCINOMA (BCC) OF BACK: Primary | ICD-10-CM

## 2018-04-23 PROCEDURE — 17262 DSTRJ MAL LES T/A/L 1.1-2.0: CPT | Performed by: DERMATOLOGY

## 2018-04-23 NOTE — PROGRESS NOTES
Past Medical History:   Diagnosis Date   • Anemia    • Anesthesia complication    • Arthritis     feet   • BACK PAIN    • Back problem    • Urban's esophagus    • Blood disorder    • CHF (congestive heart failure) (HCC)    • Colon, diverticulosis    • Co has a defibrillator No    Reaction to local anesthetic No     Social History Narrative    The patient uses the following assistive device(s):  wheelchair. The patient does live in a home with stairs.     The patient is currently in [facility]

## 2018-04-23 NOTE — PROCEDURES
Procedural Report for Electrodesiccation and Curettage    Procedure: With the patient is a seated position, the skin was scrubbed with hibiclens. Anesthesia was obtained by injecting 3 mL of 1% Xylocaine with Epinephrine.   Sharp dissection of the mass

## 2018-04-23 NOTE — PROGRESS NOTES
HPI:     Chief Complaint     Derm Problem        HPI     Derm Problem    Additional comments: Pt presents for E and C procedure of BCC to the mid back.         Last edited by Marion Obrien RN on 4/23/2018 10:11 AM. (History)          Lesion was biopsied a daily.  ) Disp: 100 tablet Rfl: 3   Polyethylene Glycol 3350 (MIRALAX) Oral Powder Take 17 g by mouth daily as needed.  take (17G)  by oral route  every day mixed with 8 oz. water, juice, soda, coffee or tea  Disp:  Rfl:      Allergies:     Compazine oz/week    2 Glasses of wine per week         Comment: occasionally    Drug use: No    Comment: methadone    Sexual activity: Not on file     Other Topics Concern    Caffeine Concern Yes    Comment: Coffee 5 cups daily    Exercise Yes    Comment: physical

## 2018-04-25 ENCOUNTER — TELEPHONE (OUTPATIENT)
Dept: INTERNAL MEDICINE CLINIC | Facility: CLINIC | Age: 78
End: 2018-04-25

## 2018-04-27 ENCOUNTER — LAB ENCOUNTER (OUTPATIENT)
Dept: LAB | Age: 78
End: 2018-04-27
Attending: INTERNAL MEDICINE
Payer: MEDICARE

## 2018-04-27 DIAGNOSIS — R53.83 FATIGUE, UNSPECIFIED TYPE: ICD-10-CM

## 2018-04-27 DIAGNOSIS — N28.9 RENAL INSUFFICIENCY: ICD-10-CM

## 2018-04-27 PROCEDURE — 85025 COMPLETE CBC W/AUTO DIFF WBC: CPT

## 2018-04-27 PROCEDURE — 36415 COLL VENOUS BLD VENIPUNCTURE: CPT

## 2018-04-27 PROCEDURE — 83036 HEMOGLOBIN GLYCOSYLATED A1C: CPT

## 2018-04-27 PROCEDURE — 80048 BASIC METABOLIC PNL TOTAL CA: CPT

## 2018-04-30 ENCOUNTER — TELEPHONE (OUTPATIENT)
Dept: INTERNAL MEDICINE CLINIC | Facility: CLINIC | Age: 78
End: 2018-04-30

## 2018-05-04 ENCOUNTER — TELEPHONE (OUTPATIENT)
Dept: INTERNAL MEDICINE CLINIC | Facility: CLINIC | Age: 78
End: 2018-05-04

## 2018-05-04 NOTE — TELEPHONE ENCOUNTER
[facility]  Is requesting a 90 day Rx conversion request for Atorvastatin 40 mg tab       Fax to [facility].  # [phone]   Routed to Rx

## 2018-05-04 NOTE — TELEPHONE ENCOUNTER
Patient asking for 4/27/2018 lab results.   Please call Sujatha Foster at [phone]  Routed to clinical

## 2018-05-04 NOTE — TELEPHONE ENCOUNTER
RX was sent to [facility] 3/23/2018 for # 90 with 3 refills. called pharmacy and spoke with pharmacist who confirmed they got the refill , so patient has enough medication

## 2018-05-04 NOTE — TELEPHONE ENCOUNTER
Nursing, labs look stable, you can contact her, let her know I reviewed them for Dr. Monik Resendez, hemoglobin A1c is 6.4, as of this time, things look very comparable to last seen by Dr. Monik Resendez may have other orders when he reviews the next week, otherwise

## 2018-05-08 ENCOUNTER — OFFICE VISIT (OUTPATIENT)
Dept: PAIN CLINIC | Facility: HOSPITAL | Age: 78
End: 2018-05-08
Attending: ANESTHESIOLOGY
Payer: MEDICARE

## 2018-05-08 VITALS
HEART RATE: 62 BPM | SYSTOLIC BLOOD PRESSURE: 168 MMHG | WEIGHT: 196 LBS | RESPIRATION RATE: 18 BRPM | BODY MASS INDEX: 34.73 KG/M2 | DIASTOLIC BLOOD PRESSURE: 67 MMHG | HEIGHT: 63 IN

## 2018-05-08 DIAGNOSIS — M96.1 FAILED BACK SURGICAL SYNDROME: Primary | ICD-10-CM

## 2018-05-08 DIAGNOSIS — R29.898 LEFT HAND WEAKNESS: ICD-10-CM

## 2018-05-08 PROCEDURE — 99211 OFF/OP EST MAY X REQ PHY/QHP: CPT

## 2018-05-08 RX ORDER — METHADONE HYDROCHLORIDE 10 MG/1
20 TABLET ORAL EVERY 12 HOURS
Qty: 120 TABLET | Refills: 0 | Status: SHIPPED | OUTPATIENT
Start: 2018-05-08 | End: 2018-05-08 | Stop reason: CLARIF

## 2018-05-08 RX ORDER — METHADONE HYDROCHLORIDE 10 MG/1
10 TABLET ORAL EVERY 12 HOURS
Qty: 60 TABLET | Refills: 0 | Status: SHIPPED | OUTPATIENT
Start: 2018-06-15 | End: 2018-07-05

## 2018-05-08 RX ORDER — HYDROCODONE BITARTRATE AND ACETAMINOPHEN 5; 325 MG/1; MG/1
1 TABLET ORAL EVERY 4 HOURS PRN
Qty: 120 TABLET | Refills: 0 | Status: SHIPPED | OUTPATIENT
Start: 2018-05-08 | End: 2018-05-08

## 2018-05-08 RX ORDER — HYDROCODONE BITARTRATE AND ACETAMINOPHEN 5; 325 MG/1; MG/1
1 TABLET ORAL EVERY 4 HOURS PRN
Qty: 120 TABLET | Refills: 0 | Status: SHIPPED | OUTPATIENT
Start: 2018-05-15 | End: 2018-05-08

## 2018-05-08 RX ORDER — HYDROCODONE BITARTRATE AND ACETAMINOPHEN 5; 325 MG/1; MG/1
1 TABLET ORAL EVERY 4 HOURS PRN
Qty: 120 TABLET | Refills: 0 | Status: ON HOLD | OUTPATIENT
Start: 2018-06-15 | End: 2018-07-17

## 2018-05-08 RX ORDER — METHADONE HYDROCHLORIDE 10 MG/1
10 TABLET ORAL EVERY 12 HOURS
Qty: 60 TABLET | Refills: 0 | Status: SHIPPED | OUTPATIENT
Start: 2018-05-15 | End: 2018-05-08

## 2018-05-08 RX ORDER — METHADONE HYDROCHLORIDE 10 MG/1
10 TABLET ORAL EVERY 12 HOURS
Qty: 60 TABLET | Refills: 0 | Status: SHIPPED | OUTPATIENT
Start: 2018-05-08 | End: 2018-05-08 | Stop reason: CLARIF

## 2018-05-08 NOTE — TELEPHONE ENCOUNTER
To Dr. Bertrand Villafana - Dr. Dennis Penn message below relayed to pt who verbalized understanding. Any orders/changes from you? Pt was told we would call her if anything new, understanding verbalized.

## 2018-05-08 NOTE — TELEPHONE ENCOUNTER
Telephone call to pt and recent lab results discussed with pt. Pt's labs are doing well. Renal function is improved from before. Hb-A-1-C is 6.4. Pt is moving to an Assisted Living facility in [facility] in the near future.   Pt will follow up with me

## 2018-05-08 NOTE — CHRONIC PAIN
[facility]  Pain Clinic  Follow Up Visit Report       Patient name: Belinda Dukes 78year old female  : 11/3/1940  MRN: [id number]  Referring MD: No ref.  provider found     COMPLAINT:  Patient presents with:  Medication Eval: chronic f HYSTERECTOMY  2006: KNEE REPLACEMENT SURGERY  No date: REMOVAL GALLBLADDER  No date: SPINE SURGERY PROCEDURE UNLISTED  No date: TOTAL HIP REPLACEMENT  No date: TOTAL KNEE REPLACEMENT  FAMILY HISTORY:  Family History   Problem Relation Age of Onset   • Hear Take 1 tablet (20 mg total) by mouth daily. , Disp: 30 tablet, Rfl: 5  •  aspirin 81 MG Oral Chew Tab, Chew 1 tablet (81 mg total) by mouth daily. , Disp: , Rfl: 0  •  docusate sodium 100 MG Oral Cap, Take 100 mg by mouth daily as needed for constipation. , D 8.3 04/27/2018   RBC 4.32 04/27/2018   HGB 12.0 04/27/2018   HCT 37.1 04/27/2018    04/27/2018       ASSESSMENT/PLAN:   Patient is a(n) 78year old year old female with (M96.1) Failed back surgical syndrome  (primary encounter diagnosis)    (R29.895

## 2018-05-09 RX ORDER — OMEPRAZOLE 20 MG/1
CAPSULE, DELAYED RELEASE ORAL
COMMUNITY
Start: 2018-05-01 | End: 2018-05-09

## 2018-05-09 NOTE — TELEPHONE ENCOUNTER
[facility] faxed a refill request for Omeprazole cap 20 mg. Originally prescribed by Dr. Arlina Felty, DO.     To Rx

## 2018-05-12 RX ORDER — OMEPRAZOLE 20 MG/1
20 CAPSULE, DELAYED RELEASE ORAL EVERY MORNING
Qty: 90 CAPSULE | Refills: 3 | Status: ON HOLD | OUTPATIENT
Start: 2018-05-12 | End: 2019-03-27

## 2018-06-12 RX ORDER — FUROSEMIDE 20 MG/1
TABLET ORAL
Qty: 30 TABLET | Refills: 1 | Status: SHIPPED | OUTPATIENT
Start: 2018-06-12 | End: 2018-07-24

## 2018-07-05 RX ORDER — METHADONE HYDROCHLORIDE 10 MG/1
10 TABLET ORAL EVERY 12 HOURS PRN
Qty: 60 TABLET | Refills: 0 | Status: CANCELLED | OUTPATIENT
Start: 2018-08-10 | End: 2018-09-09

## 2018-07-05 RX ORDER — HYDROCODONE BITARTRATE AND ACETAMINOPHEN 5; 325 MG/1; MG/1
1 TABLET ORAL EVERY 4 HOURS PRN
Qty: 120 TABLET | Refills: 0 | Status: CANCELLED | OUTPATIENT
Start: 2018-08-13 | End: 2018-09-12

## 2018-07-06 ENCOUNTER — OFFICE VISIT (OUTPATIENT)
Dept: PAIN CLINIC | Facility: HOSPITAL | Age: 78
End: 2018-07-06
Attending: ANESTHESIOLOGY
Payer: MEDICARE

## 2018-07-06 DIAGNOSIS — M96.1 FAILED BACK SURGICAL SYNDROME: Primary | ICD-10-CM

## 2018-07-06 DIAGNOSIS — F11.90 CHRONIC, CONTINUOUS USE OF OPIOIDS: ICD-10-CM

## 2018-07-06 PROCEDURE — 99211 OFF/OP EST MAY X REQ PHY/QHP: CPT | Performed by: NURSE PRACTITIONER

## 2018-07-06 RX ORDER — HYDROCODONE BITARTRATE AND ACETAMINOPHEN 10; 325 MG/1; MG/1
1 TABLET ORAL EVERY 4 HOURS PRN
Qty: 180 TABLET | Refills: 0 | OUTPATIENT
Start: 2018-07-06 | End: 2018-07-06

## 2018-07-06 RX ORDER — HYDROCODONE BITARTRATE AND ACETAMINOPHEN 5; 325 MG/1; MG/1
1 TABLET ORAL EVERY 4 HOURS PRN
Qty: 180 TABLET | Refills: 0 | Status: ON HOLD | OUTPATIENT
Start: 2018-07-06 | End: 2018-07-24

## 2018-07-06 RX ORDER — HYDROCODONE BITARTRATE AND ACETAMINOPHEN 10; 325 MG/1; MG/1
1 TABLET ORAL EVERY 4 HOURS PRN
Qty: 180 TABLET | Refills: 0 | Status: ON HOLD | OUTPATIENT
Start: 2018-08-05 | End: 2018-07-24

## 2018-07-06 RX ORDER — HYDROCODONE BITARTRATE AND ACETAMINOPHEN 10; 325 MG/1; MG/1
1 TABLET ORAL EVERY 4 HOURS PRN
Qty: 180 TABLET | Refills: 0 | Status: ON HOLD | OUTPATIENT
Start: 2018-07-06 | End: 2018-07-24

## 2018-07-06 RX ORDER — METHADONE HYDROCHLORIDE 10 MG/1
10 TABLET ORAL DAILY
Qty: 7 TABLET | Refills: 0 | Status: SHIPPED | OUTPATIENT
Start: 2018-07-06 | End: 2018-07-13

## 2018-07-06 NOTE — PROGRESS NOTES
Patient in for medication follow up, rates her pain 8/10. States the norco helps the most but she feels like the Methadone doesn't do anything. Denies any new medications and no new pain.  MD Ray to assess, see providers notes

## 2018-07-06 NOTE — CHRONIC PAIN
Follow-up Note    HISTORY OF PRESENT ILLNESS:  Richard Allen is a 78year old old female with hx of failed back surgery syndrome, having undergone 5 lumbar surgeries.   Pt has been managed with Methadone 10mg po BID and Norco 5 1-2 tab q 4hrs prn, which m Tab Take 0.5 tablets (12.5 mg total) by mouth 2x Daily(Beta Blocker). Disp: 90 tablet Rfl: 3   aspirin 81 MG Oral Chew Tab Chew 1 tablet (81 mg total) by mouth daily.  Disp:  Rfl: 0   docusate sodium 100 MG Oral Cap Take 100 mg by mouth daily as needed for Lower extremity edema     Inability to ambulate due to ankle or foot     S/P laparoscopic cholecystectomy     Memory problem     Rhabdomyolysis     Non-traumatic rhabdomyolysis     Bacteriuria     Abrasions of multiple sites     UTI (urinary tract infectio Disorder Father    • Heart Disorder Mother    • Heart Disease Sister    • Hypertension Brother    • Melanoma Other    • Cancer Other    • Stroke Other    • Heart Disease Other    • Diabetes Other    • Eye Problems Neg        SOCIAL HISTORY:    Social Histo medication.  Patient verbalized understanding.           ,

## 2018-07-09 NOTE — CM/SW NOTE
11:12pm Update- SW informed that pt will not be dc'd today due to CPK levels. Also, SW clarified the pt is isolation for pink eye. Update given to snf liaison.   --  8:56am-ADELA received MDO indicating plan for snf placement, preference indicated for [facility] NPO with alternate means of nutrition and hydration

## 2018-07-14 ENCOUNTER — HOSPITAL ENCOUNTER (EMERGENCY)
Facility: HOSPITAL | Age: 78
Discharge: HOME | End: 2018-07-14
Attending: EMERGENCY MEDICINE
Payer: MEDICARE

## 2018-07-14 ENCOUNTER — APPOINTMENT (OUTPATIENT)
Dept: GENERAL RADIOLOGY | Facility: HOSPITAL | Age: 78
End: 2018-07-14
Attending: EMERGENCY MEDICINE
Payer: MEDICARE

## 2018-07-14 VITALS
HEIGHT: 60 IN | TEMPERATURE: 99 F | SYSTOLIC BLOOD PRESSURE: 157 MMHG | BODY MASS INDEX: 31.41 KG/M2 | HEART RATE: 73 BPM | OXYGEN SATURATION: 94 % | WEIGHT: 160 LBS | RESPIRATION RATE: 12 BRPM | DIASTOLIC BLOOD PRESSURE: 74 MMHG

## 2018-07-14 DIAGNOSIS — R82.71 BACTERIURIA: Primary | ICD-10-CM

## 2018-07-14 DIAGNOSIS — L03.116 LEFT LEG CELLULITIS: ICD-10-CM

## 2018-07-14 LAB
ANION GAP SERPL CALC-SCNC: 8 MMOL/L (ref 0–18)
BASOPHILS # BLD: 0.1 K/UL (ref 0–0.2)
BASOPHILS NFR BLD: 1 %
BILIRUB UR QL: NEGATIVE
BNP SERPL-MCNC: 145 PG/ML (ref 0–100)
BUN SERPL-MCNC: 18 MG/DL (ref 8–20)
BUN/CREAT SERPL: 12.9 (ref 10–20)
CALCIUM SERPL-MCNC: 9.6 MG/DL (ref 8.5–10.5)
CHLORIDE SERPL-SCNC: 102 MMOL/L (ref 95–110)
CLARITY UR: CLEAR
CO2 SERPL-SCNC: 29 MMOL/L (ref 22–32)
COLOR UR: YELLOW
CREAT SERPL-MCNC: 1.39 MG/DL (ref 0.5–1.5)
EOSINOPHIL # BLD: 0.1 K/UL (ref 0–0.7)
EOSINOPHIL NFR BLD: 1 %
ERYTHROCYTE [DISTWIDTH] IN BLOOD BY AUTOMATED COUNT: 14.6 % (ref 11–15)
GLUCOSE BLDC GLUCOMTR-MCNC: 131 MG/DL (ref 70–99)
GLUCOSE SERPL-MCNC: 148 MG/DL (ref 70–99)
GLUCOSE UR-MCNC: NEGATIVE MG/DL
HCT VFR BLD AUTO: 38 % (ref 35–48)
HGB BLD-MCNC: 12.4 G/DL (ref 12–16)
HGB UR QL STRIP.AUTO: NEGATIVE
KETONES UR-MCNC: NEGATIVE MG/DL
LYMPHOCYTES # BLD: 1.4 K/UL (ref 1–4)
LYMPHOCYTES NFR BLD: 19 %
MCH RBC QN AUTO: 28.9 PG (ref 27–32)
MCHC RBC AUTO-ENTMCNC: 32.5 G/DL (ref 32–37)
MCV RBC AUTO: 88.8 FL (ref 80–100)
MONOCYTES # BLD: 0.4 K/UL (ref 0–1)
MONOCYTES NFR BLD: 6 %
NEUTROPHILS # BLD AUTO: 5.4 K/UL (ref 1.8–7.7)
NEUTROPHILS NFR BLD: 74 %
NITRITE UR QL STRIP.AUTO: NEGATIVE
OSMOLALITY UR CALC.SUM OF ELEC: 293 MOSM/KG (ref 275–295)
PH UR: 6 [PH] (ref 5–8)
PLATELET # BLD AUTO: 208 K/UL (ref 140–400)
PMV BLD AUTO: 8.4 FL (ref 7.4–10.3)
POTASSIUM SERPL-SCNC: 3.9 MMOL/L (ref 3.3–5.1)
PROT UR-MCNC: NEGATIVE MG/DL
RBC # BLD AUTO: 4.28 M/UL (ref 3.7–5.4)
RBC #/AREA URNS AUTO: 2 /HPF
SODIUM SERPL-SCNC: 139 MMOL/L (ref 136–144)
SP GR UR STRIP: 1.01 (ref 1–1.03)
T3 SERPL-MCNC: 0.62 NG/ML (ref 0.87–1.78)
T4 FREE SERPL-MCNC: 1.43 NG/DL (ref 0.58–1.64)
TROPONIN I SERPL-MCNC: 0.01 NG/ML (ref ?–0.03)
TSH SERPL-ACNC: 0.42 UIU/ML (ref 0.45–5.33)
UROBILINOGEN UR STRIP-ACNC: <2
VIT C UR-MCNC: NEGATIVE MG/DL
WBC # BLD AUTO: 7.4 K/UL (ref 4–11)
WBC #/AREA URNS AUTO: 6 /HPF

## 2018-07-14 PROCEDURE — 84443 ASSAY THYROID STIM HORMONE: CPT | Performed by: EMERGENCY MEDICINE

## 2018-07-14 PROCEDURE — 99285 EMERGENCY DEPT VISIT HI MDM: CPT

## 2018-07-14 PROCEDURE — 93010 ELECTROCARDIOGRAM REPORT: CPT | Performed by: EMERGENCY MEDICINE

## 2018-07-14 PROCEDURE — 84484 ASSAY OF TROPONIN QUANT: CPT | Performed by: EMERGENCY MEDICINE

## 2018-07-14 PROCEDURE — 83880 ASSAY OF NATRIURETIC PEPTIDE: CPT | Performed by: EMERGENCY MEDICINE

## 2018-07-14 PROCEDURE — 93005 ELECTROCARDIOGRAM TRACING: CPT

## 2018-07-14 PROCEDURE — 85025 COMPLETE CBC W/AUTO DIFF WBC: CPT | Performed by: EMERGENCY MEDICINE

## 2018-07-14 PROCEDURE — 87077 CULTURE AEROBIC IDENTIFY: CPT | Performed by: EMERGENCY MEDICINE

## 2018-07-14 PROCEDURE — 80048 BASIC METABOLIC PNL TOTAL CA: CPT | Performed by: EMERGENCY MEDICINE

## 2018-07-14 PROCEDURE — 84439 ASSAY OF FREE THYROXINE: CPT | Performed by: EMERGENCY MEDICINE

## 2018-07-14 PROCEDURE — 82962 GLUCOSE BLOOD TEST: CPT

## 2018-07-14 PROCEDURE — 87186 SC STD MICRODIL/AGAR DIL: CPT | Performed by: EMERGENCY MEDICINE

## 2018-07-14 PROCEDURE — 84480 ASSAY TRIIODOTHYRONINE (T3): CPT | Performed by: EMERGENCY MEDICINE

## 2018-07-14 PROCEDURE — 87086 URINE CULTURE/COLONY COUNT: CPT | Performed by: EMERGENCY MEDICINE

## 2018-07-14 PROCEDURE — 96376 TX/PRO/DX INJ SAME DRUG ADON: CPT

## 2018-07-14 PROCEDURE — 71046 X-RAY EXAM CHEST 2 VIEWS: CPT | Performed by: EMERGENCY MEDICINE

## 2018-07-14 PROCEDURE — 81001 URINALYSIS AUTO W/SCOPE: CPT | Performed by: EMERGENCY MEDICINE

## 2018-07-14 PROCEDURE — 96374 THER/PROPH/DIAG INJ IV PUSH: CPT

## 2018-07-14 RX ORDER — ONDANSETRON 4 MG/1
4 TABLET, ORALLY DISINTEGRATING ORAL ONCE
Status: DISCONTINUED | OUTPATIENT
Start: 2018-07-14 | End: 2018-07-14

## 2018-07-14 RX ORDER — CEPHALEXIN 500 MG/1
500 CAPSULE ORAL 2 TIMES DAILY
Qty: 14 CAPSULE | Refills: 0 | Status: ON HOLD | OUTPATIENT
Start: 2018-07-14 | End: 2018-07-17

## 2018-07-14 RX ORDER — ONDANSETRON 4 MG/1
4 TABLET, ORALLY DISINTEGRATING ORAL EVERY 8 HOURS PRN
Qty: 15 TABLET | Refills: 0 | Status: SHIPPED | OUTPATIENT
Start: 2018-07-14 | End: 2018-07-17

## 2018-07-14 RX ORDER — CEPHALEXIN 500 MG/1
500 CAPSULE ORAL ONCE
Status: COMPLETED | OUTPATIENT
Start: 2018-07-14 | End: 2018-07-14

## 2018-07-14 RX ORDER — CEPHALEXIN 500 MG/1
500 CAPSULE ORAL 2 TIMES DAILY
Qty: 14 CAPSULE | Refills: 0 | Status: SHIPPED | OUTPATIENT
Start: 2018-07-14 | End: 2018-07-14

## 2018-07-14 RX ORDER — ONDANSETRON 2 MG/ML
4 INJECTION INTRAMUSCULAR; INTRAVENOUS ONCE
Status: COMPLETED | OUTPATIENT
Start: 2018-07-14 | End: 2018-07-14

## 2018-07-14 NOTE — ED INITIAL ASSESSMENT (HPI)
FEELING \"LIKE SHIT\" FOR 2-3 WEEKS, NO FEVER, N/V/D, COUGH, NO UTI SYMPTOMS,  NO SOB OR CP.  HAS NOT SEEN HER PMD

## 2018-07-15 ENCOUNTER — HOSPITAL ENCOUNTER (OUTPATIENT)
Facility: HOSPITAL | Age: 78
Setting detail: OBSERVATION
Discharge: HOME HEALTH | End: 2018-07-17
Attending: EMERGENCY MEDICINE
Payer: MEDICARE

## 2018-07-15 ENCOUNTER — TELEPHONE (OUTPATIENT)
Dept: INTERNAL MEDICINE CLINIC | Facility: CLINIC | Age: 78
End: 2018-07-15

## 2018-07-15 DIAGNOSIS — N30.00 ACUTE CYSTITIS WITHOUT HEMATURIA: ICD-10-CM

## 2018-07-15 DIAGNOSIS — L03.119 CELLULITIS OF LOWER EXTREMITY, UNSPECIFIED LATERALITY: Primary | ICD-10-CM

## 2018-07-15 PROBLEM — L03.90 CELLULITIS: Status: ACTIVE | Noted: 2018-07-15

## 2018-07-15 LAB
ANION GAP SERPL CALC-SCNC: 7 MMOL/L (ref 0–18)
BACTERIA UR QL AUTO: NEGATIVE /HPF
BASOPHILS # BLD: 0.1 K/UL (ref 0–0.2)
BASOPHILS NFR BLD: 1 %
BILIRUB UR QL: NEGATIVE
BUN SERPL-MCNC: 18 MG/DL (ref 8–20)
BUN/CREAT SERPL: 11.9 (ref 10–20)
CALCIUM SERPL-MCNC: 9.4 MG/DL (ref 8.5–10.5)
CHLORIDE SERPL-SCNC: 103 MMOL/L (ref 95–110)
CLARITY UR: CLEAR
CO2 SERPL-SCNC: 28 MMOL/L (ref 22–32)
COLOR UR: YELLOW
CREAT SERPL-MCNC: 1.51 MG/DL (ref 0.5–1.5)
EOSINOPHIL # BLD: 0.1 K/UL (ref 0–0.7)
EOSINOPHIL NFR BLD: 2 %
ERYTHROCYTE [DISTWIDTH] IN BLOOD BY AUTOMATED COUNT: 14.7 % (ref 11–15)
GLUCOSE SERPL-MCNC: 125 MG/DL (ref 70–99)
GLUCOSE UR-MCNC: NEGATIVE MG/DL
HCT VFR BLD AUTO: 35.8 % (ref 35–48)
HGB BLD-MCNC: 11.7 G/DL (ref 12–16)
HGB UR QL STRIP.AUTO: NEGATIVE
KETONES UR-MCNC: NEGATIVE MG/DL
LEUKOCYTE ESTERASE UR QL STRIP.AUTO: NEGATIVE
LYMPHOCYTES # BLD: 1.4 K/UL (ref 1–4)
LYMPHOCYTES NFR BLD: 20 %
MCH RBC QN AUTO: 28.6 PG (ref 27–32)
MCHC RBC AUTO-ENTMCNC: 32.8 G/DL (ref 32–37)
MCV RBC AUTO: 87.3 FL (ref 80–100)
MONOCYTES # BLD: 0.4 K/UL (ref 0–1)
MONOCYTES NFR BLD: 6 %
NEUTROPHILS # BLD AUTO: 5 K/UL (ref 1.8–7.7)
NEUTROPHILS NFR BLD: 71 %
NITRITE UR QL STRIP.AUTO: NEGATIVE
OSMOLALITY UR CALC.SUM OF ELEC: 289 MOSM/KG (ref 275–295)
PH UR: 7 [PH] (ref 5–8)
PLATELET # BLD AUTO: 214 K/UL (ref 140–400)
PMV BLD AUTO: 9.1 FL (ref 7.4–10.3)
POTASSIUM SERPL-SCNC: 4 MMOL/L (ref 3.3–5.1)
PROT UR-MCNC: 30 MG/DL
RBC # BLD AUTO: 4.1 M/UL (ref 3.7–5.4)
RBC #/AREA URNS AUTO: 2 /HPF
SODIUM SERPL-SCNC: 138 MMOL/L (ref 136–144)
SP GR UR STRIP: 1.02 (ref 1–1.03)
UROBILINOGEN UR STRIP-ACNC: <2
VIT C UR-MCNC: NEGATIVE MG/DL
WBC # BLD AUTO: 7 K/UL (ref 4–11)
WBC #/AREA URNS AUTO: 2 /HPF

## 2018-07-15 PROCEDURE — 99220 INITIAL OBSERVATION CARE,LEVL III: CPT | Performed by: HOSPITALIST

## 2018-07-15 RX ORDER — FUROSEMIDE 20 MG/1
20 TABLET ORAL
Status: DISCONTINUED | OUTPATIENT
Start: 2018-07-15 | End: 2018-07-17

## 2018-07-15 RX ORDER — SODIUM CHLORIDE 0.9 % (FLUSH) 0.9 %
3 SYRINGE (ML) INJECTION AS NEEDED
Status: DISCONTINUED | OUTPATIENT
Start: 2018-07-15 | End: 2018-07-17

## 2018-07-15 RX ORDER — ATORVASTATIN CALCIUM 40 MG/1
40 TABLET, FILM COATED ORAL NIGHTLY
Status: DISCONTINUED | OUTPATIENT
Start: 2018-07-15 | End: 2018-07-17

## 2018-07-15 RX ORDER — HYDRALAZINE HYDROCHLORIDE 20 MG/ML
10 INJECTION INTRAMUSCULAR; INTRAVENOUS ONCE
Status: DISCONTINUED | OUTPATIENT
Start: 2018-07-15 | End: 2018-07-17

## 2018-07-15 RX ORDER — ONDANSETRON 2 MG/ML
4 INJECTION INTRAMUSCULAR; INTRAVENOUS EVERY 6 HOURS PRN
Status: DISCONTINUED | OUTPATIENT
Start: 2018-07-15 | End: 2018-07-15

## 2018-07-15 RX ORDER — BISACODYL 10 MG
10 SUPPOSITORY, RECTAL RECTAL
Status: DISCONTINUED | OUTPATIENT
Start: 2018-07-15 | End: 2018-07-17

## 2018-07-15 RX ORDER — HYDROCODONE BITARTRATE AND ACETAMINOPHEN 5; 325 MG/1; MG/1
2 TABLET ORAL EVERY 4 HOURS PRN
Status: DISCONTINUED | OUTPATIENT
Start: 2018-07-15 | End: 2018-07-17

## 2018-07-15 RX ORDER — ONDANSETRON 2 MG/ML
4 INJECTION INTRAMUSCULAR; INTRAVENOUS ONCE
Status: COMPLETED | OUTPATIENT
Start: 2018-07-15 | End: 2018-07-15

## 2018-07-15 RX ORDER — SODIUM CHLORIDE 9 MG/ML
INJECTION, SOLUTION INTRAVENOUS CONTINUOUS
Status: DISCONTINUED | OUTPATIENT
Start: 2018-07-15 | End: 2018-07-15

## 2018-07-15 RX ORDER — CEPHALEXIN 500 MG/1
500 CAPSULE ORAL ONCE
Status: COMPLETED | OUTPATIENT
Start: 2018-07-15 | End: 2018-07-15

## 2018-07-15 RX ORDER — ACETAMINOPHEN 325 MG/1
650 TABLET ORAL EVERY 4 HOURS PRN
Status: DISCONTINUED | OUTPATIENT
Start: 2018-07-15 | End: 2018-07-17

## 2018-07-15 RX ORDER — ONDANSETRON 2 MG/ML
8 INJECTION INTRAMUSCULAR; INTRAVENOUS EVERY 4 HOURS PRN
Status: DISCONTINUED | OUTPATIENT
Start: 2018-07-15 | End: 2018-07-17

## 2018-07-15 RX ORDER — METOCLOPRAMIDE HYDROCHLORIDE 5 MG/ML
10 INJECTION INTRAMUSCULAR; INTRAVENOUS EVERY 8 HOURS PRN
Status: DISCONTINUED | OUTPATIENT
Start: 2018-07-15 | End: 2018-07-17

## 2018-07-15 RX ORDER — POLYETHYLENE GLYCOL 3350 17 G/17G
17 POWDER, FOR SOLUTION ORAL
Status: DISCONTINUED | OUTPATIENT
Start: 2018-07-15 | End: 2018-07-17

## 2018-07-15 RX ORDER — PANTOPRAZOLE SODIUM 40 MG/1
40 TABLET, DELAYED RELEASE ORAL
Status: DISCONTINUED | OUTPATIENT
Start: 2018-07-16 | End: 2018-07-17

## 2018-07-15 RX ORDER — DOCUSATE SODIUM 100 MG/1
100 CAPSULE, LIQUID FILLED ORAL 2 TIMES DAILY
Status: DISCONTINUED | OUTPATIENT
Start: 2018-07-15 | End: 2018-07-17

## 2018-07-15 RX ORDER — CLINDAMYCIN PHOSPHATE 600 MG/50ML
600 INJECTION INTRAVENOUS EVERY 8 HOURS
Status: DISCONTINUED | OUTPATIENT
Start: 2018-07-15 | End: 2018-07-17

## 2018-07-15 RX ORDER — MECLIZINE HYDROCHLORIDE 25 MG/1
25 TABLET ORAL EVERY 8 HOURS PRN
Status: DISCONTINUED | OUTPATIENT
Start: 2018-07-15 | End: 2018-07-17

## 2018-07-15 RX ORDER — HYDRALAZINE HYDROCHLORIDE 20 MG/ML
10 INJECTION INTRAMUSCULAR; INTRAVENOUS EVERY 6 HOURS PRN
Status: DISCONTINUED | OUTPATIENT
Start: 2018-07-15 | End: 2018-07-17

## 2018-07-15 RX ORDER — POLYETHYLENE GLYCOL 3350 17 G/17G
17 POWDER, FOR SOLUTION ORAL DAILY PRN
Status: DISCONTINUED | OUTPATIENT
Start: 2018-07-15 | End: 2018-07-15

## 2018-07-15 RX ORDER — CLINDAMYCIN PHOSPHATE 600 MG/50ML
600 INJECTION INTRAVENOUS ONCE
Status: COMPLETED | OUTPATIENT
Start: 2018-07-15 | End: 2018-07-15

## 2018-07-15 RX ORDER — LISINOPRIL 10 MG/1
10 TABLET ORAL EVERY MORNING
Status: DISCONTINUED | OUTPATIENT
Start: 2018-07-15 | End: 2018-07-17

## 2018-07-15 RX ORDER — AMLODIPINE BESYLATE 5 MG/1
5 TABLET ORAL DAILY
Status: DISCONTINUED | OUTPATIENT
Start: 2018-07-15 | End: 2018-07-17

## 2018-07-15 RX ORDER — ONDANSETRON 4 MG/1
4 TABLET, ORALLY DISINTEGRATING ORAL ONCE
Status: COMPLETED | OUTPATIENT
Start: 2018-07-15 | End: 2018-07-15

## 2018-07-15 RX ORDER — LORAZEPAM 0.5 MG/1
0.5 TABLET ORAL EVERY 6 HOURS PRN
Status: DISCONTINUED | OUTPATIENT
Start: 2018-07-15 | End: 2018-07-17

## 2018-07-15 RX ORDER — LEVOTHYROXINE SODIUM 0.1 MG/1
100 TABLET ORAL
Status: DISCONTINUED | OUTPATIENT
Start: 2018-07-15 | End: 2018-07-17

## 2018-07-15 RX ORDER — ACETAMINOPHEN 325 MG/1
650 TABLET ORAL EVERY 6 HOURS PRN
Status: DISCONTINUED | OUTPATIENT
Start: 2018-07-15 | End: 2018-07-17

## 2018-07-15 RX ORDER — LEVOFLOXACIN 250 MG/1
500 TABLET ORAL DAILY
Status: DISCONTINUED | OUTPATIENT
Start: 2018-07-15 | End: 2018-07-16

## 2018-07-15 RX ORDER — HYDROCODONE BITARTRATE AND ACETAMINOPHEN 5; 325 MG/1; MG/1
1 TABLET ORAL EVERY 4 HOURS PRN
Status: DISCONTINUED | OUTPATIENT
Start: 2018-07-15 | End: 2018-07-17

## 2018-07-15 RX ORDER — ALLOPURINOL 300 MG/1
300 TABLET ORAL DAILY
Status: DISCONTINUED | OUTPATIENT
Start: 2018-07-15 | End: 2018-07-17

## 2018-07-15 RX ORDER — ASPIRIN 81 MG/1
81 TABLET, CHEWABLE ORAL DAILY
Status: DISCONTINUED | OUTPATIENT
Start: 2018-07-15 | End: 2018-07-17

## 2018-07-15 RX ORDER — HEPARIN SODIUM 5000 [USP'U]/ML
5000 INJECTION, SOLUTION INTRAVENOUS; SUBCUTANEOUS EVERY 8 HOURS SCHEDULED
Status: DISCONTINUED | OUTPATIENT
Start: 2018-07-15 | End: 2018-07-17

## 2018-07-15 RX ORDER — ZOLPIDEM TARTRATE 5 MG/1
2.5 TABLET ORAL NIGHTLY PRN
Status: DISCONTINUED | OUTPATIENT
Start: 2018-07-15 | End: 2018-07-17

## 2018-07-15 NOTE — ED INITIAL ASSESSMENT (HPI)
Pt presents to the ED via EMS for the c/o nausea. Pt was seen here yesterday for the same and has been unable to fill her prescription due to transportation issues.

## 2018-07-15 NOTE — CM/SW NOTE
Called by Joelyn Peabody RN to assist patient with filling rx's - pt was seen here yesterday and dx'd with UTI and was given Cephalexin and Zofran rx's but has not yet got them filled.   Pt lives at home alone and has a caregiver whom pt states is not due unti

## 2018-07-15 NOTE — H&P
[facility]    PATIENT'S NAME: Melissa File   ATTENDING PHYSICIAN: Kirby Nick MD   PATIENT ACCOUNT#:   [id number]    LOCATION:  [address] [place] [address] RECORD #:   [id number]       YOB: 1940  ADMISSION DATE:       07/15 daily.  She still reports taking methadone through Dr. Kylee Ivory, but it is not listed on her home medication profile, so I will consult pain clinic. ALLERGIES:  She has anaphylaxis to Compazine, and her tongue swells.       FAMILY HISTORY:  Father  at both lower extremities. We will place on antibiotics and clindamycin and ask Wound Clinic to see about Tubigrips. Otherwise, patient seems fine. 2.   Chronic nausea, unknown.   Patient to see GI for a scope in a few weeks, and will continue antinausea me

## 2018-07-15 NOTE — ED PROVIDER NOTES
Patient Seen in: [facility] AND [facility] Emergency Department    History   Patient presents with:  Nausea    Stated Complaint: Nausea    HPI    33-year-old female with past medical history significant for anemia, CHF, hypothyroidism, presents emergency departm REMOVAL GALLBLADDER  No date: SPINE SURGERY PROCEDURE UNLISTED  No date: TOTAL HIP REPLACEMENT  No date: TOTAL KNEE REPLACEMENT        Smoking status: Former Smoker                                                              Packs/day: 0.00      Years: 0. LAD   Neurological: Awake, alert. Normal reflexes. No cranial nerve deficit. Skin: Skin is warm and dry. No rash noted. No erythema. Psychiatric:     Nursing note and vitals reviewed.       ED Course     Labs Reviewed   BASIC METABOLIC PANEL (8) - Abno on Admission  Date Reviewed: 7/6/2018          ICD-10-CM Noted POA    Cellulitis of lower extremity L03.119 7/15/2018 Unknown

## 2018-07-15 NOTE — CM/SW NOTE
Patient will be admitted for IV antibiotics and antiemetics for persistent N/V per Dr. Carmen Diggs.  Patient informed of this, updated patients care giver Yuli Calhoun from [facility] as well as left a  for Patient SAIMA Montoya [phone] who is cu

## 2018-07-15 NOTE — TELEPHONE ENCOUNTER
Telephone call form pt. Pt was in ER yesterday. Pt was given a prescription for Keflex and Zofran. Pt is to call her caretaker to come and help her get her meds. Pt will see me later in the week.

## 2018-07-15 NOTE — CM/SW NOTE
Spoke with Maritza Cheng from  [facility]  [phone] who stated she will  patient and assist with getting prescriptions filled for patient, once patient is stable for discharge.  Currently  patient has not been evaluated by ED Physicia

## 2018-07-16 LAB
ALBUMIN SERPL BCP-MCNC: 3.3 G/DL (ref 3.5–4.8)
ALBUMIN/GLOB SERPL: 1.3 {RATIO} (ref 1–2)
ALP SERPL-CCNC: 60 U/L (ref 32–100)
ALT SERPL-CCNC: 19 U/L (ref 14–54)
ANION GAP SERPL CALC-SCNC: 8 MMOL/L (ref 0–18)
AST SERPL-CCNC: 21 U/L (ref 15–41)
BASOPHILS # BLD: 0.1 K/UL (ref 0–0.2)
BASOPHILS NFR BLD: 1 %
BILIRUB SERPL-MCNC: 0.8 MG/DL (ref 0.3–1.2)
BUN SERPL-MCNC: 15 MG/DL (ref 8–20)
BUN/CREAT SERPL: 9 (ref 10–20)
CALCIUM SERPL-MCNC: 9.7 MG/DL (ref 8.5–10.5)
CHLORIDE SERPL-SCNC: 103 MMOL/L (ref 95–110)
CO2 SERPL-SCNC: 28 MMOL/L (ref 22–32)
CREAT SERPL-MCNC: 1.67 MG/DL (ref 0.5–1.5)
EOSINOPHIL # BLD: 0.1 K/UL (ref 0–0.7)
EOSINOPHIL NFR BLD: 2 %
ERYTHROCYTE [DISTWIDTH] IN BLOOD BY AUTOMATED COUNT: 14.7 % (ref 11–15)
GLOBULIN PLAS-MCNC: 2.5 G/DL (ref 2.5–3.7)
GLUCOSE SERPL-MCNC: 116 MG/DL (ref 70–99)
HCT VFR BLD AUTO: 36.3 % (ref 35–48)
HGB BLD-MCNC: 11.9 G/DL (ref 12–16)
LYMPHOCYTES # BLD: 2.1 K/UL (ref 1–4)
LYMPHOCYTES NFR BLD: 27 %
MAGNESIUM SERPL-MCNC: 1.7 MG/DL (ref 1.8–2.5)
MCH RBC QN AUTO: 28.6 PG (ref 27–32)
MCHC RBC AUTO-ENTMCNC: 32.9 G/DL (ref 32–37)
MCV RBC AUTO: 87 FL (ref 80–100)
MONOCYTES # BLD: 0.6 K/UL (ref 0–1)
MONOCYTES NFR BLD: 8 %
NEUTROPHILS # BLD AUTO: 4.9 K/UL (ref 1.8–7.7)
NEUTROPHILS NFR BLD: 63 %
OSMOLALITY UR CALC.SUM OF ELEC: 290 MOSM/KG (ref 275–295)
PLATELET # BLD AUTO: 209 K/UL (ref 140–400)
PMV BLD AUTO: 9 FL (ref 7.4–10.3)
POTASSIUM SERPL-SCNC: 4.2 MMOL/L (ref 3.3–5.1)
PROCALCITONIN SERPL-MCNC: <0.05 NG/ML (ref ?–0.11)
PROT SERPL-MCNC: 5.8 G/DL (ref 5.9–8.4)
RBC # BLD AUTO: 4.17 M/UL (ref 3.7–5.4)
SODIUM SERPL-SCNC: 139 MMOL/L (ref 136–144)
WBC # BLD AUTO: 7.8 K/UL (ref 4–11)

## 2018-07-16 PROCEDURE — 99226 SUBSEQUENT OBSERVATION CARE: CPT | Performed by: HOSPITALIST

## 2018-07-16 RX ORDER — MAGNESIUM OXIDE 400 MG (241.3 MG MAGNESIUM) TABLET
400 TABLET ONCE
Status: COMPLETED | OUTPATIENT
Start: 2018-07-16 | End: 2018-07-16

## 2018-07-16 RX ORDER — 0.9 % SODIUM CHLORIDE 0.9 %
VIAL (ML) INJECTION
Status: COMPLETED
Start: 2018-07-16 | End: 2018-07-16

## 2018-07-16 RX ORDER — SODIUM CHLORIDE 9 MG/ML
INJECTION, SOLUTION INTRAVENOUS CONTINUOUS
Status: DISCONTINUED | OUTPATIENT
Start: 2018-07-16 | End: 2018-07-17

## 2018-07-16 RX ORDER — LEVOFLOXACIN 750 MG/1
750 TABLET ORAL
Status: DISCONTINUED | OUTPATIENT
Start: 2018-07-18 | End: 2018-07-17

## 2018-07-16 RX ORDER — DIPHENHYDRAMINE HYDROCHLORIDE 50 MG/ML
25 INJECTION INTRAMUSCULAR; INTRAVENOUS ONCE
Status: COMPLETED | OUTPATIENT
Start: 2018-07-16 | End: 2018-07-16

## 2018-07-16 NOTE — OCCUPATIONAL THERAPY NOTE
OCCUPATIONAL THERAPY EVALUATION - INPATIENT     Room Number: 548/548-A  Evaluation Date: 7/16/2018  Type of Evaluation: Initial       Physician Order: IP Consult to Occupational Therapy  Reason for Therapy: ADL/IADL Dysfunction and Discharge Planning    OC [facility] therapy services as needed. Continue skilled IP OT to maximize patient function in prep for d/c.     DISCHARGE RECOMMENDATIONS: HH Therapy;  Increased caregiver services      OT Device Recommendations: None    PLAN  OT Treatment Plan: Balance activit SURGERY PROCEDURE UNLISTED  No date: TOTAL HIP REPLACEMENT  No date: TOTAL KNEE REPLACEMENT    HOME SITUATION  Type of Home: Condo  Home Layout: One level  Lives With: Caregiver part-time     Toilet and Equipment: Toilet riser with arms  Shower/Tub and Equ bedpan or urinal? : A Little  -   Putting on and taking off regular upper body clothing?: A Little  -   Taking care of personal grooming such as brushing teeth?: A Little  -   Eating meals?: None    AM-PAC Score:  Score: 18  Approx Degree of Impairment: 46

## 2018-07-16 NOTE — PROGRESS NOTES
[facility]  Report of Consultation    Casey Fajardo Patient Status:  Inpatient    11/3/1940 MRN [id number]   Location [facility] 5SW/SE Attending Diomedes Nick Day # 1 PCP Davida Olivarez MD     Date of Adm Unspecified essential hypertension      Past Surgical History:  No date: BACK SURGERY      Comment: 5 surgeries last one in dec 2011  08/25/2017: CHOLECYSTECTOMY  12/17, 8/17: EGD  11-: ELECTROCARDIOGRAM, COMPLETE      Comment: Scanned to media tab (ZYLOPRIM) tab 300 mg, 300 mg, Oral, Daily  •  AmLODIPine Besylate (NORVASC) tab 5 mg, 5 mg, Oral, Daily  •  aspirin chewable tab 81 mg, 81 mg, Oral, Daily  •  atorvastatin (LIPITOR) tab 40 mg, 40 mg, Oral, Nightly  •  Cholecalciferol (VITAMIN D) 1000 unit discoloration at ankles.       Laboratory Data:    Lab Results  Component Value Date   WBC 7.8 07/16/2018   HGB 11.9 07/16/2018   HCT 36.3 07/16/2018    07/16/2018   CREATSERUM 1.67 07/16/2018   BUN 15 07/16/2018    07/16/2018   K 4.2 07/16/201 PRN.    Thank you for allowing me to participate in the care of your patient. Comprehensive analgesic plan was formulated. Conservative vs. Aggressive measures were discussed at length including pharmacotherapy (eg.  Anti- inflammatories, muscle rela

## 2018-07-16 NOTE — PHYSICAL THERAPY NOTE
PHYSICAL THERAPY EVALUATION - INPATIENT     Room Number: 548/548-A  Evaluation Date: 7/16/2018  Type of Evaluation: Initial   Physician Order: PT Eval and Treat    Presenting Problem: LE cellulitis; UTI  Reason for Therapy: Mobility Dysfunction and Discha does not feel safe to return home and take care of herself. Recommend short sub-acute rehab stay at this time. Will continue to monitor pt's progress w/ therapy in subsequent sessions to see if home w/ home health PT and assist at home is feasible instead. (postoperative nausea and vomiting)    • Renal insufficiency    • Scoliosis    • SEASONAL ALLERGIES    • Skin cancer 03/2018    BCC-mid back   • Type II or unspecified type diabetes mellitus without mention of complication, not stated as uncontrolled    • consistently    RANGE OF MOTION AND STRENGTH ASSESSMENT    Lower extremity strength is within functional limits except for the following:    Right Hip flexion  3+/5  Left Hip flexion  3+/5  Right Knee extension  3+/5  Left Knee extension  3+/5  Right Dorsi of session/findings; All patient questions and concerns addressed; Alarm set    CURRENT GOALS    Goals to be met by: 7/30/18  Patient Goal Patient's self-stated goal is: to go to rehab   Goal #1 Patient is able to demonstrate supine - sit EOB @ level: modifi

## 2018-07-16 NOTE — PLAN OF CARE
Problem: Patient Centered Care  Goal: Patient preferences are identified and integrated in the patient's plan of care  Interventions:  - What would you like us to know as we care for you?  - Provide timely, complete, and accurate information to patient/fam relaxation techniques  - Monitor for opioid side effects  - Notify MD/LIP if interventions unsuccessful or patient reports new pain  - Anticipate increased pain with activity and pre-medicate as appropriate   Outcome: Progressing

## 2018-07-16 NOTE — PLAN OF CARE
Problem: Patient Centered Care  Goal: Patient preferences are identified and integrated in the patient's plan of care  Interventions:  - What would you like us to know as we care for you?  - Provide timely, complete, and accurate information to patient/fam Anticipate increased pain with activity and pre-medicate as appropriate  Outcome: Progressing  Norco for pain prn.     Problem: SAFETY ADULT - FALL  Goal: Free from fall injury  INTERVENTIONS:  - Assess pt frequently for physical needs  - Identify cognitive

## 2018-07-16 NOTE — PROGRESS NOTES
[facility]    Progress Note    Carla Degree Patient Status:  Observation    11/3/1940 MRN [id number]   Location [facility] 5SW/SE Attending Dave Nuñez MD   Hosp Day # 0 PCP Anselmo Saba MD     Subjective:   Zeina Burr 7.8 07/16/2018   HGB 11.9 (L) 07/16/2018   HCT 36.3 07/16/2018    07/16/2018   CREATSERUM 1.67 (H) 07/16/2018   BUN 15 07/16/2018    07/16/2018   K 4.2 07/16/2018    07/16/2018   CO2 28 07/16/2018    (H) 07/16/2018   CA 9.7 07/16/

## 2018-07-16 NOTE — PROGRESS NOTES
[facility] Pharmacy Note:  Renal Adjustment for levofloxacin [facility])    Anna Vaughn is a 78year old female who has been prescribed levofloxacin (LEVAQUIN) 500 mg every 24 hrs.   CrCl is estimated creatinine clearance is 20.9 mL/min (A) (based on SCr of 1.6

## 2018-07-16 NOTE — CM/SW NOTE
SW received MDO for possible SNF & POLST form. SW attempted to meet w/ pt to discuss discharge planning. Pt was currently in the bathroom at this time. SW to follow up when appropriate. Pt is currently Observation status.  PT recommendation is for SNF

## 2018-07-16 NOTE — WOUND PROGRESS NOTE
Pt seen for consult/md torres for Tubigrip compression. Pt seen sitting up in the chair, no wounds noted. Pt was checked for pulses with handheld doppler. Bilateral post tibial and dorsal pedal pulses detected.  Discussed use of tubigrip compression with clyde

## 2018-07-17 VITALS
DIASTOLIC BLOOD PRESSURE: 57 MMHG | OXYGEN SATURATION: 97 % | HEART RATE: 80 BPM | HEIGHT: 60 IN | BODY MASS INDEX: 36 KG/M2 | TEMPERATURE: 98 F | SYSTOLIC BLOOD PRESSURE: 144 MMHG | WEIGHT: 183.38 LBS | RESPIRATION RATE: 18 BRPM

## 2018-07-17 LAB
ANION GAP SERPL CALC-SCNC: 8 MMOL/L (ref 0–18)
BASOPHILS # BLD: 0.1 K/UL (ref 0–0.2)
BASOPHILS NFR BLD: 1 %
BUN SERPL-MCNC: 15 MG/DL (ref 8–20)
BUN/CREAT SERPL: 9 (ref 10–20)
CALCIUM SERPL-MCNC: 9.9 MG/DL (ref 8.5–10.5)
CHLORIDE SERPL-SCNC: 103 MMOL/L (ref 95–110)
CO2 SERPL-SCNC: 27 MMOL/L (ref 22–32)
CREAT SERPL-MCNC: 1.66 MG/DL (ref 0.5–1.5)
EOSINOPHIL # BLD: 0.2 K/UL (ref 0–0.7)
EOSINOPHIL NFR BLD: 3 %
ERYTHROCYTE [DISTWIDTH] IN BLOOD BY AUTOMATED COUNT: 14.7 % (ref 11–15)
GLUCOSE SERPL-MCNC: 127 MG/DL (ref 70–99)
HCT VFR BLD AUTO: 39.8 % (ref 35–48)
HGB BLD-MCNC: 13.1 G/DL (ref 12–16)
LYMPHOCYTES # BLD: 2.2 K/UL (ref 1–4)
LYMPHOCYTES NFR BLD: 27 %
MAGNESIUM SERPL-MCNC: 1.7 MG/DL (ref 1.8–2.5)
MCH RBC QN AUTO: 28.8 PG (ref 27–32)
MCHC RBC AUTO-ENTMCNC: 33 G/DL (ref 32–37)
MCV RBC AUTO: 87.3 FL (ref 80–100)
MONOCYTES # BLD: 0.6 K/UL (ref 0–1)
MONOCYTES NFR BLD: 7 %
NEUTROPHILS # BLD AUTO: 5.2 K/UL (ref 1.8–7.7)
NEUTROPHILS NFR BLD: 63 %
OSMOLALITY UR CALC.SUM OF ELEC: 288 MOSM/KG (ref 275–295)
PHOSPHATE SERPL-MCNC: 3.6 MG/DL (ref 2.4–4.7)
PLATELET # BLD AUTO: 246 K/UL (ref 140–400)
PMV BLD AUTO: 9.3 FL (ref 7.4–10.3)
POTASSIUM SERPL-SCNC: 3.6 MMOL/L (ref 3.3–5.1)
POTASSIUM SERPL-SCNC: 4.1 MMOL/L (ref 3.3–5.1)
RBC # BLD AUTO: 4.56 M/UL (ref 3.7–5.4)
SODIUM SERPL-SCNC: 138 MMOL/L (ref 136–144)
WBC # BLD AUTO: 8.1 K/UL (ref 4–11)

## 2018-07-17 PROCEDURE — 99217 OBSERVATION CARE DISCHARGE: CPT | Performed by: HOSPITALIST

## 2018-07-17 RX ORDER — MAGNESIUM SULFATE HEPTAHYDRATE 40 MG/ML
2 INJECTION, SOLUTION INTRAVENOUS ONCE
Status: DISCONTINUED | OUTPATIENT
Start: 2018-07-17 | End: 2018-07-17

## 2018-07-17 RX ORDER — ONDANSETRON 4 MG/1
4 TABLET, ORALLY DISINTEGRATING ORAL EVERY 8 HOURS PRN
Qty: 15 TABLET | Refills: 0 | Status: ON HOLD | OUTPATIENT
Start: 2018-07-17 | End: 2018-07-24

## 2018-07-17 RX ORDER — POTASSIUM CHLORIDE 20 MEQ/1
40 TABLET, EXTENDED RELEASE ORAL EVERY 4 HOURS
Status: COMPLETED | OUTPATIENT
Start: 2018-07-17 | End: 2018-07-17

## 2018-07-17 RX ORDER — 0.9 % SODIUM CHLORIDE 0.9 %
VIAL (ML) INJECTION
Status: COMPLETED
Start: 2018-07-17 | End: 2018-07-17

## 2018-07-17 RX ORDER — CEFUROXIME AXETIL 500 MG/1
500 TABLET ORAL 2 TIMES DAILY
Qty: 14 TABLET | Refills: 0 | Status: SHIPPED | OUTPATIENT
Start: 2018-07-17 | End: 2018-07-24

## 2018-07-17 RX ORDER — METOCLOPRAMIDE HYDROCHLORIDE 5 MG/ML
5 INJECTION INTRAMUSCULAR; INTRAVENOUS EVERY 8 HOURS PRN
Status: DISCONTINUED | OUTPATIENT
Start: 2018-07-17 | End: 2018-07-17

## 2018-07-17 NOTE — PROGRESS NOTES
[facility]    Progress Note    Mitra Villa Patient Status:  Observation    11/3/1940 MRN [id number]   Location [facility] 5SW/SE Attending Toño Ferrara MD   Hosp Day # 0 PCP Jesus Luther MD     Subjective:   Misa Layton subq    Dispo: discharge planning        Results:       Lab Results  Component Value Date   WBC 8.1 07/17/2018   HGB 13.1 07/17/2018   HCT 39.8 07/17/2018    07/17/2018   CREATSERUM 1.66 (H) 07/17/2018   BUN 15 07/17/2018    07/17/2018   K 3.6

## 2018-07-17 NOTE — CM/SW NOTE
ADELA spoke w/ [facility] Liaison - PMR to [facility] this afternoon. ADELA sent tentative referral over to [facility]. [facility] aware that pt has been to [facility] in the past.    1:45PM: ADELA met w/ pt to discuss discharge planning.  Pt lives at home alone w/ cg who comes in 7 days a week for 4 ho

## 2018-07-17 NOTE — CONSULTS
Seen and examined. Consult dictated.   Recommend acute rehab as pt lives alone, to provide extra rehab services while close monitoring for UTI sx, cellulitis, BP, hypomag, but patient wishes to go home with Home health and increase her caregiver services i

## 2018-07-17 NOTE — HOME CARE LIAISON
Met with pt at the bedside. She is agreeable to [facility] services at d/c. Brochure and liaison card provided. Any pt questions addressed. Will follow.

## 2018-07-17 NOTE — PLAN OF CARE
Problem: Patient Centered Care  Goal: Patient preferences are identified and integrated in the patient's plan of care  Interventions:  - What would you like us to know as we care for you?  - Provide timely, complete, and accurate information to patient/fam SAFETY ADULT - FALL  Goal: Free from fall injury  INTERVENTIONS:  - Assess pt frequently for physical needs  - Identify cognitive and physical deficits and behaviors that affect risk of falls. - [place] fall precautions as indicated by assessment.   - Ed

## 2018-07-17 NOTE — PROGRESS NOTES
[facility] Pharmacy Note:  Renal Dose Adjustment for Metoclopramide (REGLAN)    Anna Vaughn has been prescribed Metoclopramide (REGLAN) 10 mg every 8 hours as needed for n/v.    Estimated Creatinine Clearance: 21 mL/min (A) (based on SCr of 1.66 mg/dL (H)).

## 2018-07-18 ENCOUNTER — PATIENT OUTREACH (OUTPATIENT)
Dept: CASE MANAGEMENT | Age: 78
End: 2018-07-18

## 2018-07-18 ENCOUNTER — TELEPHONE (OUTPATIENT)
Dept: INTERNAL MEDICINE CLINIC | Facility: CLINIC | Age: 78
End: 2018-07-18

## 2018-07-18 RX ORDER — ZOLPIDEM TARTRATE 5 MG/1
5 TABLET ORAL NIGHTLY PRN
Qty: 30 TABLET | Refills: 1 | OUTPATIENT
Start: 2018-07-18 | End: 2018-08-15 | Stop reason: ALTCHOICE

## 2018-07-18 NOTE — PROGRESS NOTES
S/w patient, HIPAA verified. She stated that a Rita Sires would call back to schedule the appt. She did not want to answer any questions and seemed rushed on the phone.  Instructed her to contact office should there be any issues or questions, provided phone

## 2018-07-18 NOTE — CONSULTS
[facility]    PATIENT'S NAME: Mable Smith PHYSICIAN: Alexandr Tsang MD   CONSULTING PHYSICIAN: Geni Arevalo MD   PATIENT ACCOUNT#:   [id number]    LOCATION:  [facility] [place] [facility] #:   [id number]       DATE OF BIRTH: [place]. FAMILY HISTORY:  Father  at age [de-identified] of heart disease. Mother  at age 76 of heart disease. SOCIAL HISTORY:  She smoked 1 pack per day; she quit 1970. She drinks an occasional glass of wine.   She does not do illi mobility dysfunction secondary to urinary tract infection. 2.   Hypomagnesemia, resolved. 3.   Acute on chronic kidney disease, stable. 4.   Lower extremity cellulitis. 5.   Escherichia coli urinary tract infection.   6.   Obesity, BMI 31.25.  7.

## 2018-07-18 NOTE — TELEPHONE ENCOUNTER
Noted.  Discussed with patient. She has recently returned home from the hospital.  She is having difficulty sleeping. She requests a sleeping pill. I will put her on 5 mg orally nightly #30. One refill.   Will forward this message to nursing to call to

## 2018-07-18 NOTE — TELEPHONE ENCOUNTER
Pt is calling looking to see if Dr TOTH can prescribe a sleep medication. Pt is having MAJOR trouble sleeping, and hasn't gotten any rest in a while. Best call back for the pt is [phone]. Tasked to nursing.      Holden Polanoc (owner of the care agency with pt)

## 2018-07-19 ENCOUNTER — TELEPHONE (OUTPATIENT)
Dept: INTERNAL MEDICINE CLINIC | Facility: CLINIC | Age: 78
End: 2018-07-19

## 2018-07-19 RX ORDER — ONDANSETRON 4 MG/1
4 TABLET, FILM COATED ORAL 2 TIMES DAILY PRN
Qty: 20 TABLET | Refills: 1 | Status: SHIPPED | OUTPATIENT
Start: 2018-07-19 | End: 2018-08-06

## 2018-07-19 NOTE — TELEPHONE ENCOUNTER
Pt was seen yesterday for [address] will send orders for Dr Andrea Argue refusing OT, will receive nursing and PT at this time  Pt has chronic nausea, taking Zofran every 8 hours, pt was only given 12 at discharge, can pt receive refi

## 2018-07-19 NOTE — DISCHARGE SUMMARY
[facility]    PATIENT'S NAME: Piter Degroot   ATTENDING PHYSICIAN: Shara Heaton MD   PATIENT ACCOUNT#:   [id number]    LOCATION:  [address] [place] [address] Drive #:   [id number]       YOB: 1940  ADMISSION DATE:       07/15/ improved at this time. She was deemed stable to be discharged home with home health. For details regarding the patient's hospitalization, please refer to patient's chart.     PHYSICAL EXAMINATION:    GENERAL:  The patient is lying in bed, appears to be in

## 2018-07-19 NOTE — TELEPHONE ENCOUNTER
Noted.  I called patient's visiting nurse, Safia Peña, and left a message for her. I then called the patient. Patient notes that she slept well/better last night with the Ambien that I prescribed for her. She still has 11 Zofran tablets available.   I have t

## 2018-07-21 ENCOUNTER — HOSPITAL ENCOUNTER (OUTPATIENT)
Facility: HOSPITAL | Age: 78
Setting detail: OBSERVATION
LOS: 1 days | Discharge: HOME | End: 2018-07-24
Attending: EMERGENCY MEDICINE | Admitting: HOSPITALIST
Payer: MEDICARE

## 2018-07-21 ENCOUNTER — HOSPITAL (OUTPATIENT)
Dept: OTHER | Age: 78
End: 2018-07-21
Attending: EMERGENCY MEDICINE

## 2018-07-21 DIAGNOSIS — R45.851 SUICIDAL IDEATION: ICD-10-CM

## 2018-07-21 DIAGNOSIS — G89.4 CHRONIC PAIN SYNDROME: Primary | ICD-10-CM

## 2018-07-21 PROBLEM — M54.9 BACK PAIN: Status: ACTIVE | Noted: 2018-07-21

## 2018-07-21 LAB
ALBUMIN SERPL BCP-MCNC: 3.8 G/DL (ref 3.5–4.8)
ALBUMIN/GLOB SERPL: 1.5 {RATIO} (ref 1–2)
ALP SERPL-CCNC: 55 U/L (ref 32–100)
ALT SERPL-CCNC: 21 U/L (ref 14–54)
ANALYZER ANC (IANC): NORMAL
ANION GAP SERPL CALC-SCNC: 14 MMOL/L (ref 10–20)
ANION GAP SERPL CALC-SCNC: 8 MMOL/L (ref 0–18)
APAP SERPL-MCNC: <10 MCG/ML (ref 10–30)
AST SERPL-CCNC: 18 U/L (ref 15–41)
BACTERIA UR QL AUTO: NEGATIVE /HPF
BASOPHILS # BLD: 0 THOUSAND/MCL (ref 0–0.3)
BASOPHILS # BLD: 0.1 K/UL (ref 0–0.2)
BASOPHILS NFR BLD: 0 %
BASOPHILS NFR BLD: 1 %
BILIRUB SERPL-MCNC: 0.9 MG/DL (ref 0.3–1.2)
BILIRUB UR QL: NEGATIVE
BUN SERPL-MCNC: 32 MG/DL (ref 8–20)
BUN SERPL-MCNC: 37 MG/DL (ref 6–20)
BUN/CREAT SERPL: 20.1 (ref 10–20)
BUN/CREAT SERPL: 24 (ref 7–25)
CALCIUM SERPL-MCNC: 9.7 MG/DL (ref 8.4–10.2)
CALCIUM SERPL-MCNC: 9.9 MG/DL (ref 8.5–10.5)
CHLORIDE SERPL-SCNC: 105 MMOL/L (ref 95–110)
CHLORIDE: 102 MMOL/L (ref 98–107)
CLARITY UR: CLEAR
CO2 SERPL-SCNC: 24 MMOL/L (ref 22–32)
CO2 SERPL-SCNC: 26 MMOL/L (ref 21–32)
COLOR UR: YELLOW
CREAT SERPL-MCNC: 1.53 MG/DL (ref 0.51–0.95)
CREAT SERPL-MCNC: 1.59 MG/DL (ref 0.5–1.5)
DIFFERENTIAL METHOD BLD: NORMAL
EOSINOPHIL # BLD: 0.1 K/UL (ref 0–0.7)
EOSINOPHIL # BLD: 0.2 THOUSAND/MCL (ref 0.1–0.5)
EOSINOPHIL NFR BLD: 1 %
EOSINOPHIL NFR BLD: 2 %
ERYTHROCYTE [DISTWIDTH] IN BLOOD BY AUTOMATED COUNT: 14.5 % (ref 11–15)
ERYTHROCYTE [DISTWIDTH] IN BLOOD: 14 % (ref 11–15)
ERYTHROCYTE [SEDIMENTATION RATE] IN BLOOD BY WESTERGREN METHOD: 32 MM/HR (ref 0–20)
ETHANOL SERPL-MCNC: 2 MG/DL
GLOBULIN PLAS-MCNC: 2.5 G/DL (ref 2.5–3.7)
GLUCOSE BLDC GLUCOMTR-MCNC: 164 MG/DL (ref 70–99)
GLUCOSE SERPL-MCNC: 108 MG/DL (ref 65–99)
GLUCOSE SERPL-MCNC: 99 MG/DL (ref 70–99)
GLUCOSE UR-MCNC: NEGATIVE MG/DL
HCT VFR BLD AUTO: 36.5 % (ref 35–48)
HEMATOCRIT: 37.8 % (ref 36–46.5)
HGB BLD-MCNC: 11.6 G/DL (ref 12–16)
HGB BLD-MCNC: 12.7 GM/DL (ref 12–15.5)
HGB UR QL STRIP.AUTO: NEGATIVE
HYALINE CASTS #/AREA URNS AUTO: 3 /LPF
KETONES UR-MCNC: NEGATIVE MG/DL
LYMPHOCYTES # BLD: 1.4 K/UL (ref 1–4)
LYMPHOCYTES # BLD: 2.3 THOUSAND/MCL (ref 1–4)
LYMPHOCYTES NFR BLD: 20 %
LYMPHOCYTES NFR BLD: 25 %
MAGNESIUM SERPL-MCNC: 2.1 MG/DL (ref 1.7–2.4)
MCH RBC QN AUTO: 28.2 PG (ref 27–32)
MCH RBC QN AUTO: 28.7 PG (ref 26–34)
MCHC RBC AUTO-ENTMCNC: 31.8 G/DL (ref 32–37)
MCHC RBC AUTO-ENTMCNC: 33.6 GM/DL (ref 32–36.5)
MCV RBC AUTO: 85.5 FL (ref 78–100)
MCV RBC AUTO: 88.7 FL (ref 80–100)
METHADONE UR QL SCN: DETECTED
MONOCYTES # BLD: 0.5 K/UL (ref 0–1)
MONOCYTES # BLD: 0.7 THOUSAND/MCL (ref 0.3–0.9)
MONOCYTES NFR BLD: 7 %
MONOCYTES NFR BLD: 8 %
NEUTROPHILS # BLD AUTO: 5.1 K/UL (ref 1.8–7.7)
NEUTROPHILS # BLD: 6.1 THOUSAND/MCL (ref 1.8–7.7)
NEUTROPHILS NFR BLD: 65 %
NEUTROPHILS NFR BLD: 72 %
NEUTS SEG NFR BLD: NORMAL %
NITRITE UR QL STRIP.AUTO: NEGATIVE
NRBC (NRBCRE): NORMAL
OPIATES UR QL SCN: DETECTED
OSMOLALITY UR CALC.SUM OF ELEC: 291 MOSM/KG (ref 275–295)
PH UR: 5 [PH] (ref 5–8)
PLATELET # BLD AUTO: 212 K/UL (ref 140–400)
PLATELET # BLD: 252 THOUSAND/MCL (ref 140–450)
PMV BLD AUTO: 8.7 FL (ref 7.4–10.3)
POTASSIUM SERPL-SCNC: 4 MMOL/L (ref 3.4–5.1)
POTASSIUM SERPL-SCNC: 4.2 MMOL/L (ref 3.3–5.1)
PROT SERPL-MCNC: 6.3 G/DL (ref 5.9–8.4)
PROT UR-MCNC: 30 MG/DL
RBC # BLD AUTO: 4.11 M/UL (ref 3.7–5.4)
RBC # BLD: 4.42 MILLION/MCL (ref 4–5.2)
RBC #/AREA URNS AUTO: 1 /HPF
SALICYLATES SERPL-MCNC: <4 MG/DL (ref 2–29)
SODIUM SERPL-SCNC: 137 MMOL/L (ref 136–144)
SODIUM SERPL-SCNC: 138 MMOL/L (ref 135–145)
SP GR UR STRIP: 1.02 (ref 1–1.03)
UROBILINOGEN UR STRIP-ACNC: <2
VIT C UR-MCNC: NEGATIVE MG/DL
WBC # BLD AUTO: 7.1 K/UL (ref 4–11)
WBC # BLD: 9.3 THOUSAND/MCL (ref 4.2–11)
WBC #/AREA URNS AUTO: 39 /HPF

## 2018-07-21 PROCEDURE — 99220 INITIAL OBSERVATION CARE,LEVL III: CPT | Performed by: HOSPITALIST

## 2018-07-21 RX ORDER — ONDANSETRON 2 MG/ML
4 INJECTION INTRAMUSCULAR; INTRAVENOUS EVERY 6 HOURS PRN
Status: DISCONTINUED | OUTPATIENT
Start: 2018-07-21 | End: 2018-07-24

## 2018-07-21 RX ORDER — LISINOPRIL 10 MG/1
10 TABLET ORAL EVERY MORNING
Status: DISCONTINUED | OUTPATIENT
Start: 2018-07-22 | End: 2018-07-24

## 2018-07-21 RX ORDER — ZOLPIDEM TARTRATE 5 MG/1
5 TABLET ORAL NIGHTLY PRN
Status: DISCONTINUED | OUTPATIENT
Start: 2018-07-21 | End: 2018-07-24

## 2018-07-21 RX ORDER — DEXTROSE MONOHYDRATE 25 G/50ML
50 INJECTION, SOLUTION INTRAVENOUS AS NEEDED
Status: DISCONTINUED | OUTPATIENT
Start: 2018-07-21 | End: 2018-07-24

## 2018-07-21 RX ORDER — DOCUSATE SODIUM 100 MG/1
100 CAPSULE, LIQUID FILLED ORAL
Status: DISCONTINUED | OUTPATIENT
Start: 2018-07-21 | End: 2018-07-24

## 2018-07-21 RX ORDER — ACETAMINOPHEN 325 MG/1
650 TABLET ORAL EVERY 4 HOURS PRN
Status: DISCONTINUED | OUTPATIENT
Start: 2018-07-21 | End: 2018-07-24

## 2018-07-21 RX ORDER — CEFUROXIME AXETIL 500 MG/1
500 TABLET ORAL EVERY 24 HOURS
Status: DISCONTINUED | OUTPATIENT
Start: 2018-07-22 | End: 2018-07-24

## 2018-07-21 RX ORDER — HYDROCODONE BITARTRATE AND ACETAMINOPHEN 5; 325 MG/1; MG/1
1 TABLET ORAL EVERY 4 HOURS PRN
Status: DISCONTINUED | OUTPATIENT
Start: 2018-07-21 | End: 2018-07-24

## 2018-07-21 RX ORDER — PANTOPRAZOLE SODIUM 20 MG/1
20 TABLET, DELAYED RELEASE ORAL
Status: DISCONTINUED | OUTPATIENT
Start: 2018-07-22 | End: 2018-07-24

## 2018-07-21 RX ORDER — FUROSEMIDE 20 MG/1
20 TABLET ORAL
Status: DISCONTINUED | OUTPATIENT
Start: 2018-07-22 | End: 2018-07-24

## 2018-07-21 RX ORDER — POLYETHYLENE GLYCOL 3350 17 G/17G
17 POWDER, FOR SOLUTION ORAL
Status: DISCONTINUED | OUTPATIENT
Start: 2018-07-21 | End: 2018-07-24

## 2018-07-21 RX ORDER — ASPIRIN 81 MG/1
81 TABLET, CHEWABLE ORAL DAILY
Status: DISCONTINUED | OUTPATIENT
Start: 2018-07-22 | End: 2018-07-24

## 2018-07-21 RX ORDER — SODIUM CHLORIDE 0.9 % (FLUSH) 0.9 %
3 SYRINGE (ML) INJECTION AS NEEDED
Status: DISCONTINUED | OUTPATIENT
Start: 2018-07-21 | End: 2018-07-24

## 2018-07-21 RX ORDER — AMLODIPINE BESYLATE 5 MG/1
5 TABLET ORAL DAILY
Status: DISCONTINUED | OUTPATIENT
Start: 2018-07-22 | End: 2018-07-24

## 2018-07-21 RX ORDER — ATORVASTATIN CALCIUM 40 MG/1
40 TABLET, FILM COATED ORAL NIGHTLY
Status: DISCONTINUED | OUTPATIENT
Start: 2018-07-21 | End: 2018-07-24

## 2018-07-21 RX ORDER — ACETAMINOPHEN 325 MG/1
650 TABLET ORAL EVERY 6 HOURS PRN
Status: DISCONTINUED | OUTPATIENT
Start: 2018-07-21 | End: 2018-07-24

## 2018-07-21 RX ORDER — HEPARIN SODIUM 5000 [USP'U]/ML
5000 INJECTION, SOLUTION INTRAVENOUS; SUBCUTANEOUS EVERY 12 HOURS SCHEDULED
Status: DISCONTINUED | OUTPATIENT
Start: 2018-07-21 | End: 2018-07-24

## 2018-07-21 RX ORDER — MORPHINE SULFATE 4 MG/ML
4 INJECTION, SOLUTION INTRAMUSCULAR; INTRAVENOUS ONCE
Status: COMPLETED | OUTPATIENT
Start: 2018-07-21 | End: 2018-07-21

## 2018-07-21 RX ORDER — HYDROCODONE BITARTRATE AND ACETAMINOPHEN 5; 325 MG/1; MG/1
1 TABLET ORAL ONCE
Status: COMPLETED | OUTPATIENT
Start: 2018-07-21 | End: 2018-07-21

## 2018-07-21 RX ORDER — HYDROCODONE BITARTRATE AND ACETAMINOPHEN 5; 325 MG/1; MG/1
2 TABLET ORAL EVERY 4 HOURS PRN
Status: DISCONTINUED | OUTPATIENT
Start: 2018-07-21 | End: 2018-07-24

## 2018-07-21 RX ORDER — ALLOPURINOL 300 MG/1
300 TABLET ORAL DAILY
Status: DISCONTINUED | OUTPATIENT
Start: 2018-07-22 | End: 2018-07-24

## 2018-07-21 RX ORDER — LEVOTHYROXINE SODIUM 0.1 MG/1
100 TABLET ORAL
Status: DISCONTINUED | OUTPATIENT
Start: 2018-07-22 | End: 2018-07-24

## 2018-07-21 NOTE — ED NOTES
Pt to ER with owner of home care agency, Franny Houston. Pt care called in per Dr. Peyman Aguilar to Dr. Kianna Hanson. Concern is pt is drug seeking and with SI. Clothing removed, security called and belongings secured.  Care endorsed to  [address] to room to

## 2018-07-21 NOTE — ED INITIAL ASSESSMENT (HPI)
Patient here with POA for psych eval. Patient has been seeking drugs in multiple emergency departments. Patient recently came off methadone. Possible going through withdrawls per POA.  Patient told her boyfriend and POA that she was having suicidal ideation

## 2018-07-21 NOTE — H&P
[facility]    PATIENT'S NAME: Jenny Kristian   ATTENDING PHYSICIAN: Kurtis Oneill MD   PATIENT ACCOUNT#:   [id number]    LOCATION:  [place] [address]  MEDICAL RECORD #:   [id number]       YOB: 1940  ADMISSION DATE:       07/21/2 living; mobility is limited. No alcohol or drug use. No tobacco use. REVIEW OF SYSTEMS:  Intractable pain in the lumbar area, radiating to both buttocks and legs, has been chronic. The patient was seen by the pain service on July 6, 2018.   She used t Recent urinary tract infection, currently on antibiotics. 3.   Diet-controlled diabetes mellitus type 2.  4.   Suicidal expressions earlier for home health nurse. Currently, the patient denies any active suicidal thoughts or behavior.     The patient ronny

## 2018-07-21 NOTE — ED PROVIDER NOTES
Patient Seen in: [facility] Emergency Department    History   Patient presents with:  Eval-P (psychiatric)    Stated Complaint: Behavior Problem    HPI    78year old female with multiple medical problems including anemia, back pain, CHF, hyperten hyperlipidemia    • PONV (postoperative nausea and vomiting)    • Renal insufficiency    • Scoliosis    • SEASONAL ALLERGIES    • Skin cancer 03/2018    BCC-mid back   • Type II or unspecified type diabetes mellitus without mention of complication, not sta light.   Neck: Normal range of motion and full passive range of motion without pain. Neck supple. No neck rigidity. Normal range of motion present. Cardiovascular: Normal rate, regular rhythm, normal heart sounds and intact distal pulses.     No murmur he components within normal limits   CBC W/ DIFFERENTIAL - Abnormal; Notable for the following:     HGB 11.6 (*)     MCHC 31.8 (*)     All other components within normal limits   ETHYL ALCOHOL - Normal   SALICYLATE, SERUM - Normal   CBC WITH DIFFERENTIAL WITH 7/21/2018 Unknown

## 2018-07-21 NOTE — ED NOTES
Patient from home alone, states she has caregivers that come in and help her with cleaning and ADLs. States she has been in 2 different hospitals in the last few days because she is \"trying to get more pills. \" Was taking Methadone and is now taking Norco

## 2018-07-22 LAB
ANION GAP SERPL CALC-SCNC: 6 MMOL/L (ref 0–18)
BASOPHILS # BLD: 0.1 K/UL (ref 0–0.2)
BASOPHILS NFR BLD: 1 %
BUN SERPL-MCNC: 27 MG/DL (ref 8–20)
BUN/CREAT SERPL: 17.4 (ref 10–20)
CALCIUM SERPL-MCNC: 9.9 MG/DL (ref 8.5–10.5)
CHLORIDE SERPL-SCNC: 107 MMOL/L (ref 95–110)
CO2 SERPL-SCNC: 28 MMOL/L (ref 22–32)
CREAT SERPL-MCNC: 1.55 MG/DL (ref 0.5–1.5)
EOSINOPHIL # BLD: 0.2 K/UL (ref 0–0.7)
EOSINOPHIL NFR BLD: 3 %
ERYTHROCYTE [DISTWIDTH] IN BLOOD BY AUTOMATED COUNT: 14.6 % (ref 11–15)
GLUCOSE BLDC GLUCOMTR-MCNC: 103 MG/DL (ref 70–99)
GLUCOSE BLDC GLUCOMTR-MCNC: 138 MG/DL (ref 70–99)
GLUCOSE BLDC GLUCOMTR-MCNC: 201 MG/DL (ref 70–99)
GLUCOSE BLDC GLUCOMTR-MCNC: 98 MG/DL (ref 70–99)
GLUCOSE SERPL-MCNC: 107 MG/DL (ref 70–99)
HBA1C MFR BLD: 6.3 % (ref 4–6)
HCT VFR BLD AUTO: 33.4 % (ref 35–48)
HGB BLD-MCNC: 10.8 G/DL (ref 12–16)
LYMPHOCYTES # BLD: 2.1 K/UL (ref 1–4)
LYMPHOCYTES NFR BLD: 34 %
MCH RBC QN AUTO: 28.8 PG (ref 27–32)
MCHC RBC AUTO-ENTMCNC: 32.3 G/DL (ref 32–37)
MCV RBC AUTO: 89.2 FL (ref 80–100)
MONOCYTES # BLD: 0.5 K/UL (ref 0–1)
MONOCYTES NFR BLD: 9 %
NEUTROPHILS # BLD AUTO: 3.3 K/UL (ref 1.8–7.7)
NEUTROPHILS NFR BLD: 53 %
OSMOLALITY UR CALC.SUM OF ELEC: 298 MOSM/KG (ref 275–295)
PLATELET # BLD AUTO: 200 K/UL (ref 140–400)
PMV BLD AUTO: 9.2 FL (ref 7.4–10.3)
POTASSIUM SERPL-SCNC: 4.6 MMOL/L (ref 3.3–5.1)
RBC # BLD AUTO: 3.74 M/UL (ref 3.7–5.4)
SODIUM SERPL-SCNC: 141 MMOL/L (ref 136–144)
WBC # BLD AUTO: 6.2 K/UL (ref 4–11)

## 2018-07-22 PROCEDURE — 99225 SUBSEQUENT OBSERVATION CARE: CPT | Performed by: HOSPITALIST

## 2018-07-22 RX ORDER — GABAPENTIN 100 MG/1
100 CAPSULE ORAL 4 TIMES DAILY
Status: DISCONTINUED | OUTPATIENT
Start: 2018-07-22 | End: 2018-07-24

## 2018-07-22 RX ORDER — ALPRAZOLAM 0.25 MG/1
0.25 TABLET ORAL 2 TIMES DAILY PRN
Status: DISCONTINUED | OUTPATIENT
Start: 2018-07-22 | End: 2018-07-24

## 2018-07-22 RX ORDER — DULOXETIN HYDROCHLORIDE 30 MG/1
30 CAPSULE, DELAYED RELEASE ORAL NIGHTLY
Status: DISCONTINUED | OUTPATIENT
Start: 2018-07-22 | End: 2018-07-24

## 2018-07-22 NOTE — PLAN OF CARE
ANXIETY    • Will report anxiety at manageable levels Progressing        PAIN - ADULT    • Verbalizes/displays adequate comfort level or patient's stated pain goal Progressing        Patient Centered Care    • Patient preferences are identified and Aracelis Gun

## 2018-07-22 NOTE — BH PROGRESS NOTE
Addendum:     Pt is not actively suicidal.  P & C and sitter could be discontinued. She does not require an involuntary psychiatric hospitalization.

## 2018-07-22 NOTE — PLAN OF CARE
Problem: Patient/Family Goals  Goal: Patient/Family Long Term Goal  Patient's Long Term Goal: \"To be able to sleep.  My feet are driving me nuts\"     Interventions:  - Administer pain medications as ordered  - Administer medication for insomnia  - Psychia using appropriate pain scale  - Administer analgesics based on type and severity of pain and evaluate response  - Implement non-pharmacological measures as appropriate and evaluate response  - Consider cultural and social influences on pain and pain manage

## 2018-07-22 NOTE — RESPIRATORY THERAPY NOTE
MISTY ASSESSMENT:    Pt does not have a previous diagnosis of MISTY. Pt does not routinely use a CPAP device at home per pt.

## 2018-07-22 NOTE — CM/SW NOTE
7/22/18 CM Discharge planning /  MDO for POLST   Pt currently has sitter, P&C'd, psych consult pending. POLST conversation to be deferred at this time. Pt is current with [facility].    Harmony Lies  [phone]

## 2018-07-22 NOTE — PROGRESS NOTES
[facility]    Progress Note    Zain Mendoza Patient Status:  Inpatient    11/3/1940 MRN [id number]   Location [facility] 5SW/SE Attending Shila Gonzalez MD   Hosp Day # 1 PCP Will Snell MD       Subjective:    Donnie Baer Nightly   • Cefuroxime Axetil  500 mg Oral Q24H   • Cholecalciferol  1,000 Units Oral Daily   • furosemide  20 mg Oral Daily   • Levothyroxine Sodium  100 mcg Oral Before breakfast   • lisinopril  10 mg Oral QAM   • metoprolol Tartrate  12.5 mg Oral 2x Rose Mickeyf K  4.2  3.6  4.1  4.2  4.6   CL  103  103   --   105  107   CO2  28  27   --   24  28   ALKPHO  60   --    --   55   --    AST  21   --    --   18   --    ALT  19   --    --   21   --    BILT  0.8   --    --   0.9   --    TP  5.8*   --    --   6.3   --

## 2018-07-22 NOTE — PROGRESS NOTES
Sitter dc'd per orders, no longer P&C's. Patient expressed that she is \"not happy\" with Dr. Patricia Bobby decision about not needing a sitter and for starting new medications.  Discussed that patient originally had a sitter for safety reasons/SI and that

## 2018-07-22 NOTE — BH PROGRESS NOTE
Addendum:           Her RN asked me to talk to the pt again. She really wanted the sitter back, so she could have companionship. The pt told me that the reason that she came to the hospital was for companionship.   She said that her caregiver had told h

## 2018-07-22 NOTE — CHRONIC PAIN
[facility]  Report of Consultation    Suhail  Patient Status:  Inpatient    11/3/1940 MRN [id number]   Location [facility] 5SW/SE Attending Addy Hummel MD   Hosp Day # 1 PCP Yue Gibbons MD     Date of Admission BCC-mid back   • Type II or unspecified type diabetes mellitus without mention of complication, not stated as uncontrolled    • Unspecified essential hypertension      Past Surgical History:  No date: BACK SURGERY      Comment: 5 surgeries last one in d PRN  •  Insulin Aspart Pen (NOVOLOG) 100 UNIT/ML flexpen 1-7 Units, 1-7 Units, Subcutaneous, TID CC  •  allopurinol (ZYLOPRIM) tab 300 mg, 300 mg, Oral, Daily  •  AmLODIPine Besylate (NORVASC) tab 5 mg, 5 mg, Oral, Daily  •  aspirin chewable tab 81 mg, 81 Hypercholesteremia     Osteoarthritis     DM (diabetes mellitus) (HCC)     Fatigue     Venous insufficiency     Sleep apnea     Urban's esophagus     History of back surgery     Abnormal EKG     Hypokalemia     Fibromyalgia     Trigger finger, right ring steroids, analgesics), injections, and further testing. Risks and benefits of all options were discussed at length to patients satisfaction during a comprehensive interactive discussion.  All questions were answered during extended questions and answer sess

## 2018-07-22 NOTE — PROGRESS NOTES
[facility] Pharmacy Note:  Renal Adjustment for Cefuroxime     David Dwyer is a 78year old female who has been prescribed Cefuroxime  500 mg every 12 hrs. CrCl is estimated creatinine clearance is 22 mL/min (A) (based on SCr of 1.59 mg/dL (H)).  so the dose

## 2018-07-23 LAB
ANION GAP SERPL CALC-SCNC: 6 MMOL/L (ref 0–18)
BASOPHILS # BLD: 0.1 K/UL (ref 0–0.2)
BASOPHILS NFR BLD: 1 %
BUN SERPL-MCNC: 28 MG/DL (ref 8–20)
BUN/CREAT SERPL: 20.1 (ref 10–20)
CALCIUM SERPL-MCNC: 9.4 MG/DL (ref 8.5–10.5)
CHLORIDE SERPL-SCNC: 102 MMOL/L (ref 95–110)
CO2 SERPL-SCNC: 25 MMOL/L (ref 22–32)
CREAT SERPL-MCNC: 1.39 MG/DL (ref 0.5–1.5)
EOSINOPHIL # BLD: 0.2 K/UL (ref 0–0.7)
EOSINOPHIL NFR BLD: 3 %
ERYTHROCYTE [DISTWIDTH] IN BLOOD BY AUTOMATED COUNT: 14.5 % (ref 11–15)
GLUCOSE BLDC GLUCOMTR-MCNC: 104 MG/DL (ref 70–99)
GLUCOSE BLDC GLUCOMTR-MCNC: 116 MG/DL (ref 70–99)
GLUCOSE BLDC GLUCOMTR-MCNC: 117 MG/DL (ref 70–99)
GLUCOSE BLDC GLUCOMTR-MCNC: 141 MG/DL (ref 70–99)
GLUCOSE SERPL-MCNC: 111 MG/DL (ref 70–99)
HCT VFR BLD AUTO: 32.8 % (ref 35–48)
HGB BLD-MCNC: 10.7 G/DL (ref 12–16)
LYMPHOCYTES # BLD: 2.2 K/UL (ref 1–4)
LYMPHOCYTES NFR BLD: 31 %
MCH RBC QN AUTO: 28.5 PG (ref 27–32)
MCHC RBC AUTO-ENTMCNC: 32.8 G/DL (ref 32–37)
MCV RBC AUTO: 87 FL (ref 80–100)
MONOCYTES # BLD: 0.5 K/UL (ref 0–1)
MONOCYTES NFR BLD: 7 %
NEUTROPHILS # BLD AUTO: 4.2 K/UL (ref 1.8–7.7)
NEUTROPHILS NFR BLD: 58 %
OSMOLALITY UR CALC.SUM OF ELEC: 282 MOSM/KG (ref 275–295)
PLATELET # BLD AUTO: 205 K/UL (ref 140–400)
PMV BLD AUTO: 9.4 FL (ref 7.4–10.3)
POTASSIUM SERPL-SCNC: 4.3 MMOL/L (ref 3.3–5.1)
RBC # BLD AUTO: 3.76 M/UL (ref 3.7–5.4)
SODIUM SERPL-SCNC: 133 MMOL/L (ref 136–144)
WBC # BLD AUTO: 7.3 K/UL (ref 4–11)

## 2018-07-23 PROCEDURE — 99226 SUBSEQUENT OBSERVATION CARE: CPT | Performed by: HOSPITALIST

## 2018-07-23 NOTE — CONSULTS
[facility]    PATIENT'S NAME: June Bernal   ATTENDING PHYSICIAN: Kevin Heller MD   CONSULTING PHYSICIAN: Merly Avery MD   PATIENT ACCOUNT#:   [id number]    LOCATION:  [address] [place] [address] #:   [id number]       DATE OF has trouble falling asleep because of the pain. The Ambien has helped her sleep through the night, although she wakes a few times briefly. She says that her energy level during the day is so-so. She does not nap, because she cannot relax her legs.   She STATUS EXAMINATION:  Patient is a white female, lying in her hospital bed, in hospital gown, with speech of normal rate, volume, and good eye contact. Affect is minimally depressed. Mood is controlled and slightly depressed.   She denies auditory or visua her anxiety, but may or may not be helpful in controlling her pain as well. Patient is mainly interested in getting relief of her muscle cramping in her legs and her arms.     Because the patient also seems lonely, and said that one of the reasons she marley

## 2018-07-23 NOTE — CHRONIC PAIN
[facility]  Anesthesiology Pain Management Progress Note      Patient name:  Daniel Shepard 78year old female  : 11/3/1940  MRN: [id number]    Diagnosis:  Chronic pain syndrome  (primary encounter diagnosis)  Suicidal ideation    Reason Q24H  •  Cholecalciferol (VITAMIN D) 1000 units tab 1,000 Units, 1,000 Units, Oral, Daily  •  docusate sodium (COLACE) cap 100 mg, 100 mg, Oral, Daily PRN  •  furosemide (LASIX) tab 20 mg, 20 mg, Oral, Daily  •  Levothyroxine Sodium (SYNTHROID) tab 100 mcg 07/23/2018    (L) 07/23/2018   K 4.3 07/23/2018    07/23/2018   CO2 25 07/23/2018    (H) 07/23/2018   CA 9.4 07/23/2018   ALB 3.8 07/21/2018   ALKPHO 55 07/21/2018   BILT 0.9 07/21/2018   TP 6.3 07/21/2018   AST 18 07/21/2018   ALT 21 07

## 2018-07-23 NOTE — BH PROGRESS NOTE
Behavioral Health Note  CHIEF COMPLAINT  Intractable back pain    REASON FOR ADMISSION  Intractable back pain    SOCIAL HISTORY  The patient lives alone, is retired and . She has no children and previously worked in finance.  The patient does not s mood  Conscious/Orientation: alert and oriented, good attention and memory  Thought process: linear  Thought content:  No abnormality  Perceptions: no hallucinations  Insight: good  Judgment: good  SUICIDAL RISK ASSESSMENT  No SI/HI  ASSESSMENT  The patien

## 2018-07-23 NOTE — PLAN OF CARE
Denies any suicidal ideation. Patient is anxious and has been unable to sleep tonight. PRN Ambien and Xanax has been given with little affect.  is at bedside speaking with the patient. PRN Norco given for low back pain with observed effectiveness.

## 2018-07-23 NOTE — PLAN OF CARE
ANXIETY    • Will report anxiety at manageable levels Progressing        PAIN - ADULT    • Verbalizes/displays adequate comfort level or patient's stated pain goal Progressing        Patient Centered Care    • Patient preferences are identified and Serenity Pall

## 2018-07-23 NOTE — PROGRESS NOTES
[facility]    Progress Note    Margotchristina KwanPentecostalism Patient Status:  Inpatient    11/3/1940 MRN [id number]   Location [facility] 5SW/SE Attending Nuvia Khan MD   Hosp Day # 2 PCP Stacy Segura MD       Subjective:    [facility]  40 mg Oral Nightly   • Cefuroxime Axetil  500 mg Oral Q24H   • Cholecalciferol  1,000 Units Oral Daily   • furosemide  20 mg Oral Daily   • Levothyroxine Sodium  100 mcg Oral Before breakfast   • lisinopril  10 mg Oral QAM   • metoprolol Tart

## 2018-07-24 VITALS
HEART RATE: 61 BPM | BODY MASS INDEX: 36 KG/M2 | SYSTOLIC BLOOD PRESSURE: 133 MMHG | DIASTOLIC BLOOD PRESSURE: 53 MMHG | RESPIRATION RATE: 16 BRPM | TEMPERATURE: 98 F | OXYGEN SATURATION: 99 % | WEIGHT: 183.44 LBS

## 2018-07-24 LAB
ANION GAP SERPL CALC-SCNC: 8 MMOL/L (ref 0–18)
BASOPHILS # BLD: 0.1 K/UL (ref 0–0.2)
BASOPHILS NFR BLD: 1 %
BUN SERPL-MCNC: 25 MG/DL (ref 8–20)
BUN/CREAT SERPL: 18.9 (ref 10–20)
CALCIUM SERPL-MCNC: 9.1 MG/DL (ref 8.5–10.5)
CHLORIDE SERPL-SCNC: 105 MMOL/L (ref 95–110)
CO2 SERPL-SCNC: 22 MMOL/L (ref 22–32)
CREAT SERPL-MCNC: 1.32 MG/DL (ref 0.5–1.5)
EOSINOPHIL # BLD: 0.2 K/UL (ref 0–0.7)
EOSINOPHIL NFR BLD: 3 %
ERYTHROCYTE [DISTWIDTH] IN BLOOD BY AUTOMATED COUNT: 15.1 % (ref 11–15)
GLUCOSE BLDC GLUCOMTR-MCNC: 113 MG/DL (ref 70–99)
GLUCOSE BLDC GLUCOMTR-MCNC: 124 MG/DL (ref 70–99)
GLUCOSE BLDC GLUCOMTR-MCNC: 129 MG/DL (ref 70–99)
GLUCOSE SERPL-MCNC: 106 MG/DL (ref 70–99)
HCT VFR BLD AUTO: 35 % (ref 35–48)
HGB BLD-MCNC: 11.2 G/DL (ref 12–16)
LYMPHOCYTES # BLD: 2.2 K/UL (ref 1–4)
LYMPHOCYTES NFR BLD: 35 %
MCH RBC QN AUTO: 28.5 PG (ref 27–32)
MCHC RBC AUTO-ENTMCNC: 31.9 G/DL (ref 32–37)
MCV RBC AUTO: 89.3 FL (ref 80–100)
MONOCYTES # BLD: 0.4 K/UL (ref 0–1)
MONOCYTES NFR BLD: 7 %
NEUTROPHILS # BLD AUTO: 3.4 K/UL (ref 1.8–7.7)
NEUTROPHILS NFR BLD: 54 %
OSMOLALITY UR CALC.SUM OF ELEC: 285 MOSM/KG (ref 275–295)
PLATELET # BLD AUTO: 237 K/UL (ref 140–400)
PMV BLD AUTO: 9.7 FL (ref 7.4–10.3)
POTASSIUM SERPL-SCNC: 4.3 MMOL/L (ref 3.3–5.1)
RBC # BLD AUTO: 3.92 M/UL (ref 3.7–5.4)
SODIUM SERPL-SCNC: 135 MMOL/L (ref 136–144)
WBC # BLD AUTO: 6.3 K/UL (ref 4–11)

## 2018-07-24 PROCEDURE — 99217 OBSERVATION CARE DISCHARGE: CPT | Performed by: HOSPITALIST

## 2018-07-24 RX ORDER — GABAPENTIN 100 MG/1
100 CAPSULE ORAL 4 TIMES DAILY
Qty: 120 CAPSULE | Refills: 0 | Status: SHIPPED | OUTPATIENT
Start: 2018-07-24 | End: 2018-08-15 | Stop reason: ALTCHOICE

## 2018-07-24 RX ORDER — HYDROCODONE BITARTRATE AND ACETAMINOPHEN 5; 325 MG/1; MG/1
1 TABLET ORAL EVERY 4 HOURS PRN
Qty: 30 TABLET | Refills: 0 | Status: ON HOLD | OUTPATIENT
Start: 2018-07-24 | End: 2018-09-28

## 2018-07-24 RX ORDER — DULOXETIN HYDROCHLORIDE 30 MG/1
30 CAPSULE, DELAYED RELEASE ORAL NIGHTLY
Qty: 30 CAPSULE | Refills: 0 | Status: SHIPPED | OUTPATIENT
Start: 2018-07-24 | End: 2018-08-15 | Stop reason: ALTCHOICE

## 2018-07-24 NOTE — PLAN OF CARE
Problem: Patient Centered Care  Goal: Patient preferences are identified and integrated in the patient's plan of care  Interventions:  - What would you like us to know as we care for you?  \"I have been real antsy\"   - Provide timely, complete, and accurat management  - Manage/alleviate anxiety  - Utilize distraction and/or relaxation techniques  - Monitor for opioid side effects  - Notify MD/LIP if interventions unsuccessful or patient reports new pain  - Anticipate increased pain with activity and pre-medi

## 2018-07-24 NOTE — DISCHARGE SUMMARY
[facility]    Discharge Summary    Last Crump Patient Status:  Observation    11/3/1940 MRN [id number]   Location [facility] 5SW/SE Attending Leon Rich MD   Hosp Day # 1 PCP Evelia Solano MD     Date of Admiss URINE CULTURE, ROUTINE     Status: None   Collection Time: 07/21/18  5:10 PM   Result Value Ref Range   Urine Culture No Growth at 18-24 hrs.  N/A       IMAGING STUDIES: SOME MAY NEED FOLLOW UP WITH PCP   Xr Chest Pa + Lat Chest (cpt=71046)    Result Date: (5 mg total) by mouth daily. Quantity:  30 tablet  Refills:  6     aspirin 81 MG Chew      Chew 1 tablet (81 mg total) by mouth daily.    Refills:  0     atorvastatin 40 MG Tabs  Commonly known as:  LIPITOR      Take 1 tablet (40 mg total) by mouth nightl medications    ondansetron 4 MG Tbdp  Commonly known as:  ZOFRAN-ODT              Where to Get Your Medications      These medications were sent to [address] [place] [address], [address] [place] [address] [phone], [address] [place] [address],

## 2018-07-24 NOTE — PLAN OF CARE
Problem: Patient Centered Care  Goal: Patient preferences are identified and integrated in the patient's plan of care  Interventions:  - What would you like us to know as we care for you?  \"I have been real antsy\"   - Provide timely, complete, and accurat Verbalizes/displays adequate comfort level or patient's stated pain goal  INTERVENTIONS:  - Encourage pt to monitor pain and request assistance  - Assess pain using appropriate pain scale  - Administer analgesics based on type and severity of pain and eval

## 2018-07-24 NOTE — PLAN OF CARE
Problem: ANXIETY  Goal: Will report anxiety at manageable levels  INTERVENTIONS:  - Administer medication as ordered  - Teach and rehearse alternative coping skills  - Provide emotional support with 1:1 interaction with staff   Outcome: Ibeth Fuller Progressing  Accu-checks AC/HS; insulin therapy per sliding scale

## 2018-07-24 NOTE — CM/SW NOTE
SW met w/ pt to discuss discharge planning. Pt was recently discharge 7/17. Pt lives at home alone and has a caregiver 7x/wk for 4hrs a day. Pt is current w/ [facility]. Pt plans on returning back home and resuming services.  Pt is planning into [facility]

## 2018-07-24 NOTE — HOME CARE LIAISON
Pt is current with [facility]. Please enter home health resumption orders prior to d/c.

## 2018-07-24 NOTE — CHRONIC PAIN
[facility]  Anesthesiology Pain Management Progress Note      Patient name:  Casey Fajardo 78year old female  : 11/3/1940  MRN: [id number]    Diagnosis:  Chronic pain syndrome  (primary encounter diagnosis)  Suicidal ideation    Reason 500 mg, Oral, Q24H  •  Cholecalciferol (VITAMIN D) 1000 units tab 1,000 Units, 1,000 Units, Oral, Daily  •  docusate sodium (COLACE) cap 100 mg, 100 mg, Oral, Daily PRN  •  furosemide (LASIX) tab 20 mg, 20 mg, Oral, Daily  •  Levothyroxine Sodium (SYNTHROI 1.32 07/24/2018   BUN 25 (H) 07/24/2018    (L) 07/24/2018   K 4.3 07/24/2018    07/24/2018   CO2 22 07/24/2018    (H) 07/24/2018   CA 9.1 07/24/2018   ALB 3.8 07/21/2018   ALKPHO 55 07/21/2018   BILT 0.9 07/21/2018   TP 6.3 07/21/2018

## 2018-07-25 ENCOUNTER — PATIENT OUTREACH (OUTPATIENT)
Dept: CASE MANAGEMENT | Age: 78
End: 2018-07-25

## 2018-07-25 ENCOUNTER — TELEPHONE (OUTPATIENT)
Dept: PAIN CLINIC | Facility: HOSPITAL | Age: 78
End: 2018-07-25

## 2018-07-25 ENCOUNTER — TELEPHONE (OUTPATIENT)
Dept: INTERNAL MEDICINE CLINIC | Facility: CLINIC | Age: 78
End: 2018-07-25

## 2018-07-25 DIAGNOSIS — Z02.9 ENCOUNTERS FOR ADMINISTRATIVE PURPOSE: ICD-10-CM

## 2018-07-25 RX ORDER — FUROSEMIDE 20 MG/1
TABLET ORAL
Qty: 30 TABLET | Refills: 0 | Status: SHIPPED | OUTPATIENT
Start: 2018-07-25 | End: 2018-08-23

## 2018-07-25 NOTE — PROGRESS NOTES
Initial Post Discharge Follow Up   Discharge Date: 7/24/18  Contact Date: 7/25/2018    Discharge Dx:  Chronic pain syndrome, intractable back pain     General:   • How have you been since your discharge from the hospital?   Attempted to reach pt for TCM pain management. Advised if they do not hear back from pain mgmt today or sx continue to worsen, need to go to ER/call 911 ASAP. TE sent to PCP office regarding sx/condition update.

## 2018-07-25 NOTE — TELEPHONE ENCOUNTER
Attempted to reach pt for TCM. [facility] caregiver, Xiomara Rolon, answered pt's phone and stated pt is currently sleeping. Advised that I would call back later today or tomorrow when pt was awake.   She refused and states, \"its best if you don't talk direc

## 2018-07-26 ENCOUNTER — TELEPHONE (OUTPATIENT)
Dept: INTERNAL MEDICINE CLINIC | Facility: CLINIC | Age: 78
End: 2018-07-26

## 2018-07-26 NOTE — TELEPHONE ENCOUNTER
Telephone call to patient. Patient is now home following a recent hospitalization. I initially tried to respond to message earlier today but I somehow deleted a message. Discussed the situation with the patient.   She states is currently having some pain

## 2018-07-26 NOTE — TELEPHONE ENCOUNTER
To Dr. Lashell Sorensen to advise. Patient d/c'd from [address] [place] [address] on 7/24/18.

## 2018-07-26 NOTE — TELEPHONE ENCOUNTER
Lana/[facility] requesting orders to resume care along with PT  Verbal order ok  Tasked to nursing

## 2018-07-27 ENCOUNTER — TELEPHONE (OUTPATIENT)
Dept: INTERNAL MEDICINE CLINIC | Facility: CLINIC | Age: 78
End: 2018-07-27

## 2018-07-27 NOTE — TELEPHONE ENCOUNTER
Angle Capellan from [facility] calling to speak with Dr Jac Morataya   - she feels Jose M Cross is experiencing withdrawal from Methadone    Patient  could not sleep last night or the night before  - prescribed sleeping meds are not helping    Please call Angle Capellan 6

## 2018-07-27 NOTE — TELEPHONE ENCOUNTER
Noted. Please call Yasmin Montalvo, from [facility], and let her know that I definitely approve the resumption of home health nursing services and home health physical therapy as recommended. I will sign the forms regarding this when I receive them.

## 2018-07-27 NOTE — TELEPHONE ENCOUNTER
Discussed with patient's visiting nurse, Konstantin Lott. Patient appears to be going through withdrawal from her methadone that she been on for many years from the pain clinic for her back pain. Patient has been taken off of her methadone.   Caretaker and visit

## 2018-08-01 ENCOUNTER — TELEPHONE (OUTPATIENT)
Dept: INTERNAL MEDICINE CLINIC | Facility: CLINIC | Age: 78
End: 2018-08-01

## 2018-08-01 NOTE — TELEPHONE ENCOUNTER
Unknown Santhosh @ [facility] is calling to leave a message for Dr TOTH:  Pt requested an unplanned discharge today during her visit. And so HH will no longer be seeing her, her condo is up for sale and the pt will be moving to [facility]. And has in home care with her right now.  Danielito Hernandez

## 2018-08-07 RX ORDER — ONDANSETRON 4 MG/1
TABLET, FILM COATED ORAL
Qty: 20 TABLET | Refills: 1 | Status: SHIPPED | OUTPATIENT
Start: 2018-08-07 | End: 2018-08-15

## 2018-08-15 ENCOUNTER — OFFICE VISIT (OUTPATIENT)
Dept: INTERNAL MEDICINE CLINIC | Facility: CLINIC | Age: 78
End: 2018-08-15
Payer: MEDICARE

## 2018-08-15 ENCOUNTER — TELEPHONE (OUTPATIENT)
Dept: PAIN CLINIC | Facility: HOSPITAL | Age: 78
End: 2018-08-15

## 2018-08-15 VITALS
HEIGHT: 60 IN | HEART RATE: 60 BPM | WEIGHT: 190 LBS | OXYGEN SATURATION: 97 % | BODY MASS INDEX: 37.3 KG/M2 | DIASTOLIC BLOOD PRESSURE: 60 MMHG | TEMPERATURE: 99 F | SYSTOLIC BLOOD PRESSURE: 140 MMHG

## 2018-08-15 DIAGNOSIS — E03.9 HYPOTHYROIDISM, UNSPECIFIED TYPE: ICD-10-CM

## 2018-08-15 DIAGNOSIS — E11.9 TYPE 2 DIABETES MELLITUS WITHOUT COMPLICATION, WITHOUT LONG-TERM CURRENT USE OF INSULIN (HCC): ICD-10-CM

## 2018-08-15 DIAGNOSIS — I63.9 CEREBROVASCULAR ACCIDENT (CVA), UNSPECIFIED MECHANISM (HCC): ICD-10-CM

## 2018-08-15 DIAGNOSIS — E78.9 BORDERLINE HIGH CHOLESTEROL: ICD-10-CM

## 2018-08-15 DIAGNOSIS — K21.9 GASTROESOPHAGEAL REFLUX DISEASE, ESOPHAGITIS PRESENCE NOT SPECIFIED: ICD-10-CM

## 2018-08-15 DIAGNOSIS — G89.29 OTHER CHRONIC PAIN: ICD-10-CM

## 2018-08-15 DIAGNOSIS — M15.9 PRIMARY OSTEOARTHRITIS INVOLVING MULTIPLE JOINTS: ICD-10-CM

## 2018-08-15 DIAGNOSIS — N18.30 STAGE 3 CHRONIC KIDNEY DISEASE (HCC): ICD-10-CM

## 2018-08-15 DIAGNOSIS — I10 ESSENTIAL HYPERTENSION: ICD-10-CM

## 2018-08-15 DIAGNOSIS — M79.7 FIBROMYALGIA: ICD-10-CM

## 2018-08-15 DIAGNOSIS — R53.83 FATIGUE, UNSPECIFIED TYPE: Primary | ICD-10-CM

## 2018-08-15 PROBLEM — N28.9 RENAL INSUFFICIENCY: Status: RESOLVED | Noted: 2018-02-21 | Resolved: 2018-08-15

## 2018-08-15 PROCEDURE — 99214 OFFICE O/P EST MOD 30 MIN: CPT | Performed by: INTERNAL MEDICINE

## 2018-08-15 PROCEDURE — G0463 HOSPITAL OUTPT CLINIC VISIT: HCPCS | Performed by: INTERNAL MEDICINE

## 2018-08-15 RX ORDER — ONDANSETRON 4 MG/1
4 TABLET, FILM COATED ORAL EVERY 12 HOURS PRN
Qty: 60 TABLET | Refills: 5 | Status: SHIPPED | OUTPATIENT
Start: 2018-08-15 | End: 2019-01-01

## 2018-08-15 NOTE — PROGRESS NOTES
Pamela Davison is a 78year old female. Patient presents with:  Checkup: 6 mo f/u  Fatigue  Arthritis  Hypertension    HPI:   Patient presents with:  Checkup: 6 mo f/u  Fatigue  Arthritis  Hypertension    She is seen today with her caretaker and her careta • Urban's esophagus    • Blood disorder    • CHF (congestive heart failure) (HCC)    • Colon, diverticulosis    • Congestive heart disease (HCC)    • Disorder of thyroid    • Esophageal reflux    • Fatigue    • High blood pressure    • HYPOTHYROIDISM edema over patient's lower legs with bilateral stasis dermatitis. NEURO: alert and oriented  ASSESSMENT AND PLAN:   1. Fatigue, unspecified type  Stable. CPM.  Patient is to continue her current diet, medication and activity.   I will see the patient back

## 2018-08-15 NOTE — PATIENT INSTRUCTIONS
1.  Patient is to continue her current diet, medication and activity. 2.  I have completed the necessary form for the patient to be admitted to [facility] for supportive care.   3.  I will plan see the patient back in 2 months with blood tests which ronny

## 2018-08-20 ENCOUNTER — TELEPHONE (OUTPATIENT)
Dept: INTERNAL MEDICINE CLINIC | Facility: CLINIC | Age: 78
End: 2018-08-20

## 2018-08-20 NOTE — TELEPHONE ENCOUNTER
Lisinopril is not on active med list. D/c'd by pcp 2/21/18- no reason listed. LMTCB: is patient taking this medication? If so, clarify how she is taking.

## 2018-08-20 NOTE — TELEPHONE ENCOUNTER
Pt called back   She doesn't think she is taking lisinopril but will check with her other caregiver and call us back to advise

## 2018-08-20 NOTE — TELEPHONE ENCOUNTER
Patient called back, seems a bit confused about her medications but states that she is still taking the lisinopril daily and wants refill to go to [facility] (not [facility] as requested).  Med module indicated lisinopril was discontinued and at [facility]

## 2018-08-21 NOTE — TELEPHONE ENCOUNTER
Telephone call to patient and situation discussed. I reviewed the patient's record. As of the patient's medical record she is no longer taking lisinopril. Patient's blood pressure is doing well her last visit.   Will hold the patient's lisinopril at this

## 2018-08-21 NOTE — TELEPHONE ENCOUNTER
Amlodipine is not listed in active med list. D/c'd by nursing on 8/15/18 reason: therapy completed. Last refilled 2/2018    LMTCB: Is she taking this medication? If so clarify how she is taking.

## 2018-08-23 ENCOUNTER — TELEPHONE (OUTPATIENT)
Dept: INTERNAL MEDICINE CLINIC | Facility: CLINIC | Age: 78
End: 2018-08-23

## 2018-08-23 RX ORDER — FUROSEMIDE 20 MG/1
TABLET ORAL
Qty: 40 TABLET | Refills: 5 | Status: SHIPPED | OUTPATIENT
Start: 2018-08-23 | End: 2018-11-06

## 2018-08-23 NOTE — TELEPHONE ENCOUNTER
Telephone call to patient and situation discussed. Patient has developed some swelling of her ankles. That she should continue to take Lasix 20 mg orally every morning. When her legs are swollen she may take 40 mg orally every morning.   I have renewed a

## 2018-08-23 NOTE — TELEPHONE ENCOUNTER
States she always has some swelling in her feet, but noticed it more so today. Reports swelling to bilateral feet/ankles. Slight swelling to lower legs. Denies any pain to leg/calf. States legs are always red. No SOB.  Taking lasix 20mg daily-last filled fo

## 2018-08-23 NOTE — TELEPHONE ENCOUNTER
Pt. States that both of her feet and ankles are swollen she is retaining water she has been trying to keep them elevated ph. # [phone] she would like to be prescribed something [facility].   Ph. #  [phone]  Routed to clinical

## 2018-08-27 ENCOUNTER — TELEPHONE (OUTPATIENT)
Dept: INTERNAL MEDICINE CLINIC | Facility: CLINIC | Age: 78
End: 2018-08-27

## 2018-08-27 DIAGNOSIS — R25.2 LEG CRAMPS: Primary | ICD-10-CM

## 2018-08-27 NOTE — TELEPHONE ENCOUNTER
Pt is calling she is experiencing cramping in legs, arms & feet, it is keeping her up at night and getting worse  Pt would like to discuss with Dr TOTH what she can do.   Please call pt [phone]    Tasked to nursing high

## 2018-08-28 NOTE — TELEPHONE ENCOUNTER
Telephone call to pt and message left. Pt needs to have blood tests to check her electrolytes and renal function. There are already orders in the system for blood tests including a BMP.   Pt should have the previously ordered blood tests performed today o

## 2018-08-29 NOTE — TELEPHONE ENCOUNTER
Telephone call to patient and situation discussed. Patient complains of some cramping in her arms and her legs. She is also having a difficult time sleeping. Patient required some magnesium supplementation when she was recently hospitalized.   At that ti

## 2018-08-30 ENCOUNTER — LAB ENCOUNTER (OUTPATIENT)
Dept: LAB | Age: 78
End: 2018-08-30
Attending: INTERNAL MEDICINE
Payer: MEDICARE

## 2018-08-30 DIAGNOSIS — E11.9 TYPE 2 DIABETES MELLITUS WITHOUT COMPLICATION, WITHOUT LONG-TERM CURRENT USE OF INSULIN (HCC): ICD-10-CM

## 2018-08-30 DIAGNOSIS — R25.2 LEG CRAMPS: ICD-10-CM

## 2018-08-30 DIAGNOSIS — E78.9 BORDERLINE HIGH CHOLESTEROL: ICD-10-CM

## 2018-08-30 DIAGNOSIS — R53.83 FATIGUE, UNSPECIFIED TYPE: ICD-10-CM

## 2018-08-30 LAB
ANION GAP SERPL CALC-SCNC: 8 MMOL/L (ref 0–18)
BASOPHILS # BLD: 0.1 K/UL (ref 0–0.2)
BASOPHILS NFR BLD: 1 %
BUN SERPL-MCNC: 17 MG/DL (ref 8–20)
BUN/CREAT SERPL: 11.6 (ref 10–20)
CALCIUM SERPL-MCNC: 9.8 MG/DL (ref 8.5–10.5)
CHLORIDE SERPL-SCNC: 103 MMOL/L (ref 95–110)
CHOLEST SERPL-MCNC: 181 MG/DL (ref 110–200)
CO2 SERPL-SCNC: 30 MMOL/L (ref 22–32)
CREAT SERPL-MCNC: 1.46 MG/DL (ref 0.5–1.5)
EOSINOPHIL # BLD: 0.2 K/UL (ref 0–0.7)
EOSINOPHIL NFR BLD: 3 %
ERYTHROCYTE [DISTWIDTH] IN BLOOD BY AUTOMATED COUNT: 14.7 % (ref 11–15)
GLUCOSE SERPL-MCNC: 121 MG/DL (ref 70–99)
HBA1C MFR BLD: 6 % (ref 4–6)
HCT VFR BLD AUTO: 38.8 % (ref 35–48)
HDLC SERPL-MCNC: 55 MG/DL
HGB BLD-MCNC: 12.6 G/DL (ref 12–16)
LDLC SERPL CALC-MCNC: 102 MG/DL (ref 0–99)
LYMPHOCYTES # BLD: 1.5 K/UL (ref 1–4)
LYMPHOCYTES NFR BLD: 20 %
MAGNESIUM SERPL-MCNC: 2.1 MG/DL (ref 1.8–2.5)
MCH RBC QN AUTO: 28.7 PG (ref 27–32)
MCHC RBC AUTO-ENTMCNC: 32.4 G/DL (ref 32–37)
MCV RBC AUTO: 88.7 FL (ref 80–100)
MONOCYTES # BLD: 0.4 K/UL (ref 0–1)
MONOCYTES NFR BLD: 6 %
NEUTROPHILS # BLD AUTO: 5.2 K/UL (ref 1.8–7.7)
NEUTROPHILS NFR BLD: 70 %
NONHDLC SERPL-MCNC: 126 MG/DL
OSMOLALITY UR CALC.SUM OF ELEC: 295 MOSM/KG (ref 275–295)
PLATELET # BLD AUTO: 229 K/UL (ref 140–400)
PMV BLD AUTO: 9 FL (ref 7.4–10.3)
POTASSIUM SERPL-SCNC: 4.8 MMOL/L (ref 3.3–5.1)
RBC # BLD AUTO: 4.38 M/UL (ref 3.7–5.4)
SODIUM SERPL-SCNC: 141 MMOL/L (ref 136–144)
TRIGL SERPL-MCNC: 120 MG/DL (ref 1–149)
WBC # BLD AUTO: 7.4 K/UL (ref 4–11)

## 2018-08-30 PROCEDURE — 83036 HEMOGLOBIN GLYCOSYLATED A1C: CPT

## 2018-08-30 PROCEDURE — 85025 COMPLETE CBC W/AUTO DIFF WBC: CPT

## 2018-08-30 PROCEDURE — 80048 BASIC METABOLIC PNL TOTAL CA: CPT

## 2018-08-30 PROCEDURE — 36415 COLL VENOUS BLD VENIPUNCTURE: CPT

## 2018-08-30 PROCEDURE — 80061 LIPID PANEL: CPT

## 2018-08-30 PROCEDURE — 83735 ASSAY OF MAGNESIUM: CPT

## 2018-08-31 ENCOUNTER — TELEPHONE (OUTPATIENT)
Dept: INTERNAL MEDICINE CLINIC | Facility: CLINIC | Age: 78
End: 2018-08-31

## 2018-08-31 NOTE — TELEPHONE ENCOUNTER
Called patient and relayed message that RX was sent in March 2018  For one year - verbalized understanding

## 2018-09-03 ENCOUNTER — TELEPHONE (OUTPATIENT)
Dept: INTERNAL MEDICINE CLINIC | Facility: CLINIC | Age: 78
End: 2018-09-03

## 2018-09-03 NOTE — TELEPHONE ENCOUNTER
Telephone call to pt. Her recent blood tests have turned out well. Specifically her K+, Mg++ and Calcium are all good. Pt does have some renal insufficiency which is stable. Pt still c/o muscle cramps.   Pt my use Norco 3/325 1 tab 2-3 times/day as nece

## 2018-09-04 NOTE — TELEPHONE ENCOUNTER
To  to please advise on refill as Zolpidem inactive in med module, discontinued 8/15/2018 but nothing found in the notes

## 2018-09-07 RX ORDER — ZOLPIDEM TARTRATE 5 MG/1
TABLET ORAL
Qty: 30 TABLET | Refills: 5 | Status: ON HOLD | OUTPATIENT
Start: 2018-09-07 | End: 2018-09-28

## 2018-09-07 NOTE — TELEPHONE ENCOUNTER
Noted.  I have refilled the patient's medication as requested with 5 additional refills. Please call this prescription into the patient's pharmacy as requested. Also please notify the patient that this has been done. I will route this to nursing.   Thank

## 2018-09-10 ENCOUNTER — TELEPHONE (OUTPATIENT)
Dept: INTERNAL MEDICINE CLINIC | Facility: CLINIC | Age: 78
End: 2018-09-10

## 2018-09-10 NOTE — TELEPHONE ENCOUNTER
[address] requesting refill for:  Amlodipine Besylate 5MG tab, qty 30 (substituted for Norvasc)  Tasked to [employer]

## 2018-09-10 NOTE — TELEPHONE ENCOUNTER
Called patient who states she is not on Amlodipine anymore - also not in med list or office note .  Calledpharmacy, spoke with Era Later and relayed message that patithom tis not on th emedication anymore - verbalized understanding

## 2018-09-13 RX ORDER — AMLODIPINE BESYLATE 5 MG/1
TABLET ORAL
Qty: 30 TABLET | OUTPATIENT
Start: 2018-09-13

## 2018-09-17 ENCOUNTER — TELEPHONE (OUTPATIENT)
Dept: INTERNAL MEDICINE CLINIC | Facility: CLINIC | Age: 78
End: 2018-09-17

## 2018-09-17 NOTE — TELEPHONE ENCOUNTER
Patient needs refill for:    Sleeping pill (Zolpidem)    Send to [address] at [employer] ( a phone number is suppose to be called back to us).

## 2018-09-18 NOTE — TELEPHONE ENCOUNTER
Notified pt she still has remaining refills at [facility]. Pt will call back once she has the information for the new pharmacy.

## 2018-09-19 ENCOUNTER — TELEPHONE (OUTPATIENT)
Dept: INTERNAL MEDICINE CLINIC | Facility: CLINIC | Age: 78
End: 2018-09-19

## 2018-09-19 ENCOUNTER — APPOINTMENT (OUTPATIENT)
Dept: CT IMAGING | Facility: HOSPITAL | Age: 78
DRG: 372 | End: 2018-09-19
Attending: EMERGENCY MEDICINE
Payer: MEDICARE

## 2018-09-19 ENCOUNTER — HOSPITAL ENCOUNTER (INPATIENT)
Facility: HOSPITAL | Age: 78
LOS: 7 days | Discharge: HOME | DRG: 372 | End: 2018-09-26
Attending: EMERGENCY MEDICINE | Admitting: HOSPITALIST
Payer: MEDICARE

## 2018-09-19 DIAGNOSIS — R19.7 DIARRHEA, UNSPECIFIED TYPE: ICD-10-CM

## 2018-09-19 DIAGNOSIS — K57.92 ACUTE DIVERTICULITIS: Primary | ICD-10-CM

## 2018-09-19 PROCEDURE — 99223 1ST HOSP IP/OBS HIGH 75: CPT | Performed by: HOSPITALIST

## 2018-09-19 PROCEDURE — 74177 CT ABD & PELVIS W/CONTRAST: CPT | Performed by: EMERGENCY MEDICINE

## 2018-09-19 RX ORDER — ACETAMINOPHEN 325 MG/1
650 TABLET ORAL EVERY 6 HOURS PRN
Status: DISCONTINUED | OUTPATIENT
Start: 2018-09-19 | End: 2018-09-26

## 2018-09-19 RX ORDER — PANTOPRAZOLE SODIUM 20 MG/1
20 TABLET, DELAYED RELEASE ORAL
Status: DISCONTINUED | OUTPATIENT
Start: 2018-09-20 | End: 2018-09-26

## 2018-09-19 RX ORDER — ASPIRIN 81 MG/1
81 TABLET, CHEWABLE ORAL DAILY
Status: DISCONTINUED | OUTPATIENT
Start: 2018-09-19 | End: 2018-09-26

## 2018-09-19 RX ORDER — MORPHINE SULFATE 2 MG/ML
1 INJECTION, SOLUTION INTRAMUSCULAR; INTRAVENOUS EVERY 2 HOUR PRN
Status: DISCONTINUED | OUTPATIENT
Start: 2018-09-19 | End: 2018-09-25

## 2018-09-19 RX ORDER — FUROSEMIDE 20 MG/1
20 TABLET ORAL DAILY
Status: DISCONTINUED | OUTPATIENT
Start: 2018-09-20 | End: 2018-09-20

## 2018-09-19 RX ORDER — DEXTROSE MONOHYDRATE 25 G/50ML
50 INJECTION, SOLUTION INTRAVENOUS AS NEEDED
Status: DISCONTINUED | OUTPATIENT
Start: 2018-09-19 | End: 2018-09-26

## 2018-09-19 RX ORDER — ONDANSETRON 2 MG/ML
4 INJECTION INTRAMUSCULAR; INTRAVENOUS EVERY 6 HOURS PRN
Status: DISCONTINUED | OUTPATIENT
Start: 2018-09-19 | End: 2018-09-26

## 2018-09-19 RX ORDER — MORPHINE SULFATE 4 MG/ML
4 INJECTION, SOLUTION INTRAMUSCULAR; INTRAVENOUS EVERY 2 HOUR PRN
Status: DISCONTINUED | OUTPATIENT
Start: 2018-09-19 | End: 2018-09-25

## 2018-09-19 RX ORDER — SODIUM CHLORIDE 9 MG/ML
INJECTION, SOLUTION INTRAVENOUS CONTINUOUS
Status: DISPENSED | OUTPATIENT
Start: 2018-09-19 | End: 2018-09-19

## 2018-09-19 RX ORDER — MORPHINE SULFATE 2 MG/ML
2 INJECTION, SOLUTION INTRAMUSCULAR; INTRAVENOUS EVERY 2 HOUR PRN
Status: DISCONTINUED | OUTPATIENT
Start: 2018-09-19 | End: 2018-09-25

## 2018-09-19 RX ORDER — LEVOTHYROXINE SODIUM 0.1 MG/1
100 TABLET ORAL
Status: DISCONTINUED | OUTPATIENT
Start: 2018-09-19 | End: 2018-09-26

## 2018-09-19 RX ORDER — DICYCLOMINE HYDROCHLORIDE 10 MG/1
10 CAPSULE ORAL ONCE
Status: COMPLETED | OUTPATIENT
Start: 2018-09-19 | End: 2018-09-19

## 2018-09-19 RX ORDER — ATORVASTATIN CALCIUM 40 MG/1
40 TABLET, FILM COATED ORAL NIGHTLY
Status: DISCONTINUED | OUTPATIENT
Start: 2018-09-19 | End: 2018-09-20

## 2018-09-19 RX ORDER — ONDANSETRON 4 MG/1
4 TABLET, FILM COATED ORAL EVERY 12 HOURS PRN
Status: DISCONTINUED | OUTPATIENT
Start: 2018-09-19 | End: 2018-09-22

## 2018-09-19 RX ORDER — ZOLPIDEM TARTRATE 5 MG/1
5 TABLET ORAL NIGHTLY PRN
Status: DISCONTINUED | OUTPATIENT
Start: 2018-09-19 | End: 2018-09-26

## 2018-09-19 RX ORDER — ALLOPURINOL 300 MG/1
300 TABLET ORAL DAILY
Status: DISCONTINUED | OUTPATIENT
Start: 2018-09-19 | End: 2018-09-26

## 2018-09-19 RX ORDER — SODIUM CHLORIDE 9 MG/ML
INJECTION, SOLUTION INTRAVENOUS CONTINUOUS
Status: DISCONTINUED | OUTPATIENT
Start: 2018-09-19 | End: 2018-09-21

## 2018-09-19 RX ORDER — HYDROCODONE BITARTRATE AND ACETAMINOPHEN 5; 325 MG/1; MG/1
1 TABLET ORAL EVERY 4 HOURS PRN
Status: DISCONTINUED | OUTPATIENT
Start: 2018-09-19 | End: 2018-09-26

## 2018-09-19 RX ORDER — SODIUM CHLORIDE 0.9 % (FLUSH) 0.9 %
3 SYRINGE (ML) INJECTION AS NEEDED
Status: DISCONTINUED | OUTPATIENT
Start: 2018-09-19 | End: 2018-09-26

## 2018-09-19 NOTE — ED PROVIDER NOTES
Patient Seen in: [facility] AND [facility] Emergency Department    History   Patient presents with:  Nausea/Vomiting/Diarrhea (gastrointestinal)    Stated Complaint: diarrhea x3 weeks     HPI    77 yo F with PMH CKD, diastolic HF, HTN, HL, chronic back pain presen Comment:  Performed by Irineo Nicholson MD at [facility] ENDOSCOPY  No date: HIP REPLACEMENT SURGERY;  Left  No date: HYSTERECTOMY  2006: KNEE REPLACEMENT SURGERY  8/25/2017: LAPAROSCOPIC CHOLECYSTECTOMY; N/A      Comment:  Performed by Soheila Taylor MD at [facility] Smoking status: Former Smoker        Quit date: 1970        Years since quittin.7      Smokeless tobacco: Never Used    Alcohol use:  Yes      Alcohol/week: 1.2 oz      Types: 2 Glasses of wine per week      Comment: occasionally    Drug use: No tests on the individual orders.    BASIC METABOLIC PANEL (8)   RAINBOW DRAW BLUE   RAINBOW DRAW LAVENDER   RAINBOW DRAW DARK GREEN   RAINBOW DRAW LIGHT GREEN   RAINBOW DRAW GOLD   RAINBOW DRAW LAVENDER TALL (BNP)   GI STOOL PANEL BY PCR   CBC W/ DIFFERENTIA retroperitoneal lymph nodes which are unchanged. BOWEL:  Multiple diverticula are seen within the descending and sigmoid colon.  There is an acute area of wall thickening involving the distal descending and proximal sigmoid colon with pericolonic and perisi esophagitis. If clinically indicated, further evaluation with direct visualization could be performed. Complex cyst interpolar right kidney. Nonemergent renal MRI (or triphasic CT if patient cannot tolerate MRI) is recommended to further evaluate.       Sumi Zaman

## 2018-09-19 NOTE — TELEPHONE ENCOUNTER
As FYI to DR. TOTH  Called patient who just moved to a new place, has diarrhea for 2 weeks ( took 3 bottles of immodium) no relief , has chills nausea. Is drinking water but when she eats it goes right through. Rn instructed patient to go to ER for assessment.

## 2018-09-19 NOTE — TELEPHONE ENCOUNTER
Pt. Called stating she has had diarrhea for the last 2 weeks. She has gone through 3 bottles of Immodium with no relief. She has the chills and nausea. Right now she has a terrible headache.   She is drinking water, but every time she eats something she

## 2018-09-20 PROCEDURE — 99233 SBSQ HOSP IP/OBS HIGH 50: CPT | Performed by: HOSPITALIST

## 2018-09-20 RX ORDER — MAGNESIUM OXIDE 400 MG (241.3 MG MAGNESIUM) TABLET
400 TABLET DAILY
Status: DISCONTINUED | OUTPATIENT
Start: 2018-09-20 | End: 2018-09-20

## 2018-09-20 NOTE — PROGRESS NOTES
[facility]    Progress Note    Jacobo Patino Patient Status:  Inpatient    11/3/1940 MRN [id number]   Location [facility] 5SW/SE Attending Price Everett MD   Hosp Day # 1 PCP John Baires MD       Subjective:     Pt c/o distal esophagus at the gastroesophageal junction may be related to esophagitis. If clinically indicated, further evaluation with direct visualization could be performed. Complex cyst interpolar right kidney.  Nonemergent renal MRI (or triphasic CT if mirta

## 2018-09-20 NOTE — CM/SW NOTE
Sw met w/ pt to discuss discharge planning. Pt lives at [facility] and recently moved in. Pt reported she has caregivers through [facility]. Pt reported using a rollator and has applied for a motorized scooter. Pt plans on returning back to [facility] at discharge.

## 2018-09-20 NOTE — RESPIRATORY THERAPY NOTE
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 Pt does not routinely use a CPAP device at home.  Pt refused to use CPAP device from hospital.

## 2018-09-20 NOTE — PLAN OF CARE
Problem: Patient Centered Care  Goal: Patient preferences are identified and integrated in the patient's plan of care  Interventions:  - What would you like us to know as we care for you?  Patient wants to be kept updated on POC  - Provide timely, complete, Maintain adequate hydration with IV or PO as ordered and tolerated  - Nasogastric tube to low intermittent suction as ordered  - Evaluate effectiveness of ordered antiemetic medications  - Provide nonpharmacologic comfort measures as appropriate  - Advance Implement strategies to promote bladder emptying  Outcome: Progressing      Problem: METABOLIC/FLUID AND ELECTROLYTES - ADULT  Goal: Electrolytes maintained within normal limits  INTERVENTIONS:  - Monitor labs and rhythm and assess patient for signs and sy

## 2018-09-20 NOTE — H&P
[facility]    PATIENT'S NAME: Dawn Vázquez   ATTENDING PHYSICIAN: Dilan Benoit MD   PATIENT ACCOUNT#:   [id number]    LOCATION:  [address] [place] [address] RECORD #:   [id number]       YOB: 1940  ADMISSION DATE:       09/19/201 fever or chills, but she did have decreased appetite. Last antibiotic exposure was in July 2018, when she was treated for cellulitis. The patient denies any chest pain or shortness of breath. Other 12-point review of systems negative.         PHYSICAL EX 22:45:31  [facility] [id number]  [place]/

## 2018-09-21 PROCEDURE — 99233 SBSQ HOSP IP/OBS HIGH 50: CPT | Performed by: HOSPITALIST

## 2018-09-21 RX ORDER — CLONIDINE HYDROCHLORIDE 0.1 MG/1
0.05 TABLET ORAL 3 TIMES DAILY
Status: DISCONTINUED | OUTPATIENT
Start: 2018-09-21 | End: 2018-09-26

## 2018-09-21 RX ORDER — POTASSIUM CHLORIDE 20 MEQ/1
40 TABLET, EXTENDED RELEASE ORAL ONCE
Status: COMPLETED | OUTPATIENT
Start: 2018-09-21 | End: 2018-09-21

## 2018-09-21 RX ORDER — FUROSEMIDE 10 MG/ML
20 INJECTION INTRAMUSCULAR; INTRAVENOUS ONCE
Status: COMPLETED | OUTPATIENT
Start: 2018-09-21 | End: 2018-09-21

## 2018-09-21 RX ORDER — HYDRALAZINE HYDROCHLORIDE 20 MG/ML
10 INJECTION INTRAMUSCULAR; INTRAVENOUS EVERY 6 HOURS PRN
Status: DISCONTINUED | OUTPATIENT
Start: 2018-09-21 | End: 2018-09-26

## 2018-09-21 RX ORDER — SODIUM CHLORIDE 9 MG/ML
INJECTION, SOLUTION INTRAVENOUS CONTINUOUS
Status: DISCONTINUED | OUTPATIENT
Start: 2018-09-21 | End: 2018-09-23

## 2018-09-21 RX ORDER — AMLODIPINE BESYLATE 5 MG/1
5 TABLET ORAL DAILY
Status: DISCONTINUED | OUTPATIENT
Start: 2018-09-21 | End: 2018-09-26

## 2018-09-21 NOTE — PROGRESS NOTES
[place] [facility]    Progress Note    Grace Messing Patient Status:  Inpatient    11/3/1940 MRN [id number]   Location [facility] 5SW/SE Attending Kaleigh Cespedes MD   Hosp Day # 2 PCP Neto Karimi MD       Subjective:     Pt c/o could be performed. Complex cyst interpolar right kidney. Nonemergent renal MRI (or triphasic CT if patient cannot tolerate MRI) is recommended to further evaluate.       Dictated by (CST): Gaudencio Dave MD on 9/19/2018 at 19:54     Approved by (CST): Basilio

## 2018-09-21 NOTE — DIETARY NOTE
ADULT NUTRITION INITIAL ASSESSMENT    Pt is at moderate nutrition risk. Pt does not meet malnutrition criteria. RECOMMENDATIONS TO MD:  Advance diet to Soft/Low fiber as medically safe.       NUTRITION DIAGNOSIS/PROBLEM:  1)  Inadequate oral intake re ALLERGIES, Skin cancer (03/2018), Type II or unspecified type diabetes mellitus without mention of complication, not stated as uncontrolled, and  hypertension.     ANTHROPOMETRICS:  HT: 152.4 cm (5')       WT: 84.6 kg (186 lb 9.6 oz) true wt masked by fluid beyond clears in 48hrs and return to normal GI function    DIETITIAN FOLLOW UP:  RD to follow up within 7 days  Emily Mac, [address] [place] [address], [address] [place] [address], [address] [place] [address]   Clinical Dietitian  [phone]

## 2018-09-21 NOTE — PLAN OF CARE
Problem: Patient Centered Care  Goal: Patient preferences are identified and integrated in the patient's plan of care  Interventions:  - What would you like us to know as we care for you?   - Provide timely, complete, and accurate information to patient/fa Progressing    Goal: Maintains or returns to baseline bowel function  INTERVENTIONS:  - Assess bowel function  - Maintain adequate hydration with IV or PO as ordered and tolerated  - Evaluate effectiveness of GI medications  - Encourage mobilization and ac electrolyte replacements, including rhythm and repeat lab results as appropriate  - Fluid restriction as ordered  - Instruct patient on fluid and nutrition restrictions as appropriate  Outcome: Progressing    Goal: Hemodynamic stability and optimal renal f

## 2018-09-21 NOTE — PLAN OF CARE
Problem: Patient Centered Care  Goal: Patient preferences are identified and integrated in the patient's plan of care  Interventions:  - What would you like us to know as we care for you?  \"I'm from [place]\"  - Provide timely, complete, and accurate infor appropriate  - Advance diet as tolerated, if ordered  - Obtain nutritional consult as needed  - Evaluate fluid balance  Outcome: Progressing  Pt c/o mild nausea on CLD;  Zofran being given PRN  Goal: Maintains or returns to baseline bowel function  INTERVEN emptying  Outcome: Completed Date Met: 09/21/18  Pt voiding freely no issues on incontinence or retention    Problem: METABOLIC/FLUID AND ELECTROLYTES - ADULT  Goal: Electrolytes maintained within normal limits  INTERVENTIONS:  - Monitor labs and rhythm an

## 2018-09-21 NOTE — CM/SW NOTE
Per RN, pt requesting HHC. SW placed referral to [address]. Will need C orders prior to d/c.     Sandee Del Rosario, [address]

## 2018-09-21 NOTE — PLAN OF CARE
Problem: PAIN - ADULT  Goal: Verbalizes/displays adequate comfort level or patient's stated pain goal  INTERVENTIONS:  - Encourage pt to monitor pain and request assistance  - Assess pain using appropriate pain scale  - Administer analgesics based on type partner in discharge planning  - Arrange for needed discharge resources and transportation as appropriate  - Identify discharge learning needs (meds, wound care, etc)  - Arrange for interpreters to assist at discharge as needed  - Consider post-discharge p

## 2018-09-22 PROCEDURE — 99233 SBSQ HOSP IP/OBS HIGH 50: CPT | Performed by: HOSPITALIST

## 2018-09-22 RX ORDER — LORAZEPAM 2 MG/ML
1 CONCENTRATE ORAL EVERY 8 HOURS PRN
Status: DISCONTINUED | OUTPATIENT
Start: 2018-09-22 | End: 2018-09-26

## 2018-09-22 RX ORDER — ONDANSETRON 4 MG/1
4 TABLET, FILM COATED ORAL
Status: DISCONTINUED | OUTPATIENT
Start: 2018-09-22 | End: 2018-09-26

## 2018-09-22 RX ORDER — LORAZEPAM 2 MG/ML
2 INJECTION INTRAMUSCULAR ONCE
Status: COMPLETED | OUTPATIENT
Start: 2018-09-22 | End: 2018-09-22

## 2018-09-22 NOTE — PLAN OF CARE
Problem: Patient Centered Care  Goal: Patient preferences are identified and integrated in the patient's plan of care  Interventions:  - What would you like us to know as we care for you?  \"I'm from [place]\"  - Provide timely, complete, and accurate infor balance  Outcome: Progressing  Zofran administered for nausea.    Goal: Maintains or returns to baseline bowel function  INTERVENTIONS:  - Assess bowel function  - Maintain adequate hydration with IV or PO as ordered and tolerated  - Evaluate effectiveness patient's stated pain goal  INTERVENTIONS:  - Encourage pt to monitor pain and request assistance  - Assess pain using appropriate pain scale  - Administer analgesics based on type and severity of pain and evaluate response  - Implement non-pharmacological needed discharge resources and transportation as appropriate  - Identify discharge learning needs (meds, wound care, etc)  - Arrange for interpreters to assist at discharge as needed  - Consider post-discharge preferences of patient/family/discharge partne

## 2018-09-22 NOTE — PROGRESS NOTES
[facility]    Progress Note    Layla Booth Patient Status:  Inpatient    11/3/1940 MRN [id number]   Location [facility] 5SW/SE Attending Carrie Leavitt, [address] [facility] Day # 3 PCP Charmayne Applebaum, MD       Subjective:     Janet Hoskins meals as she does at home     Periodic cramps in feet and arms over the past few months.   Pt says Mg pills helped but then pt stopped taking them.   - Mg protocol  - ativan PRN     Hx of HTN  BPs high here  - cont  metoprolol  - cont clonidine and norvasc

## 2018-09-23 PROCEDURE — 99233 SBSQ HOSP IP/OBS HIGH 50: CPT | Performed by: HOSPITALIST

## 2018-09-23 RX ORDER — METOCLOPRAMIDE HYDROCHLORIDE 5 MG/ML
10 INJECTION INTRAMUSCULAR; INTRAVENOUS EVERY 6 HOURS PRN
Status: DISCONTINUED | OUTPATIENT
Start: 2018-09-23 | End: 2018-09-26

## 2018-09-23 NOTE — PLAN OF CARE
Problem: Patient Centered Care  Goal: Patient preferences are identified and integrated in the patient's plan of care  Interventions:  - What would you like us to know as we care for you?  \"I'm from [place]\"  - Provide timely, complete, and accurate infor fluid balance  Outcome: Progressing    Goal: Maintains or returns to baseline bowel function  INTERVENTIONS:  - Assess bowel function  - Maintain adequate hydration with IV or PO as ordered and tolerated  - Evaluate effectiveness of GI medications  - Encou reports new pain  - Anticipate increased pain with activity and pre-medicate as appropriate  Outcome: Progressing  Norco given PRN for back pain.      Problem: RISK FOR INFECTION - ADULT  Goal: Absence of fever/infection during anticipated neutropenic perio ability or social support system  Outcome: Progressing

## 2018-09-23 NOTE — PROGRESS NOTES
[facility]    Progress Note    Gene Hint Patient Status:  Inpatient    11/3/1940 MRN [id number]   Location [facility] 5SW/SE Attending Darnell Lira MD   Hosp Day # 4 PCP Alma Rosa Mcghee MD       Subjective:     Pt c/o at home  - IV reglan PRN     Periodic cramps in feet and arms over the past few months.   Pt says Mg pills helped but then pt stopped taking them.   - Mg protocol  - ativan PRN     Hx of HTN  BPs high here  - cont  metoprolol  - cont clonidine and norvasc

## 2018-09-23 NOTE — PLAN OF CARE
Breanne King #[id number] (71 year old F)   [address] [place] [address] [id number]   Jenni Hilliard, RN   Registered Nurse   Nursing   Plan of Care   Signed   Date of Service:  9/22/2018 12:19 PM               Signed                   Show:Clear all  [x]Manual[]Templ

## 2018-09-24 PROCEDURE — 99233 SBSQ HOSP IP/OBS HIGH 50: CPT | Performed by: HOSPITALIST

## 2018-09-24 RX ORDER — POTASSIUM CHLORIDE 20 MEQ/1
40 TABLET, EXTENDED RELEASE ORAL EVERY 4 HOURS
Status: DISPENSED | OUTPATIENT
Start: 2018-09-24 | End: 2018-09-24

## 2018-09-24 NOTE — RESTORATIVE THERAPY
RESTORATIVE CARE TREATMENT NOTE    Presenting Problem  Presenting Problem: Acute diverticulitis  Presenting Problem: (Acute diverticulitis)       Precautions  Precautions: (Contact-c-diff )       Weight Bearing Restriction  Weight Bearing Restriction: None

## 2018-09-24 NOTE — PLAN OF CARE
Problem: Patient Centered Care  Goal: Patient preferences are identified and integrated in the patient's plan of care  Interventions:  - What would you like us to know as we care for you?  \"I'm from [place]\"  - Provide timely, complete, and accurate infor balance  Outcome: Progressing    Goal: Maintains or returns to baseline bowel function  INTERVENTIONS:  - Assess bowel function  - Maintain adequate hydration with IV or PO as ordered and tolerated  - Evaluate effectiveness of GI medications  - Encourage m pain using appropriate pain scale  - Administer analgesics based on type and severity of pain and evaluate response  - Implement non-pharmacological measures as appropriate and evaluate response  - Consider cultural and social influences on pain and pain m appropriate  - Assess patient's ability to be responsible for managing their own health  - Refer to Case Management Department for coordinating discharge planning if the patient needs post-hospital services based on physician/LIP order or complex needs rel

## 2018-09-24 NOTE — PROGRESS NOTES
[facility]    Progress Note    Pamela Davison Patient Status:  Inpatient    11/3/1940 MRN [id number]   Location [facility] 5SW/SE Attending Sharon Doyle MD   Hosp Day # 5 PCP Hugo Topete MD       Subjective:     Pt c/o   Pt says Mg pills helped but then pt stopped taking them.   - Mg protocol  - ativan PRN     Hx of HTN  BPs high here  - cont  metoprolol  - cont clonidine and norvasc  - hydralazine prn     Chronic back pain syndrome.    - norco prn     dvt proph:   SCDs

## 2018-09-24 NOTE — PHYSICAL THERAPY NOTE
PHYSICAL THERAPY EVALUATION - INPATIENT     Room Number: 538/538-A  Evaluation Date: 9/24/2018  Type of Evaluation: Initial   Physician Order: PT Eval and Treat    Presenting Problem: Acute diverticulitis  Reason for Therapy: Mobility Dysfunction and Disc of the proximal sigmoid colon without abscess or perforation. Problem List  Principal Problem:    Acute diverticulitis  Active Problems:    Diarrhea, unspecified type      Past Medical History  Past Medical History:  No date: Anemia  No date:  Anesthes date:  TOTAL HIP REPLACEMENT  No date: TOTAL KNEE REPLACEMENT    HOME SITUATION  Type of Home: Independent living facility   Home Layout: One level                Lives With: Alone  Drives: No  Patient Owned Equipment: (rollator)       Prior Level of Inde[place] 150  Assistive Device: Rolling walker  Pattern: Shuffle  Stoop/Curb Assistance: Not tested  Comment : Stated  that she has issues with her toes, and was scheulde to see a podiatrist at her apartment prior to hosp admission    Bed Mobility: mod I    Transfe

## 2018-09-24 NOTE — OCCUPATIONAL THERAPY NOTE
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  Pt reports she receives 3 meals a day in the diningha body clothing?: A Little  -   Taking care of personal grooming such as brushing teeth?: None  -   Eating meals?: None    AM-PAC Score:  Score: 20  Approx Degree of Impairment: 38.32%  Standardized Score (AM-PAC Scale): 42.03  CMS Modifier (G-Code): Bradly Amezcua

## 2018-09-24 NOTE — PLAN OF CARE
Problem: Patient Centered Care  Goal: Patient preferences are identified and integrated in the patient's plan of care  Interventions:  - What would you like us to know as we care for you?  \"I'm from [place]\"  - Provide timely, complete, and accurate infor Evaluate fluid balance  Outcome: Progressing    Goal: Maintains or returns to baseline bowel function  INTERVENTIONS:  - Assess bowel function  - Maintain adequate hydration with IV or PO as ordered and tolerated  - Evaluate effectiveness of GI medications patient reports new pain  - Anticipate increased pain with activity and pre-medicate as appropriate  Outcome: Progressing      Problem: RISK FOR INFECTION - ADULT  Goal: Absence of fever/infection during anticipated neutropenic period  INTERVENTIONS  - Mon system  Outcome: Progressing

## 2018-09-25 ENCOUNTER — TELEPHONE (OUTPATIENT)
Dept: INTERNAL MEDICINE CLINIC | Facility: CLINIC | Age: 78
End: 2018-09-25

## 2018-09-25 PROCEDURE — 99233 SBSQ HOSP IP/OBS HIGH 50: CPT | Performed by: HOSPITALIST

## 2018-09-25 RX ORDER — LEVOFLOXACIN 5 MG/ML
750 INJECTION, SOLUTION INTRAVENOUS
Status: DISCONTINUED | OUTPATIENT
Start: 2018-09-25 | End: 2018-09-26

## 2018-09-25 RX ORDER — METRONIDAZOLE 500 MG/100ML
500 INJECTION, SOLUTION INTRAVENOUS EVERY 8 HOURS
Status: DISCONTINUED | OUTPATIENT
Start: 2018-09-25 | End: 2018-09-26

## 2018-09-25 NOTE — PROGRESS NOTES
[facility] Pharmacy Note:  Renal Adjustment for levofloxacin [facility])    Emmanuel Dwyer is a 78year old female who has been prescribed levofloxacin (LEVAQUIN) 500 mg every 24 hrs.   CrCl is estimated creatinine clearance is 23.6 mL/min (based on SCr of 1.48 mg

## 2018-09-25 NOTE — PLAN OF CARE
Problem: Patient Centered Care  Goal: Patient preferences are identified and integrated in the patient's plan of care  Interventions:  - What would you like us to know as we care for you?  \"I'm from [place]\"  - Provide timely, complete, and accurate infor effectiveness of ordered antiemetic medications  - Provide nonpharmacologic comfort measures as appropriate  - Advance diet as tolerated, if ordered  - Obtain nutritional consult as needed  - Evaluate fluid balance  Outcome: Progressing    Goal: Maintains needed  Outcome: Progressing      Problem: PAIN - ADULT  Goal: Verbalizes/displays adequate comfort level or patient's stated pain goal  INTERVENTIONS:  - Encourage pt to monitor pain and request assistance  - Assess pain using appropriate pain scale  - Ad Identify discharge learning needs (meds, wound care, etc)  - Arrange for interpreters to assist at discharge as needed  - Consider post-discharge preferences of patient/family/discharge partner  - Complete POLST form as appropriate  - Assess patient's abil

## 2018-09-25 NOTE — TELEPHONE ENCOUNTER
Lily Sung / [facility] requesting office notes from , please fax #[phone]    Tasked to nursing

## 2018-09-25 NOTE — TELEPHONE ENCOUNTER
To Dr. Teofilo Guerra, vania: Pt will be discharged from  [address] [place] [address] - [address] [place] [address],[address] HH in [place]. 8/15/18 office visit note faxed to [facility]: [phone].

## 2018-09-25 NOTE — PROGRESS NOTES
[facility]    Progress Note    Chani Everett Patient Status:  Inpatient    11/3/1940 MRN [id number]   Location [facility] 5SW/SE Attending Brook Samuel,  [facility] Day # 6 PCP Onesimo Patrick MD       Subjective:     Pt aga tomorrow. - GI on consult, signed off  - stop zosyn and change to levaquin and flagyl due to pt continues sx of nausea, diarrhea, abd pain.   - soft low residue diet  - off IVF  - off lasix  - pain control - do not add pain meds as pt with hx of narcotic d

## 2018-09-26 VITALS
BODY MASS INDEX: 36.64 KG/M2 | HEIGHT: 60 IN | TEMPERATURE: 98 F | HEART RATE: 61 BPM | SYSTOLIC BLOOD PRESSURE: 175 MMHG | WEIGHT: 186.63 LBS | OXYGEN SATURATION: 100 % | DIASTOLIC BLOOD PRESSURE: 79 MMHG | RESPIRATION RATE: 18 BRPM

## 2018-09-26 PROCEDURE — 99239 HOSP IP/OBS DSCHRG MGMT >30: CPT | Performed by: HOSPITALIST

## 2018-09-26 RX ORDER — SODIUM CHLORIDE 9 MG/ML
INJECTION, SOLUTION INTRAVENOUS
Status: COMPLETED
Start: 2018-09-26 | End: 2018-09-26

## 2018-09-26 RX ORDER — LEVOFLOXACIN 750 MG/1
750 TABLET ORAL
Qty: 4 TABLET | Refills: 0 | Status: SHIPPED | OUTPATIENT
Start: 2018-09-26 | End: 2018-10-03

## 2018-09-26 RX ORDER — CLONIDINE HYDROCHLORIDE 0.1 MG/1
0.1 TABLET ORAL 2 TIMES DAILY
Qty: 60 TABLET | Refills: 0 | Status: SHIPPED | OUTPATIENT
Start: 2018-09-26 | End: 2018-10-26

## 2018-09-26 RX ORDER — POTASSIUM CHLORIDE 20 MEQ/1
40 TABLET, EXTENDED RELEASE ORAL ONCE
Status: COMPLETED | OUTPATIENT
Start: 2018-09-26 | End: 2018-09-26

## 2018-09-26 RX ORDER — AMLODIPINE BESYLATE 5 MG/1
5 TABLET ORAL DAILY
Qty: 30 TABLET | Refills: 0 | Status: SHIPPED | OUTPATIENT
Start: 2018-09-27 | End: 2018-10-27

## 2018-09-26 RX ORDER — METRONIDAZOLE 500 MG/1
500 TABLET ORAL 3 TIMES DAILY
Qty: 21 TABLET | Refills: 0 | Status: SHIPPED | OUTPATIENT
Start: 2018-09-26 | End: 2018-10-03

## 2018-09-26 NOTE — PLAN OF CARE
Problem: Patient Centered Care  Goal: Patient preferences are identified and integrated in the patient's plan of care  Interventions:  - What would you like us to know as we care for you?  \"I'm from [place]\"  - Provide timely, complete, and accurate infor consult as needed  - Evaluate fluid balance  Outcome: Adequate for Discharge    Goal: Maintains or returns to baseline bowel function  INTERVENTIONS:  - Assess bowel function  - Maintain adequate hydration with IV or PO as ordered and tolerated  - Evaluate pain goal  INTERVENTIONS:  - Encourage pt to monitor pain and request assistance  - Assess pain using appropriate pain scale  - Administer analgesics based on type and severity of pain and evaluate response  - Implement non-pharmacological measures as appr discharge as needed  - Consider post-discharge preferences of patient/family/discharge partner  - Complete POLST form as appropriate  - Assess patient's ability to be responsible for managing their own health  - Refer to Case Management Department for coor

## 2018-09-26 NOTE — PLAN OF CARE
DISCHARGE PLANNING    • Discharge to home or other facility with appropriate resources  Possible discharge   today Not Progressing          Diabetes/Glucose Control    • Glucose maintained within prescribed range  ACHS blood sugar Progressing        GASTRO

## 2018-09-26 NOTE — PLAN OF CARE
Assumed patient care at 1900. Patient a&ox3, forgetful. VSS- AFebrile. RA. C/o generalized pain- PRN pain medication given. Patient continent of B&B. Upx1 and walker. Isolation for CDiff- PO vanco. IV abx infusing as ordered. Plan to dc home tomorrow.  Rita

## 2018-09-26 NOTE — DISCHARGE SUMMARY
[facility]    Discharge Summary    Hugo Hurst Patient Status:  Inpatient    11/3/1940 MRN [id number]   Location [facility] 5SW/SE Attending Christoph Urbina MD   Hosp Day # 7 PCP Haresh Goodman MD     Date of Admission: disease ([facility].)     Borderline high cholesterol     Acute diverticulitis     Diarrhea, unspecified type      Reason for Admission:   Patient Active Problem List:     Essential hypertension     Hypercholesteremia     Osteoarthritis     DM (diabetes mellitus) [facility]  CHIEF COMPLAINT:  Acute sigmoid diverticulitis. HISTORY OF PRESENT ILLNESS:  The patient is a 17-year-old  female who came into the emergency department with lower abdominal pain and loose bowel movements for the last 2 weeks.   CBC an daily.   Stop taking on:  10/27/2018  Quantity:  30 tablet  Refills:  0     CloNIDine HCl 0.1 MG Tabs  Commonly known as:  CATAPRES      Take 1 tablet (0.1 mg total) by mouth 2 (two) times daily.    Stop taking on:  10/26/2018  Quantity:  60 tablet  Refills morning. Patient may take 40 mg in the morning if her legs are swollen.    Quantity:  40 tablet  Refills:  5     HYDROcodone-acetaminophen 5-325 MG Tabs  Commonly known as:  NORCO      Take 1 tablet by mouth every 4 (four) hours as needed for Pain (max 6 p

## 2018-09-26 NOTE — CM/SW NOTE
Met with patient for discharge planning and provided patient with important letter from Medicare. Patient will be returning to [facility] and requesting ride assist for Medicare.  Patient in agreement with Med[employer] car cost of  $ 30  Flat rate

## 2018-09-27 ENCOUNTER — TELEPHONE (OUTPATIENT)
Dept: INTERNAL MEDICINE CLINIC | Facility: CLINIC | Age: 78
End: 2018-09-27

## 2018-09-27 ENCOUNTER — HOSPITAL ENCOUNTER (OUTPATIENT)
Facility: HOSPITAL | Age: 78
Setting detail: OBSERVATION
Discharge: NURSING, REHAB OR LONG-TERM CARE | End: 2018-09-28
Attending: EMERGENCY MEDICINE | Admitting: HOSPITALIST
Payer: MEDICARE

## 2018-09-27 DIAGNOSIS — K57.92 ACUTE DIVERTICULITIS: ICD-10-CM

## 2018-09-27 DIAGNOSIS — N30.00 ACUTE CYSTITIS WITHOUT HEMATURIA: ICD-10-CM

## 2018-09-27 DIAGNOSIS — A04.72 COLITIS DUE TO CLOSTRIDIUM DIFFICILE: ICD-10-CM

## 2018-09-27 DIAGNOSIS — R53.81 PHYSICAL DECONDITIONING: Primary | ICD-10-CM

## 2018-09-27 PROCEDURE — 99219 INITIAL OBSERVATION CARE,LEVL II: CPT | Performed by: HOSPITALIST

## 2018-09-27 RX ORDER — DEXTROSE MONOHYDRATE 25 G/50ML
50 INJECTION, SOLUTION INTRAVENOUS AS NEEDED
Status: DISCONTINUED | OUTPATIENT
Start: 2018-09-27 | End: 2018-09-28

## 2018-09-27 RX ORDER — AMLODIPINE BESYLATE 5 MG/1
5 TABLET ORAL DAILY
Status: DISCONTINUED | OUTPATIENT
Start: 2018-09-28 | End: 2018-09-28

## 2018-09-27 RX ORDER — ONDANSETRON 4 MG/1
4 TABLET, FILM COATED ORAL EVERY 12 HOURS PRN
Qty: 60 TABLET | Refills: 5 | OUTPATIENT
Start: 2018-09-27 | End: 2019-01-01

## 2018-09-27 RX ORDER — SODIUM CHLORIDE 0.9 % (FLUSH) 0.9 %
3 SYRINGE (ML) INJECTION AS NEEDED
Status: DISCONTINUED | OUTPATIENT
Start: 2018-09-27 | End: 2018-09-28

## 2018-09-27 RX ORDER — HEPARIN SODIUM 5000 [USP'U]/ML
5000 INJECTION, SOLUTION INTRAVENOUS; SUBCUTANEOUS EVERY 12 HOURS SCHEDULED
Status: DISCONTINUED | OUTPATIENT
Start: 2018-09-27 | End: 2018-09-28

## 2018-09-27 RX ORDER — METRONIDAZOLE 500 MG/1
500 TABLET ORAL 3 TIMES DAILY
Status: DISCONTINUED | OUTPATIENT
Start: 2018-09-27 | End: 2018-09-28

## 2018-09-27 RX ORDER — LEVOTHYROXINE SODIUM 0.1 MG/1
100 TABLET ORAL
Status: DISCONTINUED | OUTPATIENT
Start: 2018-09-28 | End: 2018-09-28

## 2018-09-27 RX ORDER — ZOLPIDEM TARTRATE 5 MG/1
5 TABLET ORAL NIGHTLY PRN
Status: DISCONTINUED | OUTPATIENT
Start: 2018-09-27 | End: 2018-09-28

## 2018-09-27 RX ORDER — ALLOPURINOL 300 MG/1
300 TABLET ORAL DAILY
Status: DISCONTINUED | OUTPATIENT
Start: 2018-09-28 | End: 2018-09-28

## 2018-09-27 RX ORDER — ATORVASTATIN CALCIUM 40 MG/1
40 TABLET, FILM COATED ORAL NIGHTLY
Status: DISCONTINUED | OUTPATIENT
Start: 2018-09-27 | End: 2018-09-28

## 2018-09-27 RX ORDER — SODIUM CHLORIDE 9 MG/ML
INJECTION, SOLUTION INTRAVENOUS CONTINUOUS
Status: DISCONTINUED | OUTPATIENT
Start: 2018-09-27 | End: 2018-09-28

## 2018-09-27 RX ORDER — ASPIRIN 81 MG/1
81 TABLET, CHEWABLE ORAL DAILY
Status: DISCONTINUED | OUTPATIENT
Start: 2018-09-28 | End: 2018-09-28

## 2018-09-27 RX ORDER — CLONIDINE HYDROCHLORIDE 0.1 MG/1
0.1 TABLET ORAL 2 TIMES DAILY
Status: DISCONTINUED | OUTPATIENT
Start: 2018-09-27 | End: 2018-09-28

## 2018-09-27 RX ORDER — 0.9 % SODIUM CHLORIDE 0.9 %
10 VIAL (ML) INJECTION AS NEEDED
Status: DISCONTINUED | OUTPATIENT
Start: 2018-09-27 | End: 2018-09-28

## 2018-09-27 RX ORDER — HYDROCODONE BITARTRATE AND ACETAMINOPHEN 5; 325 MG/1; MG/1
1 TABLET ORAL EVERY 4 HOURS PRN
Qty: 30 TABLET | Refills: 0 | Status: CANCELLED | OUTPATIENT
Start: 2018-09-27

## 2018-09-27 RX ORDER — HYDROCODONE BITARTRATE AND ACETAMINOPHEN 5; 325 MG/1; MG/1
1 TABLET ORAL EVERY 4 HOURS PRN
Status: DISCONTINUED | OUTPATIENT
Start: 2018-09-27 | End: 2018-09-28

## 2018-09-27 RX ORDER — ONDANSETRON 2 MG/ML
4 INJECTION INTRAMUSCULAR; INTRAVENOUS EVERY 6 HOURS PRN
Status: DISCONTINUED | OUTPATIENT
Start: 2018-09-27 | End: 2018-09-28

## 2018-09-27 RX ORDER — ZOLPIDEM TARTRATE 5 MG/1
TABLET ORAL
Qty: 30 TABLET | Refills: 5 | OUTPATIENT
Start: 2018-09-27

## 2018-09-27 RX ORDER — PANTOPRAZOLE SODIUM 20 MG/1
20 TABLET, DELAYED RELEASE ORAL
Status: DISCONTINUED | OUTPATIENT
Start: 2018-09-28 | End: 2018-09-28

## 2018-09-27 NOTE — TELEPHONE ENCOUNTER
Pt. States she hasn't slept in 4 days she wants to know if she can take more than one Zolpidem at a time

## 2018-09-27 NOTE — TELEPHONE ENCOUNTER
To MD:  The above refill request is for a controlled substance. Please indicate yes or no to refill 30 days supply plus one refill.   If more refills are appropriate, please indicate quantity    Last refilled - 7/24/2018 # 30/0     Pts EC would like to pic

## 2018-09-27 NOTE — TELEPHONE ENCOUNTER
To Dr. Cristina Figueroa -  Zolpidem too early - refilled 9/4/18 - I am unable to refuse refill. To MD:  The above refill request is for a controlled substance. Please indicate yes or no to refill 30 days supply plus one refill.   If more refills are appropriate,

## 2018-09-28 VITALS
DIASTOLIC BLOOD PRESSURE: 53 MMHG | HEIGHT: 57 IN | OXYGEN SATURATION: 100 % | HEART RATE: 81 BPM | BODY MASS INDEX: 38.83 KG/M2 | SYSTOLIC BLOOD PRESSURE: 149 MMHG | WEIGHT: 180 LBS | RESPIRATION RATE: 18 BRPM | TEMPERATURE: 98 F

## 2018-09-28 PROCEDURE — 99217 OBSERVATION CARE DISCHARGE: CPT | Performed by: HOSPITALIST

## 2018-09-28 RX ORDER — GABAPENTIN 100 MG/1
100 CAPSULE ORAL NIGHTLY
Status: DISCONTINUED | OUTPATIENT
Start: 2018-09-28 | End: 2018-09-28

## 2018-09-28 RX ORDER — HYDROCODONE BITARTRATE AND ACETAMINOPHEN 5; 325 MG/1; MG/1
1 TABLET ORAL EVERY 4 HOURS PRN
Qty: 10 TABLET | Refills: 0 | Status: SHIPPED | OUTPATIENT
Start: 2018-09-28 | End: 2018-10-29

## 2018-09-28 RX ORDER — GABAPENTIN 100 MG/1
100 CAPSULE ORAL 3 TIMES DAILY
Refills: 0 | Status: SHIPPED | COMMUNITY
Start: 2018-09-28 | End: 2018-11-06

## 2018-09-28 NOTE — H&P
[facility]    PATIENT'S NAME: Mitesh    ATTENDING PHYSICIAN: Sandra Mckeon MD   PATIENT ACCOUNT#:   [de-identified]    LOCATION:  [address] [place] [address] RECORD #:   [id number]       YOB: 1940  ADMISSION DATE:       0 73% neutrophils. Urinalysis revealed 79 white cells and 7 red cells. The patient states that she has had some dysuria. The ED case manager looked into transfer to a skilled facility for the patient, but no beds were available tonight.   She was admitted She has had no recent fall. She smoked years ago but has quit 40 years ago. Does not drink alcohol or use drugs. 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     REVIEW OF SYSTEMS: Sodium was 138, potassium 3.9, chloride 106, CO2 of 23, BUN of 21 with a creatinine of 1.34, glucose of 133. GFR was 39. Liver function tests were essentially normal.  Magnesium was 2.2.   White count was 9.7 with a hemoglobin of 11.8 and a platelet coun 02:25:05  Beatriz Baez [id number]  PRECIOUS/

## 2018-09-28 NOTE — ED NOTES
Patient arrives to ER by EMS from [facility] with complaints of increased weakness. Patient states she was recently diagnosed with diverticulitis and cdiff and was being treated at the hospital and discharged approximately two days ago.  Patient states her sym

## 2018-09-28 NOTE — TELEPHONE ENCOUNTER
Noted.  When I went to this patient's record to arrange a refill for her medications I saw that she was currently in the emergency room at [facility].  At this point I feel we need to see how she does in the emergency room as she may well need to be

## 2018-09-28 NOTE — PLAN OF CARE
GASTROINTESTINAL - ADULT    • Maintains or returns to baseline bowel function Adequate for Discharge        Impaired Functional Mobility    • Achieve highest/safest level of mobility/gait Adequate for Discharge        MUSCULOSKELETAL - ADULT    • Return mo

## 2018-09-28 NOTE — TELEPHONE ENCOUNTER
Noted. Pt was admitted to [facility] 9/27.

## 2018-09-28 NOTE — CM/SW NOTE
Per ED CM notes, plan is for 1) [facility] 2) [facility]. Referrals have been sent. SW requested DON from [address]. Pt was just d/c 9/26 - pt lives at [facility] and was d/c w/ [facility]. 12:30PM: [facility] and [facility] have no beds available.  Pt requesting referrals

## 2018-09-28 NOTE — CM/SW NOTE
Met with patient as requested by Dr. Louann Zaidi for [facility] placement. Pt was just inpatient here therefore, has qualified stay. [facility] now, possibly tomorrow.   P

## 2018-09-28 NOTE — TELEPHONE ENCOUNTER
Noted. Please see my other message from today. Unfortunately I signed this encounter rather than forwarded to nursing. In the previous encounter I declined refilling the patient's medications which include Norco, hydrocodone and zolpidem.   At the time i

## 2018-09-28 NOTE — ED NOTES
Ambulated patient with walker to bathroom. Patient shuffled feet but kept balance while walking. Denies any complaints at this time. No diarrhea.

## 2018-09-28 NOTE — PHYSICAL THERAPY NOTE
PHYSICAL THERAPY EVALUATION - INPATIENT     Room Number: 559/559-A  Evaluation Date: 9/28/2018  Type of Evaluation: Initial   Physician Order: PT Eval and Treat    Presenting Problem: physical deconditioning  Reason for Therapy: Mobility Dysfunction and D Clostridium difficile      Past Medical History  Past Medical History:   Diagnosis Date   • Anemia    • Anesthesia complication    • Arthritis     feet   • BACK PAIN    • Back problem    • Urban's esophagus    • Blood disorder    • CHF (congestive heart Equipment: Rolling walker  Patient Regularly Uses: Reading glasses;Glasses    Prior Level of Mallie: Patient reports being independent in ADL's and ambulation with rolling walker prior to admission to the hospital.     SUBJECTIVE  \"I would like to g mechanics  Gait training  Transfer training    Patient End of Session: Up in chair;Needs met;Call light within reach;RN aware of session/findings; All patient questions and concerns addressed; Alarm set    CURRENT GOALS    Goals to be met by: 10/18/18  Humberto

## 2018-09-28 NOTE — PROGRESS NOTES
ADMISSION NOTE    78year old female recently dc after admission for diverticulitis and C diff  presents with weakness and continued diarrhea. Available medical records partially reviewed. Dictation to follow.     Castro St M.D.  9/27/2018

## 2018-09-28 NOTE — PLAN OF CARE
Problem: Patient/Family Goals  Goal: Patient/Family Long Term Goal  Patient's Long Term Goal: \"Get to Rehab\"    Interventions:  - Follow MD orders  - See additional Care Plan goals for specific interventions   Outcome: Progressing    Goal: Patient/Family ADULT  Goal: Verbalizes/displays adequate comfort level or patient's stated pain goal  INTERVENTIONS:  - Encourage pt to monitor pain and request assistance  - Assess pain using appropriate pain scale  - Administer analgesics based on type and severity of

## 2018-09-28 NOTE — DISCHARGE SUMMARY
[place] [facility]    Discharge Summary    Zain Mendoza Patient Status:  Observation    11/3/1940 MRN [id number]   Location [facility] 5SW/SE Attending Zahraa Toussaint MD   Hosp Day # 0 PCP Will Snell MD     Date of Admissio pain syndrome     Suicidal ideation     Back pain     Stage 3 chronic kidney disease ([facility] [address]     Borderline high cholesterol     Acute diverticulitis     Diarrhea, unspecified type     Physical deconditioning     Colitis due to Clostridium difficile      Cecilia Gottlieb rhythm  Abdominal: soft, non-tender; bowel sounds normal; no masses,  no organomegaly  Extremities: extremities normal, atraumatic, no cyanosis or edema  Psychiatric: calm        History of Present Illness:   Per Dr. Flaquito Perry: manager looked into transfer to a skilled facility for the patient, but no beds were available tonight.   She was admitted as an observation patient to the medical floor for further treatment and to establish a safe discharge plan.       Hospital Course: 300 MG Tabs  Commonly known as:  ZYLOPRIM      Take 1 tablet (300 mg total) by mouth daily. Quantity:  90 tablet  Refills:  3     AmLODIPine Besylate 5 MG Tabs  Commonly known as:  NORVASC      Take 1 tablet (5 mg total) by mouth daily.    Stop taking on: mouth every morning. Quantity:  90 capsule  Refills:  3     Ondansetron HCl 4 mg tablet  Commonly known as:  ZOFRAN      Take 1 tablet (4 mg total) by mouth every 12 (twelve) hours as needed for Nausea.    Quantity:  60 tablet  Refills:  5     Vancomycin

## 2018-09-28 NOTE — PLAN OF CARE
Problem: Patient/Family Goals  Goal: Patient/Family Long Term Goal  Patient's Long Term Goal: \"Get to Rehab\"    Interventions:  - Follow MD orders  - See additional Care Plan goals for specific interventions   Outcome: Progressing    Goal: Patient/Family patient's stated pain goal  INTERVENTIONS:  - Encourage pt to monitor pain and request assistance  - Assess pain using appropriate pain scale  - Administer analgesics based on type and severity of pain and evaluate response  - Implement non-pharmacological

## 2018-10-03 ENCOUNTER — TELEPHONE (OUTPATIENT)
Dept: INTERNAL MEDICINE CLINIC | Facility: CLINIC | Age: 78
End: 2018-10-03

## 2018-10-04 ENCOUNTER — TELEPHONE (OUTPATIENT)
Dept: INTERNAL MEDICINE CLINIC | Facility: CLINIC | Age: 78
End: 2018-10-04

## 2018-10-04 NOTE — TELEPHONE ENCOUNTER
Please call pt at [phone]  Pt is scheduled to see Dr Thelma Grace on Monday, 10/8/18 for post hospital appt. Pt is currently at rehab and is on antibiotic for ecoli and cdif.   Pt wanted to be sure Dr Thelma Grace was aware of this and wanted to confirm that it was ok for her to be seen in the office on Monday  Please call pt to confirm  Tasked to nursing

## 2018-10-05 NOTE — TELEPHONE ENCOUNTER
[facility] contacted patient to have her transferred to their facility. Patient has questions for Dr. Shaun Lundberg. Asking how the process works, she doesn't know what she is to do. Patient asking for Dr. Shaun Lundberg to call her. Please call [facility] at [phone] or [phone] Rm 17.   Routed high to clinical

## 2018-10-05 NOTE — TELEPHONE ENCOUNTER
Spoke with patient, discussed her transferred over to [facility] which will happen tomorrow morning, she is doing well, and I will follow when she gets to the facility. She verbalized understanding.

## 2018-10-19 ENCOUNTER — TELEPHONE (OUTPATIENT)
Dept: INTERNAL MEDICINE CLINIC | Facility: CLINIC | Age: 78
End: 2018-10-19

## 2018-10-19 RX ORDER — HYDROCODONE BITARTRATE AND ACETAMINOPHEN 5; 325 MG/1; MG/1
1 TABLET ORAL 2 TIMES DAILY PRN
Qty: 60 TABLET | Refills: 0 | Status: SHIPPED | OUTPATIENT
Start: 2018-10-19 | End: 2018-11-29

## 2018-10-19 NOTE — TELEPHONE ENCOUNTER
Please call Suellen Talbot at [facility] [phone]  They package pt medications  Requesting an alternative for Risaquad, very expensive  Can different probiotic be prescribed?   They are trying to get to pt tonight  Tasked to nursing

## 2018-10-19 NOTE — TELEPHONE ENCOUNTER
Noted.  I talked to list at [facility] pharmacy and given permission to switch the patient to a different probiotic such as Floragen 3.

## 2018-10-19 NOTE — TELEPHONE ENCOUNTER
Spoke with talia Torres. Reported that the patient was only sent home with 3 pills of Severy from [facility]. Per Pearl Torres, the patient does have enough Norco to get through the weekend at this time. Stated okay to review with Dr. Alvarez Mail on Monday.      Routed to Dr. Alvarez Mail

## 2018-10-19 NOTE — TELEPHONE ENCOUNTER
Per pharmacist, on patient's discharge papers from [facility] it states Norco 5/325mg. Patient needs a hard copy for that medication & will be out of meds tonight. Niece will call us later today to be sure it is ready before coming to pick it up.

## 2018-10-19 NOTE — TELEPHONE ENCOUNTER
Noted.  Discussed at length with patient's niece, Edith Osei. We discussed about the patient's recent stay at [facility] and also now that she is back at [facility].   I did discuss with Edith Osei that I will prescribe Norco 5/325 #60 that she can take 1 table

## 2018-10-19 NOTE — TELEPHONE ENCOUNTER
She should have been given the card of medications for the controlled substances and creams, including the [place] from [facility], the nurse Yudy Kellogg was going to order an actual fresh supply for her, she should have more than enough Norco at home, and I do not

## 2018-10-22 NOTE — TELEPHONE ENCOUNTER
Pt's sister Alecia Rg picked up script as requested by the pt's niece Arnold Thomas. Per Dr. Edy Aguillon., ok to give script to Alecia Rg.

## 2018-10-22 NOTE — TELEPHONE ENCOUNTER
Pt's talia is calling about [facility], stated she talked to Dr TOTH on Friday. But nothing is up front to be picked up. Please call talia at [phone] when ready for .

## 2018-10-22 NOTE — TELEPHONE ENCOUNTER
Patient is asking why Dr. Gianni Poole change dosage on [address] [facility] she was taking Norco 4 times a day, every four hours. Back pain is at a level 10. Tens unit that was used at [facility] did not help at all.   Feels worse since she left [facility]

## 2018-10-23 NOTE — TELEPHONE ENCOUNTER
Pt. Called stating Dr. Nhung Abdul. Left her a message regarding her [place] but the pt. Did not understand his message. She is asking if Dr. Nhung Abdul. Could call her on Wednesday at [phone].

## 2018-10-23 NOTE — TELEPHONE ENCOUNTER
.  Patient's sister Anne Holstein came to  the prescription today. Gave permission to my front office staff to give the prescription to Anne Holstein as Anne Holstein was doing this on instructions from both Ms. Lester Nur and also Ms. Franks's niece, Danna Jensen.   I spoke to Joel Castro

## 2018-10-24 ENCOUNTER — APPOINTMENT (OUTPATIENT)
Dept: GENERAL RADIOLOGY | Facility: HOSPITAL | Age: 78
End: 2018-10-24
Payer: MEDICARE

## 2018-10-24 ENCOUNTER — HOSPITAL ENCOUNTER (EMERGENCY)
Facility: HOSPITAL | Age: 78
Discharge: HOME | End: 2018-10-24
Attending: EMERGENCY MEDICINE
Payer: MEDICARE

## 2018-10-24 ENCOUNTER — APPOINTMENT (OUTPATIENT)
Dept: CT IMAGING | Facility: HOSPITAL | Age: 78
End: 2018-10-24
Attending: EMERGENCY MEDICINE
Payer: MEDICARE

## 2018-10-24 ENCOUNTER — TELEPHONE (OUTPATIENT)
Dept: INTERNAL MEDICINE CLINIC | Facility: CLINIC | Age: 78
End: 2018-10-24

## 2018-10-24 ENCOUNTER — APPOINTMENT (OUTPATIENT)
Dept: CT IMAGING | Facility: HOSPITAL | Age: 78
End: 2018-10-24
Payer: MEDICARE

## 2018-10-24 VITALS
BODY MASS INDEX: 38.83 KG/M2 | SYSTOLIC BLOOD PRESSURE: 130 MMHG | RESPIRATION RATE: 21 BRPM | TEMPERATURE: 99 F | OXYGEN SATURATION: 94 % | HEIGHT: 57 IN | HEART RATE: 73 BPM | WEIGHT: 180 LBS | DIASTOLIC BLOOD PRESSURE: 66 MMHG
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
## 2018-10-24 PROCEDURE — 85025 COMPLETE CBC W/AUTO DIFF WBC: CPT | Performed by: EMERGENCY MEDICINE

## 2018-10-24 PROCEDURE — 83880 ASSAY OF NATRIURETIC PEPTIDE: CPT | Performed by: EMERGENCY MEDICINE

## 2018-10-24 PROCEDURE — 36415 COLL VENOUS BLD VENIPUNCTURE: CPT

## 2018-10-24 PROCEDURE — 70450 CT HEAD/BRAIN W/O DYE: CPT | Performed by: EMERGENCY MEDICINE

## 2018-10-24 PROCEDURE — 93010 ELECTROCARDIOGRAM REPORT: CPT | Performed by: INTERNAL MEDICINE

## 2018-10-24 PROCEDURE — 72072 X-RAY EXAM THORAC SPINE 3VWS: CPT

## 2018-10-24 PROCEDURE — 81001 URINALYSIS AUTO W/SCOPE: CPT | Performed by: EMERGENCY MEDICINE

## 2018-10-24 PROCEDURE — 72125 CT NECK SPINE W/O DYE: CPT | Performed by: EMERGENCY MEDICINE

## 2018-10-24 PROCEDURE — 93005 ELECTROCARDIOGRAM TRACING: CPT

## 2018-10-24 PROCEDURE — 84484 ASSAY OF TROPONIN QUANT: CPT | Performed by: EMERGENCY MEDICINE

## 2018-10-24 PROCEDURE — 99285 EMERGENCY DEPT VISIT HI MDM: CPT

## 2018-10-24 PROCEDURE — 72110 X-RAY EXAM L-2 SPINE 4/>VWS: CPT

## 2018-10-24 PROCEDURE — 80048 BASIC METABOLIC PNL TOTAL CA: CPT | Performed by: EMERGENCY MEDICINE

## 2018-10-24 NOTE — TELEPHONE ENCOUNTER
Pt fell last night, hoping to be seen today by Dr Leno Bradshaw  No openings in schedule  Tasked to nursing

## 2018-10-24 NOTE — ED NOTES
PT safe to DC home per MD. Wero Mercado to dress self. DC teaching done, pt verbalizes understanding. Wheeled to exit.

## 2018-10-24 NOTE — ED PROVIDER NOTES
Patient Seen in: [facility] Emergency Department    History   Patient presents with:  Fall (musculoskeletal, neurologic)    Stated Complaint:     HPI    78year old female complains that she accidentally fell backwards last night and landed on her b HYSTERECTOMY     • KNEE REPLACEMENT SURGERY  2006   • LAPAROSCOPIC CHOLECYSTECTOMY N/A 8/25/2017    Performed by Ronel Yusuf MD at [address] [place] [address] MAIN OR   • REMOVAL GALLBLADDER     • SPINE SURGERY PROCEDURE UNLISTED     • TOTAL HIP REPLACEMENT     • TOTAL KNE • Heart Disease Sister    • Hypertension Brother    • Melanoma Other    • Cancer Other    • Stroke Other    • Heart Disease Other    • Diabetes Other    • Eye Problems Neg        Social History    Tobacco Use      Smoking status: Former Smoker        William Dominguezs CBC W/ DIFFERENTIAL[[id number]]                              Final result                 Please view results for these tests on the individual orders.    URINALYSIS WITH CULTURE REFLEX   RAINBOW DRAW BLUE   RAINBOW DRAW LAVENDER   RAINBOW DRAW DA apparent. There is no space-occupying lesion, mass effect,     or shift of midline structures. The gray-white matter junction is     preserved and bilaterally symmetric in     appearance.      Scattered periventricular and subcortical white matter hypodensi MD on 10/24/2018 at 14:36               XR ROUTINE THORACIC SPINE (3 VIEWS) (CPT=72072)   Final Result    PROCEDURE: XR ROUTINE THORACIC SPINE (3 VIEWS) (CPT=72072)         COMPARISON: [facility], XR ROUTINE THORACIC SPINE (3     VIEWS) (CP Bony demineralization. DISC SPACES: Instrumented thoracolumbar posterior spinal fusion with     contiguous instrumented sacroiliac joint fusion. Solid bony posterolateral     lumbar fusion. Interbody cage for fusion at L2-3.     SACROILIAC JOINTS: Normal care. Any diagnostic tests performed here were reviewed and questions answered. Diagnosis, care plan, treatment options, and a number of associated acute management issues were discussed with patient. Further evaluation and treatment will be required.  The

## 2018-10-24 NOTE — TELEPHONE ENCOUNTER
To DR TOTH   Pt calling ofc - fell last evening at home   No LOC  Did not hit head   Sore all over   Wanting to be checked out by DR TOTH   Per DR TOTH  Pt should go to ER to be evaluated and if needs any tests or xrays - can be done at ER

## 2018-10-26 ENCOUNTER — TELEPHONE (OUTPATIENT)
Dept: INTERNAL MEDICINE CLINIC | Facility: CLINIC | Age: 78
End: 2018-10-26

## 2018-10-29 ENCOUNTER — OFFICE VISIT (OUTPATIENT)
Dept: INTERNAL MEDICINE CLINIC | Facility: CLINIC | Age: 78
End: 2018-10-29
Payer: MEDICARE

## 2018-10-29 VITALS
DIASTOLIC BLOOD PRESSURE: 66 MMHG | SYSTOLIC BLOOD PRESSURE: 140 MMHG | BODY MASS INDEX: 40.91 KG/M2 | OXYGEN SATURATION: 98 % | WEIGHT: 189.63 LBS | HEIGHT: 57 IN | TEMPERATURE: 98 F | HEART RATE: 60 BPM

## 2018-10-29 DIAGNOSIS — E78.00 HYPERCHOLESTEROLEMIA: ICD-10-CM

## 2018-10-29 DIAGNOSIS — R53.83 FATIGUE, UNSPECIFIED TYPE: Primary | ICD-10-CM

## 2018-10-29 DIAGNOSIS — I63.9 CEREBROVASCULAR ACCIDENT (CVA), UNSPECIFIED MECHANISM (HCC): ICD-10-CM

## 2018-10-29 DIAGNOSIS — G89.29 CHRONIC BILATERAL LOW BACK PAIN WITHOUT SCIATICA: ICD-10-CM

## 2018-10-29 DIAGNOSIS — K22.70 BARRETT'S ESOPHAGUS WITHOUT DYSPLASIA: ICD-10-CM

## 2018-10-29 DIAGNOSIS — K21.9 GASTROESOPHAGEAL REFLUX DISEASE, ESOPHAGITIS PRESENCE NOT SPECIFIED: ICD-10-CM

## 2018-10-29 DIAGNOSIS — M15.9 PRIMARY OSTEOARTHRITIS INVOLVING MULTIPLE JOINTS: ICD-10-CM

## 2018-10-29 DIAGNOSIS — G89.4 CHRONIC PAIN SYNDROME: ICD-10-CM

## 2018-10-29 DIAGNOSIS — E11.9 TYPE 2 DIABETES MELLITUS WITHOUT COMPLICATION, WITHOUT LONG-TERM CURRENT USE OF INSULIN (HCC): ICD-10-CM

## 2018-10-29 DIAGNOSIS — I10 ESSENTIAL HYPERTENSION: ICD-10-CM

## 2018-10-29 DIAGNOSIS — M54.50 CHRONIC BILATERAL LOW BACK PAIN WITHOUT SCIATICA: ICD-10-CM

## 2018-10-29 DIAGNOSIS — E03.9 HYPOTHYROIDISM, UNSPECIFIED TYPE: ICD-10-CM

## 2018-10-29 DIAGNOSIS — M79.7 FIBROMYALGIA: ICD-10-CM

## 2018-10-29 PROCEDURE — 99214 OFFICE O/P EST MOD 30 MIN: CPT | Performed by: INTERNAL MEDICINE

## 2018-10-29 PROCEDURE — G0463 HOSPITAL OUTPT CLINIC VISIT: HCPCS | Performed by: INTERNAL MEDICINE

## 2018-10-29 RX ORDER — HYDROCODONE BITARTRATE AND ACETAMINOPHEN 5; 325 MG/1; MG/1
1 TABLET ORAL 2 TIMES DAILY PRN
Qty: 60 TABLET | Refills: 0 | Status: CANCELLED | OUTPATIENT
Start: 2018-10-29

## 2018-10-29 RX ORDER — HYDROCODONE BITARTRATE AND ACETAMINOPHEN 5; 325 MG/1; MG/1
1 TABLET ORAL EVERY 6 HOURS PRN
Qty: 120 TABLET | Refills: 0 | Status: SHIPPED | OUTPATIENT
Start: 2018-10-29 | End: 2018-11-19

## 2018-10-29 RX ORDER — ZOLPIDEM TARTRATE 5 MG/1
5 TABLET ORAL NIGHTLY PRN
COMMUNITY
End: 2018-11-29

## 2018-10-29 NOTE — PATIENT INSTRUCTIONS
1.  Patient is to continue her current diet, medication and activity. 2.  I will increase the patient's hydrocodone to 5/325 1 tablet orally 4 times daily. I will give her a prescription for 120 of these should be good for a month.   3.  I have given the

## 2018-10-29 NOTE — PROGRESS NOTES
Jacobo Patino is a 78year old female. Patient presents with: Follow - Up: Pt fell last week. Medication Request: pended. Pt would like to discuss Norco.  Fatigue  Arthritis  Hypertension    HPI:   Patient presents with:   Follow - Up: Pt fell last week Rfl: 0   metoprolol Tartrate 25 MG Oral Tab Take 1 tablet (25 mg total) by mouth 2x Daily(Beta Blocker). Disp: 60 tablet Rfl: 0   furosemide 20 MG Oral Tab 20 mg tablets    #40   Sig-patient is to take 20 mg orally every morning.   Patient may take 40 mg i complication, not stated as uncontrolled    • Unspecified essential hypertension       Social History:  Social History    Tobacco Use      Smoking status: Former Smoker        Quit date: 1970        Years since quittin.8      Smokeless tobacco: Ne dysplasia  Chronic bilateral low back pain without sciatica  Chronic pain syndrome  Hypercholesterolemia    Patient continues to have numerous problems. Patient is weak. Patient feels that she has to walk great distances at the [address] [place] [address] Street.   She has noted t

## 2018-10-31 ENCOUNTER — TELEPHONE (OUTPATIENT)
Dept: INTERNAL MEDICINE CLINIC | Facility: CLINIC | Age: 78
End: 2018-10-31

## 2018-10-31 NOTE — TELEPHONE ENCOUNTER
Pt. Has a nurse visit through [facility]. Pt. Thinks it was a waste of time pt. Is having trouble getting out of the bed to ise the bathroom. She states the nurse told her it's not her job to help her with assistance to get out of the bed. Pt.  Wants PT servi

## 2018-11-01 ENCOUNTER — TELEPHONE (OUTPATIENT)
Dept: INTERNAL MEDICINE CLINIC | Facility: CLINIC | Age: 78
End: 2018-11-01

## 2018-11-01 NOTE — TELEPHONE ENCOUNTER
Called [facility] , spoke with PT supervisor Annette Patrick and relayed DR. TOTH message - verbalized understanding and will arrange for Pt to see patient

## 2018-11-01 NOTE — TELEPHONE ENCOUNTER
Anushka is asking for an order for [facility] to provide a  for the patient. At discharge from [facility], it was mentioned that this would be done, but now the niece is being told she needed to request it form the PCP.

## 2018-11-01 NOTE — TELEPHONE ENCOUNTER
Noted. Please call [facility] and asked them to have physical therapy see the patient for evaluation due to her weakness, problem with balance and ambulation and problem with transfers. .  Patient is already being seen by the visiting nurse.

## 2018-11-01 NOTE — TELEPHONE ENCOUNTER
Called residential and spoke with [facility] from the PT dept. She took verbal order for  services. She will be faxing an order form for signature to be signed by pcp. Called niece and notified.

## 2018-11-01 NOTE — TELEPHONE ENCOUNTER
Lemuel/[facility] PT requesting verbal order to move OP assessment to next week.   Tasked to nursing

## 2018-11-01 NOTE — TELEPHONE ENCOUNTER
Noted.  I have reviewed the message below. The answer is yes. Patient is an excellent candidate to have a  assist in patient's care and management. Please ask [facility] to have a  evaluate and assist Ms. Gideon Spencer

## 2018-11-02 ENCOUNTER — TELEPHONE (OUTPATIENT)
Dept: INTERNAL MEDICINE CLINIC | Facility: CLINIC | Age: 78
End: 2018-11-02

## 2018-11-02 NOTE — TELEPHONE ENCOUNTER
Chrissy Heaton / [facility] calling to let Dr TOTH know she missed her visit today, she didn't answer door   Tasked to nursing

## 2018-11-02 NOTE — TELEPHONE ENCOUNTER
Noted.  Ignacia Montes to call Lemuel from [facility] physical therapy and let her know that is fine to see the patient next week. I will route this to nursing to call her back tomorrow.   Thank you!!

## 2018-11-02 NOTE — TELEPHONE ENCOUNTER
Sydney Hdez from [facility] and relayed Dr. Lulu Henao message - verbalized understanding

## 2018-11-05 ENCOUNTER — TELEPHONE (OUTPATIENT)
Dept: INTERNAL MEDICINE CLINIC | Facility: CLINIC | Age: 78
End: 2018-11-05

## 2018-11-06 NOTE — TELEPHONE ENCOUNTER
Unable to reach [facility]. No answer, no VM. Called [facility] main line. Iraida Reed, who was able to take the verbal order to start [facility] OT on this patient. She states she will pass along to [facility] Pat.

## 2018-11-06 NOTE — TELEPHONE ENCOUNTER
Noted. Please call [facility] and tell them it is fine with me to add occupational therapy to the patient's home care. I will route this to nursing to call residential on Tuesday.   Thank you!!

## 2018-11-07 ENCOUNTER — TELEPHONE (OUTPATIENT)
Dept: INTERNAL MEDICINE CLINIC | Facility: CLINIC | Age: 78
End: 2018-11-07

## 2018-11-07 NOTE — TELEPHONE ENCOUNTER
[facility] is requesting a refill Clonidine HCL 0.1 mg tab, qty 60   Take 1 tab by mouth twice daily  Dr Jose Thompson is the original prescriber    Tasked to rx

## 2018-11-07 NOTE — TELEPHONE ENCOUNTER
Dr. Daniele Morin please review requests as manydifferent providers have placed orders for Pt, thanks

## 2018-11-08 ENCOUNTER — TELEPHONE (OUTPATIENT)
Dept: INTERNAL MEDICINE CLINIC | Facility: CLINIC | Age: 78
End: 2018-11-08

## 2018-11-10 RX ORDER — ASPIRIN 81 MG/1
TABLET ORAL
Qty: 90 TABLET | Refills: 3 | Status: SHIPPED | OUTPATIENT
Start: 2018-11-10

## 2018-11-10 RX ORDER — GABAPENTIN 100 MG/1
CAPSULE ORAL
Qty: 90 CAPSULE | Refills: 6 | Status: ON HOLD | OUTPATIENT
Start: 2018-11-10 | End: 2019-02-01

## 2018-11-10 RX ORDER — CHOLECALCIFEROL (VITAMIN D3) 25 MCG
CAPSULE ORAL
Qty: 90 CAPSULE | Refills: 3 | Status: ON HOLD | OUTPATIENT
Start: 2018-11-10 | End: 2019-02-01

## 2018-11-10 RX ORDER — AMLODIPINE BESYLATE 10 MG/1
TABLET ORAL
Qty: 30 TABLET | Refills: 6 | Status: ON HOLD | OUTPATIENT
Start: 2018-11-10 | End: 2019-02-01

## 2018-11-10 RX ORDER — CLONIDINE HYDROCHLORIDE 0.1 MG/1
0.1 TABLET ORAL 2 TIMES DAILY
Qty: 60 TABLET | Refills: 6 | Status: ON HOLD | OUTPATIENT
Start: 2018-11-10 | End: 2019-02-01

## 2018-11-10 RX ORDER — FUROSEMIDE 20 MG/1
TABLET ORAL
Qty: 60 TABLET | Refills: 6 | Status: ON HOLD | OUTPATIENT
Start: 2018-11-10 | End: 2019-02-01

## 2018-11-10 RX ORDER — L.ACIDOPH/B.ANIMALIS/B.LONGUM 15B CELL
CAPSULE ORAL
Qty: 60 CAPSULE | OUTPATIENT
Start: 2018-11-10

## 2018-11-12 NOTE — TELEPHONE ENCOUNTER
Cristi called again. She needs  A verbal OK to move occupational therapy to this week.        Tasked high to Nursing

## 2018-11-13 NOTE — TELEPHONE ENCOUNTER
Telephone call to Radha Ryder from [facility], and permission given for her to see that Ms. Azra Condon tomorrow as requested. Venita Mata

## 2018-11-19 ENCOUNTER — TELEPHONE (OUTPATIENT)
Dept: INTERNAL MEDICINE CLINIC | Facility: CLINIC | Age: 78
End: 2018-11-19

## 2018-11-19 RX ORDER — HYDROCODONE BITARTRATE AND ACETAMINOPHEN 5; 325 MG/1; MG/1
TABLET ORAL
Qty: 120 TABLET | Refills: 0 | Status: SHIPPED | OUTPATIENT
Start: 2018-11-19 | End: 2018-12-27

## 2018-11-20 NOTE — TELEPHONE ENCOUNTER
Noted.  I have printed out and signed a new prescription for the patient's medication as requested. I have left the prescription in an envelope with the patient's name on my nurses desk.   Please notify the patient that the prescription is available to be

## 2018-11-20 NOTE — TELEPHONE ENCOUNTER
Script placed at  for patient pickup. I relayed MDs message. She states she does not take more than 4 pills in one day.

## 2018-11-21 ENCOUNTER — APPOINTMENT (OUTPATIENT)
Dept: CT IMAGING | Facility: HOSPITAL | Age: 78
End: 2018-11-21
Attending: EMERGENCY MEDICINE
Payer: MEDICARE

## 2018-11-21 ENCOUNTER — APPOINTMENT (OUTPATIENT)
Dept: GENERAL RADIOLOGY | Facility: HOSPITAL | Age: 78
End: 2018-11-21
Attending: EMERGENCY MEDICINE
Payer: MEDICARE

## 2018-11-21 ENCOUNTER — HOSPITAL ENCOUNTER (EMERGENCY)
Facility: HOSPITAL | Age: 78
Discharge: HOME | End: 2018-11-21
Attending: EMERGENCY MEDICINE
Payer: MEDICARE

## 2018-11-21 VITALS
BODY MASS INDEX: 41 KG/M2 | DIASTOLIC BLOOD PRESSURE: 94 MMHG | RESPIRATION RATE: 16 BRPM | OXYGEN SATURATION: 98 % | SYSTOLIC BLOOD PRESSURE: 188 MMHG | WEIGHT: 189.63 LBS | TEMPERATURE: 99 F | HEART RATE: 91 BPM

## 2018-11-21 DIAGNOSIS — S06.0X0A CONCUSSION WITHOUT LOSS OF CONSCIOUSNESS, INITIAL ENCOUNTER: ICD-10-CM

## 2018-11-21 DIAGNOSIS — M54.50 ACUTE MIDLINE LOW BACK PAIN WITHOUT SCIATICA: ICD-10-CM
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
## 2018-11-21 PROCEDURE — 72125 CT NECK SPINE W/O DYE: CPT | Performed by: EMERGENCY MEDICINE

## 2018-11-21 PROCEDURE — 70450 CT HEAD/BRAIN W/O DYE: CPT | Performed by: EMERGENCY MEDICINE

## 2018-11-21 PROCEDURE — 99284 EMERGENCY DEPT VISIT MOD MDM: CPT

## 2018-11-21 PROCEDURE — 72110 X-RAY EXAM L-2 SPINE 4/>VWS: CPT | Performed by: EMERGENCY MEDICINE

## 2018-11-21 RX ORDER — HYDROCODONE BITARTRATE AND ACETAMINOPHEN 5; 325 MG/1; MG/1
1 TABLET ORAL ONCE
Status: COMPLETED | OUTPATIENT
Start: 2018-11-21 | End: 2018-11-21

## 2018-11-21 NOTE — TELEPHONE ENCOUNTER
Telephone call to patient and message left. I told the patient that if she continues to be in much pain she should go the emergency room for evaluation.   Also in view of the fact that she hit her head there is any question she is to go to the emergency ro

## 2018-11-21 NOTE — ED PROVIDER NOTES
Patient Seen in: [facility] Emergency Department    History   Patient presents with:  Fall (musculoskeletal, neurologic)    Stated Complaint:     HPI    51-year-old female presents for evaluation after a fall.   Patient reports mechanical fall last Performed by Davina Drew MD at [facility] ENDOSCOPY   • HIP REPLACEMENT SURGERY Left    • HYSTERECTOMY     • KNEE REPLACEMENT SURGERY  2006   • LAPAROSCOPIC CHOLECYSTECTOMY N/A 8/25/2017    Performed by Natalie Graff MD at [facility] Musculoskeletal: Normal range of motion. Neurological: She is alert and oriented to person, place, and time. No cranial nerve deficit. No focal deficit, 5/5 throughout, sensation intact throughout, clear speech   Skin: Skin is warm and dry.  No rash n supportive care, outpatient follow-up, return precautions. Verbalizes understanding of and agreement with plan.     Disposition and Plan     Clinical Impression:  Fall, initial encounter  (primary encounter diagnosis)  Acute midline low back pain without s

## 2018-11-21 NOTE — TELEPHONE ENCOUNTER
Sister Naga Solorzano picked up the script yesterday. Gabe Vargas not on HIPPA to call back. I called Stacy Julian to let her know that I cannot call Gabe Vargas back. Stacy Julian says she will let Gabe Vargas know and she will update her HIPPA communication form next time she is in the office.  Stacy Julian

## 2018-11-21 NOTE — TELEPHONE ENCOUNTER
Pascual Arriola is calling to find out why Elizabeth Mao is getting a refill on Norco so soon  Please advise ph.  # [phone]  Routed to Rx

## 2018-11-21 NOTE — ED INITIAL ASSESSMENT (HPI)
C/o back pain since yesterday s/p fall. States she had mechanical fall and hit head, c/o back pain as well. Denies loc.

## 2018-11-22 NOTE — ED NOTES
[facility] EMS in , report given. Per EMS pt can be downgrade to [address] [address] since pt is ambulatory with walker.

## 2018-11-23 ENCOUNTER — TELEPHONE (OUTPATIENT)
Dept: INTERNAL MEDICINE CLINIC | Facility: CLINIC | Age: 78
End: 2018-11-23

## 2018-11-23 NOTE — TELEPHONE ENCOUNTER
NOted.  Please call Gold Jarrett and tell her that it is fine with me to extend home PT for 2 more weeks. I will route this to nursing.   Thank you!!

## 2018-11-27 ENCOUNTER — TELEPHONE (OUTPATIENT)
Dept: INTERNAL MEDICINE CLINIC | Facility: CLINIC | Age: 78
End: 2018-11-27

## 2018-11-29 ENCOUNTER — OFFICE VISIT (OUTPATIENT)
Dept: INTERNAL MEDICINE CLINIC | Facility: CLINIC | Age: 78
End: 2018-11-29
Payer: MEDICARE

## 2018-11-29 VITALS
DIASTOLIC BLOOD PRESSURE: 80 MMHG | BODY MASS INDEX: 42.24 KG/M2 | SYSTOLIC BLOOD PRESSURE: 160 MMHG | HEART RATE: 60 BPM | TEMPERATURE: 98 F | HEIGHT: 57 IN | OXYGEN SATURATION: 98 % | WEIGHT: 195.81 LBS

## 2018-11-29 DIAGNOSIS — R53.83 FATIGUE, UNSPECIFIED TYPE: Primary | ICD-10-CM

## 2018-11-29 DIAGNOSIS — I63.9 CEREBROVASCULAR ACCIDENT (CVA), UNSPECIFIED MECHANISM (HCC): ICD-10-CM

## 2018-11-29 DIAGNOSIS — E11.9 TYPE 2 DIABETES MELLITUS WITHOUT COMPLICATION, WITHOUT LONG-TERM CURRENT USE OF INSULIN (HCC): ICD-10-CM

## 2018-11-29 DIAGNOSIS — I87.2 VENOUS INSUFFICIENCY: ICD-10-CM

## 2018-11-29 DIAGNOSIS — E78.1 HYPERTRIGLYCERIDEMIA: ICD-10-CM

## 2018-11-29 DIAGNOSIS — M96.1 FAILED BACK SURGICAL SYNDROME: ICD-10-CM

## 2018-11-29 DIAGNOSIS — I10 ESSENTIAL HYPERTENSION: ICD-10-CM

## 2018-11-29 DIAGNOSIS — N18.30 STAGE 3 CHRONIC KIDNEY DISEASE (HCC): ICD-10-CM

## 2018-11-29 DIAGNOSIS — K22.70 BARRETT'S ESOPHAGUS WITHOUT DYSPLASIA: ICD-10-CM

## 2018-11-29 DIAGNOSIS — M79.7 FIBROMYALGIA: ICD-10-CM

## 2018-11-29 DIAGNOSIS — Z98.890 HISTORY OF BACK SURGERY: ICD-10-CM

## 2018-11-29 DIAGNOSIS — M15.9 PRIMARY OSTEOARTHRITIS INVOLVING MULTIPLE JOINTS: ICD-10-CM

## 2018-11-29 DIAGNOSIS — G89.4 CHRONIC PAIN SYNDROME: ICD-10-CM

## 2018-11-29 DIAGNOSIS — K21.9 GASTROESOPHAGEAL REFLUX DISEASE, ESOPHAGITIS PRESENCE NOT SPECIFIED: ICD-10-CM

## 2018-11-29 DIAGNOSIS — E78.00 HYPERCHOLESTEREMIA: ICD-10-CM

## 2018-11-29 PROCEDURE — 99214 OFFICE O/P EST MOD 30 MIN: CPT | Performed by: INTERNAL MEDICINE

## 2018-11-29 PROCEDURE — G0463 HOSPITAL OUTPT CLINIC VISIT: HCPCS | Performed by: INTERNAL MEDICINE

## 2018-11-29 RX ORDER — ZOLPIDEM TARTRATE 5 MG/1
5 TABLET ORAL NIGHTLY PRN
Qty: 30 TABLET | Refills: 5 | Status: ON HOLD
Start: 2018-11-29 | End: 2019-02-01

## 2018-11-29 NOTE — PROGRESS NOTES
Natividad Arana is a 70year old female. Patient presents with: Follow - Up: 1 month. Receiving [facility] services through residential for RN and PT services. Fatigue  Arthritis  Hypertension    HPI:   Patient presents with: Follow - Up: 1 month.  Receiving [facility] THREE TIMES DAILY Disp: 90 capsule Rfl: 6   VITAMIN D HIGH POTENCY 1000 units Oral Cap TAKE 1 CAPSULE BY MOUTH DAILY Disp: 90 capsule Rfl: 3   ASPIRIN ADULT LOW STRENGTH 81 MG Oral Tab EC TAKE 1 TABLET BY MOUTH DAILY Disp: 90 tablet Rfl: 3   omeprazole 20 REVIEW OF SYSTEMS:   GENERAL HEALTH: feels well otherwise  RESPIRATORY:No cough or SOB  CARDIOVASCULAR: No chest pain  GI: No abdominal pain, nausea, vomiting, diarrhea, or constipation  :No Urinary complaints  EXT:No complaints of pain or swelling i medication for this. She is taking Norco 5/325 1 tablet 4 times a day as necessary. 3. Cerebrovascular accident (CVA), unspecified mechanism ([address]  Doing well.  CPM.    4. Essential hypertension  Patient's blood pressure is slightly elevated today.   [facility]

## 2018-11-29 NOTE — PATIENT INSTRUCTIONS
1.  Patient is to continue her current diet, medication and activity. 2.  Patient is to take her Lasix/furosemide 2 tablets / 40 mg orally daily. 3.  Patient is to check her medications at home and call us back if there are any discrepancies.   4.  I will

## 2018-11-30 NOTE — TELEPHONE ENCOUNTER
Noted. Please call Mariah Winter who is  OT with [facility] and tell her it is fine with me to give the patient 2 additional visits. I will route this to nursing.   Thank you!!

## 2018-12-06 ENCOUNTER — TELEPHONE (OUTPATIENT)
Dept: INTERNAL MEDICINE CLINIC | Facility: CLINIC | Age: 78
End: 2018-12-06

## 2018-12-06 NOTE — TELEPHONE ENCOUNTER
Okay to [facility] Okay to tell her that I have received the forms but has not had a chance to complete them yet. I will plan to complete the forms in the near future. I will route this to nursing.   Thank you!!

## 2018-12-06 NOTE — TELEPHONE ENCOUNTER
Marylu Lawrence/PT/[facility] called  Would like to confirm that Dr Martin Foy rec'd request/FAX from [facility] for power wheel chair for pt?   Please call to discuss/advise  Tasked to nursing

## 2018-12-10 ENCOUNTER — TELEPHONE (OUTPATIENT)
Dept: INTERNAL MEDICINE CLINIC | Facility: CLINIC | Age: 78
End: 2018-12-10

## 2018-12-10 NOTE — TELEPHONE ENCOUNTER
Gay Kramer @ [facility]:   Pt said that Dr TOTH put orders for blood work in the system, but pt was wondering if [facility] could have those orders so pt wouldn't have to leave her home to get them done. Best call back is [phone].

## 2018-12-11 ENCOUNTER — TELEPHONE (OUTPATIENT)
Dept: INTERNAL MEDICINE CLINIC | Facility: CLINIC | Age: 78
End: 2018-12-11

## 2018-12-11 RX ORDER — LEVOTHYROXINE SODIUM 0.1 MG/1
TABLET ORAL
Qty: 30 TABLET | Refills: 5 | Status: ON HOLD | OUTPATIENT
Start: 2018-12-11 | End: 2019-02-01

## 2018-12-11 NOTE — TELEPHONE ENCOUNTER
To Dr. Franklin Harrell - see below - pt is living in independent senior living which doesn't provide much assistance. Pt always sliding out of bed, sleeps too close to edge - which is what happened last night. She does have bed rails.   She now has call button a

## 2018-12-11 NOTE — TELEPHONE ENCOUNTER
Dr. Lowe Sample message relayed to Aden Holstein who verbalized understanding. To Dr. Kannan Harrell planning to see pt again on Thursday due to falls - is this OK?

## 2018-12-11 NOTE — TELEPHONE ENCOUNTER
Dr. Varun Gibson message relayed to Odell Neves who verbalized understanding. Labs will be drawn on Friday.

## 2018-12-11 NOTE — TELEPHONE ENCOUNTER
Sandeep Dodson from [facility]. Is calling to report pt. Abington Omari out of bed twice last night she alerted the  and it took two people to assist her each time she is ok. Pt. Is still waiting for Dr. TOTH to complete paperwork for her power wheel chair ph.  # [phone]

## 2018-12-11 NOTE — TELEPHONE ENCOUNTER
Noted. Please call home health nurse back and let her know that it is fine if she can draw the blood tests as requested. I did place orders in system for the patient have a CBC, CMP, hemoglobin A1c, and lipid panel.   Please call the home health nurse bernadette

## 2018-12-11 NOTE — TELEPHONE ENCOUNTER
Please call Bebe Kiran and notify her that is is fine for her to see pt again on Thursday.   Thank you!!

## 2018-12-12 NOTE — TELEPHONE ENCOUNTER
Called and left a message for physical therapist Amanda Peterson on her confidential vm, giving verbal order to order social work services.

## 2018-12-12 NOTE — TELEPHONE ENCOUNTER
Yes.  Please order Social Work consult and evaluation to see pt. I will route this to nursing.   Thank you!!

## 2018-12-14 ENCOUNTER — LAB REQUISITION (OUTPATIENT)
Dept: LAB | Facility: HOSPITAL | Age: 78
End: 2018-12-14
Payer: MEDICARE

## 2018-12-14 DIAGNOSIS — I13.0 HYPERTENSIVE HEART AND CHRONIC KIDNEY DISEASE WITH HEART FAILURE AND STAGE 1 THROUGH STAGE 4 CHRONIC KIDNEY DISEASE, OR CHRONIC KIDNEY DISEASE (HCC): ICD-10-CM

## 2018-12-14 PROCEDURE — 85025 COMPLETE CBC W/AUTO DIFF WBC: CPT | Performed by: INTERNAL MEDICINE

## 2018-12-14 PROCEDURE — 80061 LIPID PANEL: CPT | Performed by: INTERNAL MEDICINE

## 2018-12-14 PROCEDURE — 83036 HEMOGLOBIN GLYCOSYLATED A1C: CPT | Performed by: INTERNAL MEDICINE

## 2018-12-14 PROCEDURE — 80053 COMPREHEN METABOLIC PANEL: CPT | Performed by: INTERNAL MEDICINE

## 2018-12-27 ENCOUNTER — TELEPHONE (OUTPATIENT)
Dept: INTERNAL MEDICINE CLINIC | Facility: CLINIC | Age: 78
End: 2018-12-27

## 2018-12-27 RX ORDER — HYDROCODONE BITARTRATE AND ACETAMINOPHEN 5; 325 MG/1; MG/1
TABLET ORAL
Qty: 120 TABLET | Refills: 0 | Status: ON HOLD | OUTPATIENT
Start: 2018-12-27 | End: 2019-02-01

## 2018-12-27 NOTE — TELEPHONE ENCOUNTER
Noted.  I have printed out and signed a new prescription for the patient's medication as requested. Please notify the patient that the prescription is available to be picked up.   I will give the prescription to the nursing staff who can give it to the fro

## 2019-01-01 ENCOUNTER — APPOINTMENT (OUTPATIENT)
Dept: GENERAL RADIOLOGY | Facility: HOSPITAL | Age: 79
DRG: 308 | End: 2019-01-01
Attending: HOSPITALIST
Payer: MEDICARE

## 2019-01-01 ENCOUNTER — APPOINTMENT (OUTPATIENT)
Dept: CT IMAGING | Facility: HOSPITAL | Age: 79
End: 2019-01-01
Attending: HOSPITALIST
Payer: MEDICARE

## 2019-01-01 ENCOUNTER — HOSPITAL ENCOUNTER (EMERGENCY)
Facility: HOSPITAL | Age: 79
Discharge: HOME | End: 2019-01-01
Attending: EMERGENCY MEDICINE
Payer: MEDICARE

## 2019-01-01 ENCOUNTER — APPOINTMENT (OUTPATIENT)
Dept: CV DIAGNOSTICS | Facility: HOSPITAL | Age: 79
End: 2019-01-01
Attending: HOSPITALIST
Payer: MEDICARE

## 2019-01-01 ENCOUNTER — TELEPHONE (OUTPATIENT)
Dept: INTERNAL MEDICINE CLINIC | Facility: CLINIC | Age: 79
End: 2019-01-01

## 2019-01-01 ENCOUNTER — SNF DISCHARGE (OUTPATIENT)
Dept: INTERNAL MEDICINE CLINIC | Facility: SKILLED NURSING FACILITY | Age: 79
End: 2019-01-01

## 2019-01-01 ENCOUNTER — APPOINTMENT (OUTPATIENT)
Dept: GENERAL RADIOLOGY | Facility: HOSPITAL | Age: 79
End: 2019-01-01
Attending: EMERGENCY MEDICINE
Payer: MEDICARE

## 2019-01-01 ENCOUNTER — APPOINTMENT (OUTPATIENT)
Dept: CV DIAGNOSTICS | Facility: HOSPITAL | Age: 79
DRG: 291 | End: 2019-01-01
Attending: HOSPITALIST
Payer: MEDICARE

## 2019-01-01 ENCOUNTER — APPOINTMENT (OUTPATIENT)
Dept: GENERAL RADIOLOGY | Facility: HOSPITAL | Age: 79
DRG: 291 | End: 2019-01-01
Attending: INTERNAL MEDICINE
Payer: MEDICARE

## 2019-01-01 ENCOUNTER — LAB ENCOUNTER (OUTPATIENT)
Dept: LAB | Age: 79
End: 2019-01-01
Attending: INTERNAL MEDICINE
Payer: MEDICARE

## 2019-01-01 ENCOUNTER — APPOINTMENT (OUTPATIENT)
Dept: ULTRASOUND IMAGING | Facility: HOSPITAL | Age: 79
End: 2019-01-01
Attending: INTERNAL MEDICINE
Payer: MEDICARE

## 2019-01-01 ENCOUNTER — PATIENT OUTREACH (OUTPATIENT)
Dept: CASE MANAGEMENT | Age: 79
End: 2019-01-01

## 2019-01-01 ENCOUNTER — APPOINTMENT (OUTPATIENT)
Dept: CV DIAGNOSTICS | Facility: HOSPITAL | Age: 79
DRG: 308 | End: 2019-01-01
Attending: INTERNAL MEDICINE
Payer: MEDICARE

## 2019-01-01 ENCOUNTER — EXTERNAL FACILITY (OUTPATIENT)
Dept: INTERNAL MEDICINE CLINIC | Facility: CLINIC | Age: 79
End: 2019-01-01

## 2019-01-01 ENCOUNTER — APPOINTMENT (OUTPATIENT)
Dept: GENERAL RADIOLOGY | Facility: HOSPITAL | Age: 79
DRG: 291 | End: 2019-01-01
Attending: HOSPITALIST
Payer: MEDICARE

## 2019-01-01 ENCOUNTER — HOSPITAL ENCOUNTER (OUTPATIENT)
Facility: HOSPITAL | Age: 79
Setting detail: OBSERVATION
Discharge: HOME HEALTH | End: 2019-01-01
Attending: EMERGENCY MEDICINE | Admitting: HOSPITALIST
Payer: MEDICARE

## 2019-01-01 ENCOUNTER — APPOINTMENT (OUTPATIENT)
Dept: GENERAL RADIOLOGY | Facility: HOSPITAL | Age: 79
DRG: 291 | End: 2019-01-01
Attending: EMERGENCY MEDICINE
Payer: MEDICARE

## 2019-01-01 ENCOUNTER — OFFICE VISIT (OUTPATIENT)
Dept: INTERNAL MEDICINE CLINIC | Facility: CLINIC | Age: 79
End: 2019-01-01
Payer: MEDICARE

## 2019-01-01 ENCOUNTER — MOBILE ENCOUNTER (OUTPATIENT)
Dept: INTERNAL MEDICINE CLINIC | Facility: CLINIC | Age: 79
End: 2019-01-01

## 2019-01-01 ENCOUNTER — HOSPITAL ENCOUNTER (EMERGENCY)
Facility: HOSPITAL | Age: 79
Discharge: HOME | End: 2019-01-02
Attending: EMERGENCY MEDICINE
Payer: MEDICARE

## 2019-01-01 ENCOUNTER — MED REC SCAN ONLY (OUTPATIENT)
Dept: INTERNAL MEDICINE CLINIC | Facility: CLINIC | Age: 79
End: 2019-01-01

## 2019-01-01 ENCOUNTER — HOSPITAL ENCOUNTER (INPATIENT)
Facility: HOSPITAL | Age: 79
LOS: 7 days | Discharge: NURSING, REHAB OR LONG-TERM CARE | DRG: 308 | End: 2019-01-01
Attending: EMERGENCY MEDICINE | Admitting: HOSPITALIST
Payer: MEDICARE

## 2019-01-01 ENCOUNTER — APPOINTMENT (OUTPATIENT)
Dept: CT IMAGING | Facility: HOSPITAL | Age: 79
End: 2019-01-01
Attending: EMERGENCY MEDICINE
Payer: MEDICARE

## 2019-01-01 ENCOUNTER — APPOINTMENT (OUTPATIENT)
Dept: GENERAL RADIOLOGY | Facility: HOSPITAL | Age: 79
DRG: 308 | End: 2019-01-01
Attending: INTERNAL MEDICINE
Payer: MEDICARE

## 2019-01-01 ENCOUNTER — APPOINTMENT (OUTPATIENT)
Dept: NUCLEAR MEDICINE | Facility: HOSPITAL | Age: 79
DRG: 308 | End: 2019-01-01
Attending: INTERNAL MEDICINE
Payer: MEDICARE

## 2019-01-01 ENCOUNTER — APPOINTMENT (OUTPATIENT)
Dept: ULTRASOUND IMAGING | Facility: HOSPITAL | Age: 79
DRG: 308 | End: 2019-01-01
Attending: HOSPITALIST
Payer: MEDICARE

## 2019-01-01 ENCOUNTER — HOSPITAL ENCOUNTER (OUTPATIENT)
Dept: GENERAL RADIOLOGY | Age: 79
Discharge: HOME | End: 2019-01-01
Attending: INTERNAL MEDICINE
Payer: MEDICARE

## 2019-01-01 ENCOUNTER — TELEPHONE (OUTPATIENT)
Dept: MEDSURG UNIT | Facility: HOSPITAL | Age: 79
End: 2019-01-01

## 2019-01-01 ENCOUNTER — APPOINTMENT (OUTPATIENT)
Dept: GENERAL RADIOLOGY | Facility: HOSPITAL | Age: 79
DRG: 308 | End: 2019-01-01
Attending: EMERGENCY MEDICINE
Payer: MEDICARE

## 2019-01-01 ENCOUNTER — APPOINTMENT (OUTPATIENT)
Dept: CT IMAGING | Facility: HOSPITAL | Age: 79
DRG: 291 | End: 2019-01-01
Attending: HOSPITALIST
Payer: MEDICARE

## 2019-01-01 ENCOUNTER — HOSPITAL ENCOUNTER (INPATIENT)
Facility: HOSPITAL | Age: 79
LOS: 18 days | Discharge: NURSING, REHAB OR LONG-TERM CARE | DRG: 291 | End: 2019-01-01
Attending: EMERGENCY MEDICINE | Admitting: HOSPITALIST
Payer: MEDICARE

## 2019-01-01 VITALS
DIASTOLIC BLOOD PRESSURE: 70 MMHG | SYSTOLIC BLOOD PRESSURE: 156 MMHG | OXYGEN SATURATION: 97 % | BODY MASS INDEX: 43 KG/M2 | TEMPERATURE: 98 F | WEIGHT: 198 LBS | HEART RATE: 60 BPM

## 2019-01-01 VITALS
TEMPERATURE: 98 F | DIASTOLIC BLOOD PRESSURE: 70 MMHG | HEART RATE: 60 BPM | SYSTOLIC BLOOD PRESSURE: 136 MMHG | BODY MASS INDEX: 40.12 KG/M2 | HEIGHT: 59 IN | WEIGHT: 199 LBS | OXYGEN SATURATION: 98 %

## 2019-01-01 VITALS
BODY MASS INDEX: 43.15 KG/M2 | SYSTOLIC BLOOD PRESSURE: 167 MMHG | DIASTOLIC BLOOD PRESSURE: 69 MMHG | WEIGHT: 200 LBS | HEIGHT: 57 IN | TEMPERATURE: 97 F | RESPIRATION RATE: 18 BRPM | OXYGEN SATURATION: 98 % | HEART RATE: 60 BPM

## 2019-01-01 VITALS
BODY MASS INDEX: 41 KG/M2 | DIASTOLIC BLOOD PRESSURE: 80 MMHG | HEIGHT: 59 IN | TEMPERATURE: 98 F | SYSTOLIC BLOOD PRESSURE: 150 MMHG | OXYGEN SATURATION: 98 % | HEART RATE: 76 BPM

## 2019-01-01 VITALS
TEMPERATURE: 97 F | SYSTOLIC BLOOD PRESSURE: 144 MMHG | DIASTOLIC BLOOD PRESSURE: 59 MMHG | BODY MASS INDEX: 43 KG/M2 | WEIGHT: 197.31 LBS | OXYGEN SATURATION: 94 % | HEART RATE: 72 BPM | RESPIRATION RATE: 19 BRPM

## 2019-01-01 VITALS
HEART RATE: 68 BPM | HEIGHT: 59 IN | SYSTOLIC BLOOD PRESSURE: 171 MMHG | OXYGEN SATURATION: 95 % | TEMPERATURE: 98 F | WEIGHT: 200 LBS | RESPIRATION RATE: 13 BRPM | BODY MASS INDEX: 40.32 KG/M2 | DIASTOLIC BLOOD PRESSURE: 70 MMHG

## 2019-01-01 VITALS
HEART RATE: 64 BPM | SYSTOLIC BLOOD PRESSURE: 140 MMHG | TEMPERATURE: 99 F | OXYGEN SATURATION: 98 % | DIASTOLIC BLOOD PRESSURE: 88 MMHG

## 2019-01-01 VITALS
BODY MASS INDEX: 43.15 KG/M2 | DIASTOLIC BLOOD PRESSURE: 73 MMHG | TEMPERATURE: 98 F | SYSTOLIC BLOOD PRESSURE: 170 MMHG | HEART RATE: 74 BPM | HEIGHT: 57 IN | RESPIRATION RATE: 18 BRPM | OXYGEN SATURATION: 96 % | WEIGHT: 200 LBS

## 2019-01-01 VITALS
DIASTOLIC BLOOD PRESSURE: 48 MMHG | WEIGHT: 206 LBS | TEMPERATURE: 99 F | OXYGEN SATURATION: 94 % | HEIGHT: 57 IN | HEART RATE: 56 BPM | SYSTOLIC BLOOD PRESSURE: 124 MMHG | BODY MASS INDEX: 44.44 KG/M2 | RESPIRATION RATE: 20 BRPM

## 2019-01-01 VITALS
RESPIRATION RATE: 18 BRPM | HEIGHT: 57 IN | DIASTOLIC BLOOD PRESSURE: 63 MMHG | SYSTOLIC BLOOD PRESSURE: 182 MMHG | WEIGHT: 200.38 LBS | BODY MASS INDEX: 43.23 KG/M2 | HEART RATE: 84 BPM | TEMPERATURE: 98 F | OXYGEN SATURATION: 95 %

## 2019-01-01 VITALS
WEIGHT: 201 LBS | HEIGHT: 59 IN | TEMPERATURE: 98 F | SYSTOLIC BLOOD PRESSURE: 150 MMHG | DIASTOLIC BLOOD PRESSURE: 66 MMHG | BODY MASS INDEX: 40.52 KG/M2 | HEART RATE: 60 BPM

## 2019-01-01 DIAGNOSIS — I10 ESSENTIAL HYPERTENSION: ICD-10-CM

## 2019-01-01 DIAGNOSIS — E87.6 HYPOKALEMIA: ICD-10-CM

## 2019-01-01 DIAGNOSIS — I87.2 VENOUS INSUFFICIENCY: ICD-10-CM

## 2019-01-01 DIAGNOSIS — Z91.81 RISK FOR FALLS: ICD-10-CM

## 2019-01-01 DIAGNOSIS — R11.2 NAUSEA AND VOMITING IN ADULT: Primary | ICD-10-CM

## 2019-01-01 DIAGNOSIS — R53.83 FATIGUE, UNSPECIFIED TYPE: Primary | ICD-10-CM

## 2019-01-01 DIAGNOSIS — S46.912A STRAIN OF LEFT SHOULDER, INITIAL ENCOUNTER: ICD-10-CM

## 2019-01-01 DIAGNOSIS — M96.1 FAILED BACK SURGICAL SYNDROME: ICD-10-CM

## 2019-01-01 DIAGNOSIS — N18.30 STAGE 3 CHRONIC KIDNEY DISEASE (HCC): ICD-10-CM

## 2019-01-01 DIAGNOSIS — I63.9 CEREBROVASCULAR ACCIDENT (CVA), UNSPECIFIED MECHANISM (HCC): ICD-10-CM

## 2019-01-01 DIAGNOSIS — R26.2 UNABLE TO AMBULATE: ICD-10-CM

## 2019-01-01 DIAGNOSIS — I49.9 CARDIAC ARRHYTHMIA, UNSPECIFIED CARDIAC ARRHYTHMIA TYPE: ICD-10-CM

## 2019-01-01 DIAGNOSIS — K22.70 BARRETT'S ESOPHAGUS WITHOUT DYSPLASIA: ICD-10-CM

## 2019-01-01 DIAGNOSIS — E78.00 HYPERCHOLESTEREMIA: ICD-10-CM

## 2019-01-01 DIAGNOSIS — E78.1 HYPERTRIGLYCERIDEMIA: ICD-10-CM

## 2019-01-01 DIAGNOSIS — G89.4 CHRONIC PAIN SYNDROME: ICD-10-CM

## 2019-01-01 DIAGNOSIS — M15.9 PRIMARY OSTEOARTHRITIS INVOLVING MULTIPLE JOINTS: ICD-10-CM

## 2019-01-01 DIAGNOSIS — E08.00 DIABETES MELLITUS DUE TO UNDERLYING CONDITION WITH HYPEROSMOLARITY WITHOUT COMA, WITHOUT LONG-TERM CURRENT USE OF INSULIN (HCC): ICD-10-CM

## 2019-01-01 DIAGNOSIS — R53.83 FATIGUE, UNSPECIFIED TYPE: ICD-10-CM

## 2019-01-01 DIAGNOSIS — K21.9 GASTROESOPHAGEAL REFLUX DISEASE, ESOPHAGITIS PRESENCE NOT SPECIFIED: ICD-10-CM

## 2019-01-01 DIAGNOSIS — J18.9 NOSOCOMIAL PNEUMONIA: Primary | ICD-10-CM

## 2019-01-01 DIAGNOSIS — M25.512 ACUTE PAIN OF LEFT SHOULDER: ICD-10-CM

## 2019-01-01 DIAGNOSIS — S22.42XA CLOSED FRACTURE OF MULTIPLE RIBS OF LEFT SIDE, INITIAL ENCOUNTER: ICD-10-CM

## 2019-01-01 DIAGNOSIS — R35.0 URINARY FREQUENCY: ICD-10-CM

## 2019-01-01 DIAGNOSIS — I10 ESSENTIAL HYPERTENSION: Primary | ICD-10-CM

## 2019-01-01 DIAGNOSIS — I48.91 ATRIAL FIBRILLATION, NEW ONSET (HCC): ICD-10-CM

## 2019-01-01 DIAGNOSIS — S22.42XD CLOSED FRACTURE OF MULTIPLE RIBS OF LEFT SIDE WITH ROUTINE HEALING, SUBSEQUENT ENCOUNTER: ICD-10-CM

## 2019-01-01 DIAGNOSIS — Z02.9 ENCOUNTERS FOR UNSPECIFIED ADMINISTRATIVE PURPOSE: ICD-10-CM

## 2019-01-01 DIAGNOSIS — R29.898 LEFT HAND WEAKNESS: ICD-10-CM

## 2019-01-01 DIAGNOSIS — S13.9XXA NECK SPRAIN, INITIAL ENCOUNTER: ICD-10-CM

## 2019-01-01 DIAGNOSIS — N39.0 URINARY TRACT INFECTION WITHOUT HEMATURIA, SITE UNSPECIFIED: ICD-10-CM
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
## 2019-01-01 DIAGNOSIS — R52 INTRACTABLE PAIN: ICD-10-CM

## 2019-01-01 DIAGNOSIS — R53.1 WEAKNESS: Primary | ICD-10-CM

## 2019-01-01 DIAGNOSIS — R53.1 WEAKNESS GENERALIZED: ICD-10-CM

## 2019-01-01 DIAGNOSIS — E78.9 BORDERLINE HIGH CHOLESTEROL: ICD-10-CM

## 2019-01-01 DIAGNOSIS — R53.81 PHYSICAL DECONDITIONING: ICD-10-CM

## 2019-01-01 DIAGNOSIS — D64.9 ANEMIA, UNSPECIFIED TYPE: ICD-10-CM
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
## 2019-01-01 DIAGNOSIS — Z02.9 ENCOUNTERS FOR ADMINISTRATIVE PURPOSE: ICD-10-CM

## 2019-01-01 DIAGNOSIS — M79.7 FIBROMYALGIA: ICD-10-CM

## 2019-01-01 DIAGNOSIS — Z02.9 DISCHARGE PLANNING ISSUES: ICD-10-CM

## 2019-01-01 DIAGNOSIS — I10 HYPERTENSION, UNSPECIFIED TYPE: ICD-10-CM

## 2019-01-01 DIAGNOSIS — I87.8 VENOUS STASIS OF BOTH LOWER EXTREMITIES: Primary | ICD-10-CM

## 2019-01-01 DIAGNOSIS — N39.0 URINARY TRACT INFECTION WITHOUT HEMATURIA, SITE UNSPECIFIED: Primary | ICD-10-CM

## 2019-01-01 DIAGNOSIS — D72.829 LEUKOCYTOSIS, UNSPECIFIED TYPE: ICD-10-CM

## 2019-01-01 DIAGNOSIS — F32.A DEPRESSION, UNSPECIFIED DEPRESSION TYPE: ICD-10-CM
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
## 2019-01-01 DIAGNOSIS — S20.212A CONTUSION OF RIB ON LEFT SIDE, INITIAL ENCOUNTER: Primary | ICD-10-CM

## 2019-01-01 DIAGNOSIS — R07.9 ACUTE CHEST PAIN: ICD-10-CM

## 2019-01-01 LAB
25(OH)D3 SERPL-MCNC: 9.2 NG/ML (ref 30–100)
ADENOVIRUS PCR:: NEGATIVE
ALBUMIN SERPL BCP-MCNC: 3.3 G/DL (ref 3.5–4.8)
ALBUMIN SERPL-MCNC: 2.3 G/DL (ref 3.4–5)
ALBUMIN/GLOB SERPL: 0.6 {RATIO} (ref 1–2)
ALP LIVER SERPL-CCNC: 112 U/L (ref 55–142)
ALP SERPL-CCNC: 85 U/L (ref 32–100)
ALT SERPL-CCNC: 18 U/L (ref 13–56)
ALT SERPL-CCNC: 22 U/L (ref 13–56)
ALT SERPL-CCNC: 34 U/L (ref 14–54)
ANION GAP SERPL CALC-SCNC: 10 MMOL/L (ref 0–18)
ANION GAP SERPL CALC-SCNC: 11 MMOL/L (ref 0–18)
ANION GAP SERPL CALC-SCNC: 5 MMOL/L (ref 0–18)
ANION GAP SERPL CALC-SCNC: 5 MMOL/L (ref 0–18)
ANION GAP SERPL CALC-SCNC: 6 MMOL/L (ref 0–18)
ANION GAP SERPL CALC-SCNC: 7 MMOL/L (ref 0–18)
ANION GAP SERPL CALC-SCNC: 8 MMOL/L (ref 0–18)
ANION GAP SERPL CALC-SCNC: 9 MMOL/L (ref 0–18)
ANTIBODY SCREEN: NEGATIVE
AST SERPL-CCNC: 12 U/L (ref 15–37)
AST SERPL-CCNC: 15 U/L (ref 15–37)
AST SERPL-CCNC: 33 U/L (ref 15–41)
B PERT DNA SPEC QL NAA+PROBE: NEGATIVE
BACTERIA UR QL AUTO: NEGATIVE /HPF
BASE EXCESS BLD CALC-SCNC: -2.1 MMOL/L (ref ?–2)
BASE EXCESS BLD CALC-SCNC: 2.2 MMOL/L (ref ?–2)
BASOPHILS # BLD AUTO: 0.04 X10(3) UL (ref 0–0.2)
BASOPHILS # BLD AUTO: 0.05 X10(3) UL (ref 0–0.2)
BASOPHILS # BLD AUTO: 0.06 X10(3) UL (ref 0–0.2)
BASOPHILS # BLD AUTO: 0.07 X10(3) UL (ref 0–0.2)
BASOPHILS # BLD AUTO: 0.08 X10(3) UL (ref 0–0.2)
BASOPHILS # BLD AUTO: 0.09 X10(3) UL (ref 0–0.2)
BASOPHILS # BLD AUTO: 0.09 X10(3) UL (ref 0–0.2)
BASOPHILS # BLD AUTO: 0.11 X10(3) UL (ref 0–0.2)
BASOPHILS # BLD: 0 K/UL (ref 0–0.2)
BASOPHILS NFR BLD AUTO: 0.4 %
BASOPHILS NFR BLD AUTO: 0.5 %
BASOPHILS NFR BLD AUTO: 0.6 %
BASOPHILS NFR BLD AUTO: 0.7 %
BASOPHILS NFR BLD AUTO: 0.8 %
BASOPHILS NFR BLD AUTO: 0.9 %
BASOPHILS NFR BLD AUTO: 0.9 %
BASOPHILS NFR BLD AUTO: 1 %
BASOPHILS NFR BLD AUTO: 1 %
BASOPHILS NFR BLD: 0 %
BILIRUB DIRECT SERPL-MCNC: 0.1 MG/DL (ref 0–0.2)
BILIRUB SERPL-MCNC: 0.3 MG/DL (ref 0.1–2)
BILIRUB SERPL-MCNC: 0.6 MG/DL (ref 0.3–1.2)
BILIRUB UR QL: NEGATIVE
BILIRUB UR QL: NEGATIVE
BLOOD TYPE BARCODE: 9500
BUN BLD-MCNC: 14 MG/DL (ref 7–18)
BUN BLD-MCNC: 15 MG/DL (ref 7–18)
BUN BLD-MCNC: 18 MG/DL (ref 7–18)
BUN BLD-MCNC: 19 MG/DL (ref 7–18)
BUN BLD-MCNC: 20 MG/DL (ref 7–18)
BUN BLD-MCNC: 21 MG/DL (ref 7–18)
BUN BLD-MCNC: 21 MG/DL (ref 7–18)
BUN BLD-MCNC: 23 MG/DL (ref 7–18)
BUN BLD-MCNC: 23 MG/DL (ref 7–18)
BUN BLD-MCNC: 25 MG/DL (ref 7–18)
BUN BLD-MCNC: 27 MG/DL (ref 7–18)
BUN BLD-MCNC: 29 MG/DL (ref 7–18)
BUN BLD-MCNC: 29 MG/DL (ref 7–18)
BUN BLD-MCNC: 30 MG/DL (ref 7–18)
BUN BLD-MCNC: 31 MG/DL (ref 7–18)
BUN BLD-MCNC: 34 MG/DL (ref 7–18)
BUN BLD-MCNC: 34 MG/DL (ref 7–18)
BUN BLD-MCNC: 35 MG/DL (ref 7–18)
BUN BLD-MCNC: 38 MG/DL (ref 7–18)
BUN SERPL-MCNC: 18 MG/DL (ref 8–20)
BUN/CREAT SERPL: 10 (ref 10–20)
BUN/CREAT SERPL: 10.2 (ref 10–20)
BUN/CREAT SERPL: 10.4 (ref 10–20)
BUN/CREAT SERPL: 11.1 (ref 10–20)
BUN/CREAT SERPL: 11.4 (ref 10–20)
BUN/CREAT SERPL: 11.8 (ref 10–20)
BUN/CREAT SERPL: 12.6 (ref 10–20)
BUN/CREAT SERPL: 12.7 (ref 10–20)
BUN/CREAT SERPL: 12.8 (ref 10–20)
BUN/CREAT SERPL: 12.9 (ref 10–20)
BUN/CREAT SERPL: 12.9 (ref 10–20)
BUN/CREAT SERPL: 13 (ref 10–20)
BUN/CREAT SERPL: 13.1 (ref 10–20)
BUN/CREAT SERPL: 13.1 (ref 10–20)
BUN/CREAT SERPL: 13.4 (ref 10–20)
BUN/CREAT SERPL: 13.5 (ref 10–20)
BUN/CREAT SERPL: 13.6 (ref 10–20)
BUN/CREAT SERPL: 13.6 (ref 10–20)
BUN/CREAT SERPL: 14.5 (ref 10–20)
BUN/CREAT SERPL: 14.5 (ref 10–20)
BUN/CREAT SERPL: 15 (ref 10–20)
BUN/CREAT SERPL: 15 (ref 10–20)
BUN/CREAT SERPL: 15.3 (ref 10–20)
BUN/CREAT SERPL: 15.4 (ref 10–20)
BUN/CREAT SERPL: 15.8 (ref 10–20)
BUN/CREAT SERPL: 16.2 (ref 10–20)
BUN/CREAT SERPL: 16.4 (ref 10–20)
BUN/CREAT SERPL: 18.5 (ref 10–20)
C PNEUM DNA SPEC QL NAA+PROBE: NEGATIVE
CALCIUM BLD-MCNC: 8.5 MG/DL (ref 8.5–10.1)
CALCIUM BLD-MCNC: 8.6 MG/DL (ref 8.5–10.1)
CALCIUM BLD-MCNC: 8.6 MG/DL (ref 8.5–10.1)
CALCIUM BLD-MCNC: 8.9 MG/DL (ref 8.5–10.1)
CALCIUM BLD-MCNC: 9 MG/DL (ref 8.5–10.1)
CALCIUM BLD-MCNC: 9.1 MG/DL (ref 8.5–10.1)
CALCIUM BLD-MCNC: 9.2 MG/DL (ref 8.5–10.1)
CALCIUM BLD-MCNC: 9.3 MG/DL (ref 8.5–10.1)
CALCIUM BLD-MCNC: 9.3 MG/DL (ref 8.5–10.1)
CALCIUM BLD-MCNC: 9.5 MG/DL (ref 8.5–10.1)
CALCIUM BLD-MCNC: 9.6 MG/DL (ref 8.5–10.1)
CALCIUM SERPL-MCNC: 9 MG/DL (ref 8.5–10.5)
CHLORIDE SERPL-SCNC: 101 MMOL/L (ref 98–112)
CHLORIDE SERPL-SCNC: 103 MMOL/L (ref 98–112)
CHLORIDE SERPL-SCNC: 104 MMOL/L (ref 98–112)
CHLORIDE SERPL-SCNC: 106 MMOL/L (ref 98–112)
CHLORIDE SERPL-SCNC: 107 MMOL/L (ref 98–112)
CHLORIDE SERPL-SCNC: 108 MMOL/L (ref 95–110)
CHLORIDE SERPL-SCNC: 108 MMOL/L (ref 98–112)
CHLORIDE SERPL-SCNC: 109 MMOL/L (ref 98–112)
CHLORIDE SERPL-SCNC: 110 MMOL/L (ref 98–112)
CHLORIDE SERPL-SCNC: 112 MMOL/L (ref 98–112)
CHOLEST SMN-MCNC: 425 MG/DL (ref ?–200)
CLARITY UR: CLEAR
CLARITY UR: CLEAR
CO2 SERPL-SCNC: 20 MMOL/L (ref 21–32)
CO2 SERPL-SCNC: 22 MMOL/L (ref 21–32)
CO2 SERPL-SCNC: 23 MMOL/L (ref 21–32)
CO2 SERPL-SCNC: 24 MMOL/L (ref 21–32)
CO2 SERPL-SCNC: 24 MMOL/L (ref 22–32)
CO2 SERPL-SCNC: 25 MMOL/L (ref 21–32)
CO2 SERPL-SCNC: 26 MMOL/L (ref 21–32)
CO2 SERPL-SCNC: 27 MMOL/L (ref 21–32)
CO2 SERPL-SCNC: 28 MMOL/L (ref 21–32)
CO2 SERPL-SCNC: 29 MMOL/L (ref 21–32)
CO2 SERPL-SCNC: 30 MMOL/L (ref 21–32)
COLOR UR: YELLOW
COLOR UR: YELLOW
CORONAVIRUS 229E PCR:: NEGATIVE
CORONAVIRUS HKU1 PCR:: NEGATIVE
CORONAVIRUS NL63 PCR:: NEGATIVE
CORONAVIRUS OC43 PCR:: NEGATIVE
CREAT BLD-MCNC: 1.23 MG/DL (ref 0.55–1.02)
CREAT BLD-MCNC: 1.24 MG/DL (ref 0.55–1.02)
CREAT BLD-MCNC: 1.24 MG/DL (ref 0.55–1.02)
CREAT BLD-MCNC: 1.31 MG/DL (ref 0.55–1.02)
CREAT BLD-MCNC: 1.32 MG/DL (ref 0.55–1.02)
CREAT BLD-MCNC: 1.34 MG/DL (ref 0.55–1.02)
CREAT BLD-MCNC: 1.4 MG/DL (ref 0.55–1.02)
CREAT BLD-MCNC: 1.4 MG/DL (ref 0.55–1.02)
CREAT BLD-MCNC: 1.45 MG/DL (ref 0.55–1.02)
CREAT BLD-MCNC: 1.54 MG/DL (ref 0.55–1.02)
CREAT BLD-MCNC: 1.83 MG/DL (ref 0.55–1.02)
CREAT BLD-MCNC: 1.9 MG/DL (ref 0.55–1.02)
CREAT BLD-MCNC: 1.91 MG/DL (ref 0.55–1.02)
CREAT BLD-MCNC: 1.92 MG/DL (ref 0.55–1.02)
CREAT BLD-MCNC: 1.95 MG/DL (ref 0.55–1.02)
CREAT BLD-MCNC: 1.96 MG/DL (ref 0.55–1.02)
CREAT BLD-MCNC: 2 MG/DL (ref 0.55–1.02)
CREAT BLD-MCNC: 2.07 MG/DL (ref 0.55–1.02)
CREAT BLD-MCNC: 2.1 MG/DL (ref 0.55–1.02)
CREAT BLD-MCNC: 2.1 MG/DL (ref 0.55–1.02)
CREAT BLD-MCNC: 2.12 MG/DL (ref 0.55–1.02)
CREAT BLD-MCNC: 2.2 MG/DL (ref 0.55–1.02)
CREAT BLD-MCNC: 2.21 MG/DL (ref 0.55–1.02)
CREAT BLD-MCNC: 2.26 MG/DL (ref 0.55–1.02)
CREAT BLD-MCNC: 2.26 MG/DL (ref 0.55–1.02)
CREAT BLD-MCNC: 2.31 MG/DL (ref 0.55–1.02)
CREAT BLD-MCNC: 2.33 MG/DL (ref 0.55–1.02)
CREAT BLD-MCNC: 2.41 MG/DL (ref 0.55–1.02)
CREAT BLD-MCNC: 2.41 MG/DL (ref 0.55–1.02)
CREAT SERPL-MCNC: 1.43 MG/DL (ref 0.5–1.5)
DEPRECATED HBV CORE AB SER IA-ACNC: 461.6 NG/ML (ref 18–340)
DEPRECATED RDW RBC AUTO: 43.3 FL (ref 35.1–46.3)
DEPRECATED RDW RBC AUTO: 44.1 FL (ref 35.1–46.3)
DEPRECATED RDW RBC AUTO: 44.4 FL (ref 35.1–46.3)
DEPRECATED RDW RBC AUTO: 45.1 FL (ref 35.1–46.3)
DEPRECATED RDW RBC AUTO: 45.2 FL (ref 35.1–46.3)
DEPRECATED RDW RBC AUTO: 45.2 FL (ref 35.1–46.3)
DEPRECATED RDW RBC AUTO: 45.3 FL (ref 35.1–46.3)
DEPRECATED RDW RBC AUTO: 45.9 FL (ref 35.1–46.3)
DEPRECATED RDW RBC AUTO: 46.2 FL (ref 35.1–46.3)
DEPRECATED RDW RBC AUTO: 46.8 FL (ref 35.1–46.3)
DEPRECATED RDW RBC AUTO: 47.4 FL (ref 35.1–46.3)
DEPRECATED RDW RBC AUTO: 48.1 FL (ref 35.1–46.3)
DEPRECATED RDW RBC AUTO: 48.3 FL (ref 35.1–46.3)
DEPRECATED RDW RBC AUTO: 48.5 FL (ref 35.1–46.3)
DEPRECATED RDW RBC AUTO: 48.7 FL (ref 35.1–46.3)
DEPRECATED RDW RBC AUTO: 49 FL (ref 35.1–46.3)
DEPRECATED RDW RBC AUTO: 49.3 FL (ref 35.1–46.3)
DEPRECATED RDW RBC AUTO: 49.3 FL (ref 35.1–46.3)
DEPRECATED RDW RBC AUTO: 49.5 FL (ref 35.1–46.3)
DEPRECATED RDW RBC AUTO: 50.1 FL (ref 35.1–46.3)
DEPRECATED RDW RBC AUTO: 50.1 FL (ref 35.1–46.3)
DEPRECATED RDW RBC AUTO: 50.6 FL (ref 35.1–46.3)
DEPRECATED RDW RBC AUTO: 51.9 FL (ref 35.1–46.3)
DEPRECATED RDW RBC AUTO: 52.2 FL (ref 35.1–46.3)
DEPRECATED RDW RBC AUTO: 53.9 FL (ref 35.1–46.3)
DEPRECATED RDW RBC AUTO: 54 FL (ref 35.1–46.3)
EOSINOPHIL # BLD AUTO: 0.03 X10(3) UL (ref 0–0.7)
EOSINOPHIL # BLD AUTO: 0.27 X10(3) UL (ref 0–0.7)
EOSINOPHIL # BLD AUTO: 0.27 X10(3) UL (ref 0–0.7)
EOSINOPHIL # BLD AUTO: 0.28 X10(3) UL (ref 0–0.7)
EOSINOPHIL # BLD AUTO: 0.29 X10(3) UL (ref 0–0.7)
EOSINOPHIL # BLD AUTO: 0.34 X10(3) UL (ref 0–0.7)
EOSINOPHIL # BLD AUTO: 0.4 X10(3) UL (ref 0–0.7)
EOSINOPHIL # BLD AUTO: 0.41 X10(3) UL (ref 0–0.7)
EOSINOPHIL # BLD AUTO: 0.41 X10(3) UL (ref 0–0.7)
EOSINOPHIL # BLD AUTO: 0.42 X10(3) UL (ref 0–0.7)
EOSINOPHIL # BLD AUTO: 0.44 X10(3) UL (ref 0–0.7)
EOSINOPHIL # BLD AUTO: 0.45 X10(3) UL (ref 0–0.7)
EOSINOPHIL # BLD AUTO: 0.56 X10(3) UL (ref 0–0.7)
EOSINOPHIL # BLD AUTO: 0.57 X10(3) UL (ref 0–0.7)
EOSINOPHIL # BLD AUTO: 0.59 X10(3) UL (ref 0–0.7)
EOSINOPHIL # BLD AUTO: 0.62 X10(3) UL (ref 0–0.7)
EOSINOPHIL # BLD AUTO: 0.64 X10(3) UL (ref 0–0.7)
EOSINOPHIL # BLD AUTO: 0.65 X10(3) UL (ref 0–0.7)
EOSINOPHIL # BLD AUTO: 0.69 X10(3) UL (ref 0–0.7)
EOSINOPHIL # BLD AUTO: 0.72 X10(3) UL (ref 0–0.7)
EOSINOPHIL # BLD AUTO: 0.73 X10(3) UL (ref 0–0.7)
EOSINOPHIL # BLD AUTO: 0.76 X10(3) UL (ref 0–0.7)
EOSINOPHIL # BLD AUTO: 0.77 X10(3) UL (ref 0–0.7)
EOSINOPHIL # BLD AUTO: 0.81 X10(3) UL (ref 0–0.7)
EOSINOPHIL # BLD AUTO: 0.83 X10(3) UL (ref 0–0.7)
EOSINOPHIL # BLD AUTO: 0.84 X10(3) UL (ref 0–0.7)
EOSINOPHIL # BLD AUTO: 0.84 X10(3) UL (ref 0–0.7)
EOSINOPHIL # BLD: 0.4 K/UL (ref 0–0.7)
EOSINOPHIL NFR BLD AUTO: 0.4 %
EOSINOPHIL NFR BLD AUTO: 2.4 %
EOSINOPHIL NFR BLD AUTO: 3 %
EOSINOPHIL NFR BLD AUTO: 3 %
EOSINOPHIL NFR BLD AUTO: 3.1 %
EOSINOPHIL NFR BLD AUTO: 3.2 %
EOSINOPHIL NFR BLD AUTO: 3.2 %
EOSINOPHIL NFR BLD AUTO: 4.2 %
EOSINOPHIL NFR BLD AUTO: 4.4 %
EOSINOPHIL NFR BLD AUTO: 4.5 %
EOSINOPHIL NFR BLD AUTO: 4.5 %
EOSINOPHIL NFR BLD AUTO: 4.8 %
EOSINOPHIL NFR BLD AUTO: 4.9 %
EOSINOPHIL NFR BLD AUTO: 5.1 %
EOSINOPHIL NFR BLD AUTO: 5.2 %
EOSINOPHIL NFR BLD AUTO: 5.6 %
EOSINOPHIL NFR BLD AUTO: 5.6 %
EOSINOPHIL NFR BLD AUTO: 6.3 %
EOSINOPHIL NFR BLD AUTO: 6.5 %
EOSINOPHIL NFR BLD AUTO: 6.9 %
EOSINOPHIL NFR BLD AUTO: 7.4 %
EOSINOPHIL NFR BLD AUTO: 7.8 %
EOSINOPHIL NFR BLD AUTO: 8 %
EOSINOPHIL NFR BLD AUTO: 8.7 %
EOSINOPHIL NFR BLD AUTO: 8.8 %
EOSINOPHIL NFR BLD AUTO: 8.8 %
EOSINOPHIL NFR BLD AUTO: 9.8 %
EOSINOPHIL NFR BLD: 5 %
ERYTHROCYTE [DISTWIDTH] IN BLOOD BY AUTOMATED COUNT: 14.1 % (ref 11–15)
ERYTHROCYTE [DISTWIDTH] IN BLOOD BY AUTOMATED COUNT: 15 % (ref 11–15)
ERYTHROCYTE [DISTWIDTH] IN BLOOD BY AUTOMATED COUNT: 15 % (ref 11–15)
ERYTHROCYTE [DISTWIDTH] IN BLOOD BY AUTOMATED COUNT: 15.2 % (ref 11–15)
ERYTHROCYTE [DISTWIDTH] IN BLOOD BY AUTOMATED COUNT: 15.3 % (ref 11–15)
ERYTHROCYTE [DISTWIDTH] IN BLOOD BY AUTOMATED COUNT: 15.3 % (ref 11–15)
ERYTHROCYTE [DISTWIDTH] IN BLOOD BY AUTOMATED COUNT: 15.4 % (ref 11–15)
ERYTHROCYTE [DISTWIDTH] IN BLOOD BY AUTOMATED COUNT: 15.6 % (ref 11–15)
ERYTHROCYTE [DISTWIDTH] IN BLOOD BY AUTOMATED COUNT: 15.9 % (ref 11–15)
ERYTHROCYTE [DISTWIDTH] IN BLOOD BY AUTOMATED COUNT: 16.1 % (ref 11–15)
ERYTHROCYTE [DISTWIDTH] IN BLOOD BY AUTOMATED COUNT: 16.2 % (ref 11–15)
ERYTHROCYTE [DISTWIDTH] IN BLOOD BY AUTOMATED COUNT: 16.2 % (ref 11–15)
ERYTHROCYTE [DISTWIDTH] IN BLOOD BY AUTOMATED COUNT: 16.3 % (ref 11–15)
ERYTHROCYTE [DISTWIDTH] IN BLOOD BY AUTOMATED COUNT: 16.3 % (ref 11–15)
ERYTHROCYTE [DISTWIDTH] IN BLOOD BY AUTOMATED COUNT: 16.4 % (ref 11–15)
ERYTHROCYTE [DISTWIDTH] IN BLOOD BY AUTOMATED COUNT: 16.4 % (ref 11–15)
ERYTHROCYTE [DISTWIDTH] IN BLOOD BY AUTOMATED COUNT: 16.5 % (ref 11–15)
ERYTHROCYTE [DISTWIDTH] IN BLOOD BY AUTOMATED COUNT: 16.6 % (ref 11–15)
ERYTHROCYTE [DISTWIDTH] IN BLOOD BY AUTOMATED COUNT: 16.9 % (ref 11–15)
ERYTHROCYTE [DISTWIDTH] IN BLOOD BY AUTOMATED COUNT: 17.1 % (ref 11–15)
ERYTHROCYTE [DISTWIDTH] IN BLOOD BY AUTOMATED COUNT: 17.2 % (ref 11–15)
EST. AVERAGE GLUCOSE BLD GHB EST-MCNC: 146 MG/DL (ref 68–126)
FLUAV RNA SPEC QL NAA+PROBE: NEGATIVE
FLUBV RNA SPEC QL NAA+PROBE: NEGATIVE
GLOBULIN PLAS-MCNC: 3.7 G/DL (ref 2.8–4.4)
GLUCOSE BLD-MCNC: 104 MG/DL (ref 70–99)
GLUCOSE BLD-MCNC: 105 MG/DL (ref 70–99)
GLUCOSE BLD-MCNC: 105 MG/DL (ref 70–99)
GLUCOSE BLD-MCNC: 107 MG/DL (ref 70–99)
GLUCOSE BLD-MCNC: 107 MG/DL (ref 70–99)
GLUCOSE BLD-MCNC: 108 MG/DL (ref 70–99)
GLUCOSE BLD-MCNC: 110 MG/DL (ref 70–99)
GLUCOSE BLD-MCNC: 111 MG/DL (ref 70–99)
GLUCOSE BLD-MCNC: 111 MG/DL (ref 70–99)
GLUCOSE BLD-MCNC: 112 MG/DL (ref 70–99)
GLUCOSE BLD-MCNC: 112 MG/DL (ref 70–99)
GLUCOSE BLD-MCNC: 113 MG/DL (ref 70–99)
GLUCOSE BLD-MCNC: 115 MG/DL (ref 70–99)
GLUCOSE BLD-MCNC: 117 MG/DL (ref 70–99)
GLUCOSE BLD-MCNC: 118 MG/DL (ref 70–99)
GLUCOSE BLD-MCNC: 119 MG/DL (ref 70–99)
GLUCOSE BLD-MCNC: 119 MG/DL (ref 70–99)
GLUCOSE BLD-MCNC: 124 MG/DL (ref 70–99)
GLUCOSE BLD-MCNC: 124 MG/DL (ref 70–99)
GLUCOSE BLD-MCNC: 128 MG/DL (ref 70–99)
GLUCOSE BLD-MCNC: 130 MG/DL (ref 70–99)
GLUCOSE BLD-MCNC: 132 MG/DL (ref 70–99)
GLUCOSE BLD-MCNC: 133 MG/DL (ref 70–99)
GLUCOSE BLD-MCNC: 134 MG/DL (ref 70–99)
GLUCOSE BLD-MCNC: 151 MG/DL (ref 70–99)
GLUCOSE BLD-MCNC: 154 MG/DL (ref 70–99)
GLUCOSE BLD-MCNC: 164 MG/DL (ref 70–99)
GLUCOSE BLD-MCNC: 219 MG/DL (ref 70–99)
GLUCOSE BLDC GLUCOMTR-MCNC: 115 MG/DL (ref 70–99)
GLUCOSE SERPL-MCNC: 138 MG/DL (ref 70–99)
GLUCOSE UR-MCNC: 150 MG/DL
GLUCOSE UR-MCNC: 50 MG/DL
HAV IGM SER QL: 2.2 MG/DL (ref 1.6–2.6)
HBA1C MFR BLD HPLC: 6.7 % (ref ?–5.7)
HCO3 BLDA-SCNC: 23.3 MEQ/L (ref 21–27)
HCO3 BLDA-SCNC: 26.6 MEQ/L (ref 21–27)
HCT VFR BLD AUTO: 23 % (ref 35–48)
HCT VFR BLD AUTO: 23.1 % (ref 35–48)
HCT VFR BLD AUTO: 23.3 % (ref 35–48)
HCT VFR BLD AUTO: 23.4 % (ref 35–48)
HCT VFR BLD AUTO: 23.9 % (ref 35–48)
HCT VFR BLD AUTO: 23.9 % (ref 35–48)
HCT VFR BLD AUTO: 24.2 % (ref 35–48)
HCT VFR BLD AUTO: 24.3 % (ref 35–48)
HCT VFR BLD AUTO: 24.3 % (ref 35–48)
HCT VFR BLD AUTO: 24.8 % (ref 35–48)
HCT VFR BLD AUTO: 25 % (ref 35–48)
HCT VFR BLD AUTO: 25.4 % (ref 35–48)
HCT VFR BLD AUTO: 27.8 % (ref 35–48)
HCT VFR BLD AUTO: 28.5 % (ref 35–48)
HCT VFR BLD AUTO: 28.7 % (ref 35–48)
HCT VFR BLD AUTO: 29.1 % (ref 35–48)
HCT VFR BLD AUTO: 29.4 % (ref 35–48)
HCT VFR BLD AUTO: 29.7 % (ref 35–48)
HCT VFR BLD AUTO: 33.5 % (ref 35–48)
HCT VFR BLD AUTO: 36.8 % (ref 35–48)
HCT VFR BLD AUTO: 37.1 % (ref 35–48)
HCT VFR BLD AUTO: 37.3 % (ref 35–48)
HCT VFR BLD AUTO: 38.1 % (ref 35–48)
HCT VFR BLD AUTO: 38.2 % (ref 35–48)
HCT VFR BLD AUTO: 39.1 % (ref 35–48)
HCT VFR BLD AUTO: 39.2 % (ref 35–48)
HCT VFR BLD AUTO: 40 % (ref 35–48)
HCT VFR BLD AUTO: 40.9 % (ref 35–48)
HCT VFR BLD AUTO: 42.5 % (ref 35–48)
HDLC SERPL-MCNC: 69 MG/DL (ref 40–59)
HEMOCCULT STL QL: NEGATIVE
HGB BLD-MCNC: 10.5 G/DL (ref 12–16)
HGB BLD-MCNC: 11.7 G/DL (ref 12–16)
HGB BLD-MCNC: 11.8 G/DL (ref 12–16)
HGB BLD-MCNC: 12 G/DL (ref 12–16)
HGB BLD-MCNC: 12.1 G/DL (ref 12–16)
HGB BLD-MCNC: 12.2 G/DL (ref 12–16)
HGB BLD-MCNC: 12.3 G/DL (ref 12–16)
HGB BLD-MCNC: 12.5 G/DL (ref 12–16)
HGB BLD-MCNC: 12.8 G/DL (ref 12–16)
HGB BLD-MCNC: 13 G/DL (ref 12–16)
HGB BLD-MCNC: 13.6 G/DL (ref 12–16)
HGB BLD-MCNC: 7 G/DL (ref 12–16)
HGB BLD-MCNC: 7.1 G/DL (ref 12–16)
HGB BLD-MCNC: 7.2 G/DL (ref 12–16)
HGB BLD-MCNC: 7.3 G/DL (ref 12–16)
HGB BLD-MCNC: 7.4 G/DL (ref 12–16)
HGB BLD-MCNC: 7.4 G/DL (ref 12–16)
HGB BLD-MCNC: 7.5 G/DL (ref 12–16)
HGB BLD-MCNC: 7.5 G/DL (ref 12–16)
HGB BLD-MCNC: 7.6 G/DL (ref 12–16)
HGB BLD-MCNC: 7.7 G/DL (ref 12–16)
HGB BLD-MCNC: 8 G/DL (ref 12–16)
HGB BLD-MCNC: 8.3 G/DL (ref 12–16)
HGB BLD-MCNC: 8.7 G/DL (ref 12–16)
HGB BLD-MCNC: 8.7 G/DL (ref 12–16)
HGB BLD-MCNC: 8.9 G/DL (ref 12–16)
HGB BLD-MCNC: 8.9 G/DL (ref 12–16)
HGB BLD-MCNC: 9 G/DL (ref 12–16)
HGB UR QL STRIP.AUTO: NEGATIVE
HGB UR QL STRIP.AUTO: NEGATIVE
IMM GRANULOCYTES # BLD AUTO: 0.02 X10(3) UL (ref 0–1)
IMM GRANULOCYTES # BLD AUTO: 0.03 X10(3) UL (ref 0–1)
IMM GRANULOCYTES # BLD AUTO: 0.04 X10(3) UL (ref 0–1)
IMM GRANULOCYTES # BLD AUTO: 0.05 X10(3) UL (ref 0–1)
IMM GRANULOCYTES # BLD AUTO: 0.06 X10(3) UL (ref 0–1)
IMM GRANULOCYTES # BLD AUTO: 0.06 X10(3) UL (ref 0–1)
IMM GRANULOCYTES # BLD AUTO: 0.07 X10(3) UL (ref 0–1)
IMM GRANULOCYTES # BLD AUTO: 0.08 X10(3) UL (ref 0–1)
IMM GRANULOCYTES # BLD AUTO: 0.1 X10(3) UL (ref 0–1)
IMM GRANULOCYTES # BLD AUTO: 0.11 X10(3) UL (ref 0–1)
IMM GRANULOCYTES # BLD AUTO: 0.12 X10(3) UL (ref 0–1)
IMM GRANULOCYTES # BLD AUTO: 0.13 X10(3) UL (ref 0–1)
IMM GRANULOCYTES # BLD AUTO: 0.15 X10(3) UL (ref 0–1)
IMM GRANULOCYTES # BLD AUTO: 0.16 X10(3) UL (ref 0–1)
IMM GRANULOCYTES # BLD AUTO: 0.23 X10(3) UL (ref 0–1)
IMM GRANULOCYTES NFR BLD: 0.2 %
IMM GRANULOCYTES NFR BLD: 0.3 %
IMM GRANULOCYTES NFR BLD: 0.3 %
IMM GRANULOCYTES NFR BLD: 0.4 %
IMM GRANULOCYTES NFR BLD: 0.4 %
IMM GRANULOCYTES NFR BLD: 0.5 %
IMM GRANULOCYTES NFR BLD: 0.6 %
IMM GRANULOCYTES NFR BLD: 0.6 %
IMM GRANULOCYTES NFR BLD: 0.7 %
IMM GRANULOCYTES NFR BLD: 0.8 %
IMM GRANULOCYTES NFR BLD: 0.8 %
IMM GRANULOCYTES NFR BLD: 0.9 %
IMM GRANULOCYTES NFR BLD: 1 %
IMM GRANULOCYTES NFR BLD: 1 %
IMM GRANULOCYTES NFR BLD: 1.1 %
IMM GRANULOCYTES NFR BLD: 1.1 %
IMM GRANULOCYTES NFR BLD: 1.2 %
IMM GRANULOCYTES NFR BLD: 1.2 %
IMM GRANULOCYTES NFR BLD: 1.3 %
IMM GRANULOCYTES NFR BLD: 1.6 %
IMM GRANULOCYTES NFR BLD: 1.6 %
IMM GRANULOCYTES NFR BLD: 1.7 %
IRON SATURATION: 13 % (ref 15–50)
IRON SERPL-MCNC: 31 UG/DL (ref 50–170)
KETONES UR-MCNC: NEGATIVE MG/DL
KETONES UR-MCNC: NEGATIVE MG/DL
L PNEUMO AG UR QL: NEGATIVE
LACTATE SERPL-SCNC: 0.5 MMOL/L (ref 0.4–2)
LACTATE SERPL-SCNC: 1 MMOL/L (ref 0.4–2)
LDLC SERPL CALC-MCNC: 288 MG/DL (ref ?–100)
LEUKOCYTE ESTERASE UR QL STRIP.AUTO: NEGATIVE
LIPASE SERPL-CCNC: 64 U/L (ref 22–51)
LYMPHOCYTES # BLD AUTO: 0.74 X10(3) UL (ref 1–4)
LYMPHOCYTES # BLD AUTO: 0.79 X10(3) UL (ref 1–4)
LYMPHOCYTES # BLD AUTO: 0.8 X10(3) UL (ref 1–4)
LYMPHOCYTES # BLD AUTO: 0.81 X10(3) UL (ref 1–4)
LYMPHOCYTES # BLD AUTO: 0.84 X10(3) UL (ref 1–4)
LYMPHOCYTES # BLD AUTO: 0.86 X10(3) UL (ref 1–4)
LYMPHOCYTES # BLD AUTO: 0.96 X10(3) UL (ref 1–4)
LYMPHOCYTES # BLD AUTO: 1 X10(3) UL (ref 1–4)
LYMPHOCYTES # BLD AUTO: 1.04 X10(3) UL (ref 1–4)
LYMPHOCYTES # BLD AUTO: 1.12 X10(3) UL (ref 1–4)
LYMPHOCYTES # BLD AUTO: 1.21 X10(3) UL (ref 1–4)
LYMPHOCYTES # BLD AUTO: 1.23 X10(3) UL (ref 1–4)
LYMPHOCYTES # BLD AUTO: 1.24 X10(3) UL (ref 1–4)
LYMPHOCYTES # BLD AUTO: 1.26 X10(3) UL (ref 1–4)
LYMPHOCYTES # BLD AUTO: 1.3 X10(3) UL (ref 1–4)
LYMPHOCYTES # BLD AUTO: 1.57 X10(3) UL (ref 1–4)
LYMPHOCYTES # BLD AUTO: 1.58 X10(3) UL (ref 1–4)
LYMPHOCYTES # BLD AUTO: 1.59 X10(3) UL (ref 1–4)
LYMPHOCYTES # BLD AUTO: 1.61 X10(3) UL (ref 1–4)
LYMPHOCYTES # BLD AUTO: 1.68 X10(3) UL (ref 1–4)
LYMPHOCYTES # BLD AUTO: 1.7 X10(3) UL (ref 1–4)
LYMPHOCYTES # BLD AUTO: 1.72 X10(3) UL (ref 1–4)
LYMPHOCYTES # BLD AUTO: 1.81 X10(3) UL (ref 1–4)
LYMPHOCYTES # BLD AUTO: 1.92 X10(3) UL (ref 1–4)
LYMPHOCYTES # BLD AUTO: 1.92 X10(3) UL (ref 1–4)
LYMPHOCYTES # BLD AUTO: 1.93 X10(3) UL (ref 1–4)
LYMPHOCYTES # BLD AUTO: 2 X10(3) UL (ref 1–4)
LYMPHOCYTES # BLD: 2.4 K/UL (ref 1–4)
LYMPHOCYTES NFR BLD AUTO: 10 %
LYMPHOCYTES NFR BLD AUTO: 10.2 %
LYMPHOCYTES NFR BLD AUTO: 12 %
LYMPHOCYTES NFR BLD AUTO: 13.1 %
LYMPHOCYTES NFR BLD AUTO: 13.4 %
LYMPHOCYTES NFR BLD AUTO: 13.6 %
LYMPHOCYTES NFR BLD AUTO: 14.3 %
LYMPHOCYTES NFR BLD AUTO: 14.7 %
LYMPHOCYTES NFR BLD AUTO: 16.4 %
LYMPHOCYTES NFR BLD AUTO: 16.5 %
LYMPHOCYTES NFR BLD AUTO: 17.3 %
LYMPHOCYTES NFR BLD AUTO: 17.4 %
LYMPHOCYTES NFR BLD AUTO: 18.4 %
LYMPHOCYTES NFR BLD AUTO: 18.6 %
LYMPHOCYTES NFR BLD AUTO: 20.4 %
LYMPHOCYTES NFR BLD AUTO: 20.6 %
LYMPHOCYTES NFR BLD AUTO: 20.8 %
LYMPHOCYTES NFR BLD AUTO: 21.1 %
LYMPHOCYTES NFR BLD AUTO: 21.1 %
LYMPHOCYTES NFR BLD AUTO: 5.8 %
LYMPHOCYTES NFR BLD AUTO: 6.2 %
LYMPHOCYTES NFR BLD AUTO: 6.9 %
LYMPHOCYTES NFR BLD AUTO: 7.3 %
LYMPHOCYTES NFR BLD AUTO: 7.8 %
LYMPHOCYTES NFR BLD AUTO: 8.2 %
LYMPHOCYTES NFR BLD AUTO: 9.1 %
LYMPHOCYTES NFR BLD AUTO: 9.5 %
LYMPHOCYTES NFR BLD: 26 %
M PROTEIN MFR SERPL ELPH: 6 G/DL (ref 6.4–8.2)
MCH RBC QN AUTO: 25 PG (ref 26–34)
MCH RBC QN AUTO: 25.2 PG (ref 26–34)
MCH RBC QN AUTO: 25.3 PG (ref 26–34)
MCH RBC QN AUTO: 25.4 PG (ref 26–34)
MCH RBC QN AUTO: 25.4 PG (ref 26–34)
MCH RBC QN AUTO: 25.5 PG (ref 26–34)
MCH RBC QN AUTO: 25.5 PG (ref 26–34)
MCH RBC QN AUTO: 25.6 PG (ref 26–34)
MCH RBC QN AUTO: 25.7 PG (ref 26–34)
MCH RBC QN AUTO: 25.9 PG (ref 26–34)
MCH RBC QN AUTO: 25.9 PG (ref 26–34)
MCH RBC QN AUTO: 26 PG (ref 26–34)
MCH RBC QN AUTO: 26.1 PG (ref 26–34)
MCH RBC QN AUTO: 26.1 PG (ref 26–34)
MCH RBC QN AUTO: 26.2 PG (ref 26–34)
MCH RBC QN AUTO: 26.3 PG (ref 26–34)
MCH RBC QN AUTO: 26.3 PG (ref 26–34)
MCH RBC QN AUTO: 26.4 PG (ref 26–34)
MCH RBC QN AUTO: 26.5 PG (ref 26–34)
MCH RBC QN AUTO: 26.6 PG (ref 26–34)
MCH RBC QN AUTO: 27.4 PG (ref 27–32)
MCHC RBC AUTO-ENTMCNC: 28.8 G/DL (ref 31–37)
MCHC RBC AUTO-ENTMCNC: 29.3 G/DL (ref 31–37)
MCHC RBC AUTO-ENTMCNC: 30.3 G/DL (ref 31–37)
MCHC RBC AUTO-ENTMCNC: 30.3 G/DL (ref 31–37)
MCHC RBC AUTO-ENTMCNC: 30.4 G/DL (ref 31–37)
MCHC RBC AUTO-ENTMCNC: 30.4 G/DL (ref 31–37)
MCHC RBC AUTO-ENTMCNC: 30.5 G/DL (ref 31–37)
MCHC RBC AUTO-ENTMCNC: 30.6 G/DL (ref 31–37)
MCHC RBC AUTO-ENTMCNC: 30.9 G/DL (ref 31–37)
MCHC RBC AUTO-ENTMCNC: 30.9 G/DL (ref 31–37)
MCHC RBC AUTO-ENTMCNC: 31 G/DL (ref 31–37)
MCHC RBC AUTO-ENTMCNC: 31.1 G/DL (ref 31–37)
MCHC RBC AUTO-ENTMCNC: 31.2 G/DL (ref 31–37)
MCHC RBC AUTO-ENTMCNC: 31.2 G/DL (ref 31–37)
MCHC RBC AUTO-ENTMCNC: 31.3 G/DL (ref 31–37)
MCHC RBC AUTO-ENTMCNC: 31.3 G/DL (ref 31–37)
MCHC RBC AUTO-ENTMCNC: 31.5 G/DL (ref 31–37)
MCHC RBC AUTO-ENTMCNC: 31.5 G/DL (ref 31–37)
MCHC RBC AUTO-ENTMCNC: 31.8 G/DL (ref 31–37)
MCHC RBC AUTO-ENTMCNC: 32 G/DL (ref 31–37)
MCHC RBC AUTO-ENTMCNC: 32.4 G/DL (ref 31–37)
MCHC RBC AUTO-ENTMCNC: 32.5 G/DL (ref 31–37)
MCHC RBC AUTO-ENTMCNC: 32.8 G/DL (ref 32–37)
MCV RBC AUTO: 81.6 FL (ref 80–100)
MCV RBC AUTO: 81.8 FL (ref 80–100)
MCV RBC AUTO: 82 FL (ref 80–100)
MCV RBC AUTO: 82.2 FL (ref 80–100)
MCV RBC AUTO: 82.2 FL (ref 80–100)
MCV RBC AUTO: 82.3 FL (ref 80–100)
MCV RBC AUTO: 82.4 FL (ref 80–100)
MCV RBC AUTO: 82.5 FL (ref 80–100)
MCV RBC AUTO: 82.6 FL (ref 80–100)
MCV RBC AUTO: 82.7 FL (ref 80–100)
MCV RBC AUTO: 82.7 FL (ref 80–100)
MCV RBC AUTO: 82.8 FL (ref 80–100)
MCV RBC AUTO: 82.9 FL (ref 80–100)
MCV RBC AUTO: 82.9 FL (ref 80–100)
MCV RBC AUTO: 83 FL (ref 80–100)
MCV RBC AUTO: 83.5 FL (ref 80–100)
MCV RBC AUTO: 83.7 FL (ref 80–100)
MCV RBC AUTO: 83.7 FL (ref 80–100)
MCV RBC AUTO: 83.8 FL (ref 80–100)
MCV RBC AUTO: 83.9 FL (ref 80–100)
MCV RBC AUTO: 83.9 FL (ref 80–100)
MCV RBC AUTO: 84.1 FL (ref 80–100)
MCV RBC AUTO: 84.3 FL (ref 80–100)
MCV RBC AUTO: 84.4 FL (ref 80–100)
MCV RBC AUTO: 85 FL (ref 80–100)
MCV RBC AUTO: 87.4 FL (ref 80–100)
MCV RBC AUTO: 90 FL (ref 80–100)
METAPNEUMOVIRUS PCR:: NEGATIVE
MODIFIED ALLEN TEST: POSITIVE
MODIFIED ALLEN TEST: POSITIVE
MONOCYTES # BLD AUTO: 0.37 X10(3) UL (ref 0.1–1)
MONOCYTES # BLD AUTO: 0.46 X10(3) UL (ref 0.1–1)
MONOCYTES # BLD AUTO: 0.47 X10(3) UL (ref 0.1–1)
MONOCYTES # BLD AUTO: 0.49 X10(3) UL (ref 0.1–1)
MONOCYTES # BLD AUTO: 0.49 X10(3) UL (ref 0.1–1)
MONOCYTES # BLD AUTO: 0.51 X10(3) UL (ref 0.1–1)
MONOCYTES # BLD AUTO: 0.51 X10(3) UL (ref 0.1–1)
MONOCYTES # BLD AUTO: 0.52 X10(3) UL (ref 0.1–1)
MONOCYTES # BLD AUTO: 0.53 X10(3) UL (ref 0.1–1)
MONOCYTES # BLD AUTO: 0.54 X10(3) UL (ref 0.1–1)
MONOCYTES # BLD AUTO: 0.54 X10(3) UL (ref 0.1–1)
MONOCYTES # BLD AUTO: 0.57 X10(3) UL (ref 0.1–1)
MONOCYTES # BLD AUTO: 0.58 X10(3) UL (ref 0.1–1)
MONOCYTES # BLD AUTO: 0.63 X10(3) UL (ref 0.1–1)
MONOCYTES # BLD AUTO: 0.65 X10(3) UL (ref 0.1–1)
MONOCYTES # BLD AUTO: 0.65 X10(3) UL (ref 0.1–1)
MONOCYTES # BLD AUTO: 0.69 X10(3) UL (ref 0.1–1)
MONOCYTES # BLD AUTO: 0.69 X10(3) UL (ref 0.1–1)
MONOCYTES # BLD AUTO: 0.71 X10(3) UL (ref 0.1–1)
MONOCYTES # BLD AUTO: 0.73 X10(3) UL (ref 0.1–1)
MONOCYTES # BLD AUTO: 0.76 X10(3) UL (ref 0.1–1)
MONOCYTES # BLD AUTO: 0.76 X10(3) UL (ref 0.1–1)
MONOCYTES # BLD AUTO: 0.82 X10(3) UL (ref 0.1–1)
MONOCYTES # BLD AUTO: 0.87 X10(3) UL (ref 0.1–1)
MONOCYTES # BLD: 0.8 K/UL (ref 0–1)
MONOCYTES NFR BLD AUTO: 4.1 %
MONOCYTES NFR BLD AUTO: 4.8 %
MONOCYTES NFR BLD AUTO: 5.1 %
MONOCYTES NFR BLD AUTO: 5.3 %
MONOCYTES NFR BLD AUTO: 5.4 %
MONOCYTES NFR BLD AUTO: 5.5 %
MONOCYTES NFR BLD AUTO: 5.5 %
MONOCYTES NFR BLD AUTO: 5.8 %
MONOCYTES NFR BLD AUTO: 5.9 %
MONOCYTES NFR BLD AUTO: 6 %
MONOCYTES NFR BLD AUTO: 6.1 %
MONOCYTES NFR BLD AUTO: 6.3 %
MONOCYTES NFR BLD AUTO: 6.3 %
MONOCYTES NFR BLD AUTO: 6.4 %
MONOCYTES NFR BLD AUTO: 6.6 %
MONOCYTES NFR BLD AUTO: 6.8 %
MONOCYTES NFR BLD AUTO: 7 %
MONOCYTES NFR BLD AUTO: 7.6 %
MONOCYTES NFR BLD AUTO: 7.8 %
MONOCYTES NFR BLD: 9 %
MYCOPLASMA PNEUMONIA PCR:: NEGATIVE
NEUTROPHILS # BLD AUTO: 10.24 X10 (3) UL (ref 1.5–7.7)
NEUTROPHILS # BLD AUTO: 10.24 X10(3) UL (ref 1.5–7.7)
NEUTROPHILS # BLD AUTO: 11.03 X10 (3) UL (ref 1.5–7.7)
NEUTROPHILS # BLD AUTO: 11.03 X10(3) UL (ref 1.5–7.7)
NEUTROPHILS # BLD AUTO: 11.42 X10 (3) UL (ref 1.5–7.7)
NEUTROPHILS # BLD AUTO: 11.42 X10(3) UL (ref 1.5–7.7)
NEUTROPHILS # BLD AUTO: 5.13 X10 (3) UL (ref 1.5–7.7)
NEUTROPHILS # BLD AUTO: 5.13 X10(3) UL (ref 1.5–7.7)
NEUTROPHILS # BLD AUTO: 5.14 X10 (3) UL (ref 1.5–7.7)
NEUTROPHILS # BLD AUTO: 5.14 X10(3) UL (ref 1.5–7.7)
NEUTROPHILS # BLD AUTO: 5.26 X10 (3) UL (ref 1.5–7.7)
NEUTROPHILS # BLD AUTO: 5.26 X10(3) UL (ref 1.5–7.7)
NEUTROPHILS # BLD AUTO: 5.6 K/UL (ref 1.8–7.7)
NEUTROPHILS # BLD AUTO: 6 X10 (3) UL (ref 1.5–7.7)
NEUTROPHILS # BLD AUTO: 6 X10(3) UL (ref 1.5–7.7)
NEUTROPHILS # BLD AUTO: 6.12 X10 (3) UL (ref 1.5–7.7)
NEUTROPHILS # BLD AUTO: 6.12 X10(3) UL (ref 1.5–7.7)
NEUTROPHILS # BLD AUTO: 6.35 X10 (3) UL (ref 1.5–7.7)
NEUTROPHILS # BLD AUTO: 6.35 X10(3) UL (ref 1.5–7.7)
NEUTROPHILS # BLD AUTO: 6.56 X10 (3) UL (ref 1.5–7.7)
NEUTROPHILS # BLD AUTO: 6.56 X10(3) UL (ref 1.5–7.7)
NEUTROPHILS # BLD AUTO: 6.6 X10 (3) UL (ref 1.5–7.7)
NEUTROPHILS # BLD AUTO: 6.6 X10(3) UL (ref 1.5–7.7)
NEUTROPHILS # BLD AUTO: 6.65 X10 (3) UL (ref 1.5–7.7)
NEUTROPHILS # BLD AUTO: 6.65 X10(3) UL (ref 1.5–7.7)
NEUTROPHILS # BLD AUTO: 6.69 X10 (3) UL (ref 1.5–7.7)
NEUTROPHILS # BLD AUTO: 6.69 X10(3) UL (ref 1.5–7.7)
NEUTROPHILS # BLD AUTO: 6.72 X10 (3) UL (ref 1.5–7.7)
NEUTROPHILS # BLD AUTO: 6.72 X10(3) UL (ref 1.5–7.7)
NEUTROPHILS # BLD AUTO: 6.75 X10 (3) UL (ref 1.5–7.7)
NEUTROPHILS # BLD AUTO: 6.75 X10(3) UL (ref 1.5–7.7)
NEUTROPHILS # BLD AUTO: 6.98 X10 (3) UL (ref 1.5–7.7)
NEUTROPHILS # BLD AUTO: 6.98 X10 (3) UL (ref 1.5–7.7)
NEUTROPHILS # BLD AUTO: 6.98 X10(3) UL (ref 1.5–7.7)
NEUTROPHILS # BLD AUTO: 6.98 X10(3) UL (ref 1.5–7.7)
NEUTROPHILS # BLD AUTO: 7.66 X10 (3) UL (ref 1.5–7.7)
NEUTROPHILS # BLD AUTO: 7.66 X10(3) UL (ref 1.5–7.7)
NEUTROPHILS # BLD AUTO: 7.67 X10 (3) UL (ref 1.5–7.7)
NEUTROPHILS # BLD AUTO: 7.67 X10(3) UL (ref 1.5–7.7)
NEUTROPHILS # BLD AUTO: 7.76 X10 (3) UL (ref 1.5–7.7)
NEUTROPHILS # BLD AUTO: 7.76 X10(3) UL (ref 1.5–7.7)
NEUTROPHILS # BLD AUTO: 7.84 X10 (3) UL (ref 1.5–7.7)
NEUTROPHILS # BLD AUTO: 7.84 X10(3) UL (ref 1.5–7.7)
NEUTROPHILS # BLD AUTO: 7.91 X10 (3) UL (ref 1.5–7.7)
NEUTROPHILS # BLD AUTO: 7.91 X10(3) UL (ref 1.5–7.7)
NEUTROPHILS # BLD AUTO: 7.98 X10 (3) UL (ref 1.5–7.7)
NEUTROPHILS # BLD AUTO: 7.98 X10(3) UL (ref 1.5–7.7)
NEUTROPHILS # BLD AUTO: 8.46 X10 (3) UL (ref 1.5–7.7)
NEUTROPHILS # BLD AUTO: 8.46 X10(3) UL (ref 1.5–7.7)
NEUTROPHILS # BLD AUTO: 8.74 X10 (3) UL (ref 1.5–7.7)
NEUTROPHILS # BLD AUTO: 8.74 X10(3) UL (ref 1.5–7.7)
NEUTROPHILS # BLD AUTO: 9.03 X10 (3) UL (ref 1.5–7.7)
NEUTROPHILS # BLD AUTO: 9.03 X10(3) UL (ref 1.5–7.7)
NEUTROPHILS # BLD AUTO: 9.81 X10 (3) UL (ref 1.5–7.7)
NEUTROPHILS # BLD AUTO: 9.81 X10(3) UL (ref 1.5–7.7)
NEUTROPHILS NFR BLD AUTO: 62.2 %
NEUTROPHILS NFR BLD AUTO: 62.5 %
NEUTROPHILS NFR BLD AUTO: 66 %
NEUTROPHILS NFR BLD AUTO: 67.5 %
NEUTROPHILS NFR BLD AUTO: 67.9 %
NEUTROPHILS NFR BLD AUTO: 69.8 %
NEUTROPHILS NFR BLD AUTO: 70.2 %
NEUTROPHILS NFR BLD AUTO: 71.1 %
NEUTROPHILS NFR BLD AUTO: 71.6 %
NEUTROPHILS NFR BLD AUTO: 71.8 %
NEUTROPHILS NFR BLD AUTO: 72.9 %
NEUTROPHILS NFR BLD AUTO: 72.9 %
NEUTROPHILS NFR BLD AUTO: 73.2 %
NEUTROPHILS NFR BLD AUTO: 73.8 %
NEUTROPHILS NFR BLD AUTO: 74.3 %
NEUTROPHILS NFR BLD AUTO: 74.6 %
NEUTROPHILS NFR BLD AUTO: 75.4 %
NEUTROPHILS NFR BLD AUTO: 76 %
NEUTROPHILS NFR BLD AUTO: 76.9 %
NEUTROPHILS NFR BLD AUTO: 77 %
NEUTROPHILS NFR BLD AUTO: 78.2 %
NEUTROPHILS NFR BLD AUTO: 78.8 %
NEUTROPHILS NFR BLD AUTO: 79 %
NEUTROPHILS NFR BLD AUTO: 79.1 %
NEUTROPHILS NFR BLD AUTO: 80.2 %
NEUTROPHILS NFR BLD AUTO: 81.1 %
NEUTROPHILS NFR BLD AUTO: 82.6 %
NEUTROPHILS NFR BLD: 60 %
NITRITE UR QL STRIP.AUTO: NEGATIVE
NITRITE UR QL STRIP.AUTO: NEGATIVE
NONHDLC SERPL-MCNC: 356 MG/DL (ref ?–130)
NT-PROBNP SERPL-MCNC: 1006 PG/ML (ref ?–450)
NT-PROBNP SERPL-MCNC: 2262 PG/ML (ref ?–450)
NT-PROBNP SERPL-MCNC: 2761 PG/ML (ref ?–450)
NT-PROBNP SERPL-MCNC: 3130 PG/ML (ref ?–450)
NT-PROBNP SERPL-MCNC: 3165 PG/ML (ref ?–450)
O2 CT BLD-SCNC: 10 VOL% (ref 15–23)
O2 CT BLD-SCNC: 9.4 VOL% (ref 15–23)
OSMOLALITY SERPL CALC.SUM OF ELEC: 289 MOSM/KG (ref 275–295)
OSMOLALITY SERPL CALC.SUM OF ELEC: 290 MOSM/KG (ref 275–295)
OSMOLALITY SERPL CALC.SUM OF ELEC: 293 MOSM/KG (ref 275–295)
OSMOLALITY SERPL CALC.SUM OF ELEC: 294 MOSM/KG (ref 275–295)
OSMOLALITY SERPL CALC.SUM OF ELEC: 295 MOSM/KG (ref 275–295)
OSMOLALITY SERPL CALC.SUM OF ELEC: 295 MOSM/KG (ref 275–295)
OSMOLALITY SERPL CALC.SUM OF ELEC: 296 MOSM/KG (ref 275–295)
OSMOLALITY SERPL CALC.SUM OF ELEC: 297 MOSM/KG (ref 275–295)
OSMOLALITY SERPL CALC.SUM OF ELEC: 298 MOSM/KG (ref 275–295)
OSMOLALITY SERPL CALC.SUM OF ELEC: 299 MOSM/KG (ref 275–295)
OSMOLALITY SERPL CALC.SUM OF ELEC: 300 MOSM/KG (ref 275–295)
OSMOLALITY SERPL CALC.SUM OF ELEC: 300 MOSM/KG (ref 275–295)
OSMOLALITY SERPL CALC.SUM OF ELEC: 301 MOSM/KG (ref 275–295)
OSMOLALITY SERPL CALC.SUM OF ELEC: 301 MOSM/KG (ref 275–295)
OSMOLALITY SERPL CALC.SUM OF ELEC: 302 MOSM/KG (ref 275–295)
OSMOLALITY SERPL CALC.SUM OF ELEC: 304 MOSM/KG (ref 275–295)
OSMOLALITY SERPL CALC.SUM OF ELEC: 306 MOSM/KG (ref 275–295)
OSMOLALITY UR CALC.SUM OF ELEC: 294 MOSM/KG (ref 275–295)
OXYGEN LITERS/MINUTE: 1 L/MIN
OXYGEN LITERS/MINUTE: 5 L/MIN
PARAINFLUENZA 1 PCR:: NEGATIVE
PARAINFLUENZA 2 PCR:: NEGATIVE
PARAINFLUENZA 3 PCR:: NEGATIVE
PARAINFLUENZA 4 PCR:: NEGATIVE
PATIENT FASTING: NO
PATIENT FASTING: YES
PCO2 BLDA: 33 MM HG (ref 35–45)
PCO2 BLDA: 39 MM HG (ref 35–45)
PH BLDA: 7.43 [PH] (ref 7.35–7.45)
PH BLDA: 7.44 [PH] (ref 7.35–7.45)
PH UR: 7 [PH] (ref 5–8)
PH UR: 7 [PH] (ref 5–8)
PLATELET # BLD AUTO: 247 10(3)UL (ref 150–450)
PLATELET # BLD AUTO: 263 10(3)UL (ref 150–450)
PLATELET # BLD AUTO: 284 10(3)UL (ref 150–450)
PLATELET # BLD AUTO: 288 10(3)UL (ref 150–450)
PLATELET # BLD AUTO: 292 10(3)UL (ref 150–450)
PLATELET # BLD AUTO: 294 10(3)UL (ref 150–450)
PLATELET # BLD AUTO: 294 K/UL (ref 140–400)
PLATELET # BLD AUTO: 298 10(3)UL (ref 150–450)
PLATELET # BLD AUTO: 301 10(3)UL (ref 150–450)
PLATELET # BLD AUTO: 303 10(3)UL (ref 150–450)
PLATELET # BLD AUTO: 308 10(3)UL (ref 150–450)
PLATELET # BLD AUTO: 318 10(3)UL (ref 150–450)
PLATELET # BLD AUTO: 326 10(3)UL (ref 150–450)
PLATELET # BLD AUTO: 340 10(3)UL (ref 150–450)
PLATELET # BLD AUTO: 341 10(3)UL (ref 150–450)
PLATELET # BLD AUTO: 346 10(3)UL (ref 150–450)
PLATELET # BLD AUTO: 355 10(3)UL (ref 150–450)
PLATELET # BLD AUTO: 359 10(3)UL (ref 150–450)
PLATELET # BLD AUTO: 363 10(3)UL (ref 150–450)
PLATELET # BLD AUTO: 363 10(3)UL (ref 150–450)
PLATELET # BLD AUTO: 364 10(3)UL (ref 150–450)
PLATELET # BLD AUTO: 366 10(3)UL (ref 150–450)
PLATELET # BLD AUTO: 373 10(3)UL (ref 150–450)
PLATELET # BLD AUTO: 373 10(3)UL (ref 150–450)
PLATELET # BLD AUTO: 374 10(3)UL (ref 150–450)
PLATELET # BLD AUTO: 379 10(3)UL (ref 150–450)
PLATELET # BLD AUTO: 380 10(3)UL (ref 150–450)
PLATELET # BLD AUTO: 426 10(3)UL (ref 150–450)
PLATELET # BLD AUTO: 446 10(3)UL (ref 150–450)
PMV BLD AUTO: 8.4 FL (ref 7.4–10.3)
PO2 BLDA: 57 MM HG (ref 80–100)
PO2 BLDA: 77 MM HG (ref 80–100)
POTASSIUM SERPL-SCNC: 3.1 MMOL/L (ref 3.3–5.1)
POTASSIUM SERPL-SCNC: 3.2 MMOL/L (ref 3.5–5.1)
POTASSIUM SERPL-SCNC: 3.3 MMOL/L (ref 3.5–5.1)
POTASSIUM SERPL-SCNC: 3.4 MMOL/L (ref 3.5–5.1)
POTASSIUM SERPL-SCNC: 3.5 MMOL/L (ref 3.5–5.1)
POTASSIUM SERPL-SCNC: 3.6 MMOL/L (ref 3.5–5.1)
POTASSIUM SERPL-SCNC: 3.6 MMOL/L (ref 3.5–5.1)
POTASSIUM SERPL-SCNC: 3.7 MMOL/L (ref 3.5–5.1)
POTASSIUM SERPL-SCNC: 3.8 MMOL/L (ref 3.5–5.1)
POTASSIUM SERPL-SCNC: 3.9 MMOL/L (ref 3.5–5.1)
POTASSIUM SERPL-SCNC: 4 MMOL/L (ref 3.5–5.1)
POTASSIUM SERPL-SCNC: 4.1 MMOL/L (ref 3.5–5.1)
POTASSIUM SERPL-SCNC: 4.1 MMOL/L (ref 3.5–5.1)
POTASSIUM SERPL-SCNC: 4.2 MMOL/L (ref 3.5–5.1)
POTASSIUM SERPL-SCNC: 4.2 MMOL/L (ref 3.5–5.1)
POTASSIUM SERPL-SCNC: 4.3 MMOL/L (ref 3.5–5.1)
POTASSIUM SERPL-SCNC: 4.4 MMOL/L (ref 3.5–5.1)
POTASSIUM SERPL-SCNC: 4.5 MMOL/L (ref 3.5–5.1)
POTASSIUM SERPL-SCNC: 4.6 MMOL/L (ref 3.5–5.1)
POTASSIUM SERPL-SCNC: 4.8 MMOL/L (ref 3.5–5.1)
PROCALCITONIN SERPL-MCNC: 0.15 NG/ML
PROCALCITONIN SERPL-MCNC: 0.2 NG/ML
PROT SERPL-MCNC: 6 G/DL (ref 5.9–8.4)
PROT UR-MCNC: >=500 MG/DL
PROT UR-MCNC: >=500 MG/DL
PUNCTURE CHARGE: YES
PUNCTURE CHARGE: YES
RBC # BLD AUTO: 2.74 X10(6)UL (ref 3.8–5.3)
RBC # BLD AUTO: 2.79 X10(6)UL (ref 3.8–5.3)
RBC # BLD AUTO: 2.83 X10(6)UL (ref 3.8–5.3)
RBC # BLD AUTO: 2.84 X10(6)UL (ref 3.8–5.3)
RBC # BLD AUTO: 2.88 X10(6)UL (ref 3.8–5.3)
RBC # BLD AUTO: 2.89 X10(6)UL (ref 3.8–5.3)
RBC # BLD AUTO: 2.89 X10(6)UL (ref 3.8–5.3)
RBC # BLD AUTO: 2.93 X10(6)UL (ref 3.8–5.3)
RBC # BLD AUTO: 2.93 X10(6)UL (ref 3.8–5.3)
RBC # BLD AUTO: 3.01 X10(6)UL (ref 3.8–5.3)
RBC # BLD AUTO: 3.02 X10(6)UL (ref 3.8–5.3)
RBC # BLD AUTO: 3.04 X10(6)UL (ref 3.8–5.3)
RBC # BLD AUTO: 3.09 X10(6)UL (ref 3.8–5.3)
RBC # BLD AUTO: 3.39 X10(6)UL (ref 3.8–5.3)
RBC # BLD AUTO: 3.4 X10(6)UL (ref 3.8–5.3)
RBC # BLD AUTO: 3.45 X10(6)UL (ref 3.8–5.3)
RBC # BLD AUTO: 3.46 X10(6)UL (ref 3.8–5.3)
RBC # BLD AUTO: 3.49 X10(6)UL (ref 3.8–5.3)
RBC # BLD AUTO: 4 X10(6)UL (ref 3.8–5.3)
RBC # BLD AUTO: 4.5 X10(6)UL (ref 3.8–5.3)
RBC # BLD AUTO: 4.51 X10(6)UL (ref 3.8–5.3)
RBC # BLD AUTO: 4.57 M/UL (ref 3.7–5.4)
RBC # BLD AUTO: 4.57 X10(6)UL (ref 3.8–5.3)
RBC # BLD AUTO: 4.62 X10(6)UL (ref 3.8–5.3)
RBC # BLD AUTO: 4.67 X10(6)UL (ref 3.8–5.3)
RBC # BLD AUTO: 4.73 X10(6)UL (ref 3.8–5.3)
RBC # BLD AUTO: 4.88 X10(6)UL (ref 3.8–5.3)
RBC # BLD AUTO: 4.95 X10(6)UL (ref 3.8–5.3)
RBC # BLD AUTO: 5.17 X10(6)UL (ref 3.8–5.3)
RBC #/AREA URNS AUTO: 1 /HPF
RBC #/AREA URNS AUTO: 2 /HPF
RH BLOOD TYPE: POSITIVE
RHINOVIRUS/ENTERO PCR:: NEGATIVE
RSV RNA SPEC QL NAA+PROBE: NEGATIVE
SAO2 % BLDA: 94.6 % (ref 94–100)
SAO2 % BLDA: 98.9 % (ref 94–100)
SODIUM SERPL-SCNC: 136 MMOL/L (ref 136–145)
SODIUM SERPL-SCNC: 138 MMOL/L (ref 136–145)
SODIUM SERPL-SCNC: 138 MMOL/L (ref 136–145)
SODIUM SERPL-SCNC: 139 MMOL/L (ref 136–145)
SODIUM SERPL-SCNC: 139 MMOL/L (ref 136–145)
SODIUM SERPL-SCNC: 140 MMOL/L (ref 136–144)
SODIUM SERPL-SCNC: 140 MMOL/L (ref 136–145)
SODIUM SERPL-SCNC: 141 MMOL/L (ref 136–145)
SODIUM SERPL-SCNC: 142 MMOL/L (ref 136–145)
SODIUM SERPL-SCNC: 143 MMOL/L (ref 136–145)
SODIUM SERPL-SCNC: 144 MMOL/L (ref 136–145)
SODIUM SERPL-SCNC: 144 MMOL/L (ref 136–145)
SODIUM SERPL-SCNC: 145 MMOL/L (ref 136–145)
SODIUM SERPL-SCNC: 145 MMOL/L (ref 136–145)
SP GR UR STRIP: 1.01 (ref 1–1.03)
SP GR UR STRIP: 1.02 (ref 1–1.03)
STREP PNEUMO ANTIGEN, URINE: NEGATIVE
TOTAL IRON BINDING CAPACITY: 234 UG/DL (ref 240–450)
TRANSFERRIN SERPL-MCNC: 157 MG/DL (ref 200–360)
TRIGL SERPL-MCNC: 338 MG/DL (ref 30–149)
TROPONIN I SERPL-MCNC: <0.045 NG/ML (ref ?–0.04)
UROBILINOGEN UR STRIP-ACNC: <2
UROBILINOGEN UR STRIP-ACNC: <2
VIT B12 SERPL-MCNC: 257 PG/ML (ref 193–986)
VIT B12 SERPL-MCNC: 357 PG/ML (ref 193–986)
VIT C UR-MCNC: NEGATIVE MG/DL
VIT C UR-MCNC: NEGATIVE MG/DL
VLDLC SERPL CALC-MCNC: 68 MG/DL (ref 0–30)
WBC # BLD AUTO: 10.1 X10(3) UL (ref 4–11)
WBC # BLD AUTO: 10.4 X10(3) UL (ref 4–11)
WBC # BLD AUTO: 10.5 X10(3) UL (ref 4–11)
WBC # BLD AUTO: 10.5 X10(3) UL (ref 4–11)
WBC # BLD AUTO: 10.8 X10(3) UL (ref 4–11)
WBC # BLD AUTO: 11 X10(3) UL (ref 4–11)
WBC # BLD AUTO: 11.4 X10(3) UL (ref 4–11)
WBC # BLD AUTO: 11.8 X10(3) UL (ref 4–11)
WBC # BLD AUTO: 12.8 X10(3) UL (ref 4–11)
WBC # BLD AUTO: 13 X10(3) UL (ref 4–11)
WBC # BLD AUTO: 13.6 X10(3) UL (ref 4–11)
WBC # BLD AUTO: 13.8 X10(3) UL (ref 4–11)
WBC # BLD AUTO: 8 X10(3) UL (ref 4–11)
WBC # BLD AUTO: 8.3 X10(3) UL (ref 4–11)
WBC # BLD AUTO: 8.3 X10(3) UL (ref 4–11)
WBC # BLD AUTO: 8.5 X10(3) UL (ref 4–11)
WBC # BLD AUTO: 8.7 X10(3) UL (ref 4–11)
WBC # BLD AUTO: 8.9 X10(3) UL (ref 4–11)
WBC # BLD AUTO: 9 X10(3) UL (ref 4–11)
WBC # BLD AUTO: 9.1 X10(3) UL (ref 4–11)
WBC # BLD AUTO: 9.3 K/UL (ref 4–11)
WBC # BLD AUTO: 9.4 X10(3) UL (ref 4–11)
WBC # BLD AUTO: 9.7 X10(3) UL (ref 4–11)
WBC # BLD AUTO: 9.7 X10(3) UL (ref 4–11)
WBC # BLD AUTO: 9.9 X10(3) UL (ref 4–11)
WBC #/AREA URNS AUTO: 2 /HPF
WBC #/AREA URNS AUTO: 50 /HPF

## 2019-01-01 PROCEDURE — 99233 SBSQ HOSP IP/OBS HIGH 50: CPT | Performed by: HOSPITALIST

## 2019-01-01 PROCEDURE — 36415 COLL VENOUS BLD VENIPUNCTURE: CPT

## 2019-01-01 PROCEDURE — 99226 SUBSEQUENT OBSERVATION CARE: CPT | Performed by: HOSPITALIST

## 2019-01-01 PROCEDURE — 99232 SBSQ HOSP IP/OBS MODERATE 35: CPT | Performed by: INTERNAL MEDICINE

## 2019-01-01 PROCEDURE — 73502 X-RAY EXAM HIP UNI 2-3 VIEWS: CPT | Performed by: EMERGENCY MEDICINE

## 2019-01-01 PROCEDURE — G0463 HOSPITAL OUTPT CLINIC VISIT: HCPCS | Performed by: INTERNAL MEDICINE

## 2019-01-01 PROCEDURE — 83690 ASSAY OF LIPASE: CPT | Performed by: EMERGENCY MEDICINE

## 2019-01-01 PROCEDURE — 99222 1ST HOSP IP/OBS MODERATE 55: CPT | Performed by: INTERNAL MEDICINE

## 2019-01-01 PROCEDURE — 73060 X-RAY EXAM OF HUMERUS: CPT | Performed by: EMERGENCY MEDICINE

## 2019-01-01 PROCEDURE — 99239 HOSP IP/OBS DSCHRG MGMT >30: CPT | Performed by: HOSPITALIST

## 2019-01-01 PROCEDURE — 93010 ELECTROCARDIOGRAM REPORT: CPT | Performed by: EMERGENCY MEDICINE

## 2019-01-01 PROCEDURE — 72125 CT NECK SPINE W/O DYE: CPT | Performed by: EMERGENCY MEDICINE

## 2019-01-01 PROCEDURE — 99496 TRANSJ CARE MGMT HIGH F2F 7D: CPT | Performed by: INTERNAL MEDICINE

## 2019-01-01 PROCEDURE — 93005 ELECTROCARDIOGRAM TRACING: CPT

## 2019-01-01 PROCEDURE — 1111F DSCHRG MED/CURRENT MED MERGE: CPT | Performed by: INTERNAL MEDICINE

## 2019-01-01 PROCEDURE — 85025 COMPLETE CBC W/AUTO DIFF WBC: CPT

## 2019-01-01 PROCEDURE — 99308 SBSQ NF CARE LOW MDM 20: CPT | Performed by: INTERNAL MEDICINE

## 2019-01-01 PROCEDURE — 71045 X-RAY EXAM CHEST 1 VIEW: CPT | Performed by: HOSPITALIST

## 2019-01-01 PROCEDURE — 99214 OFFICE O/P EST MOD 30 MIN: CPT | Performed by: INTERNAL MEDICINE

## 2019-01-01 PROCEDURE — 80061 LIPID PANEL: CPT

## 2019-01-01 PROCEDURE — 99284 EMERGENCY DEPT VISIT MOD MDM: CPT

## 2019-01-01 PROCEDURE — 85025 COMPLETE CBC W/AUTO DIFF WBC: CPT | Performed by: EMERGENCY MEDICINE

## 2019-01-01 PROCEDURE — 93017 CV STRESS TEST TRACING ONLY: CPT | Performed by: INTERNAL MEDICINE

## 2019-01-01 PROCEDURE — 73030 X-RAY EXAM OF SHOULDER: CPT | Performed by: INTERNAL MEDICINE

## 2019-01-01 PROCEDURE — 71045 X-RAY EXAM CHEST 1 VIEW: CPT | Performed by: EMERGENCY MEDICINE

## 2019-01-01 PROCEDURE — 80048 BASIC METABOLIC PNL TOTAL CA: CPT | Performed by: EMERGENCY MEDICINE

## 2019-01-01 PROCEDURE — 84460 ALANINE AMINO (ALT) (SGPT): CPT

## 2019-01-01 PROCEDURE — 93306 TTE W/DOPPLER COMPLETE: CPT | Performed by: HOSPITALIST

## 2019-01-01 PROCEDURE — 99233 SBSQ HOSP IP/OBS HIGH 50: CPT | Performed by: INTERNAL MEDICINE

## 2019-01-01 PROCEDURE — 99220 INITIAL OBSERVATION CARE,LEVL III: CPT | Performed by: HOSPITALIST

## 2019-01-01 PROCEDURE — 99283 EMERGENCY DEPT VISIT LOW MDM: CPT

## 2019-01-01 PROCEDURE — 80048 BASIC METABOLIC PNL TOTAL CA: CPT

## 2019-01-01 PROCEDURE — 71045 X-RAY EXAM CHEST 1 VIEW: CPT | Performed by: INTERNAL MEDICINE

## 2019-01-01 PROCEDURE — 78452 HT MUSCLE IMAGE SPECT MULT: CPT | Performed by: INTERNAL MEDICINE

## 2019-01-01 PROCEDURE — 99225 SUBSEQUENT OBSERVATION CARE: CPT | Performed by: HOSPITALIST

## 2019-01-01 PROCEDURE — 80076 HEPATIC FUNCTION PANEL: CPT | Performed by: EMERGENCY MEDICINE

## 2019-01-01 PROCEDURE — 71101 X-RAY EXAM UNILAT RIBS/CHEST: CPT | Performed by: EMERGENCY MEDICINE

## 2019-01-01 PROCEDURE — 99223 1ST HOSP IP/OBS HIGH 75: CPT | Performed by: HOSPITALIST

## 2019-01-01 PROCEDURE — 96374 THER/PROPH/DIAG INJ IV PUSH: CPT

## 2019-01-01 PROCEDURE — 30233N1 TRANSFUSION OF NONAUTOLOGOUS RED BLOOD CELLS INTO PERIPHERAL VEIN, PERCUTANEOUS APPROACH: ICD-10-PCS | Performed by: HOSPITALIST

## 2019-01-01 PROCEDURE — 93970 EXTREMITY STUDY: CPT | Performed by: INTERNAL MEDICINE

## 2019-01-01 PROCEDURE — 99310 SBSQ NF CARE HIGH MDM 45: CPT | Performed by: CLINICAL NURSE SPECIALIST

## 2019-01-01 PROCEDURE — 99285 EMERGENCY DEPT VISIT HI MDM: CPT

## 2019-01-01 PROCEDURE — 99223 1ST HOSP IP/OBS HIGH 75: CPT | Performed by: INTERNAL MEDICINE

## 2019-01-01 PROCEDURE — 84450 TRANSFERASE (AST) (SGOT): CPT

## 2019-01-01 PROCEDURE — 99217 OBSERVATION CARE DISCHARGE: CPT | Performed by: HOSPITALIST

## 2019-01-01 PROCEDURE — 70450 CT HEAD/BRAIN W/O DYE: CPT | Performed by: HOSPITALIST

## 2019-01-01 PROCEDURE — 83880 ASSAY OF NATRIURETIC PEPTIDE: CPT | Performed by: EMERGENCY MEDICINE

## 2019-01-01 PROCEDURE — 71250 CT THORAX DX C-: CPT | Performed by: HOSPITALIST

## 2019-01-01 PROCEDURE — 76775 US EXAM ABDO BACK WALL LIM: CPT | Performed by: HOSPITALIST

## 2019-01-01 PROCEDURE — 74230 X-RAY XM SWLNG FUNCJ C+: CPT | Performed by: INTERNAL MEDICINE

## 2019-01-01 PROCEDURE — 96361 HYDRATE IV INFUSION ADD-ON: CPT

## 2019-01-01 PROCEDURE — 84484 ASSAY OF TROPONIN QUANT: CPT | Performed by: EMERGENCY MEDICINE

## 2019-01-01 PROCEDURE — 73030 X-RAY EXAM OF SHOULDER: CPT | Performed by: EMERGENCY MEDICINE

## 2019-01-01 RX ORDER — CARVEDILOL 6.25 MG/1
6.25 TABLET ORAL 2 TIMES DAILY WITH MEALS
Status: DISCONTINUED | OUTPATIENT
Start: 2019-01-01 | End: 2019-01-01

## 2019-01-01 RX ORDER — CLONIDINE HYDROCHLORIDE 0.1 MG/1
0.1 TABLET ORAL 2 TIMES DAILY
Status: DISCONTINUED | OUTPATIENT
Start: 2019-01-01 | End: 2019-01-01

## 2019-01-01 RX ORDER — ACETAMINOPHEN 500 MG
1000 TABLET ORAL EVERY 8 HOURS PRN
Status: DISCONTINUED | OUTPATIENT
Start: 2019-01-01 | End: 2019-01-01

## 2019-01-01 RX ORDER — AZITHROMYCIN 250 MG/1
500 TABLET, FILM COATED ORAL
Status: DISCONTINUED | OUTPATIENT
Start: 2019-01-01 | End: 2019-01-01 | Stop reason: ALTCHOICE

## 2019-01-01 RX ORDER — SODIUM CHLORIDE 9 MG/ML
INJECTION, SOLUTION INTRAVENOUS ONCE
Status: COMPLETED | OUTPATIENT
Start: 2019-01-01 | End: 2019-01-01

## 2019-01-01 RX ORDER — SODIUM CHLORIDE 0.9 % (FLUSH) 0.9 %
10 SYRINGE (ML) INJECTION AS NEEDED
Status: DISCONTINUED | OUTPATIENT
Start: 2019-01-01 | End: 2019-01-01

## 2019-01-01 RX ORDER — OXYBUTYNIN CHLORIDE 5 MG/1
5 TABLET, EXTENDED RELEASE ORAL DAILY
Status: DISCONTINUED | OUTPATIENT
Start: 2019-01-01 | End: 2019-01-01

## 2019-01-01 RX ORDER — SERTRALINE HYDROCHLORIDE 100 MG/1
100 TABLET, FILM COATED ORAL NIGHTLY
COMMUNITY
End: 2019-01-01

## 2019-01-01 RX ORDER — POTASSIUM CHLORIDE 20 MEQ/1
40 TABLET, EXTENDED RELEASE ORAL EVERY 4 HOURS
Status: COMPLETED | OUTPATIENT
Start: 2019-01-01 | End: 2019-01-01

## 2019-01-01 RX ORDER — MAGNESIUM OXIDE 400 MG (241.3 MG MAGNESIUM) TABLET
1 TABLET NIGHTLY
Qty: 30 TABLET | Refills: 0 | Status: SHIPPED | OUTPATIENT
Start: 2019-01-01

## 2019-01-01 RX ORDER — FUROSEMIDE 10 MG/ML
40 INJECTION INTRAMUSCULAR; INTRAVENOUS ONCE
Status: COMPLETED | OUTPATIENT
Start: 2019-01-01 | End: 2019-01-01

## 2019-01-01 RX ORDER — IPRATROPIUM BROMIDE AND ALBUTEROL SULFATE 2.5; .5 MG/3ML; MG/3ML
3 SOLUTION RESPIRATORY (INHALATION) EVERY 6 HOURS PRN
Status: DISCONTINUED | OUTPATIENT
Start: 2019-01-01 | End: 2019-01-01

## 2019-01-01 RX ORDER — PANTOPRAZOLE SODIUM 40 MG/1
40 TABLET, DELAYED RELEASE ORAL
Status: DISCONTINUED | OUTPATIENT
Start: 2019-01-01 | End: 2019-01-01

## 2019-01-01 RX ORDER — PREDNISONE 10 MG/1
TABLET ORAL
Qty: 18 TABLET | Refills: 0 | Status: SHIPPED | OUTPATIENT
Start: 2019-01-01

## 2019-01-01 RX ORDER — SERTRALINE HYDROCHLORIDE 100 MG/1
TABLET, FILM COATED ORAL
Qty: 30 TABLET | Refills: 5 | Status: SHIPPED | OUTPATIENT
Start: 2019-01-01

## 2019-01-01 RX ORDER — HYDRALAZINE HYDROCHLORIDE 20 MG/ML
10 INJECTION INTRAMUSCULAR; INTRAVENOUS EVERY 6 HOURS PRN
Status: DISCONTINUED | OUTPATIENT
Start: 2019-01-01 | End: 2019-01-01

## 2019-01-01 RX ORDER — SODIUM CHLORIDE 9 MG/ML
INJECTION, SOLUTION INTRAVENOUS CONTINUOUS
Status: DISCONTINUED | OUTPATIENT
Start: 2019-01-01 | End: 2019-01-01

## 2019-01-01 RX ORDER — CEFADROXIL 500 MG/1
500 CAPSULE ORAL 2 TIMES DAILY
Qty: 4 CAPSULE | Refills: 0 | Status: SHIPPED | OUTPATIENT
Start: 2019-01-01 | End: 2019-01-01

## 2019-01-01 RX ORDER — MORPHINE SULFATE 2 MG/ML
2 INJECTION, SOLUTION INTRAMUSCULAR; INTRAVENOUS EVERY 2 HOUR PRN
Status: DISCONTINUED | OUTPATIENT
Start: 2019-01-01 | End: 2019-01-01

## 2019-01-01 RX ORDER — ASPIRIN 81 MG/1
81 TABLET ORAL
Status: DISCONTINUED | OUTPATIENT
Start: 2019-01-01 | End: 2019-01-01

## 2019-01-01 RX ORDER — AMLODIPINE BESYLATE 5 MG/1
5 TABLET ORAL ONCE
Status: COMPLETED | OUTPATIENT
Start: 2019-01-01 | End: 2019-01-01

## 2019-01-01 RX ORDER — HYDROCODONE BITARTRATE AND ACETAMINOPHEN 5; 325 MG/1; MG/1
1 TABLET ORAL EVERY 6 HOURS PRN
Status: DISCONTINUED | OUTPATIENT
Start: 2019-01-01 | End: 2019-01-01

## 2019-01-01 RX ORDER — HYDROMORPHONE HYDROCHLORIDE 1 MG/ML
INJECTION, SOLUTION INTRAMUSCULAR; INTRAVENOUS; SUBCUTANEOUS
Status: DISPENSED
Start: 2019-01-01 | End: 2019-01-01

## 2019-01-01 RX ORDER — FUROSEMIDE 20 MG/1
20 TABLET ORAL DAILY
Qty: 90 TABLET | Refills: 3 | Status: SHIPPED | OUTPATIENT
Start: 2019-01-01 | End: 2019-01-01

## 2019-01-01 RX ORDER — POTASSIUM CHLORIDE 20 MEQ/1
40 TABLET, EXTENDED RELEASE ORAL ONCE
Status: COMPLETED | OUTPATIENT
Start: 2019-01-01 | End: 2019-01-01

## 2019-01-01 RX ORDER — HYDRALAZINE HYDROCHLORIDE 25 MG/1
25 TABLET, FILM COATED ORAL EVERY 8 HOURS SCHEDULED
Status: DISCONTINUED | OUTPATIENT
Start: 2019-01-01 | End: 2019-01-01

## 2019-01-01 RX ORDER — LEVOFLOXACIN 750 MG/1
750 TABLET ORAL
Qty: 4 TABLET | Refills: 0 | Status: ON HOLD | OUTPATIENT
Start: 2019-01-01 | End: 2019-01-01

## 2019-01-01 RX ORDER — CARVEDILOL 6.25 MG/1
6.25 TABLET ORAL 2 TIMES DAILY
Status: DISCONTINUED | OUTPATIENT
Start: 2019-01-01 | End: 2019-01-01

## 2019-01-01 RX ORDER — HEPARIN SODIUM 5000 [USP'U]/ML
5000 INJECTION, SOLUTION INTRAVENOUS; SUBCUTANEOUS EVERY 12 HOURS SCHEDULED
Status: DISCONTINUED | OUTPATIENT
Start: 2019-01-01 | End: 2019-01-01

## 2019-01-01 RX ORDER — ACETAMINOPHEN 500 MG
1000 TABLET ORAL EVERY 8 HOURS PRN
COMMUNITY

## 2019-01-01 RX ORDER — CARVEDILOL 6.25 MG/1
TABLET ORAL
Qty: 60 TABLET | Refills: 11 | Status: ON HOLD | OUTPATIENT
Start: 2019-01-01 | End: 2019-01-01

## 2019-01-01 RX ORDER — HYDRALAZINE HYDROCHLORIDE 50 MG/1
50 TABLET, FILM COATED ORAL EVERY 6 HOURS PRN
Status: DISCONTINUED | OUTPATIENT
Start: 2019-01-01 | End: 2019-01-01

## 2019-01-01 RX ORDER — HYDROCODONE BITARTRATE AND ACETAMINOPHEN 10; 325 MG/1; MG/1
1 TABLET ORAL EVERY 6 HOURS PRN
Qty: 10 TABLET | Refills: 0 | Status: ON HOLD | OUTPATIENT
Start: 2019-01-01 | End: 2019-01-01

## 2019-01-01 RX ORDER — SERTRALINE HYDROCHLORIDE 100 MG/1
100 TABLET, FILM COATED ORAL NIGHTLY
Status: DISCONTINUED | OUTPATIENT
Start: 2019-01-01 | End: 2019-01-01

## 2019-01-01 RX ORDER — MELATONIN
325 2 TIMES DAILY WITH MEALS
Status: DISCONTINUED | OUTPATIENT
Start: 2019-01-01 | End: 2019-01-01

## 2019-01-01 RX ORDER — HEPARIN SODIUM AND DEXTROSE 10000; 5 [USP'U]/100ML; G/100ML
INJECTION INTRAVENOUS CONTINUOUS
Status: DISCONTINUED | OUTPATIENT
Start: 2019-01-01 | End: 2019-01-01

## 2019-01-01 RX ORDER — POTASSIUM CHLORIDE 20 MEQ/1
20 TABLET, EXTENDED RELEASE ORAL
Status: DISCONTINUED | OUTPATIENT
Start: 2019-01-01 | End: 2019-01-01

## 2019-01-01 RX ORDER — IPRATROPIUM BROMIDE AND ALBUTEROL SULFATE 2.5; .5 MG/3ML; MG/3ML
3 SOLUTION RESPIRATORY (INHALATION) ONCE
Status: COMPLETED | OUTPATIENT
Start: 2019-01-01 | End: 2019-01-01

## 2019-01-01 RX ORDER — FUROSEMIDE 20 MG/1
20 TABLET ORAL DAILY
Status: DISCONTINUED | OUTPATIENT
Start: 2019-01-01 | End: 2019-01-01

## 2019-01-01 RX ORDER — ASPIRIN 81 MG/1
81 TABLET, CHEWABLE ORAL DAILY
Status: DISCONTINUED | OUTPATIENT
Start: 2019-01-01 | End: 2019-01-01

## 2019-01-01 RX ORDER — CARVEDILOL 25 MG/1
25 TABLET ORAL 2 TIMES DAILY
Status: DISCONTINUED | OUTPATIENT
Start: 2019-01-01 | End: 2019-01-01

## 2019-01-01 RX ORDER — FAMOTIDINE 20 MG/1
20 TABLET ORAL 2 TIMES DAILY
Status: DISCONTINUED | OUTPATIENT
Start: 2019-01-01 | End: 2019-01-01

## 2019-01-01 RX ORDER — HYDRALAZINE HYDROCHLORIDE 20 MG/ML
10 INJECTION INTRAMUSCULAR; INTRAVENOUS EVERY 4 HOURS PRN
Status: DISCONTINUED | OUTPATIENT
Start: 2019-01-01 | End: 2019-01-01

## 2019-01-01 RX ORDER — FUROSEMIDE 10 MG/ML
20 INJECTION INTRAMUSCULAR; INTRAVENOUS DAILY
Status: DISCONTINUED | OUTPATIENT
Start: 2019-01-01 | End: 2019-01-01

## 2019-01-01 RX ORDER — POTASSIUM CHLORIDE 20 MEQ/1
20 TABLET, EXTENDED RELEASE ORAL ONCE
Status: COMPLETED | OUTPATIENT
Start: 2019-01-01 | End: 2019-01-01

## 2019-01-01 RX ORDER — CARVEDILOL 25 MG/1
25 TABLET ORAL 2 TIMES DAILY
Qty: 60 TABLET | Refills: 3 | Status: SHIPPED | OUTPATIENT
Start: 2019-01-01

## 2019-01-01 RX ORDER — MELATONIN
325
Status: DISCONTINUED | OUTPATIENT
Start: 2019-01-01 | End: 2019-01-01

## 2019-01-01 RX ORDER — AMLODIPINE BESYLATE 10 MG/1
10 TABLET ORAL DAILY
Status: DISCONTINUED | OUTPATIENT
Start: 2019-01-01 | End: 2019-01-01

## 2019-01-01 RX ORDER — HYDROMORPHONE HYDROCHLORIDE 1 MG/ML
0.5 INJECTION, SOLUTION INTRAMUSCULAR; INTRAVENOUS; SUBCUTANEOUS ONCE
Status: COMPLETED | OUTPATIENT
Start: 2019-01-01 | End: 2019-01-01

## 2019-01-01 RX ORDER — GABAPENTIN 100 MG/1
CAPSULE ORAL
Qty: 270 CAPSULE | Refills: 1 | Status: SHIPPED | OUTPATIENT
Start: 2019-01-01

## 2019-01-01 RX ORDER — OXYBUTYNIN CHLORIDE 5 MG/1
5 TABLET, EXTENDED RELEASE ORAL DAILY
Qty: 30 TABLET | Refills: 0 | Status: SHIPPED | OUTPATIENT
Start: 2019-01-01

## 2019-01-01 RX ORDER — ONDANSETRON 2 MG/ML
4 INJECTION INTRAMUSCULAR; INTRAVENOUS ONCE
Status: COMPLETED | OUTPATIENT
Start: 2019-01-01 | End: 2019-01-01

## 2019-01-01 RX ORDER — HYDROCODONE BITARTRATE AND ACETAMINOPHEN 5; 325 MG/1; MG/1
1 TABLET ORAL EVERY 6 HOURS PRN
Qty: 120 TABLET | Refills: 0 | Status: SHIPPED | OUTPATIENT
Start: 2019-01-01 | End: 2019-01-01

## 2019-01-01 RX ORDER — TRAMADOL HYDROCHLORIDE 50 MG/1
50 TABLET ORAL ONCE
Status: COMPLETED | OUTPATIENT
Start: 2019-01-01 | End: 2019-01-01

## 2019-01-01 RX ORDER — ECHINACEA PURPUREA EXTRACT 125 MG
1 TABLET ORAL
Status: DISCONTINUED | OUTPATIENT
Start: 2019-01-01 | End: 2019-01-01

## 2019-01-01 RX ORDER — ALLOPURINOL 300 MG/1
300 TABLET ORAL
Status: DISCONTINUED | OUTPATIENT
Start: 2019-01-01 | End: 2019-01-01

## 2019-01-01 RX ORDER — CARVEDILOL 25 MG/1
25 TABLET ORAL 2 TIMES DAILY WITH MEALS
Status: DISCONTINUED | OUTPATIENT
Start: 2019-01-01 | End: 2019-01-01

## 2019-01-01 RX ORDER — ISOSORBIDE DINITRATE 10 MG/1
10 TABLET ORAL
Status: DISCONTINUED | OUTPATIENT
Start: 2019-01-01 | End: 2019-01-01

## 2019-01-01 RX ORDER — GABAPENTIN 100 MG/1
100 CAPSULE ORAL 3 TIMES DAILY
Status: DISCONTINUED | OUTPATIENT
Start: 2019-01-01 | End: 2019-01-01

## 2019-01-01 RX ORDER — HYDRALAZINE HYDROCHLORIDE 25 MG/1
50 TABLET, FILM COATED ORAL EVERY 8 HOURS SCHEDULED
Qty: 90 TABLET | Refills: 3 | Status: SHIPPED | OUTPATIENT
Start: 2019-01-01

## 2019-01-01 RX ORDER — ONDANSETRON 2 MG/ML
4 INJECTION INTRAMUSCULAR; INTRAVENOUS EVERY 6 HOURS PRN
Status: DISCONTINUED | OUTPATIENT
Start: 2019-01-01 | End: 2019-01-01

## 2019-01-01 RX ORDER — 0.9 % SODIUM CHLORIDE 0.9 %
VIAL (ML) INJECTION
Status: COMPLETED
Start: 2019-01-01 | End: 2019-01-01

## 2019-01-01 RX ORDER — MAGNESIUM OXIDE 400 MG (241.3 MG MAGNESIUM) TABLET
3 TABLET NIGHTLY
Status: DISCONTINUED | OUTPATIENT
Start: 2019-01-01 | End: 2019-01-01

## 2019-01-01 RX ORDER — ZOLPIDEM TARTRATE 5 MG/1
5 TABLET ORAL NIGHTLY PRN
Status: DISCONTINUED | OUTPATIENT
Start: 2019-01-01 | End: 2019-01-01

## 2019-01-01 RX ORDER — TRAMADOL HYDROCHLORIDE 50 MG/1
50 TABLET ORAL EVERY 6 HOURS PRN
Status: DISCONTINUED | OUTPATIENT
Start: 2019-01-01 | End: 2019-01-01

## 2019-01-01 RX ORDER — CALCIUM CARBONATE 200(500)MG
1000 TABLET,CHEWABLE ORAL 2 TIMES DAILY PRN
Status: DISCONTINUED | OUTPATIENT
Start: 2019-01-01 | End: 2019-01-01

## 2019-01-01 RX ORDER — HEPARIN SODIUM AND DEXTROSE 10000; 5 [USP'U]/100ML; G/100ML
1000 INJECTION INTRAVENOUS ONCE
Status: COMPLETED | OUTPATIENT
Start: 2019-01-01 | End: 2019-01-01

## 2019-01-01 RX ORDER — TORSEMIDE 10 MG/1
10 TABLET ORAL DAILY
Refills: 0 | Status: SHIPPED | COMMUNITY
Start: 2019-01-01

## 2019-01-01 RX ORDER — CARVEDILOL 12.5 MG/1
12.5 TABLET ORAL 2 TIMES DAILY
Status: DISCONTINUED | OUTPATIENT
Start: 2019-01-01 | End: 2019-01-01

## 2019-01-01 RX ORDER — HYDROCODONE BITARTRATE AND ACETAMINOPHEN 5; 325 MG/1; MG/1
1 TABLET ORAL EVERY 6 HOURS PRN
Qty: 120 TABLET | Refills: 0 | Status: ON HOLD | OUTPATIENT
Start: 2019-01-01 | End: 2019-01-01

## 2019-01-01 RX ORDER — HYDROCODONE BITARTRATE AND ACETAMINOPHEN 5; 325 MG/1; MG/1
2 TABLET ORAL ONCE
Status: COMPLETED | OUTPATIENT
Start: 2019-01-01 | End: 2019-01-01

## 2019-01-01 RX ORDER — IPRATROPIUM BROMIDE AND ALBUTEROL SULFATE 2.5; .5 MG/3ML; MG/3ML
3 SOLUTION RESPIRATORY (INHALATION) EVERY 6 HOURS PRN
Refills: 0 | Status: SHIPPED | COMMUNITY
Start: 2019-01-01

## 2019-01-01 RX ORDER — ALLOPURINOL 100 MG/1
100 TABLET ORAL
Status: DISCONTINUED | OUTPATIENT
Start: 2019-01-01 | End: 2019-01-01

## 2019-01-01 RX ORDER — SODIUM CHLORIDE 0.9 % (FLUSH) 0.9 %
3 SYRINGE (ML) INJECTION AS NEEDED
Status: DISCONTINUED | OUTPATIENT
Start: 2019-01-01 | End: 2019-01-01

## 2019-01-01 RX ORDER — ACETAMINOPHEN 325 MG/1
650 TABLET ORAL ONCE
Status: COMPLETED | OUTPATIENT
Start: 2019-01-01 | End: 2019-01-01

## 2019-01-01 RX ORDER — ISOSORBIDE DINITRATE 20 MG/1
20 TABLET ORAL
Status: DISCONTINUED | OUTPATIENT
Start: 2019-01-01 | End: 2019-01-01

## 2019-01-01 RX ORDER — FLUTICASONE PROPIONATE 50 MCG
1 SPRAY, SUSPENSION (ML) NASAL DAILY
Status: DISCONTINUED | OUTPATIENT
Start: 2019-01-01 | End: 2019-01-01

## 2019-01-01 RX ORDER — HEPARIN SODIUM 1000 [USP'U]/ML
5000 INJECTION, SOLUTION INTRAVENOUS; SUBCUTANEOUS ONCE
Status: COMPLETED | OUTPATIENT
Start: 2019-01-01 | End: 2019-01-01

## 2019-01-01 RX ORDER — FUROSEMIDE 10 MG/ML
40 INJECTION INTRAMUSCULAR; INTRAVENOUS DAILY
Status: COMPLETED | OUTPATIENT
Start: 2019-01-01 | End: 2019-01-01

## 2019-01-01 RX ORDER — IPRATROPIUM BROMIDE AND ALBUTEROL SULFATE 2.5; .5 MG/3ML; MG/3ML
3 SOLUTION RESPIRATORY (INHALATION) 3 TIMES DAILY
Status: DISCONTINUED | OUTPATIENT
Start: 2019-01-01 | End: 2019-01-01

## 2019-01-01 RX ORDER — TRAMADOL HYDROCHLORIDE 50 MG/1
TABLET ORAL EVERY 6 HOURS PRN
Qty: 10 TABLET | Refills: 0 | Status: SHIPPED | OUTPATIENT
Start: 2019-01-01 | End: 2019-01-01

## 2019-01-01 RX ORDER — FUROSEMIDE 10 MG/ML
20 INJECTION INTRAMUSCULAR; INTRAVENOUS ONCE
Status: COMPLETED | OUTPATIENT
Start: 2019-01-01 | End: 2019-01-01

## 2019-01-01 RX ORDER — CEPHALEXIN 250 MG/1
250 CAPSULE ORAL 4 TIMES DAILY
Qty: 40 CAPSULE | Refills: 0 | Status: ON HOLD | OUTPATIENT
Start: 2019-01-01 | End: 2019-01-01

## 2019-01-01 RX ORDER — IPRATROPIUM BROMIDE AND ALBUTEROL SULFATE 2.5; .5 MG/3ML; MG/3ML
3 SOLUTION RESPIRATORY (INHALATION)
Status: DISCONTINUED | OUTPATIENT
Start: 2019-01-01 | End: 2019-01-01

## 2019-01-01 RX ORDER — FLUTICASONE PROPIONATE 50 MCG
1 SPRAY, SUSPENSION (ML) NASAL DAILY
Qty: 1 BOTTLE | Refills: 0 | Status: SHIPPED | OUTPATIENT
Start: 2019-01-01

## 2019-01-01 RX ORDER — HYDRALAZINE HYDROCHLORIDE 25 MG/1
25 TABLET, FILM COATED ORAL EVERY 8 HOURS SCHEDULED
Qty: 90 TABLET | Refills: 3 | Status: ON HOLD | OUTPATIENT
Start: 2019-01-01 | End: 2019-01-01

## 2019-01-01 RX ORDER — LEVOTHYROXINE SODIUM 0.1 MG/1
100 TABLET ORAL
Status: DISCONTINUED | OUTPATIENT
Start: 2019-01-01 | End: 2019-01-01

## 2019-01-01 RX ORDER — TORSEMIDE 20 MG/1
10 TABLET ORAL DAILY
Status: DISCONTINUED | OUTPATIENT
Start: 2019-01-01 | End: 2019-01-01

## 2019-01-01 RX ORDER — CLONIDINE HYDROCHLORIDE 0.2 MG/1
0.2 TABLET ORAL 3 TIMES DAILY
Status: DISCONTINUED | OUTPATIENT
Start: 2019-01-01 | End: 2019-01-01

## 2019-01-01 RX ORDER — ACETAMINOPHEN 325 MG/1
650 TABLET ORAL EVERY 6 HOURS PRN
Status: DISCONTINUED | OUTPATIENT
Start: 2019-01-01 | End: 2019-01-01

## 2019-01-01 RX ORDER — AMLODIPINE BESYLATE 5 MG/1
5 TABLET ORAL DAILY
Status: DISCONTINUED | OUTPATIENT
Start: 2019-01-01 | End: 2019-01-01

## 2019-01-01 RX ORDER — POTASSIUM CHLORIDE 1.5 G/1.77G
20 POWDER, FOR SOLUTION ORAL ONCE
Status: COMPLETED | OUTPATIENT
Start: 2019-01-01 | End: 2019-01-01

## 2019-01-01 RX ORDER — ALLOPURINOL 100 MG/1
200 TABLET ORAL
Status: DISCONTINUED | OUTPATIENT
Start: 2019-01-01 | End: 2019-01-01

## 2019-01-01 RX ORDER — SERTRALINE HYDROCHLORIDE 100 MG/1
100 TABLET, FILM COATED ORAL DAILY
Status: DISCONTINUED | OUTPATIENT
Start: 2019-01-01 | End: 2019-01-01

## 2019-01-01 RX ORDER — CLONIDINE HYDROCHLORIDE 0.1 MG/1
0.1 TABLET ORAL ONCE
Status: COMPLETED | OUTPATIENT
Start: 2019-01-01 | End: 2019-01-01

## 2019-01-01 RX ORDER — ZOLPIDEM TARTRATE 5 MG/1
5 TABLET ORAL NIGHTLY
Qty: 5 TABLET | Refills: 0 | Status: CANCELLED
Start: 2019-01-01

## 2019-01-01 RX ORDER — NITROGLYCERIN 0.4 MG/1
0.4 TABLET SUBLINGUAL EVERY 5 MIN PRN
Status: DISCONTINUED | OUTPATIENT
Start: 2019-01-01 | End: 2019-01-01

## 2019-01-01 RX ORDER — LEVOFLOXACIN 750 MG/1
750 TABLET ORAL
Status: DISCONTINUED | OUTPATIENT
Start: 2019-01-01 | End: 2019-01-01

## 2019-01-01 RX ORDER — FERROUS SULFATE 325(65) MG
325 TABLET ORAL
Qty: 90 TABLET | Refills: 0 | Status: SHIPPED | OUTPATIENT
Start: 2019-01-01

## 2019-01-01 RX ORDER — CARVEDILOL 6.25 MG/1
6.25 TABLET ORAL 2 TIMES DAILY WITH MEALS
COMMUNITY
End: 2019-01-01

## 2019-01-01 RX ORDER — POTASSIUM CHLORIDE 14.9 MG/ML
20 INJECTION INTRAVENOUS ONCE
Status: DISCONTINUED | OUTPATIENT
Start: 2019-01-01 | End: 2019-01-01

## 2019-01-01 RX ORDER — CYANOCOBALAMIN 1000 UG/ML
1000 INJECTION INTRAMUSCULAR; SUBCUTANEOUS ONCE
Status: COMPLETED | OUTPATIENT
Start: 2019-01-01 | End: 2019-01-01

## 2019-01-01 RX ORDER — MAGNESIUM OXIDE 400 MG (241.3 MG MAGNESIUM) TABLET
TABLET
Refills: 0 | Status: SHIPPED | COMMUNITY
Start: 2019-01-01 | End: 2019-01-01

## 2019-01-01 RX ORDER — MORPHINE SULFATE 2 MG/ML
1 INJECTION, SOLUTION INTRAMUSCULAR; INTRAVENOUS EVERY 2 HOUR PRN
Status: DISCONTINUED | OUTPATIENT
Start: 2019-01-01 | End: 2019-01-01

## 2019-01-01 RX ORDER — FUROSEMIDE 10 MG/ML
40 INJECTION INTRAMUSCULAR; INTRAVENOUS EVERY 12 HOURS
Status: COMPLETED | OUTPATIENT
Start: 2019-01-01 | End: 2019-01-01

## 2019-01-01 RX ORDER — GUAIFENESIN 100 MG/5ML
100 SOLUTION ORAL EVERY 4 HOURS PRN
Qty: 236 ML | Refills: 0 | Status: SHIPPED | OUTPATIENT
Start: 2019-01-01

## 2019-01-01 RX ORDER — ALFUZOSIN HYDROCHLORIDE 10 MG/1
10 TABLET, EXTENDED RELEASE ORAL
Status: DISCONTINUED | OUTPATIENT
Start: 2019-01-01 | End: 2019-01-01

## 2019-01-01 RX ORDER — MAGNESIUM OXIDE 400 MG (241.3 MG MAGNESIUM) TABLET
1 TABLET ONCE
Status: COMPLETED | OUTPATIENT
Start: 2019-01-01 | End: 2019-01-01

## 2019-01-01 RX ORDER — NITROGLYCERIN 0.4 MG/1
TABLET SUBLINGUAL
Status: COMPLETED
Start: 2019-01-01 | End: 2019-01-01

## 2019-01-01 RX ORDER — ONDANSETRON 4 MG/1
4 TABLET, FILM COATED ORAL EVERY 12 HOURS PRN
Status: DISCONTINUED | OUTPATIENT
Start: 2019-01-01 | End: 2019-01-01

## 2019-01-01 RX ORDER — HYDROCODONE BITARTRATE AND ACETAMINOPHEN 5; 325 MG/1; MG/1
1 TABLET ORAL EVERY 6 HOURS PRN
Qty: 120 TABLET | Refills: 0 | Status: CANCELLED | OUTPATIENT
Start: 2019-01-01 | End: 2020-06-19

## 2019-01-01 RX ORDER — CODEINE PHOSPHATE AND GUAIFENESIN 10; 100 MG/5ML; MG/5ML
5 SOLUTION ORAL EVERY 4 HOURS PRN
Status: DISCONTINUED | OUTPATIENT
Start: 2019-01-01 | End: 2019-01-01

## 2019-01-01 RX ORDER — HYDROCODONE BITARTRATE AND ACETAMINOPHEN 5; 325 MG/1; MG/1
1 TABLET ORAL EVERY 8 HOURS PRN
Qty: 15 TABLET | Refills: 0 | Status: ON HOLD | OUTPATIENT
Start: 2019-01-01 | End: 2019-01-01

## 2019-01-01 RX ORDER — POTASSIUM CHLORIDE 1.5 G/1.77G
10 POWDER, FOR SOLUTION ORAL DAILY
COMMUNITY
End: 2019-01-01

## 2019-01-01 RX ORDER — HYDROCODONE BITARTRATE AND ACETAMINOPHEN 5; 325 MG/1; MG/1
1 TABLET ORAL EVERY 8 HOURS PRN
Qty: 15 TABLET | Refills: 0 | Status: SHIPPED | OUTPATIENT
Start: 2019-01-01

## 2019-01-01 RX ORDER — PREDNISONE 20 MG/1
20 TABLET ORAL
Status: DISCONTINUED | OUTPATIENT
Start: 2019-01-01 | End: 2019-01-01

## 2019-01-01 RX ORDER — HYDRALAZINE HYDROCHLORIDE 10 MG/1
10 TABLET, FILM COATED ORAL EVERY 8 HOURS SCHEDULED
Status: DISCONTINUED | OUTPATIENT
Start: 2019-01-01 | End: 2019-01-01

## 2019-01-01 RX ORDER — LIDOCAINE 50 MG/G
2 PATCH TOPICAL EVERY 24 HOURS
COMMUNITY
End: 2019-01-01 | Stop reason: ALTCHOICE

## 2019-01-01 RX ORDER — ACETAMINOPHEN 500 MG
1000 TABLET ORAL ONCE
Status: COMPLETED | OUTPATIENT
Start: 2019-01-01 | End: 2019-01-01

## 2019-01-01 RX ORDER — ISOSORBIDE DINITRATE 20 MG/1
20 TABLET ORAL
Qty: 90 TABLET | Refills: 3 | Status: SHIPPED | OUTPATIENT
Start: 2019-01-01

## 2019-01-01 RX ORDER — POTASSIUM CHLORIDE 20 MEQ/1
TABLET, EXTENDED RELEASE ORAL
Qty: 30 TABLET | Refills: 5 | Status: SHIPPED | OUTPATIENT
Start: 2019-01-01

## 2019-01-01 RX ORDER — FUROSEMIDE 10 MG/ML
40 INJECTION INTRAMUSCULAR; INTRAVENOUS DAILY
Status: DISCONTINUED | OUTPATIENT
Start: 2019-01-01 | End: 2019-01-01

## 2019-01-01 RX ORDER — HYDROCODONE BITARTRATE AND ACETAMINOPHEN 5; 325 MG/1; MG/1
1 TABLET ORAL EVERY 8 HOURS PRN
Status: DISCONTINUED | OUTPATIENT
Start: 2019-01-01 | End: 2019-01-01

## 2019-01-01 RX ORDER — LABETALOL HYDROCHLORIDE 5 MG/ML
10 INJECTION, SOLUTION INTRAVENOUS ONCE
Status: COMPLETED | OUTPATIENT
Start: 2019-01-01 | End: 2019-01-01

## 2019-01-01 RX ORDER — CLONIDINE HYDROCHLORIDE 0.2 MG/1
0.1 TABLET ORAL 2 TIMES DAILY
Status: DISCONTINUED | OUTPATIENT
Start: 2019-01-01 | End: 2019-01-01

## 2019-01-01 RX ORDER — GUAIFENESIN 100 MG/5ML
100 SOLUTION ORAL EVERY 4 HOURS PRN
Status: DISCONTINUED | OUTPATIENT
Start: 2019-01-01 | End: 2019-01-01

## 2019-01-01 RX ORDER — NYSTATIN 100000 U/G
OINTMENT TOPICAL 2 TIMES DAILY
Status: DISCONTINUED | OUTPATIENT
Start: 2019-01-01 | End: 2019-01-01

## 2019-01-02 VITALS
HEIGHT: 64 IN | SYSTOLIC BLOOD PRESSURE: 132 MMHG | TEMPERATURE: 98 F | BODY MASS INDEX: 32.44 KG/M2 | WEIGHT: 190 LBS | DIASTOLIC BLOOD PRESSURE: 62 MMHG | OXYGEN SATURATION: 97 % | HEART RATE: 58 BPM | RESPIRATION RATE: 16 BRPM

## 2019-01-02 LAB
BACTERIA UR QL AUTO: NEGATIVE /HPF
BILIRUB UR QL: NEGATIVE
CLARITY UR: CLEAR
COLOR UR: YELLOW
GLUCOSE UR-MCNC: NEGATIVE MG/DL
HGB UR QL STRIP.AUTO: NEGATIVE
KETONES UR-MCNC: NEGATIVE MG/DL
NITRITE UR QL STRIP.AUTO: NEGATIVE
PH UR: 6 [PH] (ref 5–8)
PROT UR-MCNC: 100 MG/DL
RBC #/AREA URNS AUTO: 3 /HPF
SP GR UR STRIP: 1.02 (ref 1–1.03)
UROBILINOGEN UR STRIP-ACNC: <2
VIT C UR-MCNC: NEGATIVE MG/DL
WBC #/AREA URNS AUTO: 13 /HPF

## 2019-01-02 PROCEDURE — 87086 URINE CULTURE/COLONY COUNT: CPT | Performed by: EMERGENCY MEDICINE

## 2019-01-02 PROCEDURE — 81001 URINALYSIS AUTO W/SCOPE: CPT | Performed by: EMERGENCY MEDICINE

## 2019-01-02 RX ORDER — ONDANSETRON 4 MG/1
4 TABLET, ORALLY DISINTEGRATING ORAL EVERY 4 HOURS PRN
Qty: 10 TABLET | Refills: 0 | Status: SHIPPED | OUTPATIENT
Start: 2019-01-02 | End: 2019-01-09

## 2019-01-02 NOTE — ED NOTES
To er for nausea, states seen other facility and d/c today for same. deneis vomiting, x4 bowel sounds.

## 2019-01-02 NOTE — ED PROVIDER NOTES
Patient Seen in: [facility] Emergency Department    History   Patient presents with:  Nausea/Vomiting/Diarrhea (gastrointestinal)    Stated Complaint:     HPI    Patient is a 32-year-old female who arrives from home by EMS for nausea.   She states [facility] ENDOSCOPY   • ESOPHAGOGASTRODUODENOSCOPY (EGD) N/A 8/23/2017    Performed by Jacobo Antonio MD at [facility] ENDOSCOPY   • HIP REPLACEMENT SURGERY Left    • HYSTERECTOMY     • KNEE REPLACEMENT SURGERY  2006   • LAPAROSCOPIC CHOLECYSTECTOMY N/A 8/25/2017    P within normal limits   HEPATIC FUNCTION PANEL (7) - Abnormal; Notable for the following components:    Albumin 3.3 (*)     All other components within normal limits   URINALYSIS WITH CULTURE REFLEX - Abnormal; Notable for the following components:    Prote screening including reassessment of your blood pressure.     Medications Prescribed:  Current Discharge Medication List    START taking these medications    ondansetron 4 MG Oral Tablet Dispersible  Take 1 tablet (4 mg total) by mouth every 4 (four) hours a

## 2019-01-03 ENCOUNTER — TELEPHONE (OUTPATIENT)
Dept: INTERNAL MEDICINE CLINIC | Facility: CLINIC | Age: 79
End: 2019-01-03

## 2019-01-03 DIAGNOSIS — M15.9 PRIMARY OSTEOARTHRITIS INVOLVING MULTIPLE JOINTS: ICD-10-CM

## 2019-01-03 DIAGNOSIS — R53.83 FATIGUE, UNSPECIFIED TYPE: Primary | ICD-10-CM

## 2019-01-03 DIAGNOSIS — K85.90 ACUTE PANCREATITIS, UNSPECIFIED COMPLICATION STATUS, UNSPECIFIED PANCREATITIS TYPE: ICD-10-CM

## 2019-01-03 NOTE — TELEPHONE ENCOUNTER
Pt was released from the hospital on Tuesday 1/1.   Pt is very weak she is unable to do anything for herself  Pt is unable to come in the office but was told by the hospital to follow up right away  The hospital said they were going to give her a list of fo

## 2019-01-03 NOTE — TELEPHONE ENCOUNTER
Patient was recently discharged from [facility]. She was admitted for 3 days dx: Pancreatitis. (see note for [place] thru care everywhere, they also note ileus). She states she went to [facility] ER yesterday d/t feeling weak.  She was given IVF, labs done a

## 2019-01-03 NOTE — TELEPHONE ENCOUNTER
Pt talia Rutledge calling to speak to Dr TOTH, she said Many said she may have a possible infections of pancreas or flu  Please call Pearl Torres [phone]   Tasked to nursing

## 2019-01-04 NOTE — TELEPHONE ENCOUNTER
Noted.  I reviewed the message from below. I have left a message for patient's niece Mervat Mahmood. I will try to call her tomorrow. I will give the patient the okay for the referral to have [facility] see her to follow-up with her.   I have approved

## 2019-01-05 NOTE — TELEPHONE ENCOUNTER
Telephone call to patient's niece, Zunilda Solano. Discussed patient's status with her. She indicates patient was at [facility] for short time with the patient appeared to have an inflammation of small intestines.   Patient was released from [place] but went

## 2019-01-08 RX ORDER — HYDROCODONE BITARTRATE AND ACETAMINOPHEN 5; 325 MG/1; MG/1
1 TABLET ORAL
Qty: 120 TABLET | Refills: 0 | OUTPATIENT
Start: 2019-01-08

## 2019-01-08 NOTE — TELEPHONE ENCOUNTER
To Dr. Cristina Figueroa - see below -   1) Pt was seen 12/24 by nurse for re-certification, nurse attempted to see her 1/5/19 but pt not home. New nurse will be seeing pt since the previous nurse is on leave.   Requested refax of 1/3/19  [facility] referral which was fax

## 2019-01-08 NOTE — TELEPHONE ENCOUNTER
Current refill request refused due to refill is either a duplicate request or has active refills at the pharmacy. Check previous templates.     Requested Prescriptions     Refused Prescriptions Disp Refills   • HYDROcodone-acetaminophen 5-325 MG Oral Tab 1

## 2019-01-08 NOTE — TELEPHONE ENCOUNTER
Pt's nice Kristopher Holt is calling back stating that [facility] never got the order form Dr TOTH. Per the niece, the order should be for PT and OT. Best call back for the niece is [phone].

## 2019-01-08 NOTE — TELEPHONE ENCOUNTER
Patient calling with fax number for wheelchair/scooter. Fax number [phone] Attn: Amador Urbina. Please call patient when form has been faxed.  Aurea June at [phone]

## 2019-01-09 NOTE — TELEPHONE ENCOUNTER
Saurav Reveles Artist BS#[phone], returned [facility] nursing advised yesterday that patient could give authorization for [address] to  written Rx for her       Routed message to Marv for approval

## 2019-01-10 NOTE — TELEPHONE ENCOUNTER
Spoke to pt - we agreed we will mail her [place] script to [facility]  We also discussed pt requesting a HHRN visit;    Pt is c/o leg weakness; no other c/o N,V,D,fever, pain, dizziness,vision changes, CP, SOB,HA;   Pt would like to have a nurse come in

## 2019-01-11 ENCOUNTER — TELEPHONE (OUTPATIENT)
Dept: INTERNAL MEDICINE CLINIC | Facility: CLINIC | Age: 79
End: 2019-01-11

## 2019-01-11 NOTE — TELEPHONE ENCOUNTER
I spoke with patient and relayed Dr. Virgilio Eric message. She verbalized understanding. Forms faxed to [facility] at [phone]. Fax confirmation received and sent to [facility]. Forms faxed to [facility] at [phone].  Fax confirmation received and sent to

## 2019-01-11 NOTE — TELEPHONE ENCOUNTER
Please call patient and notify her that I have completed the forms for the motorized wheelchair/scooter and the forms will be faxed to the home health and also to [facility] as requested. I will route this to nursing.   Thank you!!

## 2019-01-11 NOTE — TELEPHONE ENCOUNTER
Telephone call to visiting nurse, Karissa Peres. I have given her orders for physical therapy and occupational therapy in addition to having a visiting nurse.

## 2019-01-11 NOTE — TELEPHONE ENCOUNTER
Need order for home healthcare OT and PT. Can take a verbal order.     Sukhwinder Mancilla [phone]

## 2019-01-21 ENCOUNTER — TELEPHONE (OUTPATIENT)
Dept: INTERNAL MEDICINE CLINIC | Facility: CLINIC | Age: 79
End: 2019-01-21

## 2019-01-21 NOTE — TELEPHONE ENCOUNTER
Zulma Goodrich @ [facility] calling to inform Dr TOTH that she did not see the pt yesterday but is seeing her today.  Best call back, if needed, is [phone]

## 2019-01-25 ENCOUNTER — TELEPHONE (OUTPATIENT)
Dept: INTERNAL MEDICINE CLINIC | Facility: CLINIC | Age: 79
End: 2019-01-25

## 2019-01-25 NOTE — TELEPHONE ENCOUNTER
Res HERLINDA Renee following up on paperwork for power wheelchair new was wore send also like  to know Samm Petit felt from bad again.

## 2019-01-25 NOTE — TELEPHONE ENCOUNTER
Telephone call to [facility] from [facility]. Patient apparently slid out of bed. There was no apparent injury. I received another form concerning her motorized scooter. I will send the information they requested again.   I have already sent

## 2019-01-25 NOTE — TELEPHONE ENCOUNTER
Please  Advise - called Joanie Muniz from [facility] who wants to check up on paperwork for power wheelchair. Also patient fell out of bed last night, wanted to get up to go to washroom and slid slowly onto her buttocks, no pain , no bruises , no injuries - to DR. TOTH

## 2019-01-30 ENCOUNTER — APPOINTMENT (OUTPATIENT)
Dept: GENERAL RADIOLOGY | Facility: HOSPITAL | Age: 79
End: 2019-01-30
Attending: EMERGENCY MEDICINE
Payer: MEDICARE

## 2019-01-30 ENCOUNTER — HOSPITAL ENCOUNTER (OUTPATIENT)
Facility: HOSPITAL | Age: 79
Setting detail: OBSERVATION
Discharge: NURSING, REHAB OR LONG-TERM CARE | End: 2019-02-01
Attending: EMERGENCY MEDICINE | Admitting: HOSPITALIST
Payer: MEDICARE

## 2019-01-30 ENCOUNTER — APPOINTMENT (OUTPATIENT)
Dept: CT IMAGING | Facility: HOSPITAL | Age: 79
End: 2019-01-30
Attending: EMERGENCY MEDICINE
Payer: MEDICARE
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
## 2019-01-30 DIAGNOSIS — R53.1 WEAKNESS GENERALIZED: ICD-10-CM

## 2019-01-30 DIAGNOSIS — R41.0 DELIRIUM, ACUTE: Primary | ICD-10-CM

## 2019-01-30 DIAGNOSIS — N30.00 ACUTE CYSTITIS WITHOUT HEMATURIA: ICD-10-CM

## 2019-01-30 LAB
ANION GAP SERPL CALC-SCNC: 14 MMOL/L (ref 0–18)
BACTERIA UR QL AUTO: NEGATIVE /HPF
BASOPHILS # BLD AUTO: 0.06 X10(3) UL (ref 0–0.2)
BASOPHILS NFR BLD AUTO: 0.8 %
BILIRUB UR QL: NEGATIVE
BUN SERPL-MCNC: 14 MG/DL (ref 8–20)
BUN/CREAT SERPL: 11.7 (ref 10–20)
CALCIUM SERPL-MCNC: 9.7 MG/DL (ref 8.5–10.5)
CHLORIDE SERPL-SCNC: 99 MMOL/L (ref 95–110)
CLARITY UR: CLEAR
CO2 SERPL-SCNC: 25 MMOL/L (ref 22–32)
COLOR UR: YELLOW
CREAT SERPL-MCNC: 1.2 MG/DL (ref 0.5–1.5)
DEPRECATED RDW RBC AUTO: 40.9 FL (ref 35.1–46.3)
EOSINOPHIL # BLD AUTO: 0.41 X10(3) UL (ref 0–0.7)
EOSINOPHIL NFR BLD AUTO: 5.2 %
ERYTHROCYTE [DISTWIDTH] IN BLOOD BY AUTOMATED COUNT: 13.9 % (ref 11–15)
EST. AVERAGE GLUCOSE BLD GHB EST-MCNC: 148 MG/DL (ref 68–126)
GLUCOSE BLDC GLUCOMTR-MCNC: 137 MG/DL (ref 70–99)
GLUCOSE BLDC GLUCOMTR-MCNC: 178 MG/DL (ref 70–99)
GLUCOSE SERPL-MCNC: 147 MG/DL (ref 70–99)
GLUCOSE UR-MCNC: NEGATIVE MG/DL
HBA1C MFR BLD HPLC: 6.8 % (ref ?–5.7)
HCT VFR BLD AUTO: 38.5 % (ref 35–48)
HGB BLD-MCNC: 12.2 G/DL (ref 12–16)
HGB UR QL STRIP.AUTO: NEGATIVE
IMM GRANULOCYTES # BLD AUTO: 0.02 X10(3) UL (ref 0–1)
IMM GRANULOCYTES NFR BLD: 0.3 %
KETONES UR-MCNC: NEGATIVE MG/DL
LYMPHOCYTES # BLD AUTO: 1.83 X10(3) UL (ref 1–4)
LYMPHOCYTES NFR BLD AUTO: 23.4 %
MCH RBC QN AUTO: 26.2 PG (ref 26–34)
MCHC RBC AUTO-ENTMCNC: 31.7 G/DL (ref 31–37)
MCV RBC AUTO: 82.8 FL (ref 80–100)
MONOCYTES # BLD AUTO: 0.45 X10(3) UL (ref 0.1–1)
MONOCYTES NFR BLD AUTO: 5.8 %
NEUTROPHILS # BLD AUTO: 5.05 X10 (3) UL (ref 1.5–7.7)
NEUTROPHILS # BLD AUTO: 5.05 X10(3) UL (ref 1.5–7.7)
NEUTROPHILS NFR BLD AUTO: 64.5 %
NITRITE UR QL STRIP.AUTO: NEGATIVE
OSMOLALITY UR CALC.SUM OF ELEC: 289 MOSM/KG (ref 275–295)
PH UR: 6 [PH] (ref 5–8)
PLATELET # BLD AUTO: 282 10(3)UL (ref 150–450)
POTASSIUM SERPL-SCNC: 3.1 MMOL/L (ref 3.3–5.1)
PROT UR-MCNC: 30 MG/DL
RBC # BLD AUTO: 4.65 X10(6)UL (ref 3.8–5.3)
RBC #/AREA URNS AUTO: <1 /HPF
SODIUM SERPL-SCNC: 138 MMOL/L (ref 136–144)
SP GR UR STRIP: 1.01 (ref 1–1.03)
UROBILINOGEN UR STRIP-ACNC: <2
VIT C UR-MCNC: NEGATIVE MG/DL
WBC # BLD AUTO: 7.8 X10(3) UL (ref 4–11)
WBC #/AREA URNS AUTO: 85 /HPF

## 2019-01-30 PROCEDURE — 72125 CT NECK SPINE W/O DYE: CPT | Performed by: EMERGENCY MEDICINE

## 2019-01-30 PROCEDURE — 73060 X-RAY EXAM OF HUMERUS: CPT | Performed by: EMERGENCY MEDICINE

## 2019-01-30 PROCEDURE — 70450 CT HEAD/BRAIN W/O DYE: CPT | Performed by: EMERGENCY MEDICINE

## 2019-01-30 PROCEDURE — 72100 X-RAY EXAM L-S SPINE 2/3 VWS: CPT | Performed by: EMERGENCY MEDICINE

## 2019-01-30 PROCEDURE — 99220 INITIAL OBSERVATION CARE,LEVL III: CPT | Performed by: HOSPITALIST

## 2019-01-30 PROCEDURE — 71045 X-RAY EXAM CHEST 1 VIEW: CPT | Performed by: EMERGENCY MEDICINE

## 2019-01-30 RX ORDER — CLONIDINE HYDROCHLORIDE 0.1 MG/1
0.1 TABLET ORAL 2 TIMES DAILY
Status: DISCONTINUED | OUTPATIENT
Start: 2019-01-30 | End: 2019-02-01

## 2019-01-30 RX ORDER — ASPIRIN 81 MG/1
81 TABLET, CHEWABLE ORAL DAILY
Status: DISCONTINUED | OUTPATIENT
Start: 2019-01-30 | End: 2019-02-01

## 2019-01-30 RX ORDER — HYDROCODONE BITARTRATE AND ACETAMINOPHEN 5; 325 MG/1; MG/1
1 TABLET ORAL EVERY 6 HOURS PRN
Status: DISCONTINUED | OUTPATIENT
Start: 2019-01-30 | End: 2019-02-01

## 2019-01-30 RX ORDER — DEXTROSE MONOHYDRATE 25 G/50ML
50 INJECTION, SOLUTION INTRAVENOUS AS NEEDED
Status: DISCONTINUED | OUTPATIENT
Start: 2019-01-30 | End: 2019-02-01

## 2019-01-30 RX ORDER — ALLOPURINOL 300 MG/1
300 TABLET ORAL DAILY
Status: DISCONTINUED | OUTPATIENT
Start: 2019-01-30 | End: 2019-02-01

## 2019-01-30 RX ORDER — ONDANSETRON 2 MG/ML
4 INJECTION INTRAMUSCULAR; INTRAVENOUS EVERY 6 HOURS PRN
Status: DISCONTINUED | OUTPATIENT
Start: 2019-01-30 | End: 2019-02-01

## 2019-01-30 RX ORDER — HEPARIN SODIUM 5000 [USP'U]/ML
5000 INJECTION, SOLUTION INTRAVENOUS; SUBCUTANEOUS EVERY 12 HOURS SCHEDULED
Status: DISCONTINUED | OUTPATIENT
Start: 2019-01-30 | End: 2019-02-01

## 2019-01-30 RX ORDER — SODIUM CHLORIDE 0.9 % (FLUSH) 0.9 %
3 SYRINGE (ML) INJECTION AS NEEDED
Status: DISCONTINUED | OUTPATIENT
Start: 2019-01-30 | End: 2019-02-01

## 2019-01-30 RX ORDER — ONDANSETRON 4 MG/1
4 TABLET, FILM COATED ORAL EVERY 12 HOURS PRN
Status: DISCONTINUED | OUTPATIENT
Start: 2019-01-30 | End: 2019-02-01

## 2019-01-30 RX ORDER — SODIUM CHLORIDE 9 MG/ML
INJECTION, SOLUTION INTRAVENOUS CONTINUOUS
Status: DISCONTINUED | OUTPATIENT
Start: 2019-01-30 | End: 2019-02-01

## 2019-01-30 RX ORDER — AMLODIPINE BESYLATE 10 MG/1
10 TABLET ORAL
Status: DISCONTINUED | OUTPATIENT
Start: 2019-01-30 | End: 2019-02-01

## 2019-01-30 RX ORDER — ACETAMINOPHEN 325 MG/1
650 TABLET ORAL EVERY 6 HOURS PRN
Status: DISCONTINUED | OUTPATIENT
Start: 2019-01-30 | End: 2019-02-01

## 2019-01-30 RX ORDER — PANTOPRAZOLE SODIUM 20 MG/1
20 TABLET, DELAYED RELEASE ORAL
Status: DISCONTINUED | OUTPATIENT
Start: 2019-01-31 | End: 2019-02-01

## 2019-01-30 RX ORDER — ZOLPIDEM TARTRATE 5 MG/1
5 TABLET ORAL NIGHTLY PRN
Status: DISCONTINUED | OUTPATIENT
Start: 2019-01-30 | End: 2019-02-01

## 2019-01-30 RX ORDER — GABAPENTIN 100 MG/1
100 CAPSULE ORAL 3 TIMES DAILY
Status: DISCONTINUED | OUTPATIENT
Start: 2019-01-30 | End: 2019-02-01

## 2019-01-30 RX ORDER — LEVOTHYROXINE SODIUM 0.1 MG/1
100 TABLET ORAL
Status: DISCONTINUED | OUTPATIENT
Start: 2019-01-31 | End: 2019-02-01

## 2019-01-30 RX ORDER — ATORVASTATIN CALCIUM 40 MG/1
40 TABLET, FILM COATED ORAL NIGHTLY
Status: DISCONTINUED | OUTPATIENT
Start: 2019-01-30 | End: 2019-02-01

## 2019-01-30 NOTE — H&P
[facility]    PATIENT'S NAME: Oumar Kline   ATTENDING PHYSICIAN: Radha Santillan MD   PATIENT ACCOUNT#:   [id number]    LOCATION:  [place] [address]  MEDICAL RECORD #:   [id number]       YOB: 1940  ADMISSION DATE:       01/30/2 list.    ALLERGIES:  No known drug allergies. She has side effects to Compazine. FAMILY HISTORY:  Both parents  of heart disease. The patient is not able to provide any further details.     SOCIAL HISTORY:  Currently lives in [place], independent v sounds. EXTREMITIES:  Chronic stasis dermatitis with mild erythema but no pain. +1 edema both legs. No clubbing or cyanosis. NEUROLOGIC:  Motor and sensory intact. Cranial nerves II through XII are intact.   SKIN:  Other than lower extremity stasis neeru

## 2019-01-30 NOTE — ED NOTES
Orders for admission, patient is aware of plan and ready to go upstairs. Any questions, please call ED RN jadon  at extension [phone].

## 2019-01-30 NOTE — RESPIRATORY THERAPY NOTE
MISTY ASSESSMENT:    Pt does not have a previous diagnosis of MISTY. Pt does not routinely use a CPAP device at home. Per pt.   Pt refused to use CPAP while in hospital.

## 2019-01-30 NOTE — ED INITIAL ASSESSMENT (HPI)
Generalized weakness. States she has slid out of bed multiple times over the last few days.  C/o left arm and left neck pain after falling out of bed this am.

## 2019-01-30 NOTE — ED PROVIDER NOTES
Patient Seen in: [facility] AND [facility] Emergency Department    History   Patient presents with:  Fatigue (constitutional, neurologic)    Stated Complaint: weakness    HPI    The patient is a 68-year-old female sent from the assisted living facility because s 8/23/2017    Performed by David Lopes MD at [facility] ENDOSCOPY   • HIP REPLACEMENT SURGERY Left    • HYSTERECTOMY     • KNEE REPLACEMENT SURGERY  2006   • LAPAROSCOPIC CHOLECYSTECTOMY N/A 8/25/2017    Performed by Michelle Abel MD at [facility] OR   • RE distension. There is no tenderness. Musculoskeletal: Normal range of motion. She exhibits no tenderness or deformity. Full range of motion with all joints. Neurological: She is alert and oriented to person, place, and time. She has normal reflexes.  Anel Allison EKG    Rate, intervals and axes as noted on EKG Report. Rate: 47  Rhythm: Atrial Fibrillation  Reading: A. fib rate controlled              Patient continues to be sleepy but arousable with no confusion. IV Rocephin given for UTI with IV fluids.   Antoine (cpt=71045)    Result Date: 1/30/2019  CONCLUSION:  1. Little change from July 14, 2018. 2. Borderline cardiomegaly. 3. Demineralization. 4. Scoliosis. 5. Osteoarthritis. 6. Postop changes thoracic/lumbar spine. Dictated by (CST):  Jose Angel Avalos MD

## 2019-01-30 NOTE — CM/SW NOTE
SW received MDO for POLST. No current DNR in [address] [place] [address] placed POLST form in pt's chart, will need all signatures. HCPOA on file, listed pt's friend as SAIMA/Amarilis [phone]. Per chart review, pt is from [facility].  Pt has a hx at [place] and hx o

## 2019-01-31 LAB
ANION GAP SERPL CALC-SCNC: 12 MMOL/L (ref 0–18)
BASOPHILS # BLD AUTO: 0.05 X10(3) UL (ref 0–0.2)
BASOPHILS NFR BLD AUTO: 0.7 %
BUN SERPL-MCNC: 16 MG/DL (ref 8–20)
BUN/CREAT SERPL: 12.9 (ref 10–20)
CALCIUM SERPL-MCNC: 9.2 MG/DL (ref 8.5–10.5)
CHLORIDE SERPL-SCNC: 102 MMOL/L (ref 95–110)
CO2 SERPL-SCNC: 25 MMOL/L (ref 22–32)
CREAT SERPL-MCNC: 1.24 MG/DL (ref 0.5–1.5)
DEPRECATED RDW RBC AUTO: 41.1 FL (ref 35.1–46.3)
EOSINOPHIL # BLD AUTO: 0.44 X10(3) UL (ref 0–0.7)
EOSINOPHIL NFR BLD AUTO: 6.3 %
ERYTHROCYTE [DISTWIDTH] IN BLOOD BY AUTOMATED COUNT: 13.7 % (ref 11–15)
GLUCOSE BLDC GLUCOMTR-MCNC: 110 MG/DL (ref 70–99)
GLUCOSE BLDC GLUCOMTR-MCNC: 134 MG/DL (ref 70–99)
GLUCOSE BLDC GLUCOMTR-MCNC: 139 MG/DL (ref 70–99)
GLUCOSE BLDC GLUCOMTR-MCNC: 167 MG/DL (ref 70–99)
GLUCOSE SERPL-MCNC: 148 MG/DL (ref 70–99)
HCT VFR BLD AUTO: 33.6 % (ref 35–48)
HGB BLD-MCNC: 11 G/DL (ref 12–16)
IMM GRANULOCYTES # BLD AUTO: 0.02 X10(3) UL (ref 0–1)
IMM GRANULOCYTES NFR BLD: 0.3 %
LYMPHOCYTES # BLD AUTO: 2.23 X10(3) UL (ref 1–4)
LYMPHOCYTES NFR BLD AUTO: 31.7 %
MCH RBC QN AUTO: 27.3 PG (ref 26–34)
MCHC RBC AUTO-ENTMCNC: 32.7 G/DL (ref 31–37)
MCV RBC AUTO: 83.4 FL (ref 80–100)
MONOCYTES # BLD AUTO: 0.51 X10(3) UL (ref 0.1–1)
MONOCYTES NFR BLD AUTO: 7.3 %
NEUTROPHILS # BLD AUTO: 3.78 X10 (3) UL (ref 1.5–7.7)
NEUTROPHILS # BLD AUTO: 3.78 X10(3) UL (ref 1.5–7.7)
NEUTROPHILS NFR BLD AUTO: 53.7 %
OSMOLALITY UR CALC.SUM OF ELEC: 292 MOSM/KG (ref 275–295)
PLATELET # BLD AUTO: 226 10(3)UL (ref 150–450)
POTASSIUM SERPL-SCNC: 3.1 MMOL/L (ref 3.3–5.1)
POTASSIUM SERPL-SCNC: 4.4 MMOL/L (ref 3.3–5.1)
RBC # BLD AUTO: 4.03 X10(6)UL (ref 3.8–5.3)
SODIUM SERPL-SCNC: 139 MMOL/L (ref 136–144)
WBC # BLD AUTO: 7 X10(3) UL (ref 4–11)

## 2019-01-31 PROCEDURE — 99226 SUBSEQUENT OBSERVATION CARE: CPT | Performed by: HOSPITALIST

## 2019-01-31 RX ORDER — POTASSIUM CHLORIDE 20 MEQ/1
40 TABLET, EXTENDED RELEASE ORAL EVERY 4 HOURS
Status: COMPLETED | OUTPATIENT
Start: 2019-01-31 | End: 2019-01-31

## 2019-01-31 NOTE — PROGRESS NOTES
[facility]    CDI Prediction Tool Protocol (Vancomycin Initiated)    OVP (oral vancomycin prophylaxis) 125 mg PO Daily is being started in this patient based on a score of 25.1.       Score Breakdown:  History of CDI within 1 year AND high risk a

## 2019-01-31 NOTE — CM/SW NOTE
Patient agreeable for referral to be sent to [facility] rehab    Will check [facility] stay to see if covered by Medicare, patient is currently observation stay    Stacy Marks, [address] [facility] Leader  Phone # [phone]

## 2019-01-31 NOTE — PHYSICAL THERAPY NOTE
PHYSICAL THERAPY EVALUATION - INPATIENT     Room Number: 529/529-A  Evaluation Date: 1/31/2019  Type of Evaluation: Initial   Physician Order: PT Eval and Treat    Presenting Problem: Delirium, acute  Reason for Therapy: Mobility Dysfunction and Discharge to retirement at this time due to deficits. The patient's Approx Degree of Impairment: 68.66% has been calculated based on documentation in the [facility] '6 clicks' Inpatient Basic Mobility Short Form.   Research supports that patients with this level of impairment ma SURGERY      5 surgeries last one in dec 2011   • CHOLECYSTECTOMY  08/25/2017   • EGD  12/17, 8/17   • ELECTROCARDIOGRAM, COMPLETE  11-    Scanned to media tab - DOS 11-   • ESOPHAGOGASTRODUODENOSCOPY (EGD) N/A 12/8/2017    Performed by Darlene Campos impaired bilateral LE's: 3+/5    BALANCE  Static Sitting: Fair +  Dynamic Sitting: Fair  Static Standing: Fair -  Dynamic Standing: Poor +    ACTIVITY TOLERANCE  Pulse: 55  Heart Rate Source: Monitor                   O2 WALK  SPO2 on Room Air at Rest: 94 assistance level: CG with walker - rolling     Goal #2  Current Status    Goal #3 Patient is able to ambulate 100 feet with assist device: walker - rolling at assistance level: CG   Goal #3   Current Status    Goal #4 Patient to demonstrate independence wi

## 2019-01-31 NOTE — PLAN OF CARE
Problem: Patient Centered Care  Goal: Patient preferences are identified and integrated in the patient's plan of care  Interventions:  - What would you like us to know as we care for you?   [facility]   - Provide timely, complete, and accur with strengthening/mobility  - Encourage toileting schedule  Outcome: Progressing  Pt on high fall precautions. Compliant with call light. Comments: Pt alert, ,oriened X4. Vital signs stable.  On IVF and IV abx for UTI, frequent urine output

## 2019-01-31 NOTE — PROGRESS NOTES
[facility]    Progress Note    Daniel Shepard Patient Status:  Observation    11/3/1940 MRN [id number]   Location [facility] 5SW/SE Attending Rebeca Vann MD   Hosp Day # 0 PCP Heidi Mcneill MD       Subjective:    Horacio Jewell Nightly   • CloNIDine HCl  0.1 mg Oral BID   • gabapentin  100 mg Oral TID   • Levothyroxine Sodium  100 mcg Oral Before breakfast   • metoprolol Tartrate  25 mg Oral 2x Daily(Beta Blocker)   • Pantoprazole Sodium  20 mg Oral QAM AC       Current PRN Inpat <10,000 cfu/ml Mixture of Gram negative organisms isolated - probable contamination. Imaging/EKG:   Xr Humerus (min 2 Views), Left (cpt=73060)    Result Date: 1/30/2019  CONCLUSION:  1. No fracture. 2. Osteoarthritis. 3. Demineralization.     Dictated -------------------------- sinus rhythm - Negative precordial T-waves -Probably normal -consider anteroseptal ischemia.  ABNORMAL RHYTHM When compared with ECG of 10/24/2018 12:40:36 No significant changes have occurred Electronically signed on 01/30/2019 a

## 2019-02-01 VITALS
DIASTOLIC BLOOD PRESSURE: 66 MMHG | SYSTOLIC BLOOD PRESSURE: 150 MMHG | RESPIRATION RATE: 18 BRPM | WEIGHT: 190 LBS | OXYGEN SATURATION: 94 % | TEMPERATURE: 98 F | HEIGHT: 57 IN | HEART RATE: 70 BPM | BODY MASS INDEX: 40.99 KG/M2

## 2019-02-01 LAB
GLUCOSE BLDC GLUCOMTR-MCNC: 135 MG/DL (ref 70–99)
GLUCOSE BLDC GLUCOMTR-MCNC: 156 MG/DL (ref 70–99)

## 2019-02-01 PROCEDURE — 99217 OBSERVATION CARE DISCHARGE: CPT | Performed by: HOSPITALIST

## 2019-02-01 RX ORDER — HYDROCODONE BITARTRATE AND ACETAMINOPHEN 5; 325 MG/1; MG/1
1 TABLET ORAL EVERY 6 HOURS PRN
Qty: 15 TABLET | Refills: 0 | Status: ON HOLD | OUTPATIENT
Start: 2019-02-01 | End: 2019-04-01

## 2019-02-01 RX ORDER — LEVOTHYROXINE SODIUM 0.1 MG/1
TABLET ORAL
Qty: 30 TABLET | Refills: 0 | Status: SHIPPED | OUTPATIENT
Start: 2019-02-01

## 2019-02-01 RX ORDER — ALLOPURINOL 300 MG/1
300 TABLET ORAL DAILY
Qty: 30 TABLET | Refills: 0 | Status: SHIPPED | OUTPATIENT
Start: 2019-02-01 | End: 2019-04-11

## 2019-02-01 RX ORDER — AMLODIPINE BESYLATE 10 MG/1
10 TABLET ORAL
Qty: 30 TABLET | Refills: 0 | Status: ON HOLD | OUTPATIENT
Start: 2019-02-01 | End: 2019-03-22

## 2019-02-01 RX ORDER — ATORVASTATIN CALCIUM 40 MG/1
40 TABLET, FILM COATED ORAL NIGHTLY
Qty: 30 TABLET | Refills: 0 | Status: ON HOLD | OUTPATIENT
Start: 2019-02-01 | End: 2019-03-23

## 2019-02-01 RX ORDER — CIPROFLOXACIN 500 MG/1
500 TABLET, FILM COATED ORAL 2 TIMES DAILY
Qty: 6 TABLET | Refills: 0 | Status: SHIPPED | OUTPATIENT
Start: 2019-02-01 | End: 2019-02-04

## 2019-02-01 RX ORDER — CLONIDINE HYDROCHLORIDE 0.1 MG/1
0.1 TABLET ORAL 2 TIMES DAILY
Qty: 60 TABLET | Refills: 0 | Status: SHIPPED | OUTPATIENT
Start: 2019-02-01

## 2019-02-01 RX ORDER — GABAPENTIN 100 MG/1
CAPSULE ORAL
Qty: 90 CAPSULE | Refills: 0 | Status: SHIPPED | OUTPATIENT
Start: 2019-02-01 | End: 2019-01-01

## 2019-02-01 RX ORDER — FUROSEMIDE 20 MG/1
20 TABLET ORAL DAILY
Qty: 30 TABLET | Refills: 0 | Status: ON HOLD | OUTPATIENT
Start: 2019-02-01 | End: 2019-01-01

## 2019-02-01 NOTE — PROGRESS NOTES
Report given to [facility] rehab ctr  Nurse  At [facility]  Pt. Agreeable to plan. Plan for discharge to [facility] 3:30pm today.

## 2019-02-01 NOTE — PLAN OF CARE
Problem: Patient Centered Care  Goal: Patient preferences are identified and integrated in the patient's plan of care  Interventions:  - What would you like us to know as we care for you?   [facility]   - Provide timely, complete, and accur OT/PT consult to assist with strengthening/mobility  - Encourage toileting schedule  Outcome: Progressing

## 2019-02-01 NOTE — CM/SW NOTE
Per [facility] rehab- patient not in qualifying in patient stay at [facility] to qualify for a SNF stay    CTL explained this to patient and patient is agreeable to go to [facility] rehab under [address] $200.00 a day for 2 weeks    Message left with Lynn KELLER

## 2019-02-01 NOTE — PLAN OF CARE
Problem: Patient Centered Care  Goal: Patient preferences are identified and integrated in the patient's plan of care  Interventions:  - What would you like us to know as we care for you?   [facility]   - Provide timely, complete, and accur with strengthening/mobility  - Encourage toileting schedule  Outcome: Progressing  Patient in bed with bed alarm on and call light within reach. Hourly monitoring in place.

## 2019-02-01 NOTE — DISCHARGE SUMMARY
[facility] [place] [facility]    Discharge Summary    Jorge Schneider Patient Status:  Observation    11/3/1940 MRN [id number]   Location [facility] 5SW/SE Attending Julita Arriaza MD   Hosp Day # 0 PCP Mona Coy MD     Date of Admiss dependent on Norco tablets, she said she averages 4 a day Gout  Gastroesophageal reflux disease / Urban esophagus gastritis  Generalized musculoskeletal deconditioning           CULTURE:   Hospital Encounter on 01/30/19   1.  URINE CULTURE, ROUTINE     St 1/30/2019  CONCLUSION:  1. Little change from July 14, 2018. 2. Borderline cardiomegaly. 3. Demineralization. 4. Scoliosis. 5. Osteoarthritis. 6. Postop changes thoracic/lumbar spine. Dictated by (CST):  Saurav Escoto MD on 1/30/2019 at 11:50     Ap mg total) by mouth 2 (two) times daily for 3 days.    Stop taking on:  2/4/2019  Quantity:  6 tablet  Refills:  0        CHANGE how you take these medications      Instructions Prescription details   AmLODIPine Besylate 10 MG Tabs  Commonly known as:  Fabricio Pedraza gabapentin 100 MG Caps  Commonly known as:  NEURONTIN      TAKE 1 CAPSULE BY MOUTH THREE TIMES DAILY   Quantity:  90 capsule  Refills:  0     Levothyroxine Sodium 100 MCG Tabs  Commonly known as:  SYNTHROID      TAKE 1 TABLET BY MOUTH EVERY MORNING   Jeanna Carrel

## 2019-02-11 ENCOUNTER — TELEPHONE (OUTPATIENT)
Dept: INTERNAL MEDICINE CLINIC | Facility: CLINIC | Age: 79
End: 2019-02-11

## 2019-02-11 NOTE — TELEPHONE ENCOUNTER
Patient discharged on 2/9/19 from [facility] per [facility]. TCM referral generated and discharge note emailed to the [facility] team.

## 2019-02-11 NOTE — TELEPHONE ENCOUNTER
Calling to confirm Dr Gianni Poole will sign home health orders - for nursing, physical & occupational therapy     [facility] [phone]

## 2019-02-11 NOTE — TELEPHONE ENCOUNTER
Please let them know that Dr. Ho Noel will not be in the office till Wednesday. I would think that he would sign off on any home health care orders.

## 2019-02-12 ENCOUNTER — PATIENT OUTREACH (OUTPATIENT)
Dept: CASE MANAGEMENT | Age: 79
End: 2019-02-12

## 2019-02-12 DIAGNOSIS — Z02.9 ENCOUNTERS FOR ADMINISTRATIVE PURPOSE: ICD-10-CM

## 2019-02-12 NOTE — PROGRESS NOTES
Initial Post Discharge Follow Up   Discharge Date from SNF: 2/9/19  Contact Date: 2/12/2019    Consent Verification:  Assessment Completed With: Patient  HIPAA Verified?   Yes    Discharge Dx:   S/p SRINIVASA for UTI, weakness    General:   • How have you been STRENGTH 81 MG Oral Tab EC TAKE 1 TABLET BY MOUTH DAILY Disp: 90 tablet Rfl: 3   Ondansetron HCl (ZOFRAN) 4 mg tablet Take 1 tablet (4 mg total) by mouth every 12 (twelve) hours as needed for Nausea.  Disp: 60 tablet Rfl: 5   omeprazole 20 MG Oral Capsule D back to her normal self. She declined to complete med review. NC scheduled patient for HFU on 2/14/2019. No other issues or questions from the patient.  She stated that if she starts to feel worse she will go back to the ER but as of right now she just nee

## 2019-02-13 ENCOUNTER — HOSPITAL ENCOUNTER (EMERGENCY)
Facility: HOSPITAL | Age: 79
Discharge: HOME | End: 2019-02-14
Attending: EMERGENCY MEDICINE
Payer: MEDICARE

## 2019-02-13 DIAGNOSIS — R26.2 DIFFICULTY WALKING: Primary | ICD-10-CM

## 2019-02-13 PROCEDURE — 99283 EMERGENCY DEPT VISIT LOW MDM: CPT

## 2019-02-14 VITALS
RESPIRATION RATE: 20 BRPM | BODY MASS INDEX: 34.15 KG/M2 | DIASTOLIC BLOOD PRESSURE: 66 MMHG | SYSTOLIC BLOOD PRESSURE: 152 MMHG | HEIGHT: 64 IN | HEART RATE: 77 BPM | TEMPERATURE: 98 F | OXYGEN SATURATION: 95 % | WEIGHT: 200 LBS

## 2019-02-14 NOTE — ED PROVIDER NOTES
Patient Seen in: [facility] AND [facility] Emergency Department    History   Patient presents with:  Difficulty Walking    Stated Complaint: difficulty walking     HPI    71 yo F with PMH CHF, hypothyroid, HTN presenting via EMS from [facility] for evaluation of diff UNLISTED     • TOTAL HIP REPLACEMENT     • TOTAL KNEE REPLACEMENT         Medications :   HYDROcodone-acetaminophen 5-325 MG Oral Tab,  Take 1 tablet by mouth every 6 (six) hours as needed.    allopurinol 300 MG Oral Tab,  Take 1 tablet (300 mg total) by mo Systems :  Constitutional: As per HPI  Neurological: Negative for syncope and headaches. Positive for stated complaint: difficulty walking  Other systems are as noted in HPI. Constitutional and vital signs reviewed.       All other systems reviewed and diagnosis)    Disposition:  Discharge    Follow-up:  Eli Khan MD  [facility] [id number] [phone]    Call  As needed      Medications Prescribed:  Current Discharge Medication List

## 2019-02-14 NOTE — ED INITIAL ASSESSMENT (HPI)
Pt arrived per EMS for difficulty walking. Pt states she thinks it is from her back surgeries. Last surgery approx 6 years ago. Denies acute pain, has chronic back pain. Last fall a couple of weeks ago.

## 2019-02-18 ENCOUNTER — OFFICE VISIT (OUTPATIENT)
Dept: INTERNAL MEDICINE CLINIC | Facility: CLINIC | Age: 79
End: 2019-02-18
Payer: MEDICARE

## 2019-02-18 VITALS
SYSTOLIC BLOOD PRESSURE: 144 MMHG | TEMPERATURE: 98 F | HEIGHT: 64 IN | DIASTOLIC BLOOD PRESSURE: 60 MMHG | WEIGHT: 199 LBS | BODY MASS INDEX: 33.97 KG/M2 | HEART RATE: 60 BPM

## 2019-02-18 DIAGNOSIS — K21.9 GASTROESOPHAGEAL REFLUX DISEASE, ESOPHAGITIS PRESENCE NOT SPECIFIED: ICD-10-CM

## 2019-02-18 DIAGNOSIS — I87.2 VENOUS INSUFFICIENCY: ICD-10-CM

## 2019-02-18 DIAGNOSIS — E78.00 HYPERCHOLESTEREMIA: ICD-10-CM

## 2019-02-18 DIAGNOSIS — I63.9 CEREBROVASCULAR ACCIDENT (CVA), UNSPECIFIED MECHANISM (HCC): ICD-10-CM

## 2019-02-18 DIAGNOSIS — K22.70 BARRETT'S ESOPHAGUS WITHOUT DYSPLASIA: ICD-10-CM

## 2019-02-18 DIAGNOSIS — R53.83 FATIGUE, UNSPECIFIED TYPE: Primary | ICD-10-CM

## 2019-02-18 DIAGNOSIS — M79.7 FIBROMYALGIA: ICD-10-CM

## 2019-02-18 DIAGNOSIS — G89.4 CHRONIC PAIN SYNDROME: ICD-10-CM

## 2019-02-18 DIAGNOSIS — E78.1 HYPERTRIGLYCERIDEMIA: ICD-10-CM

## 2019-02-18 DIAGNOSIS — M96.1 FAILED BACK SURGICAL SYNDROME: ICD-10-CM

## 2019-02-18 DIAGNOSIS — N18.30 STAGE 3 CHRONIC KIDNEY DISEASE (HCC): ICD-10-CM

## 2019-02-18 DIAGNOSIS — E11.9 TYPE 2 DIABETES MELLITUS WITHOUT COMPLICATION, WITHOUT LONG-TERM CURRENT USE OF INSULIN (HCC): ICD-10-CM

## 2019-02-18 DIAGNOSIS — I10 ESSENTIAL HYPERTENSION: ICD-10-CM

## 2019-02-18 DIAGNOSIS — M15.9 PRIMARY OSTEOARTHRITIS INVOLVING MULTIPLE JOINTS: ICD-10-CM

## 2019-02-18 PROCEDURE — 99495 TRANSJ CARE MGMT MOD F2F 14D: CPT | Performed by: INTERNAL MEDICINE

## 2019-02-18 RX ORDER — ZOLPIDEM TARTRATE 5 MG/1
5 TABLET ORAL NIGHTLY
Status: ON HOLD | COMMUNITY
Start: 2018-12-31 | End: 2019-04-24

## 2019-02-18 NOTE — PROGRESS NOTES
Natividad Arana is a 70year old female. Patient presents with:  TCM (Transition Of Care Management): Hospital Followup from [address] [place] [address] Visit for 2/13 - 2/14 \"Difficulty walking\"  Forms Completion: Needs assistance with DME \"Scooter Forms\" and PT Forms  Fatigu TAKE 1 TABLET BY MOUTH EVERY MORNING Disp: 30 tablet Rfl: 0   metoprolol Tartrate 25 MG Oral Tab Take 1 tablet (25 mg total) by mouth 2x Daily(Beta Blocker).  Disp: 60 tablet Rfl: 0   ASPIRIN ADULT LOW STRENGTH 81 MG Oral Tab EC TAKE 1 TABLET BY MOUTH DAILY OF SYSTEMS:   GENERAL HEALTH: feels well otherwise  RESPIRATORY:No cough or SOB  CARDIOVASCULAR: No chest pain  GI: No abdominal pain, nausea, vomiting, diarrhea, or constipation  :No Urinary complaints  EXT:No complaints of pain or swelling in patient's current use of insulin (HCC)  Stable. CPM.  I have given the patient order to have blood tests as noted above. I will await those blood test.    6. Fibromyalgia  Stable. CPM.    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   CPM.    8. [place] required follow-up with community providers and services. Medication Reconciliation:  I am aware of an inpatient discharge within the last 30 days.   The discharge medication list has been reconciled with the patient's current medication list and reviewe tablet (20 mg total) by mouth daily.    gabapentin 100 MG Oral Cap TAKE 1 CAPSULE BY MOUTH THREE TIMES DAILY   Levothyroxine Sodium 100 MCG Oral Tab TAKE 1 TABLET BY MOUTH EVERY MORNING   metoprolol Tartrate 25 MG Oral Tab Take 1 tablet (25 mg total) by [place] ESOPHAGOGASTRODUODENOSCOPY (EGD) (N/A, 12/8/2017); LAPAROSCOPIC CHOLECYSTECTOMY (N/A, 8/25/2017); and ESOPHAGOGASTRODUODENOSCOPY (EGD) (N/A, 8/23/2017). She family history includes Cancer in an other family member; Diabetes in an other family member;  He throat are clear  EYES: PERRLA, EOMI, conjunctiva are clear  NECK: supple, no adenopathy, no bruits  CHEST: no chest tenderness  LUNGS: clear to auscultation  CARDIO: RRR without murmur  GI: good BS's, no masses, HSM or tenderness  MUSCULOSKELETAL: back is back in about 1 month. I have given the patient orders for the visiting nurse to draw blood tests including a CBC, BMP, hemoglobin A1c, lipid panel, AST and ALT. I will see the patient back in 1 month. I will see the patient back sooner as necessary.   P

## 2019-02-18 NOTE — PATIENT INSTRUCTIONS
1.  Patient is to continue her current diet, medication and activity.   2.  Patient is to continue to receive rehabilitation with her visiting nurse, physical therapy and occupational therapist.  3.  I will once again complete the necessary forms for the pa

## 2019-02-19 ENCOUNTER — TELEPHONE (OUTPATIENT)
Dept: INTERNAL MEDICINE CLINIC | Facility: CLINIC | Age: 79
End: 2019-02-19

## 2019-02-19 NOTE — TELEPHONE ENCOUNTER
[facility] called to request re certification for Nursing, Christine Pat[address] -  [phone]

## 2019-02-20 ENCOUNTER — TELEPHONE (OUTPATIENT)
Dept: INTERNAL MEDICINE CLINIC | Facility: CLINIC | Age: 79
End: 2019-02-20

## 2019-02-20 NOTE — TELEPHONE ENCOUNTER
HH called back and stated that the pt will be going to her wellness center in her building so she will NOT need the PT and OT orders

## 2019-02-20 NOTE — TELEPHONE ENCOUNTER
Raz from [facility] calling back the form was received but the form is incomplete there are quit a few things missing like a call from  or nurse to fill out the form correctly.  [phone]

## 2019-02-20 NOTE — TELEPHONE ENCOUNTER
Form has been received, but incorrect date for PTE. Physician needs to date PTE as 2/18/2019. Faxing blank form to be filled out.

## 2019-02-20 NOTE — TELEPHONE ENCOUNTER
[facility] Gracie like in order for re-certification for nursing, physical therapy and occupational therapy.

## 2019-02-20 NOTE — TELEPHONE ENCOUNTER
Gordon Menendez is calling back he needs the Mobility assessment performed by therapist form & Dr. Ofelia Reveles to write a statement stating that he agrees with the evaluation that patient needs a power chair signature & date 02/18/19   Fax.  # [phone]  Printed and placed o

## 2019-02-21 ENCOUNTER — TELEPHONE (OUTPATIENT)
Dept: INTERNAL MEDICINE CLINIC | Facility: CLINIC | Age: 79
End: 2019-02-21

## 2019-02-21 ENCOUNTER — HOSPITAL ENCOUNTER (EMERGENCY)
Facility: HOSPITAL | Age: 79
Discharge: HOME | End: 2019-02-21
Attending: EMERGENCY MEDICINE
Payer: MEDICARE

## 2019-02-21 VITALS
OXYGEN SATURATION: 99 % | RESPIRATION RATE: 20 BRPM | SYSTOLIC BLOOD PRESSURE: 147 MMHG | TEMPERATURE: 98 F | WEIGHT: 200 LBS | HEIGHT: 61 IN | HEART RATE: 72 BPM | DIASTOLIC BLOOD PRESSURE: 78 MMHG | BODY MASS INDEX: 37.76 KG/M2

## 2019-02-21 DIAGNOSIS — R53.1 WEAKNESS GENERALIZED: Primary | ICD-10-CM

## 2019-02-21 LAB
ANION GAP SERPL CALC-SCNC: 8 MMOL/L (ref 0–18)
BACTERIA UR QL AUTO: NEGATIVE /HPF
BASOPHILS # BLD AUTO: 0.06 X10(3) UL (ref 0–0.2)
BASOPHILS NFR BLD AUTO: 0.7 %
BILIRUB UR QL: NEGATIVE
BUN BLD-MCNC: 14 MG/DL (ref 7–18)
BUN/CREAT SERPL: 11.7 (ref 10–20)
CALCIUM BLD-MCNC: 10.3 MG/DL (ref 8.5–10.1)
CHLORIDE SERPL-SCNC: 107 MMOL/L (ref 98–107)
CLARITY UR: CLEAR
CO2 SERPL-SCNC: 27 MMOL/L (ref 21–32)
COLOR UR: YELLOW
CREAT BLD-MCNC: 1.2 MG/DL (ref 0.55–1.02)
DEPRECATED RDW RBC AUTO: 43 FL (ref 35.1–46.3)
EOSINOPHIL # BLD AUTO: 0.37 X10(3) UL (ref 0–0.7)
EOSINOPHIL NFR BLD AUTO: 4.4 %
ERYTHROCYTE [DISTWIDTH] IN BLOOD BY AUTOMATED COUNT: 14.2 % (ref 11–15)
GLUCOSE BLD-MCNC: 120 MG/DL (ref 70–99)
GLUCOSE UR-MCNC: NEGATIVE MG/DL
HCT VFR BLD AUTO: 41.9 % (ref 35–48)
HGB BLD-MCNC: 13.4 G/DL (ref 12–16)
HGB UR QL STRIP.AUTO: NEGATIVE
IMM GRANULOCYTES # BLD AUTO: 0.04 X10(3) UL (ref 0–1)
IMM GRANULOCYTES NFR BLD: 0.5 %
KETONES UR-MCNC: NEGATIVE MG/DL
LEUKOCYTE ESTERASE UR QL STRIP.AUTO: NEGATIVE
LYMPHOCYTES # BLD AUTO: 1.88 X10(3) UL (ref 1–4)
LYMPHOCYTES NFR BLD AUTO: 22.1 %
MCH RBC QN AUTO: 26.9 PG (ref 26–34)
MCHC RBC AUTO-ENTMCNC: 32 G/DL (ref 31–37)
MCV RBC AUTO: 84.1 FL (ref 80–100)
MONOCYTES # BLD AUTO: 0.45 X10(3) UL (ref 0.1–1)
MONOCYTES NFR BLD AUTO: 5.3 %
NEUTROPHILS # BLD AUTO: 5.7 X10 (3) UL (ref 1.5–7.7)
NEUTROPHILS # BLD AUTO: 5.7 X10(3) UL (ref 1.5–7.7)
NEUTROPHILS NFR BLD AUTO: 67 %
NITRITE UR QL STRIP.AUTO: NEGATIVE
OSMOLALITY SERPL CALC.SUM OF ELEC: 296 MOSM/KG (ref 275–295)
PH UR: 7 [PH] (ref 5–8)
PLATELET # BLD AUTO: 298 10(3)UL (ref 150–450)
POTASSIUM SERPL-SCNC: 3.5 MMOL/L (ref 3.5–5.1)
PROT UR-MCNC: 100 MG/DL
RBC # BLD AUTO: 4.98 X10(6)UL (ref 3.8–5.3)
RBC #/AREA URNS AUTO: 1 /HPF
SODIUM SERPL-SCNC: 142 MMOL/L (ref 136–145)
SP GR UR STRIP: 1.01 (ref 1–1.03)
UROBILINOGEN UR STRIP-ACNC: <2
VIT C UR-MCNC: NEGATIVE MG/DL
WBC # BLD AUTO: 8.5 X10(3) UL (ref 4–11)
WBC #/AREA URNS AUTO: 3 /HPF

## 2019-02-21 PROCEDURE — 99284 EMERGENCY DEPT VISIT MOD MDM: CPT

## 2019-02-21 PROCEDURE — 96360 HYDRATION IV INFUSION INIT: CPT

## 2019-02-21 PROCEDURE — 85025 COMPLETE CBC W/AUTO DIFF WBC: CPT | Performed by: EMERGENCY MEDICINE

## 2019-02-21 PROCEDURE — 81001 URINALYSIS AUTO W/SCOPE: CPT | Performed by: EMERGENCY MEDICINE

## 2019-02-21 PROCEDURE — 96361 HYDRATE IV INFUSION ADD-ON: CPT

## 2019-02-21 PROCEDURE — 80048 BASIC METABOLIC PNL TOTAL CA: CPT | Performed by: EMERGENCY MEDICINE

## 2019-02-21 RX ORDER — SODIUM CHLORIDE 9 MG/ML
INJECTION, SOLUTION INTRAVENOUS CONTINUOUS
Status: DISCONTINUED | OUTPATIENT
Start: 2019-02-21 | End: 2019-02-21

## 2019-02-21 NOTE — ED PROVIDER NOTES
Patient Seen in: [facility] AND [facility] Emergency Department    History   Patient presents with:  Fall (musculoskeletal, neurologic)    Stated Complaint: fall on buttocks 3am- fell on buttocks- denying hitting head, neck, or back but*    HPI    Patient presen COMPLETE  11-    Scanned to media tab - DOS 11-   • ESOPHAGOGASTRODUODENOSCOPY (EGD) N/A 12/8/2017    Performed by Yanci Hinton MD at [address] [place] [address] ENDOSCOPY   • ESOPHAGOGASTRODUODENOSCOPY (EGD) N/A 8/23/2017    Performed by Yanci Hitnon MD at  Onset   • Heart Disorder Father    • Heart Disorder Mother    • Heart Disease Sister    • Hypertension Brother    • Melanoma Other    • Cancer Other    • Stroke Other    • Heart Disease Other    • Diabetes Other    • Eye Problems Neg        Social History perfusion. Respiratory:  Lungs clear to auscultation bilaterally with good effort. No wheezes, ronchi, or rales. Abdomen:  Soft, non-tender, non-distended. No masses, no hepato-splenomegaly. Negative McBurney's point tenderness.   Musculoskeletal:  Goo ---------                               -----------         ------                     CBC W/ DIFFERENTIAL[[id number]]                              Final result                 Please view results for these tests on the individual orders.    RAINBOW DRAW B

## 2019-02-21 NOTE — TELEPHONE ENCOUNTER
Noted. Can you please locate the fax that was sent by [employer] yesterday. HAYDEE Jeff spent much time yesterday sending faxes back and forth to this person. She had thought that it had been totally resolved yesterday.   I have checked my desk I do not have a r

## 2019-02-21 NOTE — TELEPHONE ENCOUNTER
Per Escobar Devlin - HOLLEY bag has not arrived yet - it will arrive later today, she will search for this tomorrow. Nursing to f/u tomorrow.

## 2019-02-21 NOTE — CM/SW NOTE
This writer contacted [facility] as requested by pt for spiritual healing counseling, spoke with  of rectory and will contact pt at home ph# provided.   In addition, Dr. Azul Carrillo office contacted to assure home health benef

## 2019-02-21 NOTE — TELEPHONE ENCOUNTER
Fax received yesterday has incorrect date     [employer] sent fax back to Dr Martin Files with another -  physical therapy evaluation     On the new one please add   - I agree with the eval & need for power chair   Includes doctors signature & date of 2-18-19

## 2019-02-21 NOTE — TELEPHONE ENCOUNTER
NOted.  Please call Salvador Lindquist from [facility] and let her know what pt is going to do. I will route this to nursing.   Thank you!!

## 2019-02-21 NOTE — TELEPHONE ENCOUNTER
Following up to see if Dr Gianni Poole received fax from [facility] home health services requesting transfer of services from [facility] to [facility]    Please call Cyndy/MELVIN  from [address] - confidential voice mail

## 2019-02-21 NOTE — TELEPHONE ENCOUNTER
To Dr. Lulu Henao, have you received anything requesting transfer of services from [facility] to [facility] for this patient? Please advise, thank you!

## 2019-02-21 NOTE — CM/SW NOTE
Met with patient at bedside for dc planning. Pt presented for falling out of bed. [facility], states she feels weakness. Patient states she is working with rep from [employer] for Mom\" to relocate to [place] by her brother.   This writer ronny

## 2019-02-22 NOTE — CM/SW NOTE
Magnolia Hong from Dr. Usman Vnan office called stating they did not hear from [address] - provided Magnolia Hong with [facility] phone number and she will contact [facility] to follow-up.

## 2019-02-22 NOTE — TELEPHONE ENCOUNTER
Noted.  I have again completed the information that was requested on the fax. I have faxed this back to \"Raz\" as directed. This was done today. Hopefully this will take care of this problem.   Thank you!!

## 2019-02-22 NOTE — TELEPHONE ENCOUNTER
Angela Ratliff called again today, was informed that the paperwork was sent to be scanned into the chart. Angela Ratliff stated he will then fax over the form again right now. Will look for it and place on Dr Margi Faye desmckay once received.

## 2019-02-22 NOTE — TELEPHONE ENCOUNTER
Noted. Please call Amanuel Stevens back and let her know that I have not gotten any communication from anyone about a transfer of services.   Please tell her that somebody from home health called me yesterday to tell me that the patient was going to receive rehab ser

## 2019-02-22 NOTE — TELEPHONE ENCOUNTER
Dr. Aguilera Public completed fax form from [address] [facility] for motorized wheelchair. Form faxed back to [phone], confirmation rec'd.

## 2019-02-22 NOTE — TELEPHONE ENCOUNTER
Called and negro JOHNSON  as Darrick Bowman not available. I relayed MDs message. She advised that we reach out to [facility] home health to inquire about transfer of services. Called [facility] and left message to call back.

## 2019-02-22 NOTE — TELEPHONE ENCOUNTER
Fax came through and was placed on Dr Thompson Like desk at this time. Included is the previous fax that was sent and the next fax that was sent with the corrections needed.

## 2019-02-25 NOTE — TELEPHONE ENCOUNTER
Patient called to say that she contacted [facility] & for the third time they will fax over the paperwork.

## 2019-02-26 NOTE — TELEPHONE ENCOUNTER
Noted.  I have received the necessary papers from [facility]. I will work on the papers on Tuesday and try to get back to them on Wednesday. Please notify the patient that the papers are in the process of being done. I will route this to nursing.   Thank you!!

## 2019-02-28 NOTE — TELEPHONE ENCOUNTER
Please call pt today with status of completion of forms  Pt understood they would be sent yesterday  Tasked to nursing

## 2019-02-28 NOTE — TELEPHONE ENCOUNTER
Pt called to check status on forms for [facility]    & sates  is still waiting for her motorized wheelchair    Would like to speak with Dr Azul Carrillo today

## 2019-03-07 NOTE — TELEPHONE ENCOUNTER
Current refill request refused due to refill is either a duplicate request or has active refills at the pharmacy. Check previous templates.     Requested Prescriptions     Refused Prescriptions Disp Refills   • metoprolol Tartrate 25 MG Oral Tab [Pharmacy

## 2019-03-07 NOTE — TELEPHONE ENCOUNTER
Form completed and faxed back to [facility] @ [phone], conformation received. Original sent to scan.

## 2019-03-08 ENCOUNTER — TELEPHONE (OUTPATIENT)
Dept: INTERNAL MEDICINE CLINIC | Facility: CLINIC | Age: 79
End: 2019-03-08

## 2019-03-08 NOTE — TELEPHONE ENCOUNTER
Noted.  I completed the requested forms yesterday. .  They were faxed back today as noted.   Thank you!!

## 2019-03-08 NOTE — TELEPHONE ENCOUNTER
Otis German from [facility] is calling the facility ordered Nursing, PT, OT for pt. Will you sign orders  Ph. # [phone]  she would also like the most recent office visit faxed over fax.  #  [phone]   Routed to clinical

## 2019-03-09 NOTE — TELEPHONE ENCOUNTER
Noted. Please call nurse back and let her know that I will sign the home health care orders for the patient. Also okay to send copy of my last progress note to the facility as requested. I will route this to nursing.   Thank you!!

## 2019-03-11 NOTE — TELEPHONE ENCOUNTER
Lala from [place] [id number]. needs to verify diagnosis information on clinical notes.     Call her: [phone]

## 2019-03-11 NOTE — TELEPHONE ENCOUNTER
Lala calling again to request that we fax her the most recent office visit notes. 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 Also wondering if [facility] aid orders for Dr TOTH as well.      Fax # [phone]  Best call back: [phone]

## 2019-03-11 NOTE — TELEPHONE ENCOUNTER
Spoke with Lala. She needs most recent OV notes. I faxed 2/18/19 OV note to her. Confirmation received. I notified her Dr TOTH will be signing orders      Matt Mccoy asks-----does patient have a diagnosis of post laminectomy syndrome?   Can patient have order

## 2019-03-12 NOTE — TELEPHONE ENCOUNTER
Noted. 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   Also tell her it is okay to arrange for the patient have a home hea

## 2019-03-14 NOTE — TELEPHONE ENCOUNTER
/66 at ED visit 2/21/19.  Pt has appt with you on 3/20/19    To Dr. Raiza Fuentes, please advise on refills

## 2019-03-15 RX ORDER — AMLODIPINE BESYLATE 10 MG/1
TABLET ORAL
Qty: 30 TABLET | Refills: 11 | Status: SHIPPED | OUTPATIENT
Start: 2019-03-15

## 2019-03-19 ENCOUNTER — LAB ENCOUNTER (OUTPATIENT)
Dept: LAB | Age: 79
DRG: 383 | End: 2019-03-19
Attending: INTERNAL MEDICINE
Payer: MEDICARE

## 2019-03-19 DIAGNOSIS — E78.00 HYPERCHOLESTEREMIA: ICD-10-CM

## 2019-03-19 DIAGNOSIS — E78.00 HYPERCHOLESTEROLEMIA: ICD-10-CM

## 2019-03-19 DIAGNOSIS — N18.30 STAGE 3 CHRONIC KIDNEY DISEASE (HCC): ICD-10-CM

## 2019-03-19 DIAGNOSIS — R53.83 FATIGUE, UNSPECIFIED TYPE: ICD-10-CM

## 2019-03-19 DIAGNOSIS — E11.9 TYPE 2 DIABETES MELLITUS WITHOUT COMPLICATION, WITHOUT LONG-TERM CURRENT USE OF INSULIN (HCC): ICD-10-CM

## 2019-03-19 DIAGNOSIS — M96.1 FAILED BACK SURGICAL SYNDROME: ICD-10-CM

## 2019-03-19 LAB
ALBUMIN SERPL-MCNC: 3.7 G/DL (ref 3.4–5)
ALBUMIN/GLOB SERPL: 0.9 {RATIO} (ref 1–2)
ALP LIVER SERPL-CCNC: 123 U/L (ref 55–142)
ALT SERPL-CCNC: 21 U/L (ref 13–56)
ANION GAP SERPL CALC-SCNC: 7 MMOL/L (ref 0–18)
AST SERPL-CCNC: 13 U/L (ref 15–37)
BASOPHILS # BLD AUTO: 0.08 X10(3) UL (ref 0–0.2)
BASOPHILS NFR BLD AUTO: 0.9 %
BILIRUB SERPL-MCNC: 0.4 MG/DL (ref 0.1–2)
BUN BLD-MCNC: 21 MG/DL (ref 7–18)
BUN/CREAT SERPL: 16.7 (ref 10–20)
CALCIUM BLD-MCNC: 10.2 MG/DL (ref 8.5–10.1)
CHLORIDE SERPL-SCNC: 104 MMOL/L (ref 98–107)
CHOLEST SMN-MCNC: 156 MG/DL (ref ?–200)
CO2 SERPL-SCNC: 30 MMOL/L (ref 21–32)
CREAT BLD-MCNC: 1.26 MG/DL (ref 0.55–1.02)
DEPRECATED RDW RBC AUTO: 43.2 FL (ref 35.1–46.3)
EOSINOPHIL # BLD AUTO: 0.51 X10(3) UL (ref 0–0.7)
EOSINOPHIL NFR BLD AUTO: 5.9 %
ERYTHROCYTE [DISTWIDTH] IN BLOOD BY AUTOMATED COUNT: 14.1 % (ref 11–15)
EST. AVERAGE GLUCOSE BLD GHB EST-MCNC: 154 MG/DL (ref 68–126)
GLOBULIN PLAS-MCNC: 4 G/DL (ref 2.8–4.4)
GLUCOSE BLD-MCNC: 146 MG/DL (ref 70–99)
HBA1C MFR BLD HPLC: 7 % (ref ?–5.7)
HCT VFR BLD AUTO: 37.6 % (ref 35–48)
HDLC SERPL-MCNC: 54 MG/DL (ref 40–59)
HGB BLD-MCNC: 11.7 G/DL (ref 12–16)
IMM GRANULOCYTES # BLD AUTO: 0.07 X10(3) UL (ref 0–1)
IMM GRANULOCYTES NFR BLD: 0.8 %
LDLC SERPL CALC-MCNC: 71 MG/DL (ref ?–100)
LYMPHOCYTES # BLD AUTO: 2.01 X10(3) UL (ref 1–4)
LYMPHOCYTES NFR BLD AUTO: 23.2 %
M PROTEIN MFR SERPL ELPH: 7.7 G/DL (ref 6.4–8.2)
MCH RBC QN AUTO: 26.5 PG (ref 26–34)
MCHC RBC AUTO-ENTMCNC: 31.1 G/DL (ref 31–37)
MCV RBC AUTO: 85.3 FL (ref 80–100)
MONOCYTES # BLD AUTO: 0.43 X10(3) UL (ref 0.1–1)
MONOCYTES NFR BLD AUTO: 5 %
NEUTROPHILS # BLD AUTO: 5.55 X10 (3) UL (ref 1.5–7.7)
NEUTROPHILS # BLD AUTO: 5.55 X10(3) UL (ref 1.5–7.7)
NEUTROPHILS NFR BLD AUTO: 64.2 %
NONHDLC SERPL-MCNC: 102 MG/DL (ref ?–130)
OSMOLALITY SERPL CALC.SUM OF ELEC: 298 MOSM/KG (ref 275–295)
PLATELET # BLD AUTO: 344 10(3)UL (ref 150–450)
POTASSIUM SERPL-SCNC: 3.6 MMOL/L (ref 3.5–5.1)
RBC # BLD AUTO: 4.41 X10(6)UL (ref 3.8–5.3)
SODIUM SERPL-SCNC: 141 MMOL/L (ref 136–145)
TRIGL SERPL-MCNC: 157 MG/DL (ref 30–149)
VLDLC SERPL CALC-MCNC: 31 MG/DL (ref 0–30)
WBC # BLD AUTO: 8.7 X10(3) UL (ref 4–11)

## 2019-03-19 PROCEDURE — 83036 HEMOGLOBIN GLYCOSYLATED A1C: CPT

## 2019-03-19 PROCEDURE — 85025 COMPLETE CBC W/AUTO DIFF WBC: CPT

## 2019-03-19 PROCEDURE — 80053 COMPREHEN METABOLIC PANEL: CPT

## 2019-03-19 PROCEDURE — 80061 LIPID PANEL: CPT

## 2019-03-19 PROCEDURE — 36415 COLL VENOUS BLD VENIPUNCTURE: CPT

## 2019-03-20 ENCOUNTER — TELEPHONE (OUTPATIENT)
Dept: INTERNAL MEDICINE CLINIC | Facility: CLINIC | Age: 79
End: 2019-03-20

## 2019-03-20 NOTE — TELEPHONE ENCOUNTER
Pt. Would like to speak with Dr. Zandra Glover pt. Was offered an appt. For tomorrow or Friday she will call back to schedule.  Ph. # [phone]  Routed to clinical

## 2019-03-21 ENCOUNTER — APPOINTMENT (OUTPATIENT)
Dept: GENERAL RADIOLOGY | Facility: HOSPITAL | Age: 79
DRG: 383 | End: 2019-03-21
Attending: EMERGENCY MEDICINE
Payer: MEDICARE

## 2019-03-21 ENCOUNTER — HOSPITAL ENCOUNTER (INPATIENT)
Facility: HOSPITAL | Age: 79
LOS: 6 days | Discharge: NURSING, REHAB OR LONG-TERM CARE | DRG: 383 | End: 2019-03-28
Attending: EMERGENCY MEDICINE | Admitting: HOSPITALIST
Payer: MEDICARE

## 2019-03-21 DIAGNOSIS — K85.90 ACUTE PANCREATITIS, UNSPECIFIED COMPLICATION STATUS, UNSPECIFIED PANCREATITIS TYPE: Primary | ICD-10-CM

## 2019-03-21 DIAGNOSIS — R10.10 PAIN OF UPPER ABDOMEN: ICD-10-CM

## 2019-03-21 PROCEDURE — 71045 X-RAY EXAM CHEST 1 VIEW: CPT | Performed by: EMERGENCY MEDICINE

## 2019-03-21 RX ORDER — MORPHINE SULFATE 4 MG/ML
2 INJECTION, SOLUTION INTRAMUSCULAR; INTRAVENOUS ONCE
Status: COMPLETED | OUTPATIENT
Start: 2019-03-21 | End: 2019-03-21

## 2019-03-21 RX ORDER — FAMOTIDINE 10 MG/ML
20 INJECTION, SOLUTION INTRAVENOUS ONCE
Status: COMPLETED | OUTPATIENT
Start: 2019-03-21 | End: 2019-03-21

## 2019-03-22 ENCOUNTER — APPOINTMENT (OUTPATIENT)
Dept: CT IMAGING | Facility: HOSPITAL | Age: 79
DRG: 383 | End: 2019-03-22
Attending: INTERNAL MEDICINE
Payer: MEDICARE

## 2019-03-22 ENCOUNTER — APPOINTMENT (OUTPATIENT)
Dept: ULTRASOUND IMAGING | Facility: HOSPITAL | Age: 79
DRG: 383 | End: 2019-03-22
Attending: HOSPITALIST
Payer: MEDICARE

## 2019-03-22 PROBLEM — K85.90 ACUTE PANCREATITIS, UNSPECIFIED COMPLICATION STATUS, UNSPECIFIED PANCREATITIS TYPE: Status: ACTIVE | Noted: 2019-03-22

## 2019-03-22 PROCEDURE — 74176 CT ABD & PELVIS W/O CONTRAST: CPT | Performed by: INTERNAL MEDICINE

## 2019-03-22 PROCEDURE — 99223 1ST HOSP IP/OBS HIGH 75: CPT | Performed by: HOSPITALIST

## 2019-03-22 PROCEDURE — 76705 ECHO EXAM OF ABDOMEN: CPT | Performed by: HOSPITALIST

## 2019-03-22 RX ORDER — ONDANSETRON 2 MG/ML
4 INJECTION INTRAMUSCULAR; INTRAVENOUS EVERY 6 HOURS PRN
Status: DISCONTINUED | OUTPATIENT
Start: 2019-03-22 | End: 2019-03-26

## 2019-03-22 RX ORDER — HYDROMORPHONE HYDROCHLORIDE 1 MG/ML
0.3 INJECTION, SOLUTION INTRAMUSCULAR; INTRAVENOUS; SUBCUTANEOUS EVERY 4 HOURS PRN
Status: DISCONTINUED | OUTPATIENT
Start: 2019-03-22 | End: 2019-03-22

## 2019-03-22 RX ORDER — METOPROLOL TARTRATE 5 MG/5ML
5 INJECTION INTRAVENOUS AS NEEDED
Status: DISCONTINUED | OUTPATIENT
Start: 2019-03-22 | End: 2019-03-23

## 2019-03-22 RX ORDER — SODIUM CHLORIDE, SODIUM LACTATE, POTASSIUM CHLORIDE, CALCIUM CHLORIDE 600; 310; 30; 20 MG/100ML; MG/100ML; MG/100ML; MG/100ML
INJECTION, SOLUTION INTRAVENOUS CONTINUOUS
Status: DISCONTINUED | OUTPATIENT
Start: 2019-03-22 | End: 2019-03-26

## 2019-03-22 RX ORDER — HYDROCODONE BITARTRATE AND ACETAMINOPHEN 5; 325 MG/1; MG/1
1 TABLET ORAL EVERY 6 HOURS PRN
Status: DISCONTINUED | OUTPATIENT
Start: 2019-03-22 | End: 2019-03-28

## 2019-03-22 RX ORDER — HEPARIN SODIUM 5000 [USP'U]/ML
5000 INJECTION, SOLUTION INTRAVENOUS; SUBCUTANEOUS EVERY 12 HOURS SCHEDULED
Status: DISCONTINUED | OUTPATIENT
Start: 2019-03-22 | End: 2019-03-28

## 2019-03-22 RX ORDER — SODIUM CHLORIDE 0.9 % (FLUSH) 0.9 %
3 SYRINGE (ML) INJECTION AS NEEDED
Status: DISCONTINUED | OUTPATIENT
Start: 2019-03-22 | End: 2019-03-28

## 2019-03-22 RX ORDER — HYDROMORPHONE HYDROCHLORIDE 1 MG/ML
0.4 INJECTION, SOLUTION INTRAMUSCULAR; INTRAVENOUS; SUBCUTANEOUS EVERY 4 HOURS PRN
Status: DISCONTINUED | OUTPATIENT
Start: 2019-03-22 | End: 2019-03-28

## 2019-03-22 RX ORDER — HYDROCODONE BITARTRATE AND ACETAMINOPHEN 5; 325 MG/1; MG/1
1 TABLET ORAL EVERY 6 HOURS PRN
Status: DISCONTINUED | OUTPATIENT
Start: 2019-03-22 | End: 2019-03-22

## 2019-03-22 RX ORDER — POTASSIUM CHLORIDE 14.9 MG/ML
20 INJECTION INTRAVENOUS ONCE
Status: COMPLETED | OUTPATIENT
Start: 2019-03-22 | End: 2019-03-22

## 2019-03-22 RX ORDER — METOPROLOL TARTRATE 5 MG/5ML
2.5 INJECTION INTRAVENOUS
Status: DISCONTINUED | OUTPATIENT
Start: 2019-03-22 | End: 2019-03-23

## 2019-03-22 RX ORDER — METOPROLOL TARTRATE 5 MG/5ML
5 INJECTION INTRAVENOUS
Status: DISCONTINUED | OUTPATIENT
Start: 2019-03-22 | End: 2019-03-23

## 2019-03-22 RX ORDER — METOPROLOL TARTRATE 5 MG/5ML
2.5 INJECTION INTRAVENOUS AS NEEDED
Status: DISCONTINUED | OUTPATIENT
Start: 2019-03-22 | End: 2019-03-23

## 2019-03-22 RX ORDER — ACETAMINOPHEN 325 MG/1
650 TABLET ORAL EVERY 6 HOURS PRN
Status: DISCONTINUED | OUTPATIENT
Start: 2019-03-22 | End: 2019-03-28

## 2019-03-22 RX ORDER — HYDROMORPHONE HYDROCHLORIDE 1 MG/ML
0.5 INJECTION, SOLUTION INTRAMUSCULAR; INTRAVENOUS; SUBCUTANEOUS EVERY 4 HOURS PRN
Status: DISCONTINUED | OUTPATIENT
Start: 2019-03-22 | End: 2019-03-22

## 2019-03-22 RX ORDER — HYDROMORPHONE HYDROCHLORIDE 1 MG/ML
0.8 INJECTION, SOLUTION INTRAMUSCULAR; INTRAVENOUS; SUBCUTANEOUS EVERY 4 HOURS PRN
Status: DISCONTINUED | OUTPATIENT
Start: 2019-03-22 | End: 2019-03-28

## 2019-03-22 RX ORDER — CLONIDINE HYDROCHLORIDE 0.1 MG/1
0.1 TABLET ORAL 2 TIMES DAILY
Status: DISCONTINUED | OUTPATIENT
Start: 2019-03-22 | End: 2019-03-23

## 2019-03-22 RX ORDER — METOPROLOL TARTRATE 50 MG/1
50 TABLET, FILM COATED ORAL
Status: DISCONTINUED | OUTPATIENT
Start: 2019-03-22 | End: 2019-03-23

## 2019-03-22 NOTE — ED PROVIDER NOTES
Patient Seen in: [facility] AND [facility] Emergency Department    History   Patient presents with:  Abdomen/Flank Pain (GI/)    Stated Complaint: abd pain    HPI    Patient complains of mid upper  abdominal pain that began today.   Pain described as sharp achi • SPINE SURGERY PROCEDURE UNLISTED     • TOTAL HIP REPLACEMENT     • TOTAL KNEE REPLACEMENT         Medications :   AMLODIPINE BESYLATE 10 MG Oral Tab,  TAKE 1 TABLET BY MOUTH DAILY HOLD IF BP IS < 110/60   METOPROLOL TARTRATE 25 MG Oral Tab,  TAKE 1 TAB Former Smoker        Quit date: 1970        Years since quittin.2      Smokeless tobacco: Never Used    Alcohol use:  Yes      Alcohol/week: 1.2 oz      Types: 2 Glasses of wine per week      Comment: occasionally    Drug use: No      Comment: meth -American 39 (*)     All other components within normal limits   HEPATIC FUNCTION PANEL (7) - Abnormal; Notable for the following components:     Total Protein 8.4 (*)     All other components within normal limits   LIPASE - Abnormal; Notable for the

## 2019-03-22 NOTE — PLAN OF CARE
Problem: SKIN/TISSUE INTEGRITY - ADULT  Goal: Skin integrity remains intact  INTERVENTIONS  - Assess and document risk factors for pressure ulcer development  - Assess and document skin integrity  - Monitor for areas of redness and/or skin breakdown  - Ini adequate hydration with IV or PO as ordered and tolerated  - Nasogastric tube to low intermittent suction as ordered  - Evaluate effectiveness of ordered antiemetic medications  - Provide nonpharmacologic comfort measures as appropriate  - Advance diet as

## 2019-03-22 NOTE — PROGRESS NOTES
ADMISSION NOTE    70year old female admited to outside institution late December 2018  for abdominal pain found to have pancreatitis and ileus. presents now with sudden onset of abdominal pain nausea and vomiting. Lipase > 800.    Available medical recor

## 2019-03-22 NOTE — ED NOTES
Orders for admission, patient is aware of plan and ready to go upstairs.  Any questions, please call ED RN Duke Garza  at extension [phone]

## 2019-03-22 NOTE — PLAN OF CARE
Problem: Patient Centered Care  Goal: Patient preferences are identified and integrated in the patient's plan of care  Interventions:  - What would you like us to know as we care for you?   - Provide timely, complete, and accurate information to patient/fa safety including physical limitations  - Instruct pt to call for assistance with activity based on assessment  - Modify environment to reduce risk of injury  - Provide assistive devices as appropriate  - Consider OT/PT consult to assist with strengthening/

## 2019-03-22 NOTE — H&P
[facility]    PATIENT'S NAME: Shawandaloida    ATTENDING PHYSICIAN: Mervin Juarez MD   PATIENT ACCOUNT#:   [id number]    LOCATION:  [address] [place] [address] RECORD #:   [id number]       YOB: 1940  ADMISSION DATE:       03/21/ tests were essentially normal.  Albumin was 4.0. White count was 11.8 with 68% neutrophils. Urinalysis was negative for infection. Ultrasound of the abdomen revealed a stable-appearing biliary ductal dilatation at 13 mm postcholecystectomy.   No ultrasou The patient's father  at [de-identified]. Mother  in her 76s secondary to heart disease. SOCIAL HISTORY:  The patient lives in [facility] in the assisted living section. She uses a walker to ambulate. Falls, she said, about 4 or 5 times a year.   Is retired; 3.7, chloride 103, CO2 of 29, BUN of 22, with a creatinine 1.42, calcium 9.6. Anion gap of 9. GFR is 36. Lipase 885 with otherwise normal LFTs. Troponin less than 0.045. White count 11.8, hemoglobin 12.8, platelet count 114,886, with 68% neutrophils. intervention is necessary. 11. The patient's current clinical status and proposed treatment plan were discussed with her. All of her questions were answered, and she agreed with plan of care as outlined above.      Dictated By Kimberly Peoples MD

## 2019-03-22 NOTE — CM/SW NOTE
ADELA received call from [address] who state the pt is currently receiving RN/PT/OT. Clinical packet sent via [employer]. Orders are needed to resume services at [place], if appropriate.     [facility] [address] [place] [address]

## 2019-03-22 NOTE — PROGRESS NOTES
Post midnight follow up note. Patient was seen and examined. C/o upper abdominal pain, minimal nausea. No vomiting. Pt had a normal BM yesterday. D/w GI  Keep Npo for now  CT vs MRI per GI  hgb 12.8 to 9.9. Monitor   Discussed plan of care for today.  Wi

## 2019-03-22 NOTE — CONSULTS
REFERRING PHYSICIAN: Dr. Miller ref. provider found    HPI:         Thank you very much for requesting me to see the patient.     As you know, Mavis Gan is a 70year old female who presents today to ER 3/21/19 with c/o upper abd pain 9/10 in severity with with direct visualization could be performed. Complex cyst interpolar right kidney. Nonemergent renal MRI (or triphasic CT if patient cannot tolerate MRI) is recommended to further evaluate.        12/28/18 -- ct abd pelvis ([place]) = 1.  Several mildly dil MD at [address] [place] [address] ENDOSCOPY   • HIP REPLACEMENT SURGERY Left    • HYSTERECTOMY     • KNEE REPLACEMENT SURGERY  2006   • LAPAROSCOPIC CHOLECYSTECTOMY N/A 8/25/2017    Performed by Joanna Chapman MD at [address] [place] [address] MAIN OR   • REMOVAL GALLBLADDER     • [facility] Intravenous Daily         Compazine               ANAPHYLAXIS    Comment:AMS    A comprehensive 10 point review of systems was completed. Pertinent positives and negatives noted in the the HPI.     EXAM:  /44 (BP Location: Right arm)   Pulse 64   Tem

## 2019-03-23 PROCEDURE — 99233 SBSQ HOSP IP/OBS HIGH 50: CPT | Performed by: HOSPITALIST

## 2019-03-23 RX ORDER — HYDRALAZINE HYDROCHLORIDE 20 MG/ML
10 INJECTION INTRAMUSCULAR; INTRAVENOUS EVERY 6 HOURS PRN
Status: DISCONTINUED | OUTPATIENT
Start: 2019-03-23 | End: 2019-03-28

## 2019-03-23 RX ORDER — MAGNESIUM OXIDE 400 MG (241.3 MG MAGNESIUM) TABLET
800 TABLET ONCE
Status: COMPLETED | OUTPATIENT
Start: 2019-03-23 | End: 2019-03-23

## 2019-03-23 RX ORDER — POTASSIUM CHLORIDE 20 MEQ/1
40 TABLET, EXTENDED RELEASE ORAL EVERY 4 HOURS
Status: COMPLETED | OUTPATIENT
Start: 2019-03-23 | End: 2019-03-23

## 2019-03-23 NOTE — PROGRESS NOTES
[facility]    Progress Note    Jackie Garrison Patient Status:  Inpatient    11/3/1940 MRN [id number]   Location [facility] 5SW/SE Attending Candance Lewis, MD   Hosp Day # 1 PCP Kylee Youngblood MD       Subjective:     Jamal Flores diverticulitis. Dictated by (CST): Caroline Brower MD on 3/22/2019 at 13:07     Approved by (CST): Caroline Brower MD on 3/22/2019 at 13:26          Xr Chest Ap Portable  (cpt=71045)    Result Date: 3/22/2019  CONCLUSION:  1. No acute findings.  2. Macro vis

## 2019-03-23 NOTE — PLAN OF CARE
Problem: Patient Centered Care  Goal: Patient preferences are identified and integrated in the patient's plan of care  Interventions:  - What would you like us to know as we care for you?  From home with .   - Provide timely, complete, and accurate i deficits and behaviors that affect risk of falls.   - Rueter fall precautions as indicated by assessment.  - Educate pt/family on patient safety including physical limitations  - Instruct pt to call for assistance with activity based on assessment  - Mod

## 2019-03-23 NOTE — PLAN OF CARE
Problem: SKIN/TISSUE INTEGRITY - ADULT  Goal: Skin integrity remains intact  INTERVENTIONS  - Assess and document risk factors for pressure ulcer development  - Assess and document skin integrity  - Monitor for areas of redness and/or skin breakdown  - Ini Nasogastric tube to low intermittent suction as ordered  - Evaluate effectiveness of ordered antiemetic medications  - Provide nonpharmacologic comfort measures as appropriate  - Advance diet as tolerated, if ordered  - Obtain nutritional consult as needed

## 2019-03-23 NOTE — PROGRESS NOTES
GI  PROGRESS NOTE    SUBJECTIVE: relates feels slightly better; does not like clear liq diet but tolerating it.        OBJECTIVE:  Temp:  [97.5 °F (36.4 °C)-97.9 °F (36.6 °C)] 97.5 °F (36.4 °C)  Pulse:  [39-66] 62  Resp:  [16] 16  BP: (149183)/(57-55) 14 **OR** metoprolol Tartrate, HYDROmorphone HCl, HYDROmorphone HCl, acetaminophen, HYDROcodone-acetaminophen    _______________________________________________________________    IMPRESSION: Pt is a 70 yr F presently admitted with upper abd pain, mild elevat

## 2019-03-24 PROCEDURE — 99233 SBSQ HOSP IP/OBS HIGH 50: CPT | Performed by: HOSPITALIST

## 2019-03-24 RX ORDER — AMLODIPINE BESYLATE 10 MG/1
10 TABLET ORAL DAILY
Status: DISCONTINUED | OUTPATIENT
Start: 2019-03-24 | End: 2019-03-28

## 2019-03-24 RX ORDER — POTASSIUM CHLORIDE 20 MEQ/1
40 TABLET, EXTENDED RELEASE ORAL ONCE
Status: COMPLETED | OUTPATIENT
Start: 2019-03-24 | End: 2019-03-24

## 2019-03-24 NOTE — PROGRESS NOTES
[facility]    Progress Note    Derrek Cabral Patient Status:  Inpatient    11/3/1940 MRN [id number]   Location [facility] 5SW/SE Attending Arabella Chatterjee MD   Hosp Day # 2 PCP Mikal Murrell MD       Subjective:     Pt c/o diverticulitis.       Dictated by (CST): Michelle Gonzalez MD on 3/22/2019 at 13:07     Approved by (CST): Michelle Gonzalez MD on 3/22/2019 at 13:26          Ekg 12-lead    Result Date: 3/22/2019  ECG Report  Interpretation  -------------------------- Sinus Rhythm -

## 2019-03-24 NOTE — PLAN OF CARE
Problem: SKIN/TISSUE INTEGRITY - ADULT  Goal: Skin integrity remains intact  INTERVENTIONS  - Assess and document risk factors for pressure ulcer development  - Assess and document skin integrity  - Monitor for areas of redness and/or skin breakdown  - Ini vomiting  INTERVENTIONS:  - Maintain adequate hydration with IV or PO as ordered and tolerated  - Nasogastric tube to low intermittent suction as ordered  - Evaluate effectiveness of ordered antiemetic medications  - Provide nonpharmacologic comfort measur

## 2019-03-24 NOTE — PLAN OF CARE
Problem: Patient Centered Care  Goal: Patient preferences are identified and integrated in the patient's plan of care  Interventions:  - What would you like us to know as we care for you?  From home with .   - Provide timely, complete, and accurate i Educate pt/family on patient safety including physical limitations  - Instruct pt to call for assistance with activity based on assessment  - Modify environment to reduce risk of injury  - Provide assistive devices as appropriate  - Consider OT/PT consult

## 2019-03-25 ENCOUNTER — ANESTHESIA (OUTPATIENT)
Dept: ENDOSCOPY | Facility: HOSPITAL | Age: 79
DRG: 383 | End: 2019-03-25
Payer: MEDICARE

## 2019-03-25 ENCOUNTER — ANESTHESIA EVENT (OUTPATIENT)
Dept: ENDOSCOPY | Facility: HOSPITAL | Age: 79
DRG: 383 | End: 2019-03-25
Payer: MEDICARE

## 2019-03-25 PROCEDURE — 99233 SBSQ HOSP IP/OBS HIGH 50: CPT | Performed by: HOSPITALIST

## 2019-03-25 PROCEDURE — 0DB68ZX EXCISION OF STOMACH, VIA NATURAL OR ARTIFICIAL OPENING ENDOSCOPIC, DIAGNOSTIC: ICD-10-PCS | Performed by: INTERNAL MEDICINE

## 2019-03-25 PROCEDURE — 0DB98ZX EXCISION OF DUODENUM, VIA NATURAL OR ARTIFICIAL OPENING ENDOSCOPIC, DIAGNOSTIC: ICD-10-PCS | Performed by: INTERNAL MEDICINE

## 2019-03-25 RX ORDER — LIDOCAINE HYDROCHLORIDE 10 MG/ML
INJECTION, SOLUTION EPIDURAL; INFILTRATION; INTRACAUDAL; PERINEURAL AS NEEDED
Status: DISCONTINUED | OUTPATIENT
Start: 2019-03-25 | End: 2019-03-25 | Stop reason: SURG

## 2019-03-25 RX ORDER — CLONIDINE HYDROCHLORIDE 0.1 MG/1
0.1 TABLET ORAL 2 TIMES DAILY
Status: DISCONTINUED | OUTPATIENT
Start: 2019-03-25 | End: 2019-03-28

## 2019-03-25 RX ORDER — SODIUM CHLORIDE, SODIUM LACTATE, POTASSIUM CHLORIDE, CALCIUM CHLORIDE 600; 310; 30; 20 MG/100ML; MG/100ML; MG/100ML; MG/100ML
INJECTION, SOLUTION INTRAVENOUS CONTINUOUS
Status: DISCONTINUED | OUTPATIENT
Start: 2019-03-25 | End: 2019-03-26

## 2019-03-25 RX ORDER — NALOXONE HYDROCHLORIDE 0.4 MG/ML
80 INJECTION, SOLUTION INTRAMUSCULAR; INTRAVENOUS; SUBCUTANEOUS AS NEEDED
Status: DISCONTINUED | OUTPATIENT
Start: 2019-03-25 | End: 2019-03-25 | Stop reason: HOSPADM

## 2019-03-25 RX ADMIN — LIDOCAINE HYDROCHLORIDE 50 MG: 10 INJECTION, SOLUTION EPIDURAL; INFILTRATION; INTRACAUDAL; PERINEURAL at 15:17:00

## 2019-03-25 RX ADMIN — SODIUM CHLORIDE, SODIUM LACTATE, POTASSIUM CHLORIDE, CALCIUM CHLORIDE: 600; 310; 30; 20 INJECTION, SOLUTION INTRAVENOUS at 15:38:00

## 2019-03-25 RX ADMIN — SODIUM CHLORIDE, SODIUM LACTATE, POTASSIUM CHLORIDE, CALCIUM CHLORIDE: 600; 310; 30; 20 INJECTION, SOLUTION INTRAVENOUS at 15:17:00

## 2019-03-25 NOTE — PROGRESS NOTES
[facility]    Progress Note    Radhames Gomez Patient Status:  Inpatient    11/3/1940 MRN [id number]   Location [facility] 5SW/SE Attending Joey Alford, [facility] Day # 3 PCP Neri Amador MD       Subjective:     Pt den  Hemoglobin stable.     History of Clostridium difficile diarrhea.       Chronic back pain.   - cont home norco prn  - dilaudid prn     dvt proph:   heparin     Code status:   Full     >35 minutes spent     Fermin Onofre MD  3/25/2019

## 2019-03-25 NOTE — ANESTHESIA PREPROCEDURE EVALUATION
Anesthesia PreOp Note    HPI:     Derrek Cabral is a 70year old female who presents for preoperative consultation requested by: Vy Yang MD    Date of Surgery: 3/21/2019 - 3/25/2019    Procedure(s):  ESOPHAGOGASTRODUODENOSCOPY (EGD)  Indication: Date Noted: 02/21/2018      Gastroesophageal reflux disease         Date Noted: 02/21/2018      CVA (cerebral vascular accident) [facility])         Date Noted: 12/11/2017      Abdominal pain         Date Noted: 12/07/2017      Left hand weakness         Date No Essential hypertension        Past Medical History:   Diagnosis Date   • Anemia    • Anesthesia complication    • Arthritis     feet   • BACK PAIN    • Back problem    • Urban's esophagus    • Blood disorder    • CHF (congestive heart failure) (HCC)    • DAILY HOLD IF PULSE IS < 60 BPM Disp: 60 tablet Rfl: 11 3/21/2019 at Unknown time   Zolpidem Tartrate 5 MG Oral Tab Take 5 mg by mouth nightly.    Disp:  Rfl:  3/21/2019 at Unknown time   HYDROcodone-acetaminophen 5-325 MG Oral Tab Take 1 tablet by mouth ev Besylate (NORVASC) tab 10 mg 10 mg Oral Daily Karthikeyan Fenton MD 10 mg at 03/25/19 0953   hydrALAzine HCl (APRESOLINE) injection 10 mg 10 mg Intravenous Q6H PRN Nuria Collado MD 10 mg at 03/25/19 0700   lactated ringers infusion  Intravenous Brad Montanez       Spouse name: Not on file      Number of children: Not on file      Years of education: Not on file      Highest education level: Not on file    Occupational History      Not on file    Social Needs      Financial resource strain: Not on file Self-Exams: Not Asked        Grew up on a farm: Not Asked        History of tanning: Not Asked        Outdoor occupation: Not Asked        Pt has a pacemaker: No        Pt has a defibrillator: No        Breast feeding: Not Asked        Reaction to loc (+) neuromuscular disease, CVA,     GI/Hepatic/Renal    (+) GERD,     Endo/Other    (+) diabetes mellitus,   Abdominal   (+) obese,              Anesthesia Plan:   ASA:  3  Plan:   MAC  Informed Consent Plan and Risks Discussed With:  Patient  Discussed

## 2019-03-25 NOTE — PROGRESS NOTES
GI  PROGRESS NOTE    SUBJECTIVE: no new complaints; abd pain better.         OBJECTIVE:  Temp:  [97.9 °F (36.6 °C)-98.8 °F (37.1 °C)] 98.8 °F (37.1 °C)  Pulse:  [65-71] 68  Resp:  [18] 18  BP: (146-199)/(57-93) 168/89  Exam  Gen: No acute distress, alert and dilated CBD on CT/US. H/o anatoliy 8/2017. H/o diverticulitis 9/2018. (Note: I could not locate a prior colonoscopy report in [facility] including [facility] Ct abd suggestive of duodenitis.      PLAN: 1.) EGD with MAC tomorrow --  May need EUS/ERCP as

## 2019-03-25 NOTE — H&P
PRE-PROCEDURE UPDATE    HPI: Emmanuel Dwyer is a 70year old female. 11/3/1940. Patient presents for an Esophagogastroduodenoscopy. Patient with abdominal pain, noted thickening in second and third portion of the duodenum.  Mild esophageal wall thickeni [facility] MAIN OR   • REMOVAL GALLBLADDER     • SPINE SURGERY PROCEDURE UNLISTED     • TOTAL HIP REPLACEMENT     • TOTAL KNEE REPLACEMENT         PHYSICAL EXAM:  BP (!) 184/83 (BP Location: Left arm)   Pulse 84   Temp 98.2 °F (36.8 °C) (Oral)   Resp 14   Ht 4' 9

## 2019-03-25 NOTE — ANESTHESIA POSTPROCEDURE EVALUATION
Patient:  Emmanuel Dwyer    Procedure Summary     Date:  03/25/19 Room / Location:  [address] [place] [address] ENDOSCOPY 05 / [address] [place] [address] ENDOSCOPY    Anesthesia Start:  6246 Anesthesia Stop:  8364    Procedure:  ESOPHAGOGASTRODUODENOSCOPY (EGD) (N/A ) Diagnosis:       Pain of upper abd

## 2019-03-25 NOTE — OPERATIVE REPORT
ESOPHAGOGASTRODUODENOSCOPY REPORT    Patient Name:  Reggie Corona Record #: [id number]  YOB: 1940  Date of Procedure: 3/25/2019    Referring physician: Chandrakant Marin MD    Surgeon:  Iram Doty MD    Pre-op diagnosis: Epig

## 2019-03-26 PROCEDURE — 99233 SBSQ HOSP IP/OBS HIGH 50: CPT | Performed by: HOSPITALIST

## 2019-03-26 RX ORDER — PANTOPRAZOLE SODIUM 40 MG/1
40 TABLET, DELAYED RELEASE ORAL
Status: DISCONTINUED | OUTPATIENT
Start: 2019-03-26 | End: 2019-03-28

## 2019-03-26 RX ORDER — LISINOPRIL 10 MG/1
10 TABLET ORAL DAILY
Status: DISCONTINUED | OUTPATIENT
Start: 2019-03-26 | End: 2019-03-28

## 2019-03-26 RX ORDER — FUROSEMIDE 10 MG/ML
20 INJECTION INTRAMUSCULAR; INTRAVENOUS ONCE
Status: COMPLETED | OUTPATIENT
Start: 2019-03-26 | End: 2019-03-26

## 2019-03-26 RX ORDER — METOCLOPRAMIDE HYDROCHLORIDE 5 MG/ML
10 INJECTION INTRAMUSCULAR; INTRAVENOUS EVERY 6 HOURS PRN
Status: DISCONTINUED | OUTPATIENT
Start: 2019-03-26 | End: 2019-03-28

## 2019-03-26 RX ORDER — ZOLPIDEM TARTRATE 5 MG/1
5 TABLET ORAL NIGHTLY
Status: DISCONTINUED | OUTPATIENT
Start: 2019-03-26 | End: 2019-03-28

## 2019-03-26 NOTE — PROGRESS NOTES
[facility]  Gastroenterology Progress Note    Grace Joyce Patient Status:  Inpatient    11/3/1940 MRN [id number]   Location [facility] 5SW/SE Attending Kaleigh Cespedes, [address] [facility] Day # 4 PCP Neto Karimi MD     Subjective:   Samm Petit Neoplasm of uncertain behavior of skin     Basal cell carcinoma (BCC) of back     Cellulitis of lower extremity     Cellulitis     Cellulitis of lower extremity, unspecified laterality     Acute cystitis without hematuria     Chronic pain syndrome     Suic

## 2019-03-26 NOTE — PROGRESS NOTES
[facility]    [facility]    Last [place] Patient Status:  Inpatient    11/3/1940 MRN [id number]   Location [facility] 5SW/SE Attending Melinda Mello MD   Hosp Day # 4 PCP Evelia Solano MD       Subjective:     Pt say tolerating diet     Bradycardia. Resolved.   - stop metoprolol    HTN  - cont norvasc, clonidine     EVELIN on CKD  Improved with IVF.     Mild leukocytosis - resolved     Chronic stasis dermatitis of the lower extremities       DM2  - ISS     Mild thalassemi

## 2019-03-27 ENCOUNTER — TELEPHONE (OUTPATIENT)
Dept: INTERNAL MEDICINE CLINIC | Facility: CLINIC | Age: 79
End: 2019-03-27

## 2019-03-27 PROCEDURE — 99233 SBSQ HOSP IP/OBS HIGH 50: CPT | Performed by: HOSPITALIST

## 2019-03-27 RX ORDER — METOCLOPRAMIDE 5 MG/1
5 TABLET ORAL 3 TIMES DAILY PRN
Qty: 20 TABLET | Refills: 0 | Status: ON HOLD | OUTPATIENT
Start: 2019-03-27 | End: 2019-04-22

## 2019-03-27 RX ORDER — PANTOPRAZOLE SODIUM 40 MG/1
40 TABLET, DELAYED RELEASE ORAL
Qty: 60 TABLET | Refills: 0 | Status: SHIPPED | OUTPATIENT
Start: 2019-03-27 | End: 2019-03-28

## 2019-03-27 RX ORDER — POTASSIUM CHLORIDE 20 MEQ/1
40 TABLET, EXTENDED RELEASE ORAL EVERY 4 HOURS
Status: COMPLETED | OUTPATIENT
Start: 2019-03-27 | End: 2019-03-27

## 2019-03-27 NOTE — DISCHARGE SUMMARY
[facility]  Discharge Summary     David Dwyer  : 11/3/1940    Status: Inpatient  Day #: 6    Attending: Luda Alexander MD  PCP: Khang Calderon MD     Date of Admission: 3/21/2019  Date of Discharge: 3/28/2019     [facility] Discharge Kia Sosa symmetrical  Cardiac:  Regular rate, regular rhythm  Pulmonary:  Clear to auscultation bilaterally, respirations unlabored  Gastrointestinal:  Soft, non-tender, normal bowel sounds  Musculoskeletal:  No joint swelling  Extremities:  No edema, no cyanosis, tablet  Refills:  0     gabapentin 100 MG Caps  Commonly known as:  NEURONTIN      TAKE 1 CAPSULE BY MOUTH THREE TIMES DAILY   Quantity:  90 capsule  Refills:  0     HYDROcodone-acetaminophen 5-325 MG Tabs  Commonly known as:  NORCO      Take 1 tablet by m

## 2019-03-27 NOTE — PLAN OF CARE
Problem: Patient Centered Care  Goal: Patient preferences are identified and integrated in the patient's plan of care  Interventions:  - What would you like us to know as we care for you?  From home with .   - Provide timely, complete, and accurate i indicated by assessment.  - Educate pt/family on patient safety including physical limitations  - Instruct pt to call for assistance with activity based on assessment  - Modify environment to reduce risk of injury  - Provide assistive devices as appropriat

## 2019-03-27 NOTE — PROGRESS NOTES
[facility]  Progress Note     Zain Mendoza  : 11/3/1940    Status: Inpatient  Day #: 5    Attending: Elaine Irby MD  PCP: Will Snell MD      Assessment and Plan     Duodenal ulcer, duodenitis, esophagitis, gastritis - epigastric Lab  03/23/19   0605  03/24/19   0628  03/25/19   0640  03/27/19   0617   WBC  7.3  7.4  7.3  6.4   HGB  10.9*  10.9*  11.3*  10.3*   HCT  34.3*  34.4*  35.4  32.2*   PLT  282.0  271.0  280.0  239.0   RBC  4.08  4.05  4.15  3.83   MCV  84.1  84.9  85.3 mg Oral Daily   • Heparin Sodium (Porcine)  5,000 Units Subcutaneous 2 times per day   • levothyroxine sodium  50 mcg Intravenous Daily      PRN Meds: Metoclopramide HCl, hydrALAzine HCl, Normal Saline Flush, HYDROmorphone HCl, HYDROmorphone HCl, acetamino

## 2019-03-27 NOTE — PROGRESS NOTES
[facility]  Gastroenterology Progress Note    Radhames Gomez Patient Status:  Inpatient    11/3/1940 MRN [id number]   Location [facility] 5SW/SE Attending Brie Dumont MD   Hosp Day # 5 PCP Neri Amador MD     Subjective:   Kahlil Skinner of lower extremity     Cellulitis     Cellulitis of lower extremity, unspecified laterality     Acute cystitis without hematuria     Chronic pain syndrome     Suicidal ideation     Back pain     Stage 3 chronic kidney disease (HCC)     Borderline high chol

## 2019-03-27 NOTE — CM/SW NOTE
[facility] notified of Mar[facility] orders    Orders placed in [employer] and notified [facility] Liaison    Plan: Dc to concord with 94CHELY Valerio,CNL  Clinical Transitions Leader  Phone # [phone]

## 2019-03-28 ENCOUNTER — TELEPHONE (OUTPATIENT)
Dept: INTERNAL MEDICINE CLINIC | Facility: CLINIC | Age: 79
End: 2019-03-28

## 2019-03-28 VITALS
HEART RATE: 66 BPM | OXYGEN SATURATION: 100 % | RESPIRATION RATE: 18 BRPM | SYSTOLIC BLOOD PRESSURE: 144 MMHG | TEMPERATURE: 98 F | BODY MASS INDEX: 42.68 KG/M2 | WEIGHT: 197.81 LBS | DIASTOLIC BLOOD PRESSURE: 57 MMHG | HEIGHT: 57 IN

## 2019-03-28 PROCEDURE — 99239 HOSP IP/OBS DSCHRG MGMT >30: CPT | Performed by: HOSPITALIST

## 2019-03-28 RX ORDER — LEVOTHYROXINE SODIUM 0.1 MG/1
100 TABLET ORAL
Status: DISCONTINUED | OUTPATIENT
Start: 2019-03-28 | End: 2019-03-28

## 2019-03-28 RX ORDER — PANTOPRAZOLE SODIUM 40 MG/1
40 TABLET, DELAYED RELEASE ORAL
Qty: 60 TABLET | Refills: 1 | Status: SHIPPED | OUTPATIENT
Start: 2019-03-28

## 2019-03-28 NOTE — PROGRESS NOTES
[facility] Pharmacy Note: Route Optimization for Levothyroxine (SYNTHROID)    Patient is currently on Levothyroxine (SYNTHROID) 50 mcg IV daily.    The patient meets the criteria to convert to the oral equivalent as established by the IV to Oral conversion protoco

## 2019-03-28 NOTE — TELEPHONE ENCOUNTER
Pt released from hospital today  Asks when Dr Naty Watts would like to see her for a post hospital OV    Please advise   [phone]

## 2019-03-28 NOTE — CM/SW NOTE
Pt able to d/c today. ADELA notified [facility]. Pt requesting [phone] home @ 3p. ADELA contacted [facility] for [address]. PCS form on chart.     Javed Cook, [address]

## 2019-03-29 ENCOUNTER — HOSPITAL ENCOUNTER (INPATIENT)
Facility: HOSPITAL | Age: 79
LOS: 4 days | Discharge: NURSING, REHAB OR LONG-TERM CARE | DRG: 384 | End: 2019-04-02
Attending: EMERGENCY MEDICINE | Admitting: HOSPITALIST
Payer: MEDICARE

## 2019-03-29 ENCOUNTER — APPOINTMENT (OUTPATIENT)
Dept: CT IMAGING | Facility: HOSPITAL | Age: 79
DRG: 384 | End: 2019-03-29
Attending: INTERNAL MEDICINE
Payer: MEDICARE

## 2019-03-29 DIAGNOSIS — R10.10 UPPER ABDOMINAL PAIN: Primary | ICD-10-CM

## 2019-03-29 DIAGNOSIS — K85.90 ACUTE PANCREATITIS, UNSPECIFIED COMPLICATION STATUS, UNSPECIFIED PANCREATITIS TYPE: ICD-10-CM

## 2019-03-29 DIAGNOSIS — K26.9 DUODENAL ULCER: ICD-10-CM

## 2019-03-29 PROCEDURE — 99223 1ST HOSP IP/OBS HIGH 75: CPT | Performed by: HOSPITALIST

## 2019-03-29 PROCEDURE — 74176 CT ABD & PELVIS W/O CONTRAST: CPT | Performed by: INTERNAL MEDICINE

## 2019-03-29 RX ORDER — SODIUM CHLORIDE 0.9 % (FLUSH) 0.9 %
3 SYRINGE (ML) INJECTION AS NEEDED
Status: DISCONTINUED | OUTPATIENT
Start: 2019-03-29 | End: 2019-04-02

## 2019-03-29 RX ORDER — SODIUM CHLORIDE 9 MG/ML
INJECTION, SOLUTION INTRAVENOUS CONTINUOUS
Status: DISCONTINUED | OUTPATIENT
Start: 2019-03-29 | End: 2019-04-02

## 2019-03-29 RX ORDER — MORPHINE SULFATE 2 MG/ML
2 INJECTION, SOLUTION INTRAMUSCULAR; INTRAVENOUS EVERY 2 HOUR PRN
Status: DISCONTINUED | OUTPATIENT
Start: 2019-03-29 | End: 2019-04-02

## 2019-03-29 RX ORDER — DEXTROSE MONOHYDRATE 25 G/50ML
50 INJECTION, SOLUTION INTRAVENOUS AS NEEDED
Status: DISCONTINUED | OUTPATIENT
Start: 2019-03-29 | End: 2019-04-02

## 2019-03-29 RX ORDER — METOCLOPRAMIDE HYDROCHLORIDE 5 MG/ML
10 INJECTION INTRAMUSCULAR; INTRAVENOUS ONCE
Status: COMPLETED | OUTPATIENT
Start: 2019-03-29 | End: 2019-03-29

## 2019-03-29 RX ORDER — ENOXAPARIN SODIUM 100 MG/ML
40 INJECTION SUBCUTANEOUS DAILY
Status: DISCONTINUED | OUTPATIENT
Start: 2019-03-29 | End: 2019-03-29

## 2019-03-29 RX ORDER — LEVOTHYROXINE SODIUM 0.1 MG/1
100 TABLET ORAL
Status: DISCONTINUED | OUTPATIENT
Start: 2019-03-29 | End: 2019-04-02

## 2019-03-29 RX ORDER — GABAPENTIN 100 MG/1
100 CAPSULE ORAL 3 TIMES DAILY
Status: DISCONTINUED | OUTPATIENT
Start: 2019-03-29 | End: 2019-04-02

## 2019-03-29 RX ORDER — PANTOPRAZOLE SODIUM 40 MG/1
40 TABLET, DELAYED RELEASE ORAL
Status: DISCONTINUED | OUTPATIENT
Start: 2019-03-29 | End: 2019-03-29

## 2019-03-29 RX ORDER — MORPHINE SULFATE 4 MG/ML
4 INJECTION, SOLUTION INTRAMUSCULAR; INTRAVENOUS EVERY 2 HOUR PRN
Status: DISCONTINUED | OUTPATIENT
Start: 2019-03-29 | End: 2019-04-02

## 2019-03-29 RX ORDER — POLYETHYLENE GLYCOL 3350 17 G/17G
17 POWDER, FOR SOLUTION ORAL DAILY PRN
Status: DISCONTINUED | OUTPATIENT
Start: 2019-03-29 | End: 2019-04-02

## 2019-03-29 RX ORDER — METOCLOPRAMIDE HYDROCHLORIDE 5 MG/ML
10 INJECTION INTRAMUSCULAR; INTRAVENOUS EVERY 8 HOURS PRN
Status: DISCONTINUED | OUTPATIENT
Start: 2019-03-29 | End: 2019-04-02

## 2019-03-29 RX ORDER — ENOXAPARIN SODIUM 100 MG/ML
30 INJECTION SUBCUTANEOUS EVERY 12 HOURS SCHEDULED
Status: DISCONTINUED | OUTPATIENT
Start: 2019-03-29 | End: 2019-04-02

## 2019-03-29 RX ORDER — AMLODIPINE BESYLATE 10 MG/1
10 TABLET ORAL DAILY
Status: DISCONTINUED | OUTPATIENT
Start: 2019-03-29 | End: 2019-04-02

## 2019-03-29 RX ORDER — CLONIDINE HYDROCHLORIDE 0.1 MG/1
0.1 TABLET ORAL 2 TIMES DAILY
Status: DISCONTINUED | OUTPATIENT
Start: 2019-03-29 | End: 2019-04-02

## 2019-03-29 RX ORDER — MORPHINE SULFATE 2 MG/ML
1 INJECTION, SOLUTION INTRAMUSCULAR; INTRAVENOUS EVERY 2 HOUR PRN
Status: DISCONTINUED | OUTPATIENT
Start: 2019-03-29 | End: 2019-04-02

## 2019-03-29 RX ORDER — ONDANSETRON 2 MG/ML
4 INJECTION INTRAMUSCULAR; INTRAVENOUS EVERY 6 HOURS PRN
Status: DISCONTINUED | OUTPATIENT
Start: 2019-03-29 | End: 2019-04-02

## 2019-03-29 RX ORDER — ALLOPURINOL 300 MG/1
300 TABLET ORAL DAILY
Status: DISCONTINUED | OUTPATIENT
Start: 2019-03-29 | End: 2019-04-02

## 2019-03-29 RX ORDER — ZOLPIDEM TARTRATE 5 MG/1
5 TABLET ORAL NIGHTLY PRN
Status: DISCONTINUED | OUTPATIENT
Start: 2019-03-29 | End: 2019-04-02

## 2019-03-29 RX ORDER — SUCRALFATE ORAL 1 G/10ML
1 SUSPENSION ORAL
Status: DISCONTINUED | OUTPATIENT
Start: 2019-03-29 | End: 2019-04-02

## 2019-03-29 RX ORDER — BISACODYL 10 MG
10 SUPPOSITORY, RECTAL RECTAL
Status: DISCONTINUED | OUTPATIENT
Start: 2019-03-29 | End: 2019-04-02

## 2019-03-29 RX ORDER — ACETAMINOPHEN 325 MG/1
650 TABLET ORAL EVERY 6 HOURS PRN
Status: DISCONTINUED | OUTPATIENT
Start: 2019-03-29 | End: 2019-04-02

## 2019-03-29 RX ORDER — SODIUM PHOSPHATE, DIBASIC AND SODIUM PHOSPHATE, MONOBASIC 7; 19 G/133ML; G/133ML
1 ENEMA RECTAL ONCE AS NEEDED
Status: DISCONTINUED | OUTPATIENT
Start: 2019-03-29 | End: 2019-04-02

## 2019-03-29 NOTE — H&P
[facility]  : 11/3/1940    Status: Inpatient  Day #: 0    Attending: Luda Alexander MD  PCP: Khang Calderon MD     Date of Encounter:  3/29/2019  Date of Admission:  3/29/2019     Chief Complaint: n Procedure Laterality Date   • BACK SURGERY      5 surgeries last one in dec 2011   • CHOLECYSTECTOMY  08/25/2017   • EGD  12/17, 8/17   • ELECTROCARDIOGRAM, COMPLETE  11-    Scanned to media tab - DOS 11-   • ESOPHAGOGASTRODUODENOSCOPY (EGD HOLD IF PULSE IS < 60 BPM   Zolpidem Tartrate 5 MG Oral Tab,  Take 5 mg by mouth nightly. HYDROcodone-acetaminophen 5-325 MG Oral Tab,  Take 1 tablet by mouth every 6 (six) hours as needed.    allopurinol 300 MG Oral Tab,  Take 1 tablet (300 mg total) b 34.4*  35.4  32.2*  36.1   PLT  271.0  280.0  239.0  288.0   RBC  4.05  4.15  3.83  4.20   MCV  84.9  85.3  84.1  86.0   MCH  26.9  27.2  26.9  27.4   MCHC  31.7  31.9  32.0  31.9   RDW  14.6  14.6  14.6  14.9   NEPRELIM  4.68  4.56   --   4.05     Recent **Certification      PHYSICIAN Certification of Need for Inpatient Hospitalization - Initial Certification    Patient will require inpatient services that will reasonably be expected to span two midnight's based on the clinical documentation in H+P.

## 2019-03-29 NOTE — PLAN OF CARE
GASTROINTESTINAL - ADULT    • Minimal or absence of nausea and vomiting Not Progressing    • Maintains or returns to baseline bowel function Not Progressing        MUSCULOSKELETAL - ADULT    • Return mobility to safest level of function Not Progressing

## 2019-03-29 NOTE — RESPIRATORY THERAPY NOTE
MISTY ASSESSMENT:    Pt does not have a previous diagnosis of MISTY. Pt does not routinely use a CPAP device at home.

## 2019-03-29 NOTE — ED NOTES
70year old female here with abdominal pain and nausea for one day, pt reports recently diagnosed with gastric ulcers

## 2019-03-29 NOTE — ED INITIAL ASSESSMENT (HPI)
Triage: pt arrived via EMS for nausea and abdominal pain for 1 day, pt reports was released from hospital yesterday for gastric ulcers

## 2019-03-29 NOTE — ED NOTES
Orders for admission, patient is aware of plan and ready to go upstairs.  Any questions, please call ED RN Liset Gibson at extension [phone]

## 2019-03-29 NOTE — CONSULTS
[facility] HOSP - [facility]    Report of Consultation    Mitra Villa Patient Status:  Inpatient    11/3/1940 MRN [id number]   Location [facility] 5SW/SE Attending Marleny Batista MD   Hosp Day # 0 PCP Jesus Luther MD     Date of Admission:  3 hypertension        Past Surgical History  Past Surgical History:   Procedure Laterality Date   • BACK SURGERY      5 surgeries last one in dec 2011   • CHOLECYSTECTOMY  08/25/2017   • EGD  12/17, 8/17   • ELECTROCARDIOGRAM, COMPLETE  11-    Scanned (NEURONTIN) cap 100 mg 100 mg Oral TID   Levothyroxine Sodium (SYNTHROID) tab 100 mcg 100 mcg Oral Before breakfast   metoprolol Tartrate (LOPRESSOR) tab 25 mg 25 mg Oral 2x Daily(Beta Blocker)   Zolpidem Tartrate (AMBIEN) tab 5 mg 5 mg Oral Nightly PRN needed (nausea).    AMLODIPINE BESYLATE 10 MG Oral Tab TAKE 1 TABLET BY MOUTH DAILY HOLD IF BP IS < 110/60   METOPROLOL TARTRATE 25 MG Oral Tab TAKE 1 TABLET BY MOUTH TWICE DAILY HOLD IF PULSE IS < 60 BPM   Zolpidem Tartrate 5 MG Oral Tab Take 5 mg by mouth atraumatic, normocephalic, oropharynx clear  NECK: supple,no adenopathy, no masses  LUNGS: clear to auscultation  CARDIO: RRR without murmur  GI: normal active BS, soft, ttp in epigastric area, no bound or guarding  EXTREMITIES: no calf tenderness  MUSCULO Normal.  No mass or ductal dilatation. OTHER:   Negative. CONCLUSION:  1. Diffuse hepatic steatosis. 2. Stable biliary ductal dilatation consistent with post cholecystectomy status.     DICTATED BY (CST): Soraida Candelario MD ON 3/22/2019 AT 7:54 mild wall thickening seen of the 2nd and 3rd portions of the duodenum with mild periduodenal inflammatory fat stranding (series 2, images 54-58). The appendix is normal. There are no inflammatory changes within the right lower quadrant.   There is diverticu [facility], XR CHEST AP PORTABLE (CPT=71045), 1/30/2019, 11:08. INDICATIONS: Mid chest and epigastric pain with mild dyspnea for 1 day. TECHNIQUE:   Single view. FINDINGS:  CARDIAC/VASC: Normal.  No cardiac silhouette abnormality or cardiomegaly.   Nelly Arvizu

## 2019-03-29 NOTE — PROGRESS NOTES
[facility] Pharmacy Note: Weight Dose Adjustment for: Lovenox     Anna Vaughn is a 70year old female who has been prescribed 40 mg Lovenox every 24 hours. CrCl is estimated creatinine clearance is 45.9 mL/min (A) (based on SCr of 1.38 mg/dL (H)).  and pt has

## 2019-03-29 NOTE — ED PROVIDER NOTES
Patient Seen in: [facility] AND [facility] Emergency Department    History   Patient presents with:  Abdomen/Flank Pain (GI/)  Nausea/Vomiting/Diarrhea (gastrointestinal)    Stated Complaint:     HPI    70-year-old female with history of hypertension, hypercho Past Surgical History:   Procedure Laterality Date   • BACK SURGERY      5 surgeries last one in dec 2011   • CHOLECYSTECTOMY  08/25/2017   • EGD  12/17, 8/17   • ELECTROCARDIOGRAM, COMPLETE  11-    Scanned to media tab - DOS 11-   • ES rhythm  Gastrointestinal:  abdomen is with diffuse tenderness to palpation. There is increased tenderness to the upper abdomen, no masses, bowel sounds normal  Neurological: Speech normal.  Moving extremities equally x4. Skin: warm and dry, no rashes.   Refugio Robledo diagnosis)  Duodenal ulcer  Acute pancreatitis, unspecified complication status, unspecified pancreatitis type    Disposition:  Admit  3/29/2019 11:16 am    Follow-up:  No follow-up provider specified.   We recommend that you schedule follow up care with a

## 2019-03-29 NOTE — PHYSICAL THERAPY NOTE
PHYSICAL THERAPY EVALUATION - INPATIENT     Room Number: 551/551-A  Evaluation Date: 3/29/2019  Type of Evaluation: Initial   Physician Order: PT Eval and Treat    Presenting Problem: Upper abdominal pain  Reason for Therapy: Mobility Dysfunction and Disc Treatment Plan: Bed mobility; Body mechanics; Endurance; Energy conservation;Patient education; Family education;Gait training;Strengthening;Transfer training;Balance training  Rehab Potential : Good  Frequency (Obs): 3x/week(3-5x/week)       PHYSICAL THERAPY Heriberto Omer MD at [facility] ENDOSCOPY   • HIP REPLACEMENT SURGERY Left    • HYSTERECTOMY     • KNEE REPLACEMENT SURGERY  2006   • LAPAROSCOPIC CHOLECYSTECTOMY N/A 8/25/2017    Performed by Rich Reardon MD at [facility] MAIN OR   • REMOVAL GALLBLADDER     • SPINE FAISAL (e.g., wheelchair, bedside commode, etc.): A Little   -   Moving from lying on back to sitting on the side of the bed?: A Lot   How much help from another person does the patient currently need. ..   -   Moving to and from a bed to a chair (including a whee

## 2019-03-29 NOTE — ED NOTES
70year old female here via ems from assisted living for nausea and abdominal pain, pt reports not able to eat anything, and did not take nausea medication at home

## 2019-03-30 PROCEDURE — 99233 SBSQ HOSP IP/OBS HIGH 50: CPT | Performed by: HOSPITALIST

## 2019-03-30 NOTE — PLAN OF CARE
Problem: Patient/Family Goals  Goal: Patient/Family Long Term Goal  Patient's Long Term Goal: TO RETURN HOME    Interventions:    - See additional Care Plan goals for specific interventions  Outcome: Not Progressing    Goal: Patient/Family Short Term Goal

## 2019-03-30 NOTE — PROGRESS NOTES
[facility]  Progress Note     Last Crump  : 11/3/1940    Status: Inpatient  Day #: 1    Attending: Yohan Rollins MD  PCP: Evelia Solano MD      Assessment and Plan     Duodenal ulcer  Duodenitis  Esophagitis  Gastritis  -recent EGD 03/26/19   9212   03/27/19   1554  03/29/19   0955  03/30/19   0648   BUN  9   < >  8   --    --   16  14   CREATSERUM  1.02   < >  1.02   --    --   1.38*  1.39*   GFRAA  62   < >  62   --    --   43*  42*   GFRNAA  54*   < >  54*   --    --   37*  37* Flush, acetaminophen, morphINE sulfate **OR** morphINE sulfate **OR** morphINE sulfate, PEG 3350, magnesium hydroxide, bisacodyl, FLEET ENEMA, ondansetron HCl, Metoclopramide HCl, dextrose, Glucose-Vitamin C, glucose    Spent >35 minutes, >50% of the time

## 2019-03-30 NOTE — OCCUPATIONAL THERAPY NOTE
OCCUPATIONAL THERAPY EVALUATION - INPATIENT     Room Number: 551/551-A  Evaluation Date: 3/30/2019  Type of Evaluation: Initial  Presenting Problem: (abdominal pain )    Physician Order: IP Consult to Occupational Therapy  Reason for Therapy: ADL/IADL Dysf patients decreased general strength/ lack of mobility to complete needed ADLS and toilet transfers in her home environment/  Discussed findings with RN .      DISCHARGE RECOMMENDATIONS  OT Discharge Recommendations: (SRINIVASA)       PLAN  OT Treatment Plan: Elisa Gramajo ESOPHAGOGASTRODUODENOSCOPY (EGD) N/A 3/25/2019    Performed by Tc Diehl MD at [facility] ENDOSCOPY   • ESOPHAGOGASTRODUODENOSCOPY (EGD) N/A 12/8/2017    Performed by Mounika Baker MD at [facility] ENDOSCOPY   • ESOPHAGOGASTRODUODENOSCOPY (EGD) N/A 8/23/2017 extremity strength is within functional limits     COORDINATION  Gross Motor: WFL   Fine Motor: WFL             ACTIVITIES OF DAILY LIVING ASSESSMENT  AM-PAC ‘6-Clicks’ Inpatient Daily Activity Short Form  How much help from another person does the patient 3 x week

## 2019-03-30 NOTE — PROGRESS NOTES
[facility] AND [facility]  Gastroenterology Progress Note    Suhail Cleveland Patient Status:  Inpatient    11/3/1940 MRN [id number]   Location [facility] 5SW/SE Attending Lino Perez MD   Hosp Day # 1 PCP Yue Gibbons MD     Subjective:   Aziza Stephenson Dizziness     Cardiac arrhythmia, unspecified cardiac arrhythmia type     Lower extremity edema     Inability to ambulate due to ankle or foot     S/P laparoscopic cholecystectomy     Memory problem     Rhabdomyolysis     Non-traumatic rhabdomyolysis     B

## 2019-03-30 NOTE — PLAN OF CARE
Problem: Patient/Family Goals  Goal: Patient/Family Long Term Goal  Patient's Long Term Goal: TO RETURN HOME    Interventions:    - See additional Care Plan goals for specific interventions   Outcome: Progressing    Goal: Patient/Family Short Term Goal  Pa collaborating with pharmacy to review patient's medication profile  Outcome: Progressing      Problem: SKIN/TISSUE INTEGRITY - ADULT  Goal: Skin integrity remains intact  INTERVENTIONS  - Assess and document risk factors for pressure ulcer development  - A

## 2019-03-31 PROCEDURE — 99233 SBSQ HOSP IP/OBS HIGH 50: CPT | Performed by: HOSPITALIST

## 2019-03-31 NOTE — PLAN OF CARE
Problem: Patient/Family Goals  Goal: Patient/Family Long Term Goal  Patient's Long Term Goal: TO RETURN HOME    Interventions:    - See additional Care Plan goals for specific interventions   Outcome: Progressing  PT/OT recommending subacute rehab  Goal: P Consider collaborating with pharmacy to review patient's medication profile  Outcome: Progressing      Problem: SKIN/TISSUE INTEGRITY - ADULT  Goal: Skin integrity remains intact  INTERVENTIONS  - Assess and document risk factors for pressure ulcer develop

## 2019-03-31 NOTE — PROGRESS NOTES
[facility]  Gastroenterology Progress Note    Cami Timmons Patient Status:  Inpatient    11/3/1940 MRN [id number]   Location [facility] 5SW/SE Attending Michelle Swift MD   Hosp Day # 2 PCP Val Sagastume MD     Subjective:   Brad Topete Bacteriuria     Abrasions of multiple sites     UTI (urinary tract infection)     Left hand weakness     Abdominal pain     CVA (cerebral vascular accident) ([address]     Other chronic pain     Hypothyroidism     Gastroesophageal reflux disease     Neoplasm of

## 2019-03-31 NOTE — PLAN OF CARE
Problem: Patient/Family Goals  Goal: Patient/Family Long Term Goal  Patient's Long Term Goal: TO RETURN HOME    Interventions:    - See additional Care Plan goals for specific interventions   Outcome: Progressing

## 2019-03-31 NOTE — PROGRESS NOTES
[facility] HOSP - [facility]  Progress Note     Stephane Hu  : 11/3/1940    Status: Inpatient  Day #: 2    Attending: Anni Cardenas MD  PCP: Alissa Vazquez MD      Assessment and Plan     Duodenal ulcer  Duodenitis  Esophagitis  Gastritis  -readmissio MCV  85.3  84.1  86.0  86.8   MCH  27.2  26.9  27.4  27.0   MCHC  31.9  32.0  31.9  31.1   RDW  14.6  14.6  14.9  14.9   NEPRELIM  4.56   --   4.05  2.85     Recent Labs   Lab  03/26/19   0643   03/27/19   1554  03/29/19   0955  03/30/19   0648   BUN  8 acetaminophen, morphINE sulfate **OR** morphINE sulfate **OR** morphINE sulfate, PEG 3350, magnesium hydroxide, bisacodyl, FLEET ENEMA, ondansetron HCl, Metoclopramide HCl, dextrose, Glucose-Vitamin C, glucose    Spent >35 minutes, >50% of the time

## 2019-04-01 PROCEDURE — 99233 SBSQ HOSP IP/OBS HIGH 50: CPT | Performed by: HOSPITALIST

## 2019-04-01 NOTE — PLAN OF CARE
Problem: Patient/Family Goals  Goal: Patient/Family Long Term Goal  Patient's Long Term Goal: TO RETURN HOME    Interventions:    - See additional Care Plan goals for specific interventions   Outcome: Not Progressing  Patient still having pain and requirin care as needed  Outcome: Progressing

## 2019-04-01 NOTE — PHYSICAL THERAPY NOTE
PHYSICAL THERAPY TREATMENT NOTE - INPATIENT     Room Number: 551/551-A       Presenting Problem: Upper abdominal pain    Problem List  Principal Problem:    Upper abdominal pain  Active Problems:    Acute pancreatitis, unspecified complication status, unsp to RN, RN to provide pain meds . The patient's Approx Degree of Impairment: 54.16% has been calculated based on documentation in the [facility] '6 clicks' Inpatient Basic Mobility Short Form.   Research supports that patients with this level of impairmen hospital room?: A Little   -   Climbing 3-5 steps with a railing?: A Lot     AM-PAC Score:  Raw Score: 16   Approx Degree of Impairment: 54.16%   Standardized Score (AM-PAC Scale): 40.78   CMS Modifier (G-Code): CK    FUNCTIONAL ABILITY STATUS  Gait Assess

## 2019-04-01 NOTE — CM/SW NOTE
Sw met w/ pt to discuss d/c planning. Pt lives at [facility] & is current w/ [facility]. Recommendations at this time are for SNF. Pt has hx of [facility] and agreeable to return. SW placed referral. SW requested DON from [address] [place] [address]. [facility] able [facility] accept.     Gen Garcia,

## 2019-04-01 NOTE — DISCHARGE SUMMARY
[facility] - [facility]  Discharge Summary     Monse Stevenson  : 11/3/1940    Status: Inpatient  Day #: 4    Attending: Aldo Crockett MD  PCP: Tyler Jesus MD     Date of Admission: 3/29/2019  Date of Discharge: 2019     Hospital Discharge Doris Regular rate, regular rhythm  Pulmonary:  Clear to auscultation bilaterally, respirations unlabored  Gastrointestinal:  Soft, non-tender, normal bowel sounds  Musculoskeletal:  No joint swelling  Extremities:  No edema, no cyanosis, no clubbing  Neurologic Sodium 100 MCG Tabs  Commonly known as:  SYNTHROID      TAKE 1 TABLET BY MOUTH EVERY MORNING   Quantity:  30 tablet  Refills:  0     Metoclopramide HCl 5 MG Tabs  Commonly known as:  REGLAN      Take 1 tablet (5 mg total) by mouth 3 (three) times daily as

## 2019-04-01 NOTE — PROGRESS NOTES
[facility]  Gastroenterology Progress Note    Jacobo Patino Patient Status:  Inpatient    11/3/1940 MRN [id number]   Location [facility] 5SW/SE Attending Susanna Carolina MD   Hosp Day # 3 PCP John Baires MD     Subjective:   Gregory Lemus Chronic pain syndrome     Suicidal ideation     Back pain     Stage 3 chronic kidney disease (HCC)     Borderline high cholesterol     Acute diverticulitis     Diarrhea, unspecified type     Physical deconditioning     Colitis due to Clostridium difficile

## 2019-04-01 NOTE — OCCUPATIONAL THERAPY NOTE
OCCUPATIONAL THERAPY TREATMENT NOTE - INPATIENT        Room Number: 551/551-A           Presenting Problem: (abdominal pain )    Problem List  Principal Problem:    Upper abdominal pain  Active Problems:    Acute pancreatitis, unspecified complication stat ACTIVITIES OF DAILY LIVING ASSESSMENT  AM-PAC ‘6-Clicks’ Inpatient Daily Activity Short Form  How much help from another person does the patient currently need…  -   Putting on and taking off regular lower body clothing?: A Little  -   Bathing (doni

## 2019-04-01 NOTE — PLAN OF CARE
Problem: Patient/Family Goals  Goal: Patient/Family Long Term Goal  Patient's Long Term Goal: TO RETURN HOME    Interventions:    - See additional Care Plan goals for specific interventions   Outcome: Progressing    Goal: Patient/Family Short Term Goal  Pa INTEGRITY - ADULT  Goal: Skin integrity remains intact  INTERVENTIONS  - Assess and document risk factors for pressure ulcer development  - Assess and document skin integrity  - Monitor for areas of redness and/or skin breakdown  - Initiate interventions,

## 2019-04-01 NOTE — PROGRESS NOTES
[facility] HOSP - [facility]  Progress Note     Chani Everett  : 11/3/1940    Status: Inpatient  Day #: 3    Attending: Mias Lassiter MD  PCP: Onesimo Patrick MD      Assessment and Plan     Duodenal ulcer  Duodenitis  Esophagitis  Gastritis  -readmissio MCV  84.1  86.0  86.8   MCH  26.9  27.4  27.0   MCHC  32.0  31.9  31.1   RDW  14.6  14.9  14.9   NEPRELIM   --   4.05  2.85     Recent Labs   Lab  03/26/19   0643   03/27/19   1554  03/29/19   0955  03/30/19   0648   BUN  8   --    --   16  14   CREATSER

## 2019-04-02 VITALS
BODY MASS INDEX: 41.83 KG/M2 | WEIGHT: 193.88 LBS | DIASTOLIC BLOOD PRESSURE: 47 MMHG | OXYGEN SATURATION: 98 % | HEIGHT: 57 IN | HEART RATE: 50 BPM | SYSTOLIC BLOOD PRESSURE: 127 MMHG | TEMPERATURE: 98 F | RESPIRATION RATE: 16 BRPM

## 2019-04-02 PROCEDURE — 99239 HOSP IP/OBS DSCHRG MGMT >30: CPT | Performed by: HOSPITALIST

## 2019-04-02 RX ORDER — SUCRALFATE ORAL 1 G/10ML
1 SUSPENSION ORAL
Qty: 1200 ML | Refills: 0 | Status: SHIPPED
Start: 2019-04-02 | End: 2019-05-02

## 2019-04-02 RX ORDER — ATORVASTATIN CALCIUM 40 MG/1
TABLET, FILM COATED ORAL
Qty: 90 TABLET | Refills: 3 | Status: SHIPPED | OUTPATIENT
Start: 2019-04-02 | End: 2019-04-02

## 2019-04-02 NOTE — CM/SW NOTE
Pt able to discharge today.      Suzy 3 agreeable w/ 230p d/c time  RN aware of d/c time  Pt aware of d/c time and ambulance  SW contacted [facility] for [address]    Suzy [address] # [id number] , [address]

## 2019-04-02 NOTE — PROGRESS NOTES
[facility] HOSP - [facility]  Progress Note     Jacobo Patino  : 11/3/1940    Status: Inpatient  Day #: 4    Attending: Leanne Weiss MD  PCP: John Baires MD      Assessment and Plan     Duodenal ulcer  Duodenitis  Esophagitis  Gastritis  -readmissio 84.1  86.0  86.8   MCH  26.9  27.4  27.0   MCHC  32.0  31.9  31.1   RDW  14.6  14.9  14.9   NEPRELIM   --   4.05  2.85     Recent Labs   Lab  03/27/19   1554  03/29/19   0955  03/30/19   0648   BUN   --   16  14   CREATSERUM   --   1.38*  1.39*   GFRAA   -

## 2019-04-08 NOTE — TELEPHONE ENCOUNTER
Noted. It looks like patient was again admitted to the hospital on March 29. Patient is to call to make an appointment to see me when she returns home. Okay to notify patient. I will route this to nursing.   Thank you!!

## 2019-04-10 NOTE — TELEPHONE ENCOUNTER
Last filled upon hospital discharge on 2/1 for only #30. Patient was recently admitted again and discharged 4/2    To DR. TOTH: okay to refill?

## 2019-04-11 RX ORDER — ALLOPURINOL 300 MG/1
TABLET ORAL
Qty: 90 TABLET | Refills: 3 | Status: SHIPPED | OUTPATIENT
Start: 2019-04-11

## 2019-04-12 ENCOUNTER — HOSPITAL ENCOUNTER (EMERGENCY)
Facility: HOSPITAL | Age: 79
Discharge: HOME | End: 2019-04-12
Attending: EMERGENCY MEDICINE
Payer: MEDICARE

## 2019-04-12 ENCOUNTER — APPOINTMENT (OUTPATIENT)
Dept: CT IMAGING | Facility: HOSPITAL | Age: 79
End: 2019-04-12
Attending: EMERGENCY MEDICINE
Payer: MEDICARE

## 2019-04-12 VITALS
HEART RATE: 66 BPM | TEMPERATURE: 98 F | OXYGEN SATURATION: 96 % | SYSTOLIC BLOOD PRESSURE: 154 MMHG | WEIGHT: 202 LBS | DIASTOLIC BLOOD PRESSURE: 74 MMHG | HEIGHT: 60 IN | BODY MASS INDEX: 39.66 KG/M2 | RESPIRATION RATE: 16 BRPM

## 2019-04-12 DIAGNOSIS — R40.4 TRANSIENT ALTERATION OF AWARENESS: Primary | ICD-10-CM

## 2019-04-12 PROCEDURE — 87086 URINE CULTURE/COLONY COUNT: CPT | Performed by: EMERGENCY MEDICINE

## 2019-04-12 PROCEDURE — 85025 COMPLETE CBC W/AUTO DIFF WBC: CPT | Performed by: EMERGENCY MEDICINE

## 2019-04-12 PROCEDURE — 80048 BASIC METABOLIC PNL TOTAL CA: CPT | Performed by: EMERGENCY MEDICINE

## 2019-04-12 PROCEDURE — 36415 COLL VENOUS BLD VENIPUNCTURE: CPT

## 2019-04-12 PROCEDURE — 82962 GLUCOSE BLOOD TEST: CPT

## 2019-04-12 PROCEDURE — 81001 URINALYSIS AUTO W/SCOPE: CPT | Performed by: EMERGENCY MEDICINE

## 2019-04-12 PROCEDURE — 93010 ELECTROCARDIOGRAM REPORT: CPT | Performed by: EMERGENCY MEDICINE

## 2019-04-12 PROCEDURE — 70450 CT HEAD/BRAIN W/O DYE: CPT | Performed by: EMERGENCY MEDICINE

## 2019-04-12 PROCEDURE — 93005 ELECTROCARDIOGRAM TRACING: CPT

## 2019-04-12 PROCEDURE — 99284 EMERGENCY DEPT VISIT MOD MDM: CPT

## 2019-04-12 NOTE — ED NOTES
Pt is going back to [facility], Dr Steve Fowler talk to [facility]

## 2019-04-12 NOTE — ED INITIAL ASSESSMENT (HPI)
Pt for [facility]. Per nursing staff normally a/o x3 and sweet today she's yelling and screaming and wandering hallways. Pt had a fall on the 8th of this month.

## 2019-04-12 NOTE — ED PROVIDER NOTES
Patient Seen in: [facility] AND [facility] Emergency Department    History   Patient presents with:  Altered Mental Status (neurologic)    Stated Complaint: ams    HPI    32-year-old female presents via EMS from the nursing home for altered mental status.   Ana Laura Hearn ESOPHAGOGASTRODUODENOSCOPY (EGD) N/A 12/8/2017    Performed by Irineo Nicholson MD at [facility] ENDOSCOPY   • ESOPHAGOGASTRODUODENOSCOPY (EGD) N/A 8/23/2017    Performed by Irineo Nicholson MD at [facility] ENDOSCOPY   • HIP REPLACEMENT SURGERY Left    • HYSTERECTOMY normal heart sounds, intact distal pulses and normal pulses. Frequent extrasystoles are present. Pulses:       Radial pulses are 2+ on the right side, and 2+ on the left side. Pulmonary/Chest: Effort normal. No respiratory distress.    Musculoskeletal: GOLD   URINE CULTURE, ROUTINE     EKG    Rate, intervals and axes as noted on EKG Report. Rate: 69  Rhythm: sinus rhythm  Reading: no stemi, interpreted by ed physician. MDM    Urinalysis negative for any urinary tract infection.   CBC B obtain basic health screening including reassessment of your blood pressure.     Medications Prescribed:  Current Discharge Medication List

## 2019-04-12 NOTE — ED NOTES
Pt will be discharge back to [facility], report given to CORKY SCHWARTZ [facility], [facility], [facility] eta 30 minutes

## 2019-04-12 NOTE — ED NOTES
[facility] d[facility], discharge instruction given to Karina Dodson and voices understanding, denies any concern

## 2019-04-18 ENCOUNTER — TELEPHONE (OUTPATIENT)
Dept: INTERNAL MEDICINE CLINIC | Facility: CLINIC | Age: 79
End: 2019-04-18

## 2019-04-18 NOTE — TELEPHONE ENCOUNTER
Daylin Valentino / [facility] HH calling to request the OV Notes from 2/14, please fax # [phone]   Tasked to nursing

## 2019-04-18 NOTE — TELEPHONE ENCOUNTER
Discharge summary:  Note transcribed by Dr. Maryellen Duran    Date seen: 4/18/19    Subjective: Patient seen and examined for belligerence, ER visit, somnolence, left shoulder pain, neck pain, fall, discharge planning.   Patient had an mildly eventful stay at Contraindicated with bleeding    Ambulatory status: Per hospital discharge recommendations, specialist involvement/recommendations and physical therapy evaluation    Assessment and plan: We have a 79-year-old female admitted for duodenal ulcer, gastritis, on the discharge home with close monitoring by her significant other. She will follow-up with Dr. Ras Villalobos in the office as an outpatient in the next 5 to 7 days will be TCM eligible, this will be communicated to our TCM follow-up staff.   She verbalized

## 2019-04-19 ENCOUNTER — HOSPITAL ENCOUNTER (EMERGENCY)
Facility: HOSPITAL | Age: 79
Discharge: HOME | End: 2019-04-19
Attending: EMERGENCY MEDICINE
Payer: MEDICARE

## 2019-04-19 ENCOUNTER — PATIENT OUTREACH (OUTPATIENT)
Dept: CASE MANAGEMENT | Age: 79
End: 2019-04-19

## 2019-04-19 ENCOUNTER — TELEPHONE (OUTPATIENT)
Dept: INTERNAL MEDICINE CLINIC | Facility: CLINIC | Age: 79
End: 2019-04-19

## 2019-04-19 VITALS
OXYGEN SATURATION: 94 % | BODY MASS INDEX: 43.15 KG/M2 | HEIGHT: 57 IN | WEIGHT: 200 LBS | TEMPERATURE: 98 F | DIASTOLIC BLOOD PRESSURE: 74 MMHG | RESPIRATION RATE: 23 BRPM | HEART RATE: 83 BPM | SYSTOLIC BLOOD PRESSURE: 168 MMHG

## 2019-04-19 DIAGNOSIS — Z02.9 ENCOUNTERS FOR ADMINISTRATIVE PURPOSE: ICD-10-CM

## 2019-04-19 DIAGNOSIS — R82.71 BACTERIURIA: ICD-10-CM

## 2019-04-19 DIAGNOSIS — R51.9 NONINTRACTABLE HEADACHE, UNSPECIFIED CHRONICITY PATTERN, UNSPECIFIED HEADACHE TYPE: Primary | ICD-10-CM

## 2019-04-19 PROCEDURE — 87086 URINE CULTURE/COLONY COUNT: CPT | Performed by: EMERGENCY MEDICINE

## 2019-04-19 PROCEDURE — 96361 HYDRATE IV INFUSION ADD-ON: CPT

## 2019-04-19 PROCEDURE — 99284 EMERGENCY DEPT VISIT MOD MDM: CPT

## 2019-04-19 PROCEDURE — 96374 THER/PROPH/DIAG INJ IV PUSH: CPT

## 2019-04-19 PROCEDURE — 81001 URINALYSIS AUTO W/SCOPE: CPT | Performed by: EMERGENCY MEDICINE

## 2019-04-19 PROCEDURE — 85025 COMPLETE CBC W/AUTO DIFF WBC: CPT | Performed by: EMERGENCY MEDICINE

## 2019-04-19 PROCEDURE — 83735 ASSAY OF MAGNESIUM: CPT | Performed by: EMERGENCY MEDICINE

## 2019-04-19 PROCEDURE — 80048 BASIC METABOLIC PNL TOTAL CA: CPT | Performed by: EMERGENCY MEDICINE

## 2019-04-19 PROCEDURE — 96375 TX/PRO/DX INJ NEW DRUG ADDON: CPT

## 2019-04-19 RX ORDER — METOCLOPRAMIDE HYDROCHLORIDE 5 MG/ML
10 INJECTION INTRAMUSCULAR; INTRAVENOUS ONCE
Status: COMPLETED | OUTPATIENT
Start: 2019-04-19 | End: 2019-04-19

## 2019-04-19 RX ORDER — KETOROLAC TROMETHAMINE 15 MG/ML
15 INJECTION, SOLUTION INTRAMUSCULAR; INTRAVENOUS ONCE
Status: COMPLETED | OUTPATIENT
Start: 2019-04-19 | End: 2019-04-19

## 2019-04-19 RX ORDER — METOCLOPRAMIDE 10 MG/1
10 TABLET ORAL EVERY 6 HOURS PRN
Qty: 20 TABLET | Refills: 0 | Status: SHIPPED | OUTPATIENT
Start: 2019-04-19 | End: 2019-04-24

## 2019-04-19 RX ORDER — POTASSIUM CHLORIDE 1.5 G/1.77G
40 POWDER, FOR SOLUTION ORAL ONCE
Status: COMPLETED | OUTPATIENT
Start: 2019-04-19 | End: 2019-04-19

## 2019-04-19 RX ORDER — MELOXICAM 7.5 MG/1
7.5 TABLET ORAL DAILY PRN
Qty: 5 TABLET | Refills: 0 | Status: SHIPPED | OUTPATIENT
Start: 2019-04-19 | End: 2019-04-24

## 2019-04-19 RX ORDER — DIPHENHYDRAMINE HYDROCHLORIDE 50 MG/ML
12.5 INJECTION INTRAMUSCULAR; INTRAVENOUS ONCE
Status: COMPLETED | OUTPATIENT
Start: 2019-04-19 | End: 2019-04-19

## 2019-04-19 RX ORDER — NITROFURANTOIN 25; 75 MG/1; MG/1
100 CAPSULE ORAL 2 TIMES DAILY
Qty: 10 CAPSULE | Refills: 0 | Status: SHIPPED | OUTPATIENT
Start: 2019-04-19 | End: 2019-04-24

## 2019-04-19 NOTE — TELEPHONE ENCOUNTER
Problem: Patient Care Overview  Goal: Plan of Care/Patient Progress Review  Outcome: No Change  ICU End of Shift Summary.  For vital signs and complete assessments, please see documentation flowsheets.      Pertinent assessments: A&O, frequently drowsy. Bipap at 35% fi02. Ax1 to bedside commode. Bed alarm on. Tele SR. L/S diminished with fine crackles. Zosyn, vanco and bactrim/septra. Afebrile. Hgb replaced on evening shift 11/12, recheck 7.7. Watters in place. x1 more dose IV lasix. Refuses repositioning on side, encouraged shifting weight off bottom during night.   Major Shift Events: C/O 10/10 lower back pain. PRN morphine given x2, PRN toradol x1 and PRN atarax x1 with decrease in pain. Difficult to manage pain d/t high tolerance and low BP's. Palliative following.   Plan (Upcoming Events): Wean 02 as able. Continue respiratory support with abx, nebs and steroids.  Discharge/Transfer Needs: TBD, continue POC.      Bedside Shift Report Completed : yes  Bedside Safety Check Completed: yes                    To Dr JANEY North out of the office until Monday. S/w pharmacist who states sucralfate tablets 1gm are covered. It would cost the patient $900 for suspension    To Dr. TOTH: ok to change to tablet form? Any contraindications or problems with swallowing?

## 2019-04-19 NOTE — PROGRESS NOTES
NCM attempted to contact the patient for SNF follow up. Phone rings for about 45 secs and then the following recording turns on \"Your party is not answering please try your call  later. We're sorry but your call will now be disconnected\".  No option to le

## 2019-04-19 NOTE — TELEPHONE ENCOUNTER
HAYDEE, S/w patient. She was discharged from SNF yesterday. She states that she did not feel well at discharge and made that known but had no choice but to come home due to insurance. She states that she has severe nausea and a headache she rates as 8/10.  She

## 2019-04-19 NOTE — ED PROVIDER NOTES
Patient Seen in: [facility] AND [facility] Emergency Department    History   Patient presents with:  Headache (neurologic)    Stated Complaint:  cephalgia    HPI    71 yo F with PMH HTN, HL, DM, CHF, CKD, gastritis, recent pancreatitis one month ago, CVA with resi 12/8/2017    Performed by Spencer Beach MD at [address] [place] [address] ENDOSCOPY   • ESOPHAGOGASTRODUODENOSCOPY (EGD) N/A 8/23/2017    Performed by Spencer Beach MD at [address] [place] [address] ENDOSCOPY   • HIP REPLACEMENT SURGERY Left    • HYSTERECTOMY     • KNEE REPLACEMENT SURGERY  2006   • • Heart Disorder Mother    • Heart Disease Sister    • Hypertension Brother    • Melanoma Other    • Cancer Other    • Stroke Other    • Heart Disease Other    • Diabetes Other    • Eye Problems Neg        Social History    Tobacco Use      Smoking statu following components:       Result Value    HGB 11.6 (*)     All other components within normal limits   CBC WITH DIFFERENTIAL WITH PLATELET    Narrative: The following orders were created for panel order CBC WITH DIFFERENTIAL WITH PLATELET.   Procedure atrophy with moderate chronic white matter small vessel ischemic changes. 2. No acute or chronic infarct, mass, hemorrhage or hematoma. 3. Calvarium intact. Sinuses clear. 4. Moderate intracranial atherosclerosis. 5. No significant change has occurred. Potential duplicate medications found. Please discuss with provider.

## 2019-04-19 NOTE — ED INITIAL ASSESSMENT (HPI)
Pt c/o frontal headache \"for awhile\" (several weeks). Denies any recent falls or head trauma. Reports nausea but no vomiting. Denies any visual disturbances.

## 2019-04-19 NOTE — TELEPHONE ENCOUNTER
[facility] to get a substitution for Sucralfate oral suspension medication isn't covered under her insurance  Ph. # [phone]  Routed to Rx

## 2019-04-19 NOTE — TELEPHONE ENCOUNTER
Message relayed to [facility], pharmacist with [address] who verbalized understanding. Nothing further at this time.

## 2019-04-19 NOTE — TELEPHONE ENCOUNTER
Phone call to patient. Patient is not feeling well. She has pain that cannot be controlled with pain medication. She has a headache. Patient also complains of nausea.   I agree with the need for the patient to go back to the emergency room for evaluatio

## 2019-04-19 NOTE — TELEPHONE ENCOUNTER
Noted.  There is no problem with patient swallowing that I am aware of. It is okay to switch the patient from Carafate suspension to Carafate tablets. Please call the pharmacist and let her know that it is okay to do this. I will route this to nursing.

## 2019-04-19 NOTE — PROGRESS NOTES
S/w patient. She was discharged from SNF yesterday. She states that she did not feel well at discharge and made that known but had no choice but to come home due to insurance. She states that she has severe nausea and a headache she rates as 8/10.  She reilly

## 2019-04-20 ENCOUNTER — HOSPITAL ENCOUNTER (EMERGENCY)
Facility: HOSPITAL | Age: 79
Discharge: HOME | End: 2019-04-20
Attending: EMERGENCY MEDICINE
Payer: MEDICARE

## 2019-04-20 VITALS
TEMPERATURE: 98 F | HEIGHT: 55 IN | WEIGHT: 200 LBS | HEART RATE: 96 BPM | DIASTOLIC BLOOD PRESSURE: 88 MMHG | BODY MASS INDEX: 46.29 KG/M2 | RESPIRATION RATE: 20 BRPM | OXYGEN SATURATION: 97 % | SYSTOLIC BLOOD PRESSURE: 176 MMHG

## 2019-04-20 DIAGNOSIS — R53.1 WEAKNESS GENERALIZED: Primary | ICD-10-CM

## 2019-04-20 DIAGNOSIS — I10 ESSENTIAL HYPERTENSION: ICD-10-CM

## 2019-04-20 DIAGNOSIS — R11.0 NAUSEA: ICD-10-CM

## 2019-04-20 PROCEDURE — 96375 TX/PRO/DX INJ NEW DRUG ADDON: CPT

## 2019-04-20 PROCEDURE — 93005 ELECTROCARDIOGRAM TRACING: CPT

## 2019-04-20 PROCEDURE — 93010 ELECTROCARDIOGRAM REPORT: CPT | Performed by: EMERGENCY MEDICINE

## 2019-04-20 PROCEDURE — 84484 ASSAY OF TROPONIN QUANT: CPT | Performed by: EMERGENCY MEDICINE

## 2019-04-20 PROCEDURE — 80076 HEPATIC FUNCTION PANEL: CPT | Performed by: EMERGENCY MEDICINE

## 2019-04-20 PROCEDURE — 85025 COMPLETE CBC W/AUTO DIFF WBC: CPT | Performed by: EMERGENCY MEDICINE

## 2019-04-20 PROCEDURE — 80048 BASIC METABOLIC PNL TOTAL CA: CPT | Performed by: EMERGENCY MEDICINE

## 2019-04-20 PROCEDURE — 84443 ASSAY THYROID STIM HORMONE: CPT | Performed by: EMERGENCY MEDICINE

## 2019-04-20 PROCEDURE — 96374 THER/PROPH/DIAG INJ IV PUSH: CPT

## 2019-04-20 PROCEDURE — 99284 EMERGENCY DEPT VISIT MOD MDM: CPT

## 2019-04-20 PROCEDURE — 96361 HYDRATE IV INFUSION ADD-ON: CPT

## 2019-04-20 RX ORDER — SODIUM CHLORIDE 9 MG/ML
INJECTION, SOLUTION INTRAVENOUS CONTINUOUS
Status: DISCONTINUED | OUTPATIENT
Start: 2019-04-20 | End: 2019-04-20

## 2019-04-20 RX ORDER — ONDANSETRON 4 MG/1
4 TABLET, ORALLY DISINTEGRATING ORAL ONCE
Status: COMPLETED | OUTPATIENT
Start: 2019-04-20 | End: 2019-04-20

## 2019-04-20 RX ORDER — HYDRALAZINE HYDROCHLORIDE 20 MG/ML
10 INJECTION INTRAMUSCULAR; INTRAVENOUS ONCE
Status: COMPLETED | OUTPATIENT
Start: 2019-04-20 | End: 2019-04-20

## 2019-04-20 RX ORDER — ONDANSETRON 2 MG/ML
4 INJECTION INTRAMUSCULAR; INTRAVENOUS ONCE
Status: COMPLETED | OUTPATIENT
Start: 2019-04-20 | End: 2019-04-20

## 2019-04-20 RX ORDER — CLONIDINE HYDROCHLORIDE 0.1 MG/1
0.1 TABLET ORAL ONCE
Status: COMPLETED | OUTPATIENT
Start: 2019-04-20 | End: 2019-04-20

## 2019-04-20 NOTE — ED PROVIDER NOTES
Patient Seen in: [facility] Emergency Department    History   Patient presents with:  Fatigue (constitutional, neurologic)    Stated Complaint: Not feeling well    HPI    Patient complains of general malaise loss of appetite fatigue and nausea.   Jen Shepard Performed by Marilyn Lugo MD at [address] [place] [address] ENDOSCOPY   • ESOPHAGOGASTRODUODENOSCOPY (EGD) N/A 12/8/2017    Performed by Spencer Beach MD at [address] [place] [address] ENDOSCOPY   • ESOPHAGOGASTRODUODENOSCOPY (EGD) N/A 8/23/2017    Performed by Spencer Beach MD at [address] [place] [address] ENDOSCOPY Levothyroxine Sodium 100 MCG Oral Tab,  TAKE 1 TABLET BY MOUTH EVERY MORNING   ASPIRIN ADULT LOW STRENGTH 81 MG Oral Tab EC,  TAKE 1 TABLET BY MOUTH DAILY   Ondansetron HCl (ZOFRAN) 4 mg tablet,  Take 1 tablet (4 mg total) by mouth every 12 (twelve) hours bilateral  CARDIO: RRR without murmur  GI: abdomen is soft and non tender, no masses, nl bowel sounds   EXTREMITIES: from, 5/5 strength in all 4 ext, no edema  NEURO: alert and oiented *3, 2-12 intact, no focal deficit noted  SKIN: good skin turgor, no  ra hematoma. 3. Calvarium intact. Sinuses clear. 4. Moderate intracranial atherosclerosis. 5. No significant change has occurred. Preliminary report was given by [facility].     Dictated by (CST): Sandra Mcintyre MD on 4/12/2019 at 6:27     Approved

## 2019-04-21 ENCOUNTER — HOSPITAL ENCOUNTER (INPATIENT)
Facility: HOSPITAL | Age: 79
LOS: 1 days | Discharge: NURSING, REHAB OR LONG-TERM CARE | DRG: 690 | End: 2019-04-24
Attending: EMERGENCY MEDICINE | Admitting: HOSPITALIST
Payer: MEDICARE

## 2019-04-21 DIAGNOSIS — R11.0 NAUSEA: ICD-10-CM

## 2019-04-21 DIAGNOSIS — R53.1 WEAKNESS GENERALIZED: Primary | ICD-10-CM

## 2019-04-21 DIAGNOSIS — I10 ESSENTIAL HYPERTENSION: ICD-10-CM

## 2019-04-21 PROCEDURE — 99222 1ST HOSP IP/OBS MODERATE 55: CPT | Performed by: HOSPITALIST

## 2019-04-21 RX ORDER — ESCITALOPRAM OXALATE 10 MG/1
10 TABLET ORAL EVERY EVENING
Status: DISCONTINUED | OUTPATIENT
Start: 2019-04-21 | End: 2019-04-21

## 2019-04-21 RX ORDER — SODIUM CHLORIDE 0.9 % (FLUSH) 0.9 %
3 SYRINGE (ML) INJECTION AS NEEDED
Status: DISCONTINUED | OUTPATIENT
Start: 2019-04-21 | End: 2019-04-24

## 2019-04-21 RX ORDER — ZOLPIDEM TARTRATE 5 MG/1
5 TABLET ORAL NIGHTLY
Status: DISCONTINUED | OUTPATIENT
Start: 2019-04-21 | End: 2019-04-24

## 2019-04-21 RX ORDER — ACETAMINOPHEN 325 MG/1
650 TABLET ORAL EVERY 6 HOURS PRN
Status: DISCONTINUED | OUTPATIENT
Start: 2019-04-21 | End: 2019-04-24

## 2019-04-21 RX ORDER — CLONIDINE HYDROCHLORIDE 0.1 MG/1
0.1 TABLET ORAL 2 TIMES DAILY
Status: DISCONTINUED | OUTPATIENT
Start: 2019-04-21 | End: 2019-04-24

## 2019-04-21 RX ORDER — ONDANSETRON 2 MG/ML
4 INJECTION INTRAMUSCULAR; INTRAVENOUS EVERY 6 HOURS PRN
Status: DISCONTINUED | OUTPATIENT
Start: 2019-04-21 | End: 2019-04-24

## 2019-04-21 RX ORDER — 0.9 % SODIUM CHLORIDE 0.9 %
VIAL (ML) INJECTION
Status: COMPLETED
Start: 2019-04-21 | End: 2019-04-21

## 2019-04-21 RX ORDER — ESCITALOPRAM OXALATE 10 MG/1
10 TABLET ORAL EVERY EVENING
Status: DISCONTINUED | OUTPATIENT
Start: 2019-04-22 | End: 2019-04-24

## 2019-04-21 RX ORDER — HYDRALAZINE HYDROCHLORIDE 20 MG/ML
10 INJECTION INTRAMUSCULAR; INTRAVENOUS EVERY 4 HOURS PRN
Status: DISCONTINUED | OUTPATIENT
Start: 2019-04-21 | End: 2019-04-24

## 2019-04-21 RX ORDER — AMLODIPINE BESYLATE 10 MG/1
10 TABLET ORAL DAILY
Status: DISCONTINUED | OUTPATIENT
Start: 2019-04-21 | End: 2019-04-24

## 2019-04-21 RX ORDER — ENOXAPARIN SODIUM 100 MG/ML
30 INJECTION SUBCUTANEOUS EVERY 12 HOURS SCHEDULED
Status: DISCONTINUED | OUTPATIENT
Start: 2019-04-21 | End: 2019-04-24

## 2019-04-21 NOTE — PROGRESS NOTES
[facility] Pharmacy Note: Enoxaparin (Lovenox)    Mitra Villa is a 70year old female who has been prescribed Enoxaparin 40 mg every 24 hours. CrCl is estimated creatinine clearance is 48.3 mL/min (A) (based on SCr of 1.42 mg/dL (H)).  and pt has a weight/BMI

## 2019-04-21 NOTE — H&P
[facility] Patient Status:  Observation    11/3/1940 MRN [id number]   Location [facility] 5SW/SE Attending Aleshia Wilson MD   Hosp Day # 0 PCP Evelia Solano MD     Date:  20 ELECTROCARDIOGRAM, COMPLETE  11-    Scanned to media tab - DOS 11-   • ESOPHAGOGASTRODUODENOSCOPY (EGD) N/A 3/25/2019    Performed by Lesley Ventura MD at [address] [place] [address] ENDOSCOPY   • ESOPHAGOGASTRODUODENOSCOPY (EGD) N/A 12/8/2017    Performed by Prabhu Ta and nightly. Pantoprazole Sodium 40 MG Oral Tab EC Take 1 tablet (40 mg total) by mouth 2 (two) times daily before meals. Metoclopramide HCl (REGLAN) 5 MG Oral Tab Take 1 tablet (5 mg total) by mouth 3 (three) times daily as needed (nausea).    AMLODIPI neurological deficits. Musculoskeletal: Full range of motion of all extremities. No swelling noted. Integument: No lesions. No erythema. Psychiatric: Appropriate mood and affect.     Results:     Lab Results   Component Value Date    WBC 10.1 04/20/2019

## 2019-04-21 NOTE — ED PROVIDER NOTES
Patient Seen in: [facility] AND [facility] Emergency Department    History   Patient presents with:  Nausea  Weakness  Fall    Stated Complaint:     HPI    Patient is a 80-year-old female who presents to the emergency department with a chief complaint of nausea Melonie Singleton MD at [address] [place] [address] ENDOSCOPY   • ESOPHAGOGASTRODUODENOSCOPY (EGD) N/A 8/23/2017    Performed by Melonie Singleton MD at [address] [place] [address] ENDOSCOPY   • HIP REPLACEMENT SURGERY Left    • HYSTERECTOMY     • KNEE REPLACEMENT SURGERY  2006   • [address] [facility] warm and dry. No rash noted. Nursing note and vitals reviewed.             ED Course   Labs Reviewed - No data to display             MDM     Admission disposition: 4/21/2019  3:45 PM       I attempted discussion with the patient as to any specific compla

## 2019-04-21 NOTE — CM/SW NOTE
Per Benay Dose no female beds today. Informed patient at beside with Dr. Mata, patient stated \"I didn't want to go there anyway\".   Patient chose [facility] also, this writer left voicemail to [email or web address] inquiring about moving patient

## 2019-04-21 NOTE — ED NOTES
Orders for admission, patient is aware of plan and ready to go upstairs. Any questions, please call ED RN [address] [facility]  at extension [phone]. Patient is A&Ox4, ambulates with walker. Denies pain at this time.

## 2019-04-21 NOTE — ED INITIAL ASSESSMENT (HPI)
Patient arrived via [facility] ambulance from [facility] for evaluation of nausea and weakness \"for weeks. \" States she slid out of bed this morning-denies injury, new pain. A&Ox4, speaking in complete sentences.

## 2019-04-21 NOTE — PLAN OF CARE
Pt c/o nausea and headache. Tylenol given. BP high, MD aware, some home meds re-ordered. Afton to [facility]. Started on lexapro this evening.

## 2019-04-22 ENCOUNTER — TELEPHONE (OUTPATIENT)
Dept: INTERNAL MEDICINE CLINIC | Facility: CLINIC | Age: 79
End: 2019-04-22

## 2019-04-22 PROCEDURE — 99226 SUBSEQUENT OBSERVATION CARE: CPT | Performed by: HOSPITALIST

## 2019-04-22 RX ORDER — SUCRALFATE ORAL 1 G/10ML
1 SUSPENSION ORAL
Status: DISCONTINUED | OUTPATIENT
Start: 2019-04-22 | End: 2019-04-22

## 2019-04-22 RX ORDER — GABAPENTIN 100 MG/1
100 CAPSULE ORAL
COMMUNITY
End: 2019-04-25

## 2019-04-22 RX ORDER — HYDROCODONE BITARTRATE AND ACETAMINOPHEN 5; 325 MG/1; MG/1
1 TABLET ORAL EVERY 6 HOURS PRN
Status: ON HOLD | COMMUNITY
End: 2019-04-24

## 2019-04-22 RX ORDER — HYDROCODONE BITARTRATE AND ACETAMINOPHEN 5; 325 MG/1; MG/1
1 TABLET ORAL EVERY 6 HOURS PRN
Status: DISCONTINUED | OUTPATIENT
Start: 2019-04-22 | End: 2019-04-24

## 2019-04-22 RX ORDER — SUCRALFATE 1 G/1
1 TABLET ORAL
Status: DISCONTINUED | OUTPATIENT
Start: 2019-04-22 | End: 2019-04-24

## 2019-04-22 RX ORDER — IBUPROFEN 400 MG/1
400 TABLET ORAL EVERY 6 HOURS PRN
Status: DISCONTINUED | OUTPATIENT
Start: 2019-04-22 | End: 2019-04-24

## 2019-04-22 RX ORDER — POTASSIUM CHLORIDE 20 MEQ/1
40 TABLET, EXTENDED RELEASE ORAL EVERY 4 HOURS
Status: COMPLETED | OUTPATIENT
Start: 2019-04-22 | End: 2019-04-22

## 2019-04-22 RX ORDER — IBUPROFEN 400 MG/1
400 TABLET ORAL 4 TIMES DAILY
Status: DISCONTINUED | OUTPATIENT
Start: 2019-04-22 | End: 2019-04-22

## 2019-04-22 NOTE — RESPIRATORY THERAPY NOTE
MISTY ASSESSMENT:    Pt does not have a previous diagnosis of MISTY. Pt does not routinely use a CPAP device at home. This pt is not suspected to be at high risk for MISTY and sleep lab packet was not provided to patient for outpatient follow-up.

## 2019-04-22 NOTE — PROGRESS NOTES
[facility]    Progress Note    Raffaele Lawkarla Patient Status:  Observation    11/3/1940 MRN [id number]   Location [facility] 5SW/SE Attending Shanique Stokes MD   Hosp Day # 0 PCP Issa Pinedo MD        Subjective: 04/22/2019    ALB 3.0 (L) 04/22/2019    ALKPHO 92 04/22/2019    BILT 0.6 04/22/2019    TP 6.3 (L) 04/22/2019    AST 13 (L) 04/22/2019    ALT 18 04/22/2019    PTT 25.8 08/25/2017    INR 1.0 08/25/2017    T4F 1.43 09/19/2018    TSH 2.970 04/22/2019    LIP 57

## 2019-04-22 NOTE — CM/SW NOTE
Referrals made in ED to [facility]. Pt was inpt from 3/29-4/2. SW requested DON from [address] [facility] [place].     Koki Cunningham, [address]

## 2019-04-22 NOTE — PROGRESS NOTES
Estrella Quiroz was called again to follow up on home med list. No answer, message was left to request again to fax to [facility] fax machine.      [employer] #[phone] was called and home med list was verified with pharmacist. MD notified of completed med

## 2019-04-22 NOTE — TELEPHONE ENCOUNTER
Mervat Mahmood (niece) is calling to speak with Dr. Zandra Glover regarding Gudleia's condition pt.  Is currently admitted at [address] [place] [address]  Ph. # [phone]  Routed to clinical

## 2019-04-22 NOTE — PHYSICAL THERAPY NOTE
PHYSICAL THERAPY EVALUATION - INPATIENT     Room Number: 557/557-A  Evaluation Date: 4/22/2019  Type of Evaluation: Initial   Physician Order: PT Eval and Treat    Presenting Problem: weakness  Reason for Therapy: Mobility Dysfunction and Discharge Planni benefit from SRINIVASA. Patient will benefit from continued IP PT services to address these deficits in preparation for discharge. DISCHARGE RECOMMENDATIONS  PT Discharge Recommendations: Sub-acute rehabilitation    PLAN  PT Treatment Plan: Bed mobility; Edger Silver stated as uncontrolled    • Unspecified essential hypertension        Past Surgical History  Past Surgical History:   Procedure Laterality Date   • BACK SURGERY      5 surgeries last one in dec 2011   • CHOLECYSTECTOMY  08/25/2017   • EGD  12/17, 8/17   • OT    Lower extremity ROM is within functional limits     Lower extremity strength is within functional limits     BALANCE  Static Sitting: Good  Dynamic Sitting: Good  Static Standing: Fair -  Dynamic Standing: Poor +      ACTIVITY TOLERANCE; Poor Status    Goal #3 Patient is able to ambulate 150 feet with assist device: walker - rolling at assistance level: minimum assistance   Goal #3   Current Status    Goal #4    Goal #4   Current Status    Goal #5 Patient to demonstrate independence with home a

## 2019-04-22 NOTE — TELEPHONE ENCOUNTER
To Dr. Lulu Henao. See below. Would you like nursing to call talia? Patient is inpatient at [facility].

## 2019-04-22 NOTE — PLAN OF CARE
Problem: Patient Centered Care  Goal: Patient preferences are identified and integrated in the patient's plan of care  Description  Interventions:  - What would you like us to know as we care for you?  \"[facility]  - Provide time Implement non-pharmacological measures as appropriate and evaluate response  - Consider cultural and social influences on pain and pain management  - Manage/alleviate anxiety  - Utilize distraction and/or relaxation techniques  - Monitor for opioid side ef Christy Bowser worked with PT/OT today. Up with assist and a walker. Ref out to [facility], hopefully discharge to rehab tomorrow.

## 2019-04-22 NOTE — OCCUPATIONAL THERAPY NOTE
OCCUPATIONAL THERAPY EVALUATION - INPATIENT     Room Number: 557/557-A  Evaluation Date: 4/22/2019  Type of Evaluation: Initial  Presenting Problem: (Nausea and weakness )    Physician Order: IP Consult to Occupational Therapy  Reason for Therapy: ADL/IADL DISCHARGE RECOMMENDATIONS  OT Discharge Recommendations: 24 hour care/supervision;Sub-acute rehabilitation       PLAN  OT Treatment Plan: Balance activities; Energy conservation/work simplification techniques;ADL training;Functional transfer training;UE s Heriberto Omer MD at [facility] ENDOSCOPY   • ESOPHAGOGASTRODUODENOSCOPY (EGD) N/A 8/23/2017    Performed by Abram Noel MD at [facility] ENDOSCOPY   • HIP REPLACEMENT SURGERY Left    • HYSTERECTOMY     • KNEE REPLACEMENT SURGERY  2006   • LAPAROSCOPIC CHOLECYSTECTOMY N/A Motor: WFL except L shoulder    Fine Motor: WFL       ACTIVITIES OF DAILY LIVING ASSESSMENT  AM-PAC ‘6-Clicks’ Inpatient Daily Activity Short Form  How much help from another person does the patient currently need…  -   Putting on and taking off regular lo

## 2019-04-22 NOTE — PLAN OF CARE
Problem: Patient Centered Care  Goal: Patient preferences are identified and integrated in the patient's plan of care  Description  Interventions:  - What would you like us to know as we care for you?  \"[facility]\"  - Provide time Implement non-pharmacological measures as appropriate and evaluate response  - Consider cultural and social influences on pain and pain management  - Manage/alleviate anxiety  - Utilize distraction and/or relaxation techniques  - Monitor for opioid side ef discharge planning if the patient needs post-hospital services based on physician/LIP order or complex needs related to functional status, cognitive ability or social support system  Outcome: Progressing       Patient complained of nausea, received zofran

## 2019-04-23 PROCEDURE — 99226 SUBSEQUENT OBSERVATION CARE: CPT | Performed by: HOSPITALIST

## 2019-04-23 RX ORDER — SODIUM CHLORIDE 9 MG/ML
INJECTION, SOLUTION INTRAVENOUS
Status: COMPLETED
Start: 2019-04-23 | End: 2019-04-23

## 2019-04-23 NOTE — PLAN OF CARE
Problem: Patient Centered Care  Goal: Patient preferences are identified and integrated in the patient's plan of care  Description  Interventions:  - What would you like us to know as we care for you?  \"[facility]  - Provide time Implement non-pharmacological measures as appropriate and evaluate response  - Consider cultural and social influences on pain and pain management  - Manage/alleviate anxiety  - Utilize distraction and/or relaxation techniques  - Monitor for opioid side ef Progressing   Started on rocephin IV, awaiting urine culture. Complaints of headache, chronic back pain. PRN tylenol, motrin and norco given for pain. Up with assist and a walker. Plan for discharge to a rehab facility.

## 2019-04-23 NOTE — OCCUPATIONAL THERAPY NOTE
OCCUPATIONAL THERAPY TREATMENT NOTE - INPATIENT        Room Number: 557/557-A           Presenting Problem: (Nausea and weakness )    Problem List  Principal Problem:    Weakness generalized  Active Problems:    Essential hypertension    Nausea      OCCUPA ASSESSMENT  AM-PAC ‘6-Clicks’ Inpatient Daily Activity Short Form  How much help from another person does the patient currently need…  -   Putting on and taking off regular lower body clothing?: A Lot  -   Bathing (including washing, rinsing, drying)?: A L Therapist

## 2019-04-23 NOTE — BH PROGRESS NOTE
Behavioral Health Note  CHIEF COMPLAINT  Weakness generalized    REASON FOR ADMISSION  Weakness generalized    SOCIAL HISTORY  Fritz Valentine lives at [facility]. She does not smoke, drink alcohol or use drugs.     PAST PSYCHIATRIC HISTORY  Fritz Valentine denies any mental

## 2019-04-23 NOTE — PROGRESS NOTES
[facility]    Progress Note    Hugo Hurst Patient Status:  Observation    11/3/1940 MRN [id number]   Location [facility] 5SW/SE Attending Suzan Block MD   Hosp Day # 0 PCP Haresh Goodman MD       Subjective:   Pa CREATSERUM 1.65 (H) 04/23/2019    BUN 28 (H) 04/23/2019     04/23/2019    K 4.2 04/23/2019     04/23/2019    CO2 24.0 04/23/2019     (H) 04/23/2019    CA 9.7 04/23/2019    ALB 3.0 (L) 04/22/2019    ALKPHO 92 04/22/2019    BILT 0.6 04/22/

## 2019-04-23 NOTE — PLAN OF CARE
Problem: Patient Centered Care  Goal: Patient preferences are identified and integrated in the patient's plan of care  Description  Interventions:  - What would you like us to know as we care for you?  \"[facility]\"  - Provide time Implement non-pharmacological measures as appropriate and evaluate response  - Consider cultural and social influences on pain and pain management  - Manage/alleviate anxiety  - Utilize distraction and/or relaxation techniques  - Monitor for opioid side ef Progressing

## 2019-04-24 VITALS
TEMPERATURE: 98 F | DIASTOLIC BLOOD PRESSURE: 69 MMHG | RESPIRATION RATE: 20 BRPM | HEIGHT: 57 IN | BODY MASS INDEX: 43.15 KG/M2 | OXYGEN SATURATION: 94 % | WEIGHT: 200 LBS | SYSTOLIC BLOOD PRESSURE: 149 MMHG | HEART RATE: 56 BPM

## 2019-04-24 PROCEDURE — 99217 OBSERVATION CARE DISCHARGE: CPT | Performed by: HOSPITALIST

## 2019-04-24 RX ORDER — ESCITALOPRAM OXALATE 10 MG/1
10 TABLET ORAL EVERY EVENING
Qty: 10 TABLET | Refills: 0 | Status: SHIPPED | OUTPATIENT
Start: 2019-04-24 | End: 2019-01-01

## 2019-04-24 RX ORDER — ZOLPIDEM TARTRATE 5 MG/1
5 TABLET ORAL NIGHTLY
Qty: 5 TABLET | Refills: 0 | Status: SHIPPED | OUTPATIENT
Start: 2019-04-24 | End: 2019-01-01

## 2019-04-24 RX ORDER — HYDROCODONE BITARTRATE AND ACETAMINOPHEN 5; 325 MG/1; MG/1
1 TABLET ORAL EVERY 6 HOURS PRN
Qty: 10 TABLET | Refills: 0 | Status: SHIPPED | OUTPATIENT
Start: 2019-04-24 | End: 2019-01-01

## 2019-04-24 NOTE — CM/SW NOTE
RN informed SW that pt is medically stable to discharge today and requesting medicar transport. [address] does not have any beds available; Shayla from [facility] stated they are able to accept pt today.      ADELA spoke w/ Eunice Hudson from [facility] and arranged medicar tr

## 2019-04-24 NOTE — PROGRESS NOTES
[facility]    CDI Prediction Tool Protocol (Vancomycin Initiated)    OVP (oral vancomycin prophylaxis) 125 mg PO Daily is being started in this patient based on a score of 25.1.       Score Breakdown:  History of CDI within 1 year AND high risk a

## 2019-04-24 NOTE — PLAN OF CARE
Problem: Patient Centered Care  Goal: Patient preferences are identified and integrated in the patient's plan of care  Description  Interventions:  - What would you like us to know as we care for you?  \"[facility]  - Provide time if interventions unsuccessful or patient reports new pain  - Anticipate increased pain with activity and pre-medicate as appropriate  Outcome: Adequate for Discharge   Pt complains of chronic pain in back/headache/nausea. Norco po prn for pain.  Pt encourag based on physician/LIP order or complex needs related to functional status, cognitive ability or social support system  Outcome: Adequate for Discharge   Discharge instructions reviewed with pt. All belongings gathered.  Discharge instructions and prescript

## 2019-04-24 NOTE — PLAN OF CARE
Problem: Patient Centered Care  Goal: Patient preferences are identified and integrated in the patient's plan of care  Description  Interventions:  - What would you like us to know as we care for you?  \"[facility]\"  - Provide time Implement non-pharmacological measures as appropriate and evaluate response  - Consider cultural and social influences on pain and pain management  - Manage/alleviate anxiety  - Utilize distraction and/or relaxation techniques  - Monitor for opioid side ef Progressing

## 2019-04-25 ENCOUNTER — SNF VISIT (OUTPATIENT)
Dept: INTERNAL MEDICINE CLINIC | Facility: SKILLED NURSING FACILITY | Age: 79
End: 2019-04-25

## 2019-04-25 ENCOUNTER — EXTERNAL FACILITY (OUTPATIENT)
Dept: INTERNAL MEDICINE CLINIC | Facility: CLINIC | Age: 79
End: 2019-04-25

## 2019-04-25 DIAGNOSIS — R60.0 LOWER EXTREMITY EDEMA: ICD-10-CM

## 2019-04-25 DIAGNOSIS — L03.119 CELLULITIS OF LOWER EXTREMITY, UNSPECIFIED LATERALITY: ICD-10-CM

## 2019-04-25 DIAGNOSIS — Z79.899 ENCOUNTER FOR MEDICATION REVIEW: ICD-10-CM

## 2019-04-25 DIAGNOSIS — M79.7 FIBROMYALGIA: ICD-10-CM

## 2019-04-25 DIAGNOSIS — G89.4 CHRONIC PAIN SYNDROME: ICD-10-CM

## 2019-04-25 DIAGNOSIS — I10 ESSENTIAL HYPERTENSION: ICD-10-CM

## 2019-04-25 DIAGNOSIS — N30.00 ACUTE CYSTITIS WITHOUT HEMATURIA: ICD-10-CM

## 2019-04-25 DIAGNOSIS — G89.29 CHRONIC BILATERAL LOW BACK PAIN WITHOUT SCIATICA: ICD-10-CM

## 2019-04-25 DIAGNOSIS — M54.50 CHRONIC BILATERAL LOW BACK PAIN WITHOUT SCIATICA: ICD-10-CM

## 2019-04-25 DIAGNOSIS — T07.XXXA ABRASIONS OF MULTIPLE SITES: ICD-10-CM

## 2019-04-25 DIAGNOSIS — R53.81 PHYSICAL DECONDITIONING: ICD-10-CM

## 2019-04-25 PROCEDURE — 99307 SBSQ NF CARE SF MDM 10: CPT | Performed by: CLINICAL NURSE SPECIALIST

## 2019-04-25 PROCEDURE — 99306 1ST NF CARE HIGH MDM 50: CPT | Performed by: INTERNAL MEDICINE

## 2019-04-26 ENCOUNTER — SNF ADMIT/H&P (OUTPATIENT)
Dept: INTERNAL MEDICINE CLINIC | Facility: SKILLED NURSING FACILITY | Age: 79
End: 2019-04-26

## 2019-04-26 DIAGNOSIS — Z09 FOLLOW UP: ICD-10-CM

## 2019-04-26 DIAGNOSIS — R60.0 EDEMA OF BOTH LOWER EXTREMITIES: ICD-10-CM

## 2019-04-26 DIAGNOSIS — Z79.899 ENCOUNTER FOR MEDICATION REVIEW: ICD-10-CM

## 2019-04-26 PROCEDURE — 99310 SBSQ NF CARE HIGH MDM 45: CPT | Performed by: NURSE PRACTITIONER

## 2019-04-26 NOTE — DISCHARGE SUMMARY
[facility]    [facility] PHYSICIAN: Elieser Shah MD   PATIENT ACCOUNT#:   [id number]    LOCATION:  [address] [place] [address] #:   [id number]       YOB: 1940  ADMISSION DATE:       04/21/ O x3.  HEENT:  Extraocular movements are intact. Pupils equal, round, and reactive to light and accommodation. Atraumatic, normocephalic. CARDIAC:  S1, S2.  Regular rate and rhythm. LUNGS:  Good air entry bilaterally.   ABDOMEN:  Soft, nontender, nondis

## 2019-04-26 NOTE — PROGRESS NOTES
Daniel Shepard  : 11/3/1940  Age 70year old  female [facility] for SRINIVASA. Chief complaint: Initial APRN Assessment/Follow up Generalized weakness, hypertension, UTI, and depression.     HPI  P stated as uncontrolled    • Unspecified essential hypertension      Past Surgical History:   Procedure Laterality Date   • BACK SURGERY      5 surgeries last one in dec 2011   • CHOLECYSTECTOMY  08/25/2017   • EGD  12/17, 8/17   • ELECTROCARDIOGRAM, COMPLE weakness, syncope or headache. Hematologic: Denies excessive bruising or bleeding. PHYSICAL EXAM:  Gen: A+Ox3. No distress. Calm and cooperative. Appropriate. HEENT: NCAT, neck supple, no carotid bruit.   CV: RRR, S1S2, and intact distal puls

## 2019-04-29 PROCEDURE — 99308 SBSQ NF CARE LOW MDM 20: CPT | Performed by: INTERNAL MEDICINE

## 2019-04-30 ENCOUNTER — EXTERNAL FACILITY (OUTPATIENT)
Dept: INTERNAL MEDICINE CLINIC | Facility: CLINIC | Age: 79
End: 2019-04-30

## 2019-04-30 DIAGNOSIS — E08.00 DIABETES MELLITUS DUE TO UNDERLYING CONDITION WITH HYPEROSMOLARITY WITHOUT COMA, WITHOUT LONG-TERM CURRENT USE OF INSULIN (HCC): ICD-10-CM

## 2019-04-30 DIAGNOSIS — G89.4 CHRONIC PAIN SYNDROME: ICD-10-CM

## 2019-04-30 DIAGNOSIS — I63.9 CEREBROVASCULAR ACCIDENT (CVA), UNSPECIFIED MECHANISM (HCC): ICD-10-CM

## 2019-04-30 DIAGNOSIS — I10 ESSENTIAL HYPERTENSION: ICD-10-CM

## 2019-05-01 ENCOUNTER — SNF VISIT (OUTPATIENT)
Dept: INTERNAL MEDICINE CLINIC | Facility: SKILLED NURSING FACILITY | Age: 79
End: 2019-05-01

## 2019-05-01 ENCOUNTER — EXTERNAL FACILITY (OUTPATIENT)
Dept: INTERNAL MEDICINE CLINIC | Facility: CLINIC | Age: 79
End: 2019-05-01

## 2019-05-01 DIAGNOSIS — G89.4 CHRONIC PAIN SYNDROME: ICD-10-CM

## 2019-05-01 DIAGNOSIS — Z51.89 ENCOUNTER FOR MEDICATION ADJUSTMENT: ICD-10-CM

## 2019-05-01 DIAGNOSIS — Z71.89 ENCOUNTER FOR MEDICATION REVIEW AND COUNSELING: ICD-10-CM
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
## 2019-05-01 DIAGNOSIS — R07.81 RIB PAIN ON LEFT SIDE: ICD-10-CM

## 2019-05-01 DIAGNOSIS — T07.XXXA ABRASIONS OF MULTIPLE SITES: ICD-10-CM

## 2019-05-01 PROCEDURE — 99309 SBSQ NF CARE MODERATE MDM 30: CPT | Performed by: INTERNAL MEDICINE

## 2019-05-01 PROCEDURE — 99310 SBSQ NF CARE HIGH MDM 45: CPT | Performed by: NURSE PRACTITIONER

## 2019-05-03 ENCOUNTER — SNF VISIT (OUTPATIENT)
Dept: INTERNAL MEDICINE CLINIC | Facility: SKILLED NURSING FACILITY | Age: 79
End: 2019-05-03

## 2019-05-03 DIAGNOSIS — Z71.89 ENCOUNTER FOR MEDICATION REVIEW AND COUNSELING: ICD-10-CM

## 2019-05-03 DIAGNOSIS — E87.6 HYPOKALEMIA: ICD-10-CM

## 2019-05-03 DIAGNOSIS — R07.81 RIB PAIN ON LEFT SIDE: ICD-10-CM
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
## 2019-05-03 DIAGNOSIS — Z51.89 ENCOUNTER FOR MEDICATION ADJUSTMENT: ICD-10-CM
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
## 2019-05-03 PROCEDURE — 99310 SBSQ NF CARE HIGH MDM 45: CPT | Performed by: NURSE PRACTITIONER

## 2019-05-03 PROCEDURE — 99356 PROLONGED SERV,INPATIENT,1ST HR: CPT | Performed by: NURSE PRACTITIONER

## 2019-05-03 NOTE — PROGRESS NOTES
Natividad Arana  : 11/3/1940  Age 70year old  female patient is admitted to  [facility] for SRINIVASA.     Chief complaint: Complain of fall    HPI  Patient is a 62-year-old  female who was admitted at [address] [place] [address] back   • Type II or unspecified type diabetes mellitus without mention of complication, not stated as uncontrolled    • Unspecified essential hypertension      Past Surgical History:   Procedure Laterality Date   • BACK SURGERY      5 surgeries last one in GI: Denies tarry stools or hematochezia. Denies abdominal pain, constipation or diarrhea. Skin: Denies rash or itching. Neuro: Denies weakness, syncope or headache. Hematologic: Denies excessive bruising or bleeding.           PHYSICAL EXAM:  Gen: A+O daily    8. Nausea  -CPM Zofran 4mg daily. 9.Chronic back pain  -CPM NOrco PRN  -CPM Gapapentin    10. GOut  -CPM Allopurinol daily    11.  Hypothyroidism  -CPM Levothyroxine     Other:  -Mild thalassemia  -H/o markel Allan, APRN  5/1/201

## 2019-05-03 NOTE — PROGRESS NOTES
Richard Bernalhaider  : 11/3/1940  Age 70year old  female patient is admitted to  [facility] for SRINIVASA. Chief complaint: Follow up S/P fall and follow up hypokalemia.     HPI  Patient is a 19-year-old  female who • PONV (postoperative nausea and vomiting)    • Renal insufficiency    • Scoliosis    • SEASONAL ALLERGIES    • Skin cancer 03/2018    BCC-mid back   • Type II or unspecified type diabetes mellitus without mention of complication, not stated as uncontrol Denies fever, or chills. CV: Denies palpitations or CP. Respiratory: Denies SOB, cough or wheezing. GI: Denies tarry stools or hematochezia. Denies abdominal pain, constipation or diarrhea. Skin: Denies rash or itching.    Neuro: Denies weakness, sy - UA positive for Pyuria  -Rcvd IV rocephin      3. HTN  -Uncontrolled on admission at [facility], rcvd IV hydralazine PRN.   -CPM amlodipine daily, hold id SBP <100  -CPM ASA daily  -CPM Clonidine0.1mg BID  -Add hydralazine 25mg Q8 PRN.     4.Depression  -CPM Le

## 2019-05-09 NOTE — PROGRESS NOTES
Discharge Summary    Fatuma Remedios : 11/3/1940 Age 70year old female patient is admitted to [facility] for SRINIVASA. Chief complaint: Discharge education, Follow up S/P fall, hypokalemia, weakness, depression.     [facility]. She will be discharged today to [facility] where she resides. Will receive caregiving services from [facility] during the day, and niece is hiring a caregiver for night to have 24 hr supervision.     Patient seen today sitting up in [facility] with no acute d 12/17, 8/17   • ELECTROCARDIOGRAM, COMPLETE 11-   Scanned to media tab - DOS 11-   • ESOPHAGOGASTRODUODENOSCOPY (EGD) N/A 3/25/2019   Performed by Lianna Rodriguez MD at [address] [place] [address] ENDOSCOPY   • ESOPHAGOGASTRODUODENOSCOPY (EGD) N/A 12/8/2017   Perfor normal. CTAB/L. No distress, wheezes, rales, rhonchi. Abd: Soft, NTND, BS normal, no mass, no rebound/guarding. LYMPHATIC:No lymphedema   EXTREMITIES: +edema BLE, isolated at b/l feet. NO cyanosis, +pedal pulses. Pt refusing compression stockings.  LROM lasix adjusted by cards to 20mg BID 2/2 fluid overload. -WEight stable at 198 in last 72Hrs   - daily weights at home, discussed importance   - Metoprolol discontinued and Carvedilol 6.25mg BID started. 13.CKD   -Cont to monitor outpatient as per PCP.

## 2019-05-09 NOTE — TELEPHONE ENCOUNTER
Jodee Davis requesting response via fax for orders sent to office on 4/29/19  FAX#[phone]  Tasked to nursing

## 2019-05-10 NOTE — TELEPHONE ENCOUNTER
Spoke to pt for TCM today. Pt states she is still having L side and lower back pain r/t fall on 5/1. She states she increased Norco to 1 tab every 4 hours instead of every 6 hours and this is not helping much either.   She states the Lidocaine patches do

## 2019-05-10 NOTE — TELEPHONE ENCOUNTER
Home Health orders from MD faxed to [facility], confirmation received. Spoke to Andrew Carroll from [facility] and notified her that orders had been faxed. Original order placed in home health bin for Kathi Norton.

## 2019-05-10 NOTE — TELEPHONE ENCOUNTER
Bob Mcconnell calling for clearificaton on the below prescriptions. Received fax from [facility] for refills. They have sent 2 new prescriptions and 2 with direction changes. The following are the new ones.   Carvedilol 6.25mg 1 tab

## 2019-05-10 NOTE — ED NOTES
Per communication with , Marita Rizzo, pt has private care giver from 8p-8am and will have intermittent assistance during day at [facility].

## 2019-05-10 NOTE — ED PROVIDER NOTES
Patient Seen in: [facility] Emergency Department    History   Patient presents with:  Fall (musculoskeletal, neurologic)    Stated Complaint: Left side pain    HPI    Patient complains of mechanical fall that occurred 6 days ago.   She fell onto th 8/17   • ELECTROCARDIOGRAM, COMPLETE  11-    Scanned to media tab - DOS 11-   • ESOPHAGOGASTRODUODENOSCOPY (EGD) N/A 3/25/2019    Performed by Tc Diehl MD at [facility] ENDOSCOPY   • ESOPHAGOGASTRODUODENOSCOPY (EGD) N/A 12/8/2017    Performed daily. Patient taking differently: Take 20 mg by mouth 2 (two) times daily.      gabapentin 100 MG Oral Cap,  TAKE 1 CAPSULE BY MOUTH THREE TIMES DAILY   Levothyroxine Sodium 100 MCG Oral Tab,  TAKE 1 TABLET BY MOUTH EVERY MORNING   ASPIRIN ADULT LOW STREN supple, no midline tenderness, no pain with axial load, ROM intact   LUNGS: no resp distress, cta bilateral, tender over mid to lower lateral rib cage on the left no bony crepitance  CARDIO: RRR without murmur  GI: abdomen is soft and non tender, no masses (CST): Yvette Tripathi MD on 5/10/2019 at 18:12          Xr Ribs With Chest (3 Views), Left  (cpt=71101)    Result Date: 5/10/2019  CONCLUSION:   Nondisplaced fractures of the left lateral 6th and 7th ribs.      Dictated by (CST): Yvette Tripathi MD on 5/10/2019 Medication List as of 5/10/2019  6:42 PM    START taking these medications    HYDROcodone-acetaminophen  MG Oral Tab  Take 1 tablet by mouth every 6 (six) hours as needed for Pain., Print Script, Disp-10 tablet, R-0

## 2019-05-10 NOTE — TELEPHONE ENCOUNTER
Telephone call to pharmacy and situation discussed. I have given them the approval to give the patient the medications as noted below.   Thank you!!

## 2019-05-10 NOTE — ED NOTES
Spoke with Jose De Jesus at [facility]. Pt is in assisted living, but RN will update niece regarding 2 broken ribs on L and new Rx for Norco 10mg.

## 2019-05-10 NOTE — ED NOTES
Pt notified RN of \"neck pain\" when giving Norco for Rib pain. Pt reports neck pain \"started at same time as back pain\". MD notified. CT ordered.

## 2019-05-10 NOTE — PROGRESS NOTES
Initial Post Discharge Follow Up   Discharge Date: 5/9/19--d/c from [facility] SNF  Contact Date: 5/10/2019    Consent Verification:  Assessment Completed With: Patient  HIPAA Verified?   Yes    Discharge Dx:  Generalized weakness, UTI, HTN    General: skin daily. Disp:  Rfl:    Sertraline HCl 100 MG Oral Tab Take 100 mg by mouth nightly. Disp:  Rfl:    potassium chloride 20 MEQ Oral Powd Pack Take 20 mEq by mouth daily.  Disp:  Rfl:    carvedilol 6.25 MG Oral Tab Take 6.25 mg by mouth 2 (two) times daily discussed with you? yes  • If you were prescribed a new medication:   o Was the new medication’s purpose explained? yes  o Was the new medication’s side effects discussed? yes  o Do you have any questions about your new medication?  No  • Did you  yo medications and or orders sent to PCP.

## 2019-05-10 NOTE — ED INITIAL ASSESSMENT (HPI)
Pt arrived via EMS, from [facility]. Pt fell 6 days ago and had xrays done that were negative.   Pt complaining of left-sided pain starting under left breast to waist.

## 2019-05-10 NOTE — TELEPHONE ENCOUNTER
Noted.  I have found the orders that were sent to me. I have signed the orders. Please fax them back as requested. Also okay to call Hadley Sotelo at [facility] home health care to let her know that we will send them back.   I have left the signed orders on my nurses de

## 2019-05-10 NOTE — TELEPHONE ENCOUNTER
Noted.  Dania Morataya to call patient and make arrangements for the patient to see me next Friday on May 17 as there are appointments available on that day. I tried to call the patient and there is no answer.   It looks as though the patient may be in the emergency

## 2019-05-11 NOTE — ED PROVIDER NOTES
Patient endorsed to me from Dr. Quoc Castle. Patient had pending imaging of her chest.  Imaging was consistent with 2 nondisplaced rib fractures. She states her pain was still not controlled after tramadol. She was given 2 Norco 5–325.   He has been taking 1 of

## 2019-05-11 NOTE — TELEPHONE ENCOUNTER
Telephone call to pt. Pt is feeling better today. I told pt that she should she how she does over the weekend and call our office to make an appt to see me on Friday. Pt is to call back sooner if she has more problems. I will route this to nursing.   Abhinav Benavides

## 2019-05-13 NOTE — TELEPHONE ENCOUNTER
Called patient who states she is feeling the same - jacobo scheduled for Friday 5/17 at 11:30 am with

## 2019-05-14 NOTE — TELEPHONE ENCOUNTER
Natalee Moran / [facility] HH requesting OV notes from 2/18, please fax # [phone]   Tasked to nursing

## 2019-05-17 NOTE — TELEPHONE ENCOUNTER
Refill request has failed the Ambulatory Medication Refill Standing Order and is routed to the primary physician to review the following:    Requested Prescriptions     Pending Prescriptions Disp Refills   • SERTRALINE  MG Oral Tab Micheline Med Nam

## 2019-05-17 NOTE — PATIENT INSTRUCTIONS
1.  Patient is to continue her current diet, medication and activity. 2.  Patient will be followed by the visiting nurses, physical therapist and occupational therapist at work for the agency that works at [employer].   3.  I will give the patient a prescript

## 2019-05-17 NOTE — PROGRESS NOTES
Grace Joyce is a 70year old female. Patient presents with:  Hospital F/U: 3 days post  rehab   Fatigue  Arthritis  Hypertension    HPI:   Patient presents with:  Hospital F/U: 3 days post  rehab   Fatigue  Arthritis  Hypertension    Patient presents to (eight) hours as needed for Pain. Disp:  Rfl:    HYDROcodone-acetaminophen 5-325 MG Oral Tab Take 1 tablet by mouth every 6 (six) hours as needed for Pain.  (Patient taking differently: Take 1 tablet by mouth every 4 (four) hours as needed for Pain.  ) Disp Diverticulosis of large intestine    • Esophageal reflux    • Fatigue    • High blood pressure    • High cholesterol    • HYPOTHYROIDISM    • Iron deficiency anemia    • Osteoarthrosis, unspecified whether generalized or localized, unspecified site    • Ot current diet, medication and activity. I will give the patient a prescription for Norco that she can take 1 tablet 4 times a day as necessary for pain. I have given 120 tablets which should last her for 1 month.   I will plan to see the patient back in 1 been unsuccessful. Patient has chronic pain related to this. As above. 15. Stage 3 chronic kidney disease (HCC)  Stable. CPM.      The patient indicates understanding of these issues and agrees to the plan.   The patient is asked to return in 1 month

## 2019-05-23 NOTE — TELEPHONE ENCOUNTER
Noted.  I have refilled the patient's medications as requested. At this time I feel it is a good idea for the patient to remain on the sertraline. I refilled the Coreg for 30 days with 11 refills in the sertraline for 30 days with 5 refills.   If the mirta

## 2019-05-23 NOTE — TELEPHONE ENCOUNTER
Noted.  I have refilled the patient's medication as requested. I have change the potassium supplementation to 1 tablet daily with a prescription for 30 tablets with 5 refills. Patient's prescriptions were refilled and sent to her pharmacy. I have also placed an order in the system for the patient have a CBC and BMP. Please call the patient and niece to let them know that I placed the orders in the system. She can have them done sometime in the next week or 2. If it is too hard for the patient to come over to the office to have a blood test taken then please contact the visiting nurse at the [address] [place] [address] and see if they can draw the blood test there. I will route this to nursing.   Thank you!!

## 2019-05-23 NOTE — TELEPHONE ENCOUNTER
I s/w John Del Toro, niece, regarding other med refills (see other refill encounters). John Del Toro mentioned that pt has expressed that she does not wish to take sertraline, however, John Del Toro thinks she needs it.  Pt was seen by psych in rehab and pt was found to exhibit some

## 2019-05-23 NOTE — TELEPHONE ENCOUNTER
Noted.  I refilled the patient's medications that she can take 20 mg of Lasix daily. Sent the prescription for Lasix 20 mg number 91 tablet daily with 3 refills.

## 2019-05-23 NOTE — TELEPHONE ENCOUNTER
Pt states she is no longer using [address] and will be using a pharmacy inside [facility]. Niece will be calling us with pharmacy information. I called niece, Silke Khoury, whom states pt will now be using [facility] that delivers Rx to [place]. Updated preferred pharmacy in chart    Clarified how pt is taking these meds. She is taking gabapentin 100mg TID, potassium choride 20 MEQ daily, Lasix 20mg daily. We have not refilled potassium in a few years. Pt states she was recently started on it for low K+ levels while in hospital. Pt is requesting another blood test to re-check K+ levels    Most recent K+ level 4.2 on 4/23/19    To Dr. Brannon Faustin, please advise if you wish pt to continue Potassium Chloride ER 20 MEQ daily? Pt is also on Lasix 20mg daily.  Pt has appt with you 6/20/19

## 2019-06-03 NOTE — TELEPHONE ENCOUNTER
I spoke with patient and she reports a headache for four days. She takes 2 tylenol a day. It helps for about \"twenty minutes. \" She does not have any nausea or vomiting.  She has soreness in her left arm that she has had since her stroke and since she fell

## 2019-06-03 NOTE — TELEPHONE ENCOUNTER
If you speak to pt, please given message from 5/21/19    Noted. I have refilled the patient's medication as requested. I have change the potassium supplementation to 1 tablet daily with a prescription for 30 tablets with 5 refills.   Patient's prescri

## 2019-06-03 NOTE — TELEPHONE ENCOUNTER
Pt. Has had a headache for 4 days that she can't get rid of  Ph.  # [phone]   Routed to clinical

## 2019-06-03 NOTE — TELEPHONE ENCOUNTER
Telephone call to patient and message left. I have read over the notes. I informed the patient that she is currently taking Norco 4 tablets a day. Patient may take regular Tylenol 2 tablets 2 or 3 times a day in addition to the Norco that she is taking.

## 2019-06-05 PROBLEM — M25.512 ACUTE PAIN OF LEFT SHOULDER: Status: ACTIVE | Noted: 2019-01-01

## 2019-06-05 PROBLEM — S22.42XA MULTIPLE CLOSED FRACTURES OF RIBS OF LEFT SIDE: Status: ACTIVE | Noted: 2019-01-01

## 2019-06-05 NOTE — PATIENT INSTRUCTIONS
1.  Patient is to continue her current diet, medication and activity. 2.  We will obtain an x-ray of the patient's left shoulder. 3.  Patient is to continue with physical therapy and the visiting nurse at [facility].   4.  [facility]

## 2019-06-05 NOTE — PROGRESS NOTES
Last Crump is a 70year old female. Patient presents with:  Checkup  Fatigue  Arthritis  Hypertension    HPI:   Patient presents with:  Checkup  Fatigue  Arthritis  Hypertension    Patient presents today for follow-up evaluation.   Patient complains of Oral Tab Take 1,000 mg by mouth every 8 (eight) hours as needed for Pain.  Disp:  Rfl:    ALLOPURINOL 300 MG Oral Tab TAKE 1 TABLET BY MOUTH DAILY Disp: 90 tablet Rfl: 3   Pantoprazole Sodium 40 MG Oral Tab EC Take 1 tablet (40 mg total) by mouth 2 (two) ti deficiency anemia    • Osteoarthrosis, unspecified whether generalized or localized, unspecified site    • Other and unspecified hyperlipidemia    • PONV (postoperative nausea and vomiting)    • Renal insufficiency    • Scoliosis    • SEASONAL ALLERGIES and [facility]. Patient's occupational therapy has ended. Patient is still trying to learn how to use her scooter. She is weak today. I have advised the patient to continue her physical therapy as she is doing.   I will plan to see the pa sure she has not broken anything. I will see the patient back in 2 weeks as noted above. The patient indicates understanding of these issues and agrees to the plan. The patient is asked to return in 2 weeks as noted above. Casie Aldridge MD

## 2019-06-08 NOTE — TELEPHONE ENCOUNTER
Please call pt and notify her that her recent X-ray of her left shoulder shows pt to have much arthritis present. NO fracture was noted. I will route this to nursing.   Thank you!!

## 2019-06-10 NOTE — TELEPHONE ENCOUNTER
Spoke to patient and relayed MD message, patient verbalized understanding. Patient is wondering if Dr. Vane Barry can review her lab results.  She said that she had them done on the same day of her last appointment so she did not get to review them with Dr. Korina Santoro

## 2019-06-20 NOTE — PATIENT INSTRUCTIONS
1.  Patient is to continue her current diet, medication and activity. 2.  I will give the patient an order for her to have occupational therapy in addition to her physical therapy.   3.  Patient is to continue her rehab at the [facility] and she is currently

## 2019-06-20 NOTE — PROGRESS NOTES
David Dwyer is a 70year old female. Patient presents with:  Checkup: one month -   Medication Request: pended. Hypertension  Fatigue  Arthritis  Urinary Frequency    HPI:   Patient presents with:  Checkup: one month -   Medication Request: pended. meals. Disp: 60 tablet Rfl: 1   AMLODIPINE BESYLATE 10 MG Oral Tab TAKE 1 TABLET BY MOUTH DAILY HOLD IF BP IS < 110/60 Disp: 30 tablet Rfl: 11   CloNIDine HCl 0.1 MG Oral Tab Take 1 tablet (0.1 mg total) by mouth 2 (two) times daily.  Disp: 60 tablet Rfl: 0 Social History:  Social History    Tobacco Use      Smoking status: Never Smoker      Smokeless tobacco: Never Used    Alcohol use:  Yes      Alcohol/week: 1.2 oz      Types: 2 Glasses of wine per week      Comment: occasionally    Drug use: No       REVI I will see the patient back in 2 months with a lipid panel. CPM.    4. Hypertriglyceridemia  Stable. CPM.  As above. 5. Venous insufficiency  Stable. CPM.  Patient still has some mild renal insufficiency.     6. Fatigue, unspecified type  Patient appe

## 2019-06-21 NOTE — TELEPHONE ENCOUNTER
Pt saw Dr TOTH yesterday and was told she was anemic, pt is asking if there is anything she should be doing for this  Please call pt [phone]   Tasked to nursing

## 2019-06-21 NOTE — TELEPHONE ENCOUNTER
Discussed with patient. I told her that her CBC appears to be improving from before. Most likely she will continue to improve with good nutrition.   I will see the patient back as previously scheduled with blood test.

## 2019-06-25 NOTE — TELEPHONE ENCOUNTER
Telephone call to pt and discussed with pt. Pt's Urine C+S shows pt to have a UTI. I will not treat with Cipro or Levaquin due to history of C. Dif. I will treat pt with Keflex 250 mg QID for 10 days.   I will place an order in the system for pt to have

## 2019-06-26 NOTE — TELEPHONE ENCOUNTER
Patient called , still having SX but has not started medication.  RN instructed patient to call pharmacy since it was sent there yesterday and start medication right away - verbalized understanding

## 2019-06-26 NOTE — TELEPHONE ENCOUNTER
Patient called back - has low grade temp of 99 . 0 - she states the aide will  her medication .  RN instructed patient to get medication as soon as possible  And she verbalized understanding

## 2019-06-27 NOTE — TELEPHONE ENCOUNTER
Pt called, started the medication yesterday, still not feeling any better  Still has frequency  Pt still has low grade temp  Tasked to nursing

## 2019-06-27 NOTE — TELEPHONE ENCOUNTER
To Dr. Teofilo Guerra - pt started abx yesterday at 4 and hs and then this AM and noon. Pt states sx are the same, \"feel like I have been run over by a truck\". Pt eating yogurt and drinking plenty of water.   Pt has not taken temp, pt is very chilled (denies

## 2019-06-27 NOTE — TELEPHONE ENCOUNTER
Noted. Please call the patient and notify her that she needs to give the medication  another couple of days to work. She has had her problem for a while. It will take a few days for the antibiotic to kick in and help her urinary tract symptoms.   I will

## 2019-06-28 PROBLEM — R53.1 WEAKNESS: Status: ACTIVE | Noted: 2019-01-01

## 2019-06-28 PROBLEM — D72.829 LEUKOCYTOSIS, UNSPECIFIED TYPE: Status: ACTIVE | Noted: 2019-01-01

## 2019-06-28 NOTE — TELEPHONE ENCOUNTER
By the culture, the antibiotic should work for patient.  Would recommend she go to UC or ER for further evaluation

## 2019-06-28 NOTE — TELEPHONE ENCOUNTER
Pt states today she feeling weaker than most days, no improvement with sx, \"seem to be getting worse\", she started abx 2 days ago, temp this morning of 99, +body aches, +chills, no blood in urine, no nausea, hx of back surgery and she states she always h

## 2019-06-28 NOTE — TELEPHONE ENCOUNTER
I spoke with patient and relayed Dr. Emeka Pyle message. She verbalized understanding. She can only get to the ER by ambulance and she will call for one now. To Dr. Karthikeyan Sutton. Nursing follow up Monday.

## 2019-06-28 NOTE — ED NOTES
Pt to ED A/O x 4- speaking in full and complete sentences- states that she was recently diagnosed with UTI and placed on abx- states has been taking this for two days with no relief. States low-grade fevers- but none at this time. Denies abd pain.  Skin pin

## 2019-06-29 NOTE — OCCUPATIONAL THERAPY NOTE
OCCUPATIONAL THERAPY EVALUATION - INPATIENT     Room Number: 239/767-E  Evaluation Date: 6/29/2019  Type of Evaluation: Initial  Presenting Problem: (frequent urination)    Physician Order: IP Consult to Occupational Therapy  Reason for Therapy: ADL/IADL D may benefit from Shara Lin to increase I with recent injury to LT UE impairing her performance. The patient's Approx Degree of Impairment: 50.11% has been calculated based on documentation in the [facility] '6 clicks' Inpatient Daily Activity Short Form.   Researc Performed by Santo Claude, MD at [address] [place] [address] ENDOSCOPY   • ESOPHAGOGASTRODUODENOSCOPY (EGD) N/A 12/8/2017    Performed by Blaze Morton MD at [address] [place] [address] ENDOSCOPY   • ESOPHAGOGASTRODUODENOSCOPY (EGD) N/A 8/23/2017    Performed by Blaze Morton MD at [address] [place] [address] ENDOSCOPY (including washing, rinsing, drying)?: A Little  -   Toileting, which includes using toilet, bedpan or urinal? : A Little  -   Putting on and taking off regular upper body clothing?: A Lot  -   Taking care of personal grooming such as brushing teeth?: A Drena Gallery

## 2019-06-29 NOTE — ED PROVIDER NOTES
Patient Seen in: [facility] Emergency Department    History   Patient presents with:  Urinary Symptoms (urologic)    Stated Complaint: UTI- on abx 2 days \"not getting better. \"    HPI    Patient presents emergency department complaining of sympto 8/23/2017    Performed by David Lopes MD at [facility] ENDOSCOPY   • HIP REPLACEMENT SURGERY Left    • HYSTERECTOMY     • KNEE REPLACEMENT SURGERY  2006   • LAPAROSCOPIC CHOLECYSTECTOMY N/A 8/25/2017    Performed by Michelle Abel MD at [facility] OR   • RE note and vitals reviewed.             ED Course     Labs Reviewed   URINALYSIS WITH CULTURE REFLEX - Abnormal; Notable for the following components:       Result Value    Protein Urine >=500 (*)     Glucose Urine 150  (*)     All other components within nor that you schedule follow up care with a primary care provider within the next three months to obtain basic health screening including reassessment of your blood pressure.     Medications Prescribed:  Current Discharge Medication List              Present on

## 2019-06-29 NOTE — PLAN OF CARE
Problem: Patient Centered Care  Goal: Patient preferences are identified and integrated in the patient's plan of care  Description  Interventions:  - What would you like us to know as we care for you?   - Provide timely, complete, and accurate informatio Progressing     Patient admitted for weakness; from [facility]. Admission completed. Med rec reviewed, MD notified. MD notified of elevated BP. Received order for pain medication. Fall precautions in place. Call light within reach.

## 2019-06-29 NOTE — PHYSICAL THERAPY NOTE
PHYSICAL THERAPY EVALUATION - INPATIENT     Room Number: 799/420-Z  Evaluation Date: 6/29/2019  Type of Evaluation:initial   Physician Order: PT Eval and Treat    Presenting Problem: Pt admitted w/ increased urinary frequency, worsening back pain and gene rec continued home skilled therapy to upgrade balance and prevent future falls and assistance to perform all mobilities using RW. PT NOT safe to participate in any task alone and at high fall risk at present.    Communicated above w/ RN, will cont to foll COMPLETE  11-    Scanned to media tab - DOS 11-   • ESOPHAGOGASTRODUODENOSCOPY (EGD) N/A 3/25/2019    Performed by Omid Diaz MD at [address] [place] [address] ENDOSCOPY   • ESOPHAGOGASTRODUODENOSCOPY (EGD) N/A 12/8/2017    Performed by Tianna Hoang MD at  MOBILITY  How much difficulty does the patient currently have. ..  -   Turning over in bed (including adjusting bedclothes, sheets and blankets)?: A Little   -   Sitting down on and standing up from a chair with arms (e.g., wheelchair, bedside commode, etc.

## 2019-06-29 NOTE — ED NOTES
Orders for admission, patient is aware of plan and ready to go upstairs.  Any questions, please call ED RN Aden Holstein  at extension [phone]

## 2019-06-29 NOTE — PROGRESS NOTES
[facility] HOSP - [facility]    Progress Note    Franceen Degree Patient Status:  Observation    11/3/1940 MRN [id number]   Location [address] [facility] Attending Beatriz Whitehead MD   Hosp Day # 0 PCP Anselmo Saba MD       Subjective:    Tiffanie Dixon Oral TID   • Levothyroxine Sodium  100 mcg Oral Before breakfast   • Pantoprazole Sodium  40 mg Oral BID AC   • Heparin Sodium (Porcine)  5,000 Units Subcutaneous 2 times per day   • Sertraline HCl  100 mg Oral Daily   • Potassium Chloride ER  20 mEq Oral

## 2019-06-29 NOTE — H&P
[facility] Patient Status:  Emergency    11/3/1940 MRN [id number]   Location [address] Attending Denise Nazario MD   Hosp Day # 0 PCP Davida Olivarez MD     Gt in dec 2011   • CHOLECYSTECTOMY  08/25/2017   • EGD  12/17, 8/17   • ELECTROCARDIOGRAM, COMPLETE  11-    Scanned to media tab - DOS 11-   • ESOPHAGOGASTRODUODENOSCOPY (EGD) N/A 3/25/2019    Performed by Last Wooten MD at [address] [place] [address] ENDOSCOPY   • daily. CloNIDine HCl 0.1 MG Oral Tab   No No   Sig: Take 1 tablet (0.1 mg total) by mouth 2 (two) times daily.    GABAPENTIN 100 MG Oral Cap   No No   Sig: TAKE 1 CAPSULE BY MOUTH THREE TIMES DAILY   HYDROcodone-acetaminophen (NORCO) 5-325 MG Oral Tab [18] 18  BP: (106-187)/(61-98) 166/98    General:  Alert and oriented. Diffuse skin problem:  None. Eye:  Pupils are equal, round and reactive to light, extraocular movements are intact, Normal conjunctiva.   HENT:  Normocephalic, oral mucosa is moist.  H issues resolve. Consider CPAP at night.     Prophylaxis  Subcutaneous heparin    CODE STATUS  Full    Primary care physician  Rani Solis MD    Disposition  Clinical course will dictate outcome      Gabby Zhang MD  6/28/2019  9:00 PM

## 2019-06-29 NOTE — ED NOTES
Orders for admission, patient is aware of plan and ready to go upstairs.  Any questions, please call ED MAURICIO Sandoval  at extension [phone]

## 2019-06-29 NOTE — PLAN OF CARE
Problem: Patient/Family Goals  Goal: Patient/Family Long Term Goal  Description  Patient's Long Term Goal: strength to improve    Interventions:  - IV antibiotics  - Ambulating as tolerated  - Monitor VS  - Monitor for dizziness   - Fall precautions   - request assistance  - Assess pain using appropriate pain scale  - Administer analgesics based on type and severity of pain and evaluate response  - Implement non-pharmacological measures as appropriate and evaluate response  - Consider cultural and social

## 2019-06-29 NOTE — CM/SW NOTE
6/29: SW received MDO for advanced directives. Patient was found resting post therapies. SW to follow up for completion of documents.      Emily, [address] [place] [address]

## 2019-06-30 NOTE — PROGRESS NOTES
[facility] HOSP - [facility]    Progress Note    Refugio Collins Patient Status:  Observation    11/3/1940 MRN [id number]   Location [facility] Attending Bridget Bagley MD   Hosp Day # 0 PCP Farhan Pride MD       Subjective:    Calvin Thapa mcg Oral Before breakfast   • Pantoprazole Sodium  40 mg Oral BID AC   • Heparin Sodium (Porcine)  5,000 Units Subcutaneous 2 times per day   • Sertraline HCl  100 mg Oral Daily   • Potassium Chloride ER  20 mEq Oral Daily     traMADol HCl, Normal Saline F

## 2019-07-01 NOTE — PLAN OF CARE
RRT    *See RRT Documentation Record*    Reason the RRT was called: at 0645 RRT called for chest pain 7/10  Assessment of patient leading up to RRT: BP elevated, PRN hydralazine given, MD notified of elevated BP  Interventions/Testing: EKG, troponin, nitro

## 2019-07-01 NOTE — PLAN OF CARE
Problem: Patient/Family Goals  Goal: Patient/Family Long Term Goal  Description  Patient's Long Term Goal: strength to improve    Interventions:  - IV antibiotics  - Ambulating as tolerated  - Monitor VS  - Monitor for dizziness   - Fall precautions   - goal  Description  INTERVENTIONS:  - Encourage pt to monitor pain and request assistance  - Assess pain using appropriate pain scale  - Administer analgesics based on type and severity of pain and evaluate response  - Implement non-pharmacological measures

## 2019-07-01 NOTE — OCCUPATIONAL THERAPY NOTE
RN cleared pt for OT session, however pt declined at this time reporting she had been up in the chair this morning and had just returned to bed and wanting to rest. Will cont to follow.

## 2019-07-01 NOTE — PROGRESS NOTES
[facility]    Progress Note    Lainey Mo Patient Status:  Observation    11/3/1940 MRN [id number]   Location [address] [facility] Attending Riaz Nina MD   Hosp Day # 0 PCP Jose Snell MD       Subjective:    Juan Carlos Garzon carvedilol  6.25 mg Oral BID with meals   • cholecalciferol  1,000 Units Oral Daily   • gabapentin  100 mg Oral TID   • Levothyroxine Sodium  100 mcg Oral Before breakfast   • Pantoprazole Sodium  40 mg Oral BID AC   • Heparin Sodium (Porcine)  5,000 Units unspecified type  - resolving   - secondary to uti  - heplock IV     Morbid obesity  - BMI 44  - counseled on diet and exercise     DVT proph- heparin    Full code     >35 min spent with patient.  > 50% time was spent counseling patient, discussing plan of

## 2019-07-01 NOTE — WOUND PROGRESS NOTE
Pt see per MD request for \"bilateral legs\". Pt was sitting up in the chair, feet on floor. Pt agreeable to assessment. Pt has bilateral feet pitting edema, no wounds noted, feet are warm.  The bilateral lower legs have dry, scaly skin, no wounds, slightly

## 2019-07-01 NOTE — SIGNIFICANT EVENT
RRT called to room 46.    53-year-old female complaining of chest discomfort. Describes it as substernal heaviness in her chest with no clear aggravating or relieving factors.   Claims it is associated with shortness of breath however describes it as like

## 2019-07-01 NOTE — CM/SW NOTE
Patient is current with [address] RN PT OT    Updates sent to [address] [place] [address]  Phone # [phone]

## 2019-07-02 NOTE — CM/SW NOTE
[facility] notified to continue Shara Lin services at discharge    Gilford Shield, [address] [place] [address]  Phone # [phone]

## 2019-07-02 NOTE — DISCHARGE SUMMARY
[facility]    Discharge Summary    Cami Livingstoning Patient Status:  Observation    11/3/1940 MRN [id number]   Location [facility]W Attending Sandeep Sales MD   Hosp Day # 0 PCP Val Sagastume MD     Date of Admission: / anitbiotics  - more likely asymptomatic bactiruria   - ID consulted- appreciate recs   - follow up urine cx and sensitivity- noted   - jagjit IV   - start oxybutyninl for urinary incontinence         Chest pain  - resolved   - workup negative      Warren tablet (0.1 mg total) by mouth 2 (two) times daily. furosemide 20 MG Oral Tab  Take 1 tablet (20 mg total) by mouth daily.     Levothyroxine Sodium 100 MCG Oral Tab  TAKE 1 TABLET BY MOUTH EVERY MORNING    ASPIRIN ADULT LOW STRENGTH 81 MG Oral Tab EC  TA carvedilol 6.25 MG Tabs  Commonly known as:  COREG      TAKE 1 TABLET BY MOUTH TWICE DAILY   Quantity:  60 tablet  Refills:  11     cloNIDine HCl 0.1 MG Tabs  Commonly known as:  CATAPRES      Take 1 tablet (0.1 mg total) by mouth 2 (two) times daily. Where to Get Your Medications      These medications were sent to [address] [facility] [phone], [address], [place]  [address]    Phone:  [phone]   · Oxybutynin Chloride ER 5 MG Tb24

## 2019-07-02 NOTE — PROGRESS NOTES
Patient discharged back to [facility]. RN did discharge teaching with patient, understands to get script per patient \"[facility] takes care of that\", verified the pharmacy the medication send to is the one contracted with [facility] with patient.  Patient initiall

## 2019-07-02 NOTE — CONSULTS
INFECTIOUS DISEASE CONSULT NOTE    [facility] Patient Status:  Observation    11/3/1940 MRN [id number]   Location [facility]5W Attending Elian Fish MD   Hosp Day # 0 PCP Noemi Mobley • Iron deficiency anemia    • Osteoarthrosis, unspecified whether generalized or localized, unspecified site    • Other and unspecified hyperlipidemia    • PONV (postoperative nausea and vomiting)    • Renal insufficiency    • Scoliosis    • SEASONAL ALL HCl (ULTRAM) tab 50 mg, 50 mg, Oral, Q6H PRN  •  allopurinol (ZYLOPRIM) tab 200 mg, 200 mg, Oral, Daily  •  amLODIPine Besylate (NORVASC) tab 10 mg, 10 mg, Oral, Daily  •  aspirin EC tab 81 mg, 81 mg, Oral, Daily  •  carvedilol (COREG) tab 6.25 mg, 6.25 mg (4' 9\"), weight 200 lb (90.7 kg), SpO2 97 %. General: Alert, oriented, NAD  HEENT: Moist mucous membranes. EOMI. No oral lesions  Neck: No lymphadenopathy. Supple. Respiratory: Clear to auscultation bilaterally. No wheezes. No rhonchi.   Chani Barbour Patient states that she overall feels better and unable to find exactly feels better and states that she does feel safer being in the hospital.  When asked further she states that the urinary frequency has been going on for many months including fatigue an

## 2019-07-02 NOTE — PROGRESS NOTES
[facility] ID PROGRESS NOTE    Chani Everett Patient Status:  Observation    11/3/1940 MRN [id number]   Location [facility]5W Attending Anthony Ascencio MD   Hosp Day # 0 PCP Onesimo Patrick MD     Subjective:  Awake, no acute overnight events.  Has laparoscopic cholecystectomy     Memory problem     Rhabdomyolysis     Non-traumatic rhabdomyolysis     Bacteriuria     Abrasions of multiple sites     UTI (urinary tract infection)     Left hand weakness     Abdominal pain     CVA (cerebral vascular accid find exactly feels better and states that she does feel safer being in the hospital.  When asked further she states that the urinary frequency has been going on for many months including fatigue and states that the symptoms have not changed.   Of note aj

## 2019-07-03 NOTE — PROGRESS NOTES
Initial Post Discharge Follow Up   Discharge Date: 7/2/19  Contact Date: 7/3/2019    Consent Verification:  Assessment Completed With: Patient  HIPAA Verified?   Yes    Discharge Dx:  weakness      General:   • How have you been since your discharge from TAKE 1 CAPSULE BY MOUTH THREE TIMES DAILY Disp: 270 capsule Rfl: 1   HYDROcodone-acetaminophen  MG Oral Tab Take 1 tablet by mouth every 6 (six) hours as needed for Pain.  Disp: 10 tablet Rfl: 0   acetaminophen 500 MG Oral Tab Take 1,000 mg by mouth e are not missing anything?no  • Are there any reasons that keep you from taking your medication as prescribed? No  Are you having any concerns with constipation? No    Referrals/orders at D/C:  Home Health ordered at D/C? Yes   Has  been set up?   Yes   I complete med review but did confirm new medication which she will  today. She declined to schedule HFU at this time. NCM sent TE to PCP office to follow up on scheduling an HFU appt. Patient denied any questions or concerns at this time.         [x]

## 2019-07-05 NOTE — TELEPHONE ENCOUNTER
Pt contacted, appt scheduled for 7/9/19 at 11:00
Spoke to pt for TCM today. Pt does not have HFU appt scheduled at this time. TCM/HFU appt recommended by 7-9-2019 as pt is a high risk for readmission. Please advise.     BOOK BY DATE (last date for TCM):7-    Please f/u with pt and try to get her
To  - please call patient , needs jacobo for TCM before  7/9/2019- at great risk for readmission, last date for TCM is 7/16 thanks
no

## 2019-07-08 NOTE — PROGRESS NOTES
Shelly Yap is a 70year old female. Patient presents with:  TCM (Transition Of Care Management): 6/28-7/2 UTI and weakness. Patient asks if she should have labs done today.    Fatigue  Hypertension  Arthritis    HPI:   Patient presents with:  TCM (Trans tablet Rfl: 5   CARVEDILOL 6.25 MG Oral Tab TAKE 1 TABLET BY MOUTH TWICE DAILY Disp: 60 tablet Rfl: 11   POTASSIUM CHLORIDE ER 20 MEQ Oral Tab CR TAKE 1 TABLET BY MOUTH DAILY Disp: 30 tablet Rfl: 5   GABAPENTIN 100 MG Oral Cap TAKE 1 CAPSULE BY MOUTH THREE Diverticulosis of large intestine    • Esophageal reflux    • Fatigue    • High blood pressure    • High cholesterol    • HYPOTHYROIDISM    • Iron deficiency anemia    • Osteoarthrosis, unspecified whether generalized or localized, unspecified site    • Ot has been going to the hospital on repeated occasions and has been in a rehab center previously. Patient needs to get more rehab and get stronger and her assisted living facility.   I did explain to the patient that she continues to do poorly there we may n care  ? Education given to patient on self-management, independent living, and activities of daily living. ? Referrals as listed below in orders Assisted in scheduling required follow-up with community providers and services.     Medication Reconciliation: Oral Tab Take 1,000 mg by mouth every 8 (eight) hours as needed for Pain. ALLOPURINOL 300 MG Oral Tab TAKE 1 TABLET BY MOUTH DAILY   Pantoprazole Sodium 40 MG Oral Tab EC Take 1 tablet (40 mg total) by mouth 2 (two) times daily before meals.    AMLODIPINE knee replacement surgery (2006); electrocardiogram, complete (11-); total knee replacement; total hip replacement; spine surgery procedure unlisted; hysterectomy; cholecystectomy (08/25/2017); egd (12/17, 8/17); and removal gallbladder.     She famil normocephalic, ears and throat are clear  EYES: PERRLA, EOMI, conjunctiva are clear  NECK: supple, no adenopathy, no bruits  CHEST: no chest tenderness  LUNGS: clear to auscultation  CARDIO: RRR without murmur  GI: good BS's, no masses, HSM or tenderness Mortality: severe    Overall Risk:   severe    Patient seen within 7 days from date of discharge.      Anselmo Saba MD, 7/8/2019

## 2019-07-08 NOTE — PATIENT INSTRUCTIONS
1.  Patient is to continue her current diet, medication and activity. 2.  Patient is to have a CBC and BMP done today. 3.  I will plan to see the patient back in about 2 weeks. 4.  Patient to contact me if she has more problems.   5.  Patient needs to ha

## 2019-07-10 PROBLEM — R26.2 UNABLE TO AMBULATE: Status: ACTIVE | Noted: 2019-01-01

## 2019-07-10 PROBLEM — R52 INTRACTABLE PAIN: Status: ACTIVE | Noted: 2019-01-01

## 2019-07-10 NOTE — ED PROVIDER NOTES
Patient Seen in: [facility] Emergency Department    History   Patient presents with:  Fall (musculoskeletal, neurologic)    Stated Complaint: fall    HPI    68-year-old woman presents complaining of pain and injury to her left shoulder and left hi (EGD) N/A 8/23/2017    Performed by Jacobo Antonio MD at [facility] Left    • HYSTERECTOMY     • KNEE REPLACEMENT SURGERY  2006   • LAPAROSCOPIC CHOLECYSTECTOMY N/A 8/25/2017    Performed by Suresh Ricci MD at [address] [facility] Appropriate, not agitated      ED Course     Labs Reviewed   BASIC METABOLIC PANEL (8) - Abnormal; Notable for the following components:       Result Value    Glucose 124 (*)     Potassium 3.2 (*)     BUN 19 (*)     Creatinine 1.40 (*)     Calculated Osmol POTASSIUM   RAINBOW DRAW BLUE   RAINBOW DRAW LAVENDER   RAINBOW DRAW LIGHT GREEN   RAINBOW DRAW GOLD     EKG    Rate, intervals and axes as noted on EKG Report. Rate: 70  Rhythm: Sinus Rhythm  Reading: Sinus rhythm, rate of 70, nonspecific T changes.

## 2019-07-10 NOTE — ED INITIAL ASSESSMENT (HPI)
Patient complains of falling today, complains of L thigh pain, L shoulder pain, denies blood thinners today

## 2019-07-11 NOTE — CM/SW NOTE
Patient requesting assistance with new FCI, referral made to Christel Osei from [address] [place] [address] for Mom pt v/u.

## 2019-07-11 NOTE — OCCUPATIONAL THERAPY NOTE
OCCUPATIONAL THERAPY EVALUATION - INPATIENT     Room Number: 554/554-A  Evaluation Date: 7/11/2019  Type of Evaluation: Initial  Presenting Problem: s/p fall    Physician Order: IP Consult to Occupational Therapy  Reason for Therapy: ADL/IADL Dysfunction a OCCUPATIONAL THERAPY MEDICAL/SOCIAL HISTORY     Problem List  Principal Problem:    Fall, initial encounter  Active Problems:    Unable to ambulate    Intractable pain      Past Medical History  Past Medical History:   Diagnosis Date   • Anemia    • An facility  Home Layout: One level  Lives With: Staff 24 hours     Toilet and Equipment: Toilet riser  Shower/Tub and Equipment: Tub-shower combo; Shower chair;Grab bar;Hand-held showerhead       Occupation/Status: retired     Drives: No       Stairs in Home: CGA  Shower Transfer: min A  Chair Transfer: CGA    Bedroom Mobility: SBA with RW    BALANCE ASSESSMENT  Static Sitting: independent  Dynamic Sitting: supervision  Static Standing: SBA  Dynamic Standing: CGA    FUNCTIONAL ADL ASSESSMENT  Grooming: min A  F

## 2019-07-11 NOTE — CM/SW NOTE
07/11/19 1300   CM/SW Screening   Referral [facility] staff; Chart review;Nursing rounds   Patient's Mental Status Alert   Patient's [address] Name [address] [facility]

## 2019-07-11 NOTE — PROGRESS NOTES
[facility]    Progress Note    Alcus Shells Patient Status:  Observation    11/3/1940 MRN [id number]   Location [facility] 5SW/SE Attending Janeen Bright MD   Hosp Day # 0 PCP Steve Cruz MD       Subjective:    Jose Dang 7/10/2019 at 18:37          Xr Shoulder, Complete (min 2 Views), Left (cpt=73030)    Result Date: 7/10/2019  CONCLUSION:  1. No acute fracture dislocation. 2. Severe left shoulder osteoarthritis.  3. Healing subacute rib fractures involving the left 5th 6th anchoring screws and superimposed chronic bony ankylosis involving the visualized lumbar spine and lumbar sacral junction. Chronic bony ankylosis SI joints.   Marked osteopenia    Dictated by (CST): Luz Elena Sevilla MD on 7/10/2019 at 18:47     Approved

## 2019-07-11 NOTE — RESPIRATORY THERAPY NOTE
MISTY ASSESSMENT:    Pt does not have a previous diagnosis of MISTY. Pt does not routinely use a CPAP device at home. This pt is suspected to be at high risk for MISTY and sleep lab packet was not provided to patient for outpatient follow-up.     CPAP INITIATION:

## 2019-07-11 NOTE — ED NOTES
Per ER MD Isabel Rojas patient was ambulation tested. Upon getting her up with walker she states her legs are unable to move and make forward progress which is the same and main problem and reason she said she fell. Pt failed to ambulate self.  Pt was returned to c

## 2019-07-11 NOTE — PHYSICAL THERAPY NOTE
PHYSICAL THERAPY EVALUATION - INPATIENT     Room Number: 554/554-A  Evaluation Date: 7/11/2019  Type of Evaluation: Initial   Physician Order: PT Eval and Treat    Presenting Problem: fall at home; unable to ambulate; intractable pain  Reason for Therapy: Discharge Recommendations: Home with home health PT;24 hour care/supervision    PLAN  PT Treatment Plan: Bed mobility;Transfer training;Gait training;Patient education; Family education; Body mechanics; Endurance; Energy conservation;Balance training;Strengthe hyperlipidemia    • PONV (postoperative nausea and vomiting)    • Renal insufficiency    • Scoliosis    • SEASONAL ALLERGIES    • Skin cancer 03/2018    BCC-mid back   • Unspecified essential hypertension        Past Surgical History  Past Surgical History RESTRICTION                   PAIN ASSESSMENT  Rating: (did not rate)  Location: overall body pain  Management Techniques: Activity promotion; Body mechanics;Repositioning    COGNITION  · Following Commands:  follows one step commands consistently    [facility] assistance level: supervision with walker - rolling     Goal #2  Current Status    Goal #3 Patient is able to ambulate 50 feet with assist device: walker - rolling at assistance level: supervision   Goal #3   Current Status    Goal #4 Patient to demonstrat

## 2019-07-11 NOTE — PLAN OF CARE
Problem: MUSCULOSKELETAL - ADULT  Goal: Return mobility to safest level of function  Description  INTERVENTIONS:  - Assess patient stability and activity tolerance for standing, transferring and ambulating w/ or w/o assistive devices  - Assist with trans appropriate  Outcome: Progressing     Problem: SAFETY ADULT - FALL  Goal: Free from fall injury  Description  INTERVENTIONS:  - Assess pt frequently for physical needs  - Identify cognitive and physical deficits and behaviors that affect risk of falls.   -

## 2019-07-11 NOTE — ED NOTES
Orders for admission, patient is aware of plan and ready to go upstairs.  Any questions, please call ED RN lashae  at extension [phone]

## 2019-07-11 NOTE — H&P
[facility]    PATIENT'S NAME: Cody Aguilar   ATTENDING PHYSICIAN: Ann Marie Gr MD   PATIENT ACCOUNT#:   [de-identified]    LOCATION:  [place] [address]  MEDICAL RECORD #:   [id number]       YOB: 1940  ADMISSION DATE:       07/10/ arthroplasty, and ulnar nerve release. MEDICATIONS:  Please see medication reconciliation list.      ALLERGIES:  No known drug allergies. She had side effects to Compazine. FAMILY HISTORY:  Both parents  of heart disease.   Patient is not abl pain at the lateral and posterior aspect of her left shoulder and tenderness to palpation. No tenderness at the left humeral head area. She is able to lift her leg minimally on the left side. Able to lift her right leg straight on the right side.       A

## 2019-07-12 NOTE — PROGRESS NOTES
[facility]    Progress Note    Natividad Arana Patient Status:  Observation    11/3/1940 MRN [id number]   Location [facility] 5SW/SE Attending Demi Hoffman MD   Hosp Day # 0 PCP Elisa Sykes MD       Subjective:    Kelsey Faustin deficit. Cranial nerves intact.          Current Scheduled Inpatient Meds:     • [START ON 7/13/2019] amLODIPine Besylate  10 mg Oral Daily   • amLODIPine Besylate  5 mg Oral Once   • allopurinol  300 mg Oral Daily   • aspirin  81 mg Oral Daily   • cholecal (CST): Luz Baires MD on 7/10/2019 at 18:36     Approved by (CST): Luz Baires MD on 7/10/2019 at 18:37          Xr Shoulder, Complete (min 2 Views), Left (cpt=73030)    Result Date: 7/10/2019  CONCLUSION:  1. No acute fracture dislocation.  2. [employer] wires remain in place. 3. Extensive lumbar sacral spinal fusion hardware with bilateral iliac anchoring screws and superimposed chronic bony ankylosis involving the visualized lumbar spine and lumbar sacral junction. Chronic bony ankylosis SI joints.   Mar

## 2019-07-12 NOTE — PLAN OF CARE
Problem: Patient Centered Care  Goal: Patient preferences are identified and integrated in the patient's plan of care  Description  Interventions:  - What would you like us to know as we care for you?   - Provide timely, complete, and accurate informatio Functional Mobility  Goal: Achieve highest/safest level of mobility/gait  Description  Interventions:  - Assess patient's functional ability and stability  - Promote increasing activity/tolerance for mobility and gait  - Educate and engage patient/family i patient/family/discharge partner in discharge planning  - Arrange for needed discharge resources and transportation as appropriate  - Identify discharge learning needs (meds, wound care, etc)  - Arrange for interpreters to assist at discharge as needed  -

## 2019-07-13 NOTE — DISCHARGE SUMMARY
[facility]  Discharge Summary     Layla Booth  : 11/3/1940    Status: Observation  Day #: 0    Attending: Candie Herron MD  PCP: Charmayne Applebaum, MD     Date of Admission: 7/10/2019  Date of Discharge: 2019     Hospital Discharge depression  - continue sertraline      Physical Exam   Blood pressure (!) 182/63, pulse 84, temperature 98.3 °F (36.8 °C), temperature source Oral, resp. rate 18, height 4' 9\" (1.448 m), weight 200 lb 6.4 oz (90.9 kg), SpO2 95 %.   General:  Alert, no dist known as:  LASIX      Take 1 tablet (20 mg total) by mouth daily.    Quantity:  30 tablet  Refills:  0     gabapentin 100 MG Caps  Commonly known as:  NEURONTIN      TAKE 1 CAPSULE BY MOUTH THREE TIMES DAILY   Quantity:  270 capsule  Refills:  1     HYDROco

## 2019-07-13 NOTE — PROGRESS NOTES
[facility]    CDI Prediction Tool Protocol (Vancomycin Initiated)    OVP (oral vancomycin prophylaxis) 125 mg PO Daily is being started in this patient based on a score of 26.1.       Score Breakdown:  History of CDI within 1 year AND high risk a

## 2019-07-13 NOTE — CM/SW NOTE
07/13/19 1400   Discharge disposition   Expected discharge disposition Home-Health   Name of Facillity/Home Care/Hospice [address]   Discharge transportation Other (comment)  ([facility] transport at [address] [place] [address])   MDO received for discharge.    Patient discharg

## 2019-07-13 NOTE — PLAN OF CARE
Problem: Patient Centered Care  Goal: Patient preferences are identified and integrated in the patient's plan of care  Description  Interventions:  - What would you like us to know as we care for you?   - Provide timely, complete, and accurate informatio Monitor for opioid side effects  - Notify MD/LIP if interventions unsuccessful or patient reports new pain  - Anticipate increased pain with activity and pre-medicate as appropriate  Outcome: Progressing     Problem: SAFETY ADULT - FALL  Goal: Free from fa assistive devices  - Assist with transfers and ambulation using safe patient handling equipment as needed  - Ensure adequate protection for wounds/incisions during mobilization  - Obtain PT/OT consults as needed  - Advance activity as appropriate  - Commun

## 2019-07-14 NOTE — TELEPHONE ENCOUNTER
Please call pt and notify her that her recent Urine C+S has turned out well. No UTI noted. It is OK to finish her current course or antibiotics. I will route this to nursing.   Thank you!!

## 2019-07-15 NOTE — PROGRESS NOTES
Melody Patiño [phone] for post hospital follow up, [facility] contact information provided.

## 2019-07-16 NOTE — PROGRESS NOTES
Mateus Canales [phone] for post hospital follow up, [facility] contact information provided.

## 2019-07-17 NOTE — TELEPHONE ENCOUNTER
[facility] home health orders to transfer care to [address] [place] [address] 7/10/19 signed by Dr. Angelo Vera and faxed back to [phone]. Fax confirmation received. Copy to scanning.

## 2019-07-17 NOTE — TELEPHONE ENCOUNTER
[facility] health orders 7/3/19 reviewed and signed by Dr. Paz Buchanan and faxed back to [phone]. Fax confirmation received. Copy to scanning.

## 2019-07-17 NOTE — TELEPHONE ENCOUNTER
[facility] orders for recertification 4/7/54 signed by Dr. Gibson Arvizu and faxed back to [phone]. Fax confirmation received. Copy to scanning.

## 2019-07-17 NOTE — TELEPHONE ENCOUNTER
[facility] orders reviewed and signed by Dr. Azul Carrillo. Faxed to [phone]. Fax confirmation received. Copy to scanning.

## 2019-07-23 NOTE — TELEPHONE ENCOUNTER
Kp Naranjo from [address] calling to inform Dr. Reggie Alfaro that patient is resuming home health care with nursing, PT and OT.     Kp Naranjo from [facility] [phone]

## 2019-07-25 NOTE — PROGRESS NOTES
Fatuma Whittaker is a 70year old female. Patient presents with:  Checkup: 2 week  Fatigue  Hypertension  Arthritis    HPI:   Patient presents with:  Checkup: 2 week  Fatigue  Hypertension  Arthritis    Pt is doing OK at the [facility].   Pt is supposed to be st mouth daily.  Disp: 30 tablet Rfl: 0   Levothyroxine Sodium 100 MCG Oral Tab TAKE 1 TABLET BY MOUTH EVERY MORNING Disp: 30 tablet Rfl: 0   ASPIRIN ADULT LOW STRENGTH 81 MG Oral Tab EC TAKE 1 TABLET BY MOUTH DAILY Disp: 90 tablet Rfl: 3   Ondansetron HCl (ZO Location: Right arm, Patient Position: Sitting, Cuff Size: large)   Pulse 60   Temp 97.5 °F (36.4 °C) (Oral)   Wt 198 lb (89.8 kg)   LMP  (LMP Unknown)   SpO2 97%   BMI 42.85 kg/m²   GENERAL: Chronically ill white female in no acute distress who is seen in support. The patient indicates understanding of these issues and agrees to the plan. The patient is asked to return in 1 month as noted above.     Neto Karimi MD  7/25/2019  3:34 PM

## 2019-07-25 NOTE — PATIENT INSTRUCTIONS
1.  Patient is to continue her current diet, medications and activity. 2.  Patient is to get rehabilitation services at the [facility] which should include a visiting nurse, Occupational Therapy and physical therapy.   3.  I will see the patient back in 1 mon no

## 2019-07-26 NOTE — PROGRESS NOTES
Several attempts made to reach the patient with no return call. Patient completed HFU on 7/25/2019. Closing encounter.

## 2019-07-28 NOTE — ED PROVIDER NOTES
Patient Seen in: [facility] AND [facility] Emergency Department    History   Patient presents with:  Swelling Edema (cardiovascular, metabolic)    Stated Complaint: BLE edema    HPI    68-year-old female with past medical history significant for anemia, arthriti Scanned to media tab - DOS 11-   • ESOPHAGOGASTRODUODENOSCOPY (EGD) N/A 3/25/2019    Performed by Abhinav Gorman MD at [address] [place] [address] ENDOSCOPY   • ESOPHAGOGASTRODUODENOSCOPY (EGD) N/A 12/8/2017    Performed by Golden Shannon MD at [address] [facility] Nontender. Nondistended. Soft. Bowel sounds are normal.   Back:   :    Musculoskeletal: 2+ pitting edema in bilateral lower extremities with some areas of mild skin breakdown and weeping serous fluid, no significant erythema, bilateral mild venous stasis (cpt=71045)    Result Date: 7/28/2019  CONCLUSION:  1. No acute cardiopulmonary finding. 2. Stable mild cardiomegaly.     Dictated by (CST): Berto Washburn MD on 7/28/2019 at 16:09     Approved by (CST): Berto Washburn MD on 7/28/2019 at 16:11

## 2019-07-28 NOTE — PROGRESS NOTES
07/28/19 1800   Clinical Encounter Type   Visited With Patient   Routine Visit Introduction   Continue Visiting No   Crisis Visit ED   Referral From Patient; Family   Referral To    Spiritism Encounters   Spiritism Needs Prayer   Spiritual Reques

## 2019-07-28 NOTE — ED INITIAL ASSESSMENT (HPI)
Increasing BLE edema for 2 days with weeping blisters, and \"heaviness\" with breathing. Pt on lasix, denies CHF.

## 2019-07-29 NOTE — TELEPHONE ENCOUNTER
Spoke with patient. She states she spoke with Dr Elizabeth Mcallister over the weekend because she had a lot of swelling in her legs. She states Dr Elizabeth Mcallister told her she could take as many as 8 furosemide pills to see if it would help.  She normally takes one 20 mg tab

## 2019-07-29 NOTE — TELEPHONE ENCOUNTER
Pt was in the hospital yesterday for water retention. she is home now like to speak to  regarding her medications. Pt stated its urgent.

## 2019-07-29 NOTE — PROGRESS NOTES
Spoke with patient on multiple occasions both Saturday and Sunday of this weekend, increased her diuretic dosage secondary to upper and lower extremity edema, as this worsened as of Sunday she was directed to present to the emergency room for IV diuretics.

## 2019-08-02 NOTE — TELEPHONE ENCOUNTER
Pt called to follow up on her feet  Swelling does go down when she has her feet elevated but when she is up and about they swell up again     Anything else she should be doing    [phone]

## 2019-08-02 NOTE — TELEPHONE ENCOUNTER
[facility] HH forms signed and faxed back to [phone], conformation received. [facility]

## 2019-08-02 NOTE — TELEPHONE ENCOUNTER
Would recommend keeping legs elevated as much as possible when she is sitting.    During the day if she is going to be walking, would recommend trying to wear support hose or stockings

## 2019-08-05 PROBLEM — I48.91 ATRIAL FIBRILLATION, NEW ONSET (HCC): Status: ACTIVE | Noted: 2019-01-01

## 2019-08-05 PROBLEM — R07.9 ACUTE CHEST PAIN: Status: ACTIVE | Noted: 2019-01-01

## 2019-08-05 NOTE — H&P
[facility]    PATIENT'S NAME: Dawn Vázquez   ATTENDING PHYSICIAN: Sherry Kyle MD   PATIENT ACCOUNT#:   [de-identified]    LOCATION:  3[facility] 9 Deidre Bolton #:   [id number]       YOB: 1940  ADMISSION DATE:       08 eye, there does not appear to be any significant change. Her sodium was 131, potassium was 3.0, BUN was 21 with a creatinine of 1.53. GFR was 33.   Her CBC was essentially normal.  She received IV labetalol in the emergency room for an elevated blood pres tele.  She did have uncontrolled hypertension during that admission as well and had manipulation of her home medications.       PAST SURGICAL HISTORY:  Laparoscopic cholecystectomy, multiple extensive lumbar and thoracic laminectomies and fusions, bilateral There is no sinus tenderness. Mucous membranes are moist.  NECK:  Supple. LUNGS:  Clear with easy respiratory excursions. No wheezes or rhonchi. HEART:  Regular rate and rhythm. Normal S1, S2.  ABDOMEN:  Soft, nontender with normoactive bowel sounds. wall motion abnormalities. It was not a technically sufficient study to allow for evaluation of LV diastolic function. There was a severely dilated left atrium, which is likely contributing to the onset of her atrial fibrillation.   However, we will also

## 2019-08-05 NOTE — ED NOTES
Original entered weight estimated at 90.7kg, actual weight 89.9. Okay to continue heparin as ordered per Dr. Mavis Murphy.

## 2019-08-05 NOTE — PROGRESS NOTES
[facility]    Progress Note    Mavis Parent Patient Status:  Inpatient    11/3/1940 MRN [id number]   Location [facility] 3W/SW Attending Danilo Frazier MD   Hosp Day # 0 PCP Heidi Abdullahi MD       Subjective:    Theron Ribeiro intact.          Current Scheduled Inpatient Meds:     • allopurinol  300 mg Oral Daily   • cholecalciferol  1,000 Units Oral Daily   • furosemide  20 mg Oral Daily   • gabapentin  100 mg Oral TID   • Levothyroxine Sodium  100 mcg Oral Before breakfast   • Cont coreg increase to 12.5mg BID,     A. Fib with RVR  - seen by EP  - increase coreg to 12.5mg BID  - start multaq 400mg BID  - start hydralazine 10mg PO TID  - stop IV heparin. Start eliquis. Chronic HFpEF  - .   - Cont home cardiac meds.

## 2019-08-05 NOTE — ED INITIAL ASSESSMENT (HPI)
Patient brought in by EMS, patient has bilateral pitting edema and difficulty breathing starting today.

## 2019-08-05 NOTE — ED PROVIDER NOTES
Patient Seen in: [facility] AND [facility] Emergency Department    History   Patient presents with:  Dyspnea ROSSI SOB (respiratory)  Swelling Edema (cardiovascular, metabolic)    Stated Complaint: shortness of breath, pitting edema.      HPI    Patient is a 78-yea Deb Mcdermott MD at [facility] ENDOSCOPY   • ESOPHAGOGASTRODUODENOSCOPY (EGD) N/A 8/23/2017    Performed by Deb Mcdermott MD at [facility] ENDOSCOPY   • HIP REPLACEMENT SURGERY Left    • HYSTERECTOMY     • KNEE REPLACEMENT SURGERY  2006   • [facility] GFR, -American 38 (*)     All other components within normal limits   PRO BETA NATRIURETIC PEPTIDE - Abnormal; Notable for the following components:    Pro-Beta Natriuretic Peptide 748 (*)     All other components within normal limits   CBC W/ D [phone] ext 1 and ask for the next available radiologist.       Rosiland Carrel, M.D. Radiology exams  Viewed and reviewed by myself and findings discussed with patient including need for follow up    Critical Care:   I spent a total of 60 minutes of

## 2019-08-05 NOTE — PLAN OF CARE
Problem: Patient Centered Care  Goal: Patient preferences are identified and integrated in the patient's plan of care  Description  Interventions:  - What would you like us to know as we care for you?  Legs are weaker as of late, needing the wheel chair m respiratory effort  - Oxygen supplementation based on oxygen saturation or ABGs  - Provide Smoking Cessation handout, if applicable  - Encourage broncho-pulmonary hygiene including cough, deep breathe, Incentive Spirometry  - Assess the need for suctioning patient/family  Outcome: Progressing  Pt is alert and oriented, but forgetful. BP remains elevated. Scheduled clonidine given per MD   Heparin drip infusing at 10ml.  Jhoana Balloon in place per patient request. Plan for 2D echo in AM

## 2019-08-05 NOTE — CONSULTS
[facility] - [facility]    Cardiology Consultation    Mitra Kenty Location: [facility] 3W/SW    11/3/1940 MRN [id number]   Consulting Date 2019 [facility] [id number]   Consulting Physician Sanjay Sadler MD Attending Physician Abel Cohn, Other and unspecified hyperlipidemia    • PONV (postoperative nausea and vomiting)    • Renal insufficiency    • Scoliosis    • SEASONAL ALLERGIES    • Skin cancer 03/2018    BCC-mid back   • Unspecified essential hypertension      Past Surgical History: MG Oral Tab Take 1 tablet by mouth every 6 (six) hours as needed for Pain.  Disp: 120 tablet Rfl: 0   SERTRALINE  MG Oral Tab TAKE 1 TABLET BY MOUTH DAILY AT BEDTIME Disp: 30 tablet Rfl: 5   CARVEDILOL 6.25 MG Oral Tab TAKE 1 TABLET BY MOUTH TWICE DA +heartburn/GERD  : no dysuria or hematuria  NEURO: occas headaches, prior stroke, no current focal weaknesses or paresthesias    Physical Exam:  Vital Signs: BP (!) 175/67 (BP Location: Right arm)   Pulse 80   Temp 98.6 °F (37 °C) (Oral)   Resp 15   Ht 5

## 2019-08-06 NOTE — TREATMENT PLAN
RRT    *See RRT Documentation Record*    Reason the RRT was called: chest pain   Assessment of patient leading up to RRT: Vitals, including pain, EKG, nitro, morphine   Interventions/Testing: no additional tests/orders   Patient Outcome/Disposition: Client

## 2019-08-06 NOTE — PROGRESS NOTES
[facility]    Progress Note    Ama Sharp Patient Status:  Inpatient    11/3/1940 MRN [id number]   Location [facility] 3W/SW Attending Khadra Munoz MD   [facility] Day # 1 PCP Yadiel Pelletier MD       Subjective:    Lenice Dies Th venous stasis changes. 1+ pitting edema B/L LE's. NEUROLOGICAL:  There was no focal deficit. Cranial nerves intact.          Current Scheduled Inpatient Meds:     • piperacillin-tazobactam  3.375 g Intravenous Q8H   • vancomycin HCl  125 mg Oral Daily   • Agnieszka Sánchez MD on 8/05/2019 at 14:59          Xr Chest Ap Portable  (cpt=71045)    Result Date: 8/6/2019  CONCLUSION:  1. Markings appear slightly more prominent which may relate to limited inspiration or CHF.  2. Question early pneumonia right upper - on PO lasix. Other medical problems  H/o CVA  HTN  Hyperlipidemia  Major depression     Deconditioning  - PT/OT - rec SNF.   -  for d/c planning. Quality:  · Diet: AHA  · PT/OT: SNF  · DVT Prophylaxis: elquis  · CODE status: full.    · Dispo:

## 2019-08-06 NOTE — PHYSICAL THERAPY NOTE
PHYSICAL THERAPY EVALUATION - INPATIENT     Room Number: 326/326-A  Evaluation Date: 8/5/2019  Type of Evaluation: Initial   Physician Order: PT Eval and Treat    Presenting Problem: chest heaviness, dyspnea, LE edema, and a-fib  Reason for Therapy: [facility])      Past Medical History  Past Medical History:   Diagnosis Date   • Anemia    • Anesthesia complication    • Arthritis     feet   • BACK PAIN    • Back problem    • Urban's esophagus    • Blood disorder    • [facility] for past two weeks, has required escort to wheel her down to dining raza due to above complications and decreased activity tolerance. Received assist two times per day for dressing and showering.     SUBJECTIVE  \"I didn't sleep well last night, but I will length B)          Bed Mobility: Mod A supine to sit    Transfers: Min A sit to stand from bed    Exercise/Education Provided:  Ankle pumps, hip abd/add, L UE ROM to shoulder and elbow x 10 seated in chair    Patient End of Session: Up in chair;Needs met;C

## 2019-08-06 NOTE — PROGRESS NOTES
[facility]    CDI Prediction Tool Protocol (Vancomycin Initiated)    OVP (oral vancomycin prophylaxis) 125 mg PO Daily is being started in this patient based on a score of 26.1.       Score Breakdown:  History of CDI within 1 year AND high risk a

## 2019-08-06 NOTE — PLAN OF CARE
Problem: Patient Centered Care  Goal: Patient preferences are identified and integrated in the patient's plan of care  Description  Interventions:  - What would you like us to know as we care for you?   - Provide timely, complete, and accurate informatio Provide Smoking Cessation handout, if applicable  - Encourage broncho-pulmonary hygiene including cough, deep breathe, Incentive Spirometry  - Assess the need for suctioning and perform as needed  - Assess and instruct to report SOB or any respiratory diff Patient/Family Long Term Goal  Description  Patient's Long Term Goal: to be discharged back to assisted living    Interventions:  - discharge planning  -provide teachings about heart failure  -provide teachings about heart medications  -advance activity as

## 2019-08-06 NOTE — PROGRESS NOTES
[facility]OSP - [facility]    Cardiology - [facility]  [place] Note    Lovellasmita Crump Patient Status:  Inpatient    11/3/1940 MRN [id number]   Location [facility] 3W/SW Attending Sajan Diaz MD   Hosp Day # 1 PCP Evelia Solano MD       A moist.  Oropharynx is clear. Neck:  Supple, no LAD   Lungs: Decr bs at both bases, bilat crackles at bases. No wheeze. Cardiac: JVP is 12 cmH2O.   Carotids normal pulses without bruits; RRR, nl S1/S2, 1+ BLE edema, with chronic stasis changes, thickening, Scoliosis. 9. Osteoarthritis. 10. A preliminary report was submitted and there is agreement without major discrepancies. Dictated by (CST): Alessandro Horan MD on 8/06/2019 at 6:44     Approved by (CST):  Alessandro Horan MD on 8/06/2019 at 6:47

## 2019-08-06 NOTE — PROGRESS NOTES
08/06/19 0326   Provider Notification   Reason for Communication Change in status   Provider Name Serene Flores MD   Method of Communication Page   Response Waiting for response   Notification Time [phone]     Patient reported increased SOB.  Stated

## 2019-08-06 NOTE — PLAN OF CARE
Problem: Patient Centered Care  Goal: Patient preferences are identified and integrated in the patient's plan of care  Description  Interventions:  - What would you like us to know as we care for you?  I have chronic back pain   - Provide timely, complete oxygen saturation or ABGs  - Provide Smoking Cessation handout, if applicable  - Encourage broncho-pulmonary hygiene including cough, deep breathe, Incentive Spirometry  - Assess the need for suctioning and perform as needed  - Assess and instruct to repor Patient/Family Goals  Goal: Patient/Family Long Term Goal  Description  Patient's Long Term Goal: to be discharged back to assisted living    Interventions:  - discharge planning  -provide teachings about heart failure  -provide teachings about heart medic

## 2019-08-06 NOTE — OCCUPATIONAL THERAPY NOTE
Attempted OT evaluation. At first attempt, RN reported that pt was still sleeping. When checked again, pt was out of the room at Nuclear medicine and non-invasive cardiology. Not available for OT evaluation.     Sathish Power MA, OTR/L  Occupational Ther

## 2019-08-07 NOTE — PROGRESS NOTES
[facility]    Progress Note    Mavis Parent Patient Status:  Inpatient    11/3/1940 MRN [id number]   Location [facility] 3W/SW Attending Jacquelyn Fish MD   Hosp Day # 2 PCP Heidi Abdullahi MD       Subjective:    Theron Ocampo The Chest Ap Portable  (cpt=71045)    Result Date: 8/6/2019  CONCLUSION:  1. Increased interstitial markings and pulmonary vascular congestion since prior exam from earlier today. Borderline cardiac enlargement.   Correlate for increase fluid volume status of • vancomycin HCl  125 mg Oral Daily   • carvedilol  25 mg Oral BID with meals   • allopurinol  300 mg Oral Daily   • cholecalciferol  1,000 Units Oral Daily   • furosemide  20 mg Oral Daily   • gabapentin  100 mg Oral TID   • Levothyroxine Sodium  100 mc

## 2019-08-07 NOTE — PHYSICAL THERAPY NOTE
PHYSICAL THERAPY TREATMENT NOTE - INPATIENT     Room Number: 326/326-A       Presenting Problem: chest heaviness, dyspnea, LE edema, and a-fib    Problem List  Principal Problem:    Essential hypertension  Active Problems:    Acute chest pain    Atrial fib Restriction: None                PAIN ASSESSMENT   Rating: Unable to rate  Location: (L anterior thigh)  Management Techniques: Relaxation    BALANCE walker - rolling at assistance level: supervision   Goal #3   Current Status Not tested    Goal #4 Patient to demonstrate independence with home activity/exercise instructions provided to patient in preparation for discharge.       Goal #4   Current Status

## 2019-08-07 NOTE — PROGRESS NOTES
[facility] HOSP - [facility]    Cardiology - [facility]  Progress Note    David Dwyer Patient Status:  Inpatient    11/3/1940 MRN [id number]   Location [facility] 3W/SW Attending Maeve Flores MD   Hosp Day # 2 PCP Khang Calderon MD       A no acute distress   HEENT: Atraumatic. No scleral icterus. Mucous membranes are moist.  Oropharynx is clear. Neck:  Supple, no LAD   Lungs: Decr bs at both bases, bilat crackles at bases. No wheeze. Cardiac: JVP is 12 cmH2O.   Carotids normal pulses wit

## 2019-08-07 NOTE — OCCUPATIONAL THERAPY NOTE
OCCUPATIONAL THERAPY EVALUATION - INPATIENT     Room Number: 326/326-A  Evaluation Date: 8/7/2019  Type of Evaluation: Initial  Presenting Problem: (Chest heavines)    Physician Order: IP Consult to Occupational Therapy  Reason for Therapy: ADL/IADL Dysfun patient status and plan. DISCHARGE RECOMMENDATIONS  OT Discharge Recommendations: 24 hour care/supervision;Sub-acute rehabilitation       PLAN  OT Treatment Plan: Balance activities; Energy conservation/work simplification techniques;ADL training;Functi MD at [facility] ENDOSCOPY   • HIP REPLACEMENT SURGERY Left    • HYSTERECTOMY     • KNEE REPLACEMENT SURGERY  2006   • LAPAROSCOPIC CHOLECYSTECTOMY N/A 8/25/2017    Performed by Raquel Smalls MD at [facility] MAIN OR   • REMOVAL GALLBLADDER     • [address] [facility] Impairment: 63.03%  Standardized Score (AM-PAC Scale): 32.03  CMS Modifier (G-Code): CL    FUNCTIONAL TRANSFER ASSESSMENT  Supine to Sit : Moderate assistance(x2)  Sit to Stand:  Moderate assistance(x2)  Toilet Transfer: NT  Shower Transfer: NT  Chair Trans

## 2019-08-08 NOTE — PROGRESS NOTES
[facility] Pharmacy Note:  Renal Adjustment for levofloxacin [facility])    Chani Everett is a 70year old female who has been prescribed levofloxacin (LEVAQUIN) 500 mg every 24 hrs. CrCl is estimated creatinine clearance is 30.4 mL/min (A) (based on SCr of 2. 2

## 2019-08-08 NOTE — PHYSICAL THERAPY NOTE
PHYSICAL THERAPY TREATMENT NOTE - INPATIENT     Room Number: 326/326-A       Presenting Problem: chest heaviness, dyspnea, LE edema, and a-fib    Problem List  Principal Problem:    Essential hypertension  Active Problems:    Acute chest pain    Atrial fib need...   -   Moving to and from a bed to a chair (including a wheelchair)?: A Lot   -   Need to walk in hospital room?: Total   -   Climbing 3-5 steps with a railing?: Total     AM-PAC Score:  Raw Score: 10   Approx Degree of Impairment: 76.75%   Standard

## 2019-08-09 NOTE — PLAN OF CARE
Problem: Patient Centered Care  Goal: Patient preferences are identified and integrated in the patient's plan of care  Description  Interventions:  - What would you like us to know as we care for you?  I have chronic back pain   - Provide timely, complete review patient's medication profile  - Implement strategies to promote bladder emptying  Outcome: Progressing

## 2019-08-09 NOTE — PHYSICAL THERAPY NOTE
PHYSICAL THERAPY TREATMENT NOTE - INPATIENT     Room Number: 326/326-A       Presenting Problem: chest heaviness, dyspnea, LE edema, and a-fib    Problem List  Principal Problem:    Essential hypertension  Active Problems:    Acute chest pain    Atrial fib this level of impairment may benefit from LTAC. Pt is TOTAL A x 2 this session for mobility d/t increased stiffness, pain, weakness. Pt was MOD IND to supervision with mobility prior to admission.  PT recommendation sub-acute rehab at d/c to address deficit with a railing?: Total     AM-PAC Score:  Raw Score: 9   Approx Degree of Impairment: 81.38%   Standardized Score (AM-PAC Scale): 30.55   CMS Modifier (G-Code): CM    FUNCTIONAL ABILITY STATUS  Gait Assessment   Gait Assistance: Not tested  Distance (ft):

## 2019-08-09 NOTE — PROGRESS NOTES
[facility]    Progress Note    Pamela Davison Patient Status:  Inpatient    11/3/1940 MRN [id number]   Location [facility] 3W/SW Attending Ayanna Hensley MD   Hosp Day # 4 PCP Hugo Topete MD       Subjective:    Juve Donn The Cardiomegaly. 4. Atherosclerosis. 5. Postop changes in the spine. 6. Demineralization. 7. Scoliosis. 8. Osteoarthritis. Dictated by (CST): Katia Stewart MD on 8/09/2019 at 16:27     Approved by (CST):  Katia Stewart MD on 8/09/2019 at 16:30 gaetano      Chronic venous stasis    - compresson stockings  - elevate ext.   - on PO lasix.      Other medical problems  H/o CVA  HTN  Hyperlipidemia  Major depression      Deconditioning  - PT/OT - rec SNF.   -  for d/c planning.      Quality:  · Diet: MARCOS

## 2019-08-09 NOTE — OCCUPATIONAL THERAPY NOTE
OCCUPATIONAL THERAPY TREATMENT NOTE - INPATIENT        Room Number: 326/326-A      Presenting Problem: (Chest heavines)    Problem List  Principal Problem:    Essential hypertension  Active Problems:    Acute chest pain    Atrial fibrillation, new onset (H RECOMMENDATIONS  OT Discharge Recommendations: 24 hour care/supervision;Sub-acute rehabilitation        PLAN  OT Treatment Plan: Balance activities; Energy conservation/work simplification techniques;ADL training;Functional transfer training; Endurance train aware of session/findings; All patient questions and concerns addressed    OT Goals:         Patient will complete functional transfer with min a   Comment: dep    Patient will complete toileting with min a   Comment: dep    Patient will tolerate standing f

## 2019-08-10 NOTE — PLAN OF CARE
Problem: Patient Centered Care  Goal: Patient preferences are identified and integrated in the patient's plan of care  Description  Interventions:  - What would you like us to know as we care for you?  I have chronic back pain   - Provide timely, complete oxygen saturation or ABGs  - Provide Smoking Cessation handout, if applicable  - Encourage broncho-pulmonary hygiene including cough, deep breathe, Incentive Spirometry  - Assess the need for suctioning and perform as needed  - Assess and instruct to repor

## 2019-08-10 NOTE — PROGRESS NOTES
[facility]    Progress Note    Cami Alamo Patient Status:  Inpatient    11/3/1940 MRN [id number]   Location [facility] 3W/SW Attending Sandeep Sales MD   Hosp Day # 5 PCP Val Sagastume MD       Subjective:    Howard Perez The 4. Atherosclerosis. 5. Postop changes in the spine. 6. Demineralization. 7. Scoliosis. 8. Osteoarthritis. Dictated by (CST): Josephine Denise MD on 8/09/2019 at 16:27     Approved by (CST):  Josephine Denise MD on 8/09/2019 at 16:30              • levo HTN  - cont hydralazine. - coreg to 25mg BID.   - isordil 10mg TID. - cont norvasc.         Chronic HFpEF  - .   - Cont home cardiac meds. - CXR clear.    - cards signed off  - hold lasix due to gaetano      Chronic venous stasis    - compresso

## 2019-08-10 NOTE — PROGRESS NOTES
[facility]    Progress Note    Jorge Schneider Patient Status:  Inpatient    11/3/1940 MRN [id number]   Location [facility] 3W/SW Attending Eugene Bowman MD   Hosp Day # 5 PCP Mona Coy MD       Subjective:    Gagandeep Correa The Grossly normal    Results:     Laboratory Data:  Lab Results   Component Value Date    WBC 10.0 08/10/2019    HGB 9.1 (L) 08/10/2019    HCT 28.9 (L) 08/10/2019    .0 08/10/2019    CREATSERUM 2.37 (H) 08/10/2019    BUN 27 (H) 08/10/2019     08/

## 2019-08-10 NOTE — CONSULTS
[facility]    PATIENT'S NAME: Doris Osullivan   ATTENDING PHYSICIAN: Raquel Ordoñez MD   CONSULTING PHYSICIAN: Branden Mason.  Dionte Rivera MD   PATIENT ACCOUNT#:   [id number]    LOCATION:  [address] [place] [address] #:   [id number]       DATE OF BIRTH: 300 per day, Ambien, Synthroid, Protonix, and Eliquis. SOCIAL HISTORY:  She does not smoke or drink. She is retired. FAMILY HISTORY:  Parents are . No renal disease. REVIEW OF SYSTEMS:  She currently feels pretty well.   She is breathin need Lasix when she goes home; she was taking 20 mg a day at home. There is evidence that she probably was in heart failure when she came in due to her elevated BNP of 750, and her chest x-ray which showed very slight fluid retention but nothing severe.

## 2019-08-11 NOTE — PLAN OF CARE
Pt's vital signs stable. Alert and oriented x4 although forgetful. PRN Norco provided for pain. Tele #[phone]; NSR. On 2L of O2 via nasal cannula. Pt stated having increased SOB this morning; O2 stable. Orders provided for 1x neb. Will continue to monitor.  Hermelinda vital signs, obtain 12 lead EKG if indicated  - Evaluate effectiveness of antiarrhythmic and heart rate control medications as ordered  - Initiate emergency measures for life threatening arrhythmias  - Monitor electrolytes and administer replacement therap appropriate  Outcome: Progressing     Problem: MUSCULOSKELETAL - ADULT  Goal: Return mobility to safest level of function  Description  INTERVENTIONS:  - Assess patient stability and activity tolerance for standing, transferring and ambulating w/ or w/o as

## 2019-08-11 NOTE — PROGRESS NOTES
[facility]    Progress Note    Suhail Cleveland Patient Status:  Inpatient    11/3/1940 MRN [id number]   Location [facility] 3W/SW Attending Loraine Bray MD   Hosp Day # 6 PCP Yue Gibbons MD       Subjective:    Lori Anedrson The Improved aeration at the left base    Dictated by (CST): Ricardo Santana MD on 8/10/2019 at 16:59     Approved by (CST): Ricardo Santana MD on 8/10/2019 at 17:03          Xr Chest Ap Portable  (cpt=71045)    Result Date: 8/9/2019  CONCLUSION: 1.  Sl fluid overload   - zosyn discontinued  - urine eosinophils pending  - repeat UA shows probable UTI  - ultrasound of kidneys negative for abnormalities   - bmp daily    Generalized weakness  - Please have PT/OT see  - up to chair BID   - will need rehab

## 2019-08-12 NOTE — PLAN OF CARE
Patient discharged on 8/12 at 4:15 pm via ambulance to [facility] rehab. Patient alert and oriented. Belongings gathered and sent with patient. Discharge instructions sent with patient. Prescriptions sent with patient. Tele monitor and IV removed.  Logan vasquez

## 2019-08-12 NOTE — PLAN OF CARE
Problem: Patient Centered Care  Goal: Patient preferences are identified and integrated in the patient's plan of care  Description  Interventions:  - What would you like us to know as we care for you?  I have chronic back pain   - Provide timely, complete, oxygen saturation or ABGs  - Provide Smoking Cessation handout, if applicable  - Encourage broncho-pulmonary hygiene including cough, deep breathe, Incentive Spirometry  - Assess the need for suctioning and perform as needed  - Assess and instruct to repor Patient/Family Goals  Goal: Patient/Family Long Term Goal  Description  Patient's Long Term Goal: to be discharged back to assisted living    Interventions:  - discharge planning  -provide teachings about heart failure  -provide teachings about heart medic

## 2019-08-12 NOTE — OCCUPATIONAL THERAPY NOTE
OCCUPATIONAL THERAPY TREATMENT NOTE - INPATIENT        Room Number: 326/326-A      Presenting Problem: (Chest heavines)    Problem List  Principal Problem:    Essential hypertension  Active Problems:    Acute chest pain    Atrial fibrillation, new onset (H training;Patient/Family education;Patient/Family training;Equipment eval/education    SUBJECTIVE  Patient is pleasant and cooperative    OBJECTIVE  Precautions: Bed/chair alarm    WEIGHT BEARING RESTRICTION  Weight Bearing Restriction: None Comment: tolerated standing with max a x2 for ~20 seconds            Goals  on:   Frequency: 3x a week    [facility] MOT/R  200  35 [facility]  #[id number]

## 2019-08-12 NOTE — PHYSICAL THERAPY NOTE
PHYSICAL THERAPY TREATMENT NOTE - INPATIENT     Room Number: 326/326-A       Presenting Problem: chest heaviness, dyspnea, LE edema, and a-fib    Problem List  Principal Problem:    Essential hypertension  Active Problems:    Acute chest pain    Atrial fib person does the patient currently need. ..   -   Moving to and from a bed to a chair (including a wheelchair)?: A Lot   -   Need to walk in hospital room?: A Lot   -   Climbing 3-5 steps with a railing?: Total     AM-PAC Score:  Raw Score: 11   Approx Degre

## 2019-08-12 NOTE — PROGRESS NOTES
[facility]    Progress Note    Zain Paezard Patient Status:  Inpatient    11/3/1940 MRN [id number]   Location [facility] 3W/SW Attending Matthew Estrada MD   Hosp Day # 6 PCP Will Snell MD       Subjective:    Donnie Baer The cyanosis  Neurological:  Grossly normal    Results:     Laboratory Data:  Lab Results   Component Value Date    WBC 10.0 08/11/2019    HGB 8.7 (L) 08/11/2019    HCT 27.8 (L) 08/11/2019    .0 08/11/2019    CREATSERUM 2.15 (H) 08/11/2019    BUN 28 (H)

## 2019-08-12 NOTE — DISCHARGE SUMMARY
[facility]    Discharge Summary    Marcio Ascencio Patient Status:  Inpatient    11/3/1940 MRN [id number]   Location [facility] 3W/SW Attending Leigh Nichols MD   Hosp Day # 7 PCP Cherie Garcia MD     Date of Admission:  weakness  - will discharge to rehab today      Acute UTI  - UA from yesterday reviewed  - gentle hydration for now   - continue levaquin q 48 hours      Afib Paroxysmal  - continue on multaq and eliquis     HTN  - cont hydralazine. - coreg to 25mg BID. Hr  Take 1 tablet (5 mg total) by mouth daily.     SERTRALINE  MG Oral Tab  TAKE 1 TABLET BY MOUTH DAILY AT BEDTIME    POTASSIUM CHLORIDE ER 20 MEQ Oral Tab CR  TAKE 1 TABLET BY MOUTH DAILY    GABAPENTIN 100 MG Oral Cap  TAKE 1 CAPSULE BY MOUTH THREE levofloxacin 750 MG Tabs  Commonly known as:  LEVAQUIN      Take 1 tablet (750 mg total) by mouth every other day.    Quantity:  4 tablet  Refills:  0        CHANGE how you take these medications      Instructions Prescription details   carvedilol 25 MG T DAILY   Quantity:  270 capsule  Refills:  1     Levothyroxine Sodium 100 MCG Tabs  Commonly known as:  SYNTHROID      TAKE 1 TABLET BY MOUTH EVERY MORNING   Quantity:  30 tablet  Refills:  0     Ondansetron HCl 4 mg tablet  Commonly known as:  Trina Michaud Ifeanyi Lama 104  8/12/2019

## 2019-08-13 NOTE — PROGRESS NOTES
[facility]    Progress Note    Mitra Villa Patient Status:  Inpatient    11/3/1940 MRN [id number]   Location [facility] 3W/SW Attending No att. providers found   [facility] Day # 7 PCP Jesus Luther MD       Subjective:    Aiyana Martinez deformities  Extremities: no edema, cyanosis  Neurological:  Grossly normal    Results:     Laboratory Data:  Lab Results   Component Value Date    WBC 10.4 08/12/2019    HGB 8.8 (L) 08/12/2019    HCT 27.8 (L) 08/12/2019    .0 08/12/2019    CREATSER

## 2019-08-18 PROBLEM — J18.9 NOSOCOMIAL PNEUMONIA: Status: ACTIVE | Noted: 2019-01-01
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
## 2019-08-18 NOTE — PLAN OF CARE
New admit from ED. Received pt around 0130. Patient alert and oriented. On contact precautions for Hx of Cdiff. On O2 4L NC. No calls from tele. Daily wgt. Purewick in place. Urine cultures collected and sent.  Pain managed with PRN Norco. Turned and reposi adequate comfort level or patient's stated pain goal  Description  INTERVENTIONS:  - Encourage pt to monitor pain and request assistance  - Assess pain using appropriate pain scale  - Administer analgesics based on type and severity of pain and evaluate re their own health  - Refer to Case Management Department for coordinating discharge planning if the patient needs post-hospital services based on physician/LIP order or complex needs related to functional status, cognitive ability or social support system Impaired Activities of Daily Living  Goal: Achieve highest/safest level of independence in self care  Description  Interventions:  - Assess ability and encourage patient to participate in ADLs to maximize function  - Promote sitting position while performi

## 2019-08-18 NOTE — H&P
[facility] Patient Status:  Emergency    11/3/1940 MRN [id number]   Location [address] Attending Eugene Wooten MD   Hosp Day # 0 PCP Haresh Goodman MD     Date: ELECTROCARDIOGRAM, COMPLETE  11-    Scanned to media tab - DOS 11-   • ESOPHAGOGASTRODUODENOSCOPY (EGD) N/A 3/25/2019    Performed by Rand Ferrari MD at [address] [place] [address] ENDOSCOPY   • ESOPHAGOGASTRODUODENOSCOPY (EGD) N/A 12/8/2017    Performed by [facility] times daily. Ferrous Sulfate 325 (65 Fe) MG Oral Tab Taking  No Yes   Sig: Take 1 tablet (325 mg total) by mouth 3 (three) times daily with meals.    GABAPENTIN 100 MG Oral Cap Taking  No Yes   Sig: TAKE 1 CAPSULE BY MOUTH THREE TIMES DAILY   HYDROcodone- Fatigue. Eye:  Negative. Ear/Nose/Mouth/Throat:  Negative. Respiratory:  Negative  Cardiovascular: Negative  Gastrointestinal:  Negative. Genitourinary:  Negative  Endocrine:  Negative. Immunologic:  Negative. Musculoskeletal:  Negative.   Integumenta Zosyn and Zithromax, will continue the same for now cultures pending at this time. We will check procalcitonin along with urine for strep and Legionella.     Acute on chronic diastolic heart failure  Patient at this time appears to be somewhat fluid overlo

## 2019-08-18 NOTE — PROGRESS NOTES
[facility] Pharmacy Note:  Renal Adjustment for piperacillin/tazobactam (Gisela España)    Jing Hamilton is a 70year old female who has been prescribed piperacillin/tazobactam (ZOSYN) 3.375 gm every 12 hrs.   CrCl is estimated creatinine clearance is 29.3 mL/min (A) (b

## 2019-08-18 NOTE — PROGRESS NOTES
Brief progress note    Bita Cespedes is a 78yo female w/ hx sig for recent pneumonia, Afib, HFpEF, CVA, HTN, hypothyroidism, morbid obesity, and debility with falls, who p/w worsening lethargy.  Patient was discharged to [facility] a week ago for pneumoni

## 2019-08-18 NOTE — RESPIRATORY THERAPY NOTE
ABG results on 5L nasal cannula.     Sample Site Right Radial     ABG pH 7.43    ABG pCO2 33Low  mm Hg    ABG PO2 77Low  mm Hg    ABG HCO3 23.3 mEq/L    Blood Gas Base Excess -2.1Low  mmol/L    ABG O2 Saturation 98.9 %    Oxygen Content 9.4Low  Vol%    Oxyg

## 2019-08-18 NOTE — ED INITIAL ASSESSMENT (HPI)
Pt was recently dc'd from inpatient, had PNA and was given a prescription for levaquin. She denies any worsening symptoms, she is here because she feels the same, doesn't feel like she is getting any better.

## 2019-08-18 NOTE — ED PROVIDER NOTES
Patient Seen in: [facility] AND [facility] Emergency Department    History   Patient presents with:  Pneumonia    Stated Complaint:     HPI    33-year-old female with history of hypertension, hypercholesterolemia, hypothyroidism, renal insufficiency, congestive 11-    Scanned to media tab - DOS 11-   • ESOPHAGOGASTRODUODENOSCOPY (EGD) N/A 3/25/2019    Performed by Damien Lange MD at [facility] ENDOSCOPY   • ESOPHAGOGASTRODUODENOSCOPY (EGD) N/A 12/8/2017    Performed by Adiel Lucia MD at [facility] ENDOSCOP no rashes. Musculoskeletal: neck is supple non tender        Extremities are symmetrical, full range of motion. Bilateral leg edema noted with chronic edematous changes noted to the skin.   Psychiatric: patient is oriented X 3, there is no agitation    ED 8/10/2019, 8/6/2019    IMPRESSION:    Heart remains enlarged. There is perihilar vascular indistinctness suggesting interstitial edema.   Increase in bilateral patchy opacities, most prominently seen in the right upper lobe likely representing multifocal p

## 2019-08-18 NOTE — PROGRESS NOTES
[address]    CDI Prediction Tool Protocol (Vancomycin Initiated)    OVP (oral vancomycin prophylaxis) 125 mg PO Daily is being started in this patient based on a score of 27.1.       Score Breakdown:  History of CDI within 1 year AND high risk a

## 2019-08-19 NOTE — PLAN OF CARE
Patient drowsy, but easily arousable. Alert and oriented when awake. On O2 4L NC. Received a call from tele regarding sinus mir. Pt went back to NSR. Daily wgt. Purewick changed. Pain managed with PRN Erhard. Receiving IV Zosyn and Azithromycin.  Poorly to non-pharmacological measures as appropriate and evaluate response  - Consider cultural and social influences on pain and pain management  - Manage/alleviate anxiety  - Utilize distraction and/or relaxation techniques  - Monitor for opioid side effects  - N Progressing     Problem: RESPIRATORY - ADULT  Goal: Achieves optimal ventilation and oxygenation  Description  INTERVENTIONS:  - Assess for changes in respiratory status  - Assess for changes in mentation and behavior  - Position to facilitate oxygenation feeding, grooming, and bathing  - Educate and encourage patient/family in tolerated functional activity level and precautions during self-care    Outcome: Progressing

## 2019-08-19 NOTE — PLAN OF CARE
Patient in bed, resting well at this time. Here with pneumonia. Patient on iv zosyn and azithromycin. Alert and oriented x 4. 3 L Nc. Continuing IV antibiotics.    Problem: Patient Centered Care  Goal: Patient preferences are identified and integrated in t reports new pain  - Anticipate increased pain with activity and pre-medicate as appropriate  Outcome: Progressing     Problem: SAFETY ADULT - FALL  Goal: Free from fall injury  Description  INTERVENTIONS:  - Assess pt frequently for physical needs  - Ident oxygen saturation or ABGs  - Provide Smoking Cessation handout, if applicable  - Encourage broncho-pulmonary hygiene including cough, deep breathe, Incentive Spirometry  - Assess the need for suctioning and perform as needed  - Assess and instruct to repor

## 2019-08-19 NOTE — PLAN OF CARE
Problem: Patient Centered Care  Goal: Patient preferences are identified and integrated in the patient's plan of care  Description  Interventions:  - What would you like us to know as we care for you? Please keep pt informed.   - Provide timely, complete, FALL  Goal: Free from fall injury  Description  INTERVENTIONS:  - Assess pt frequently for physical needs  - Identify cognitive and physical deficits and behaviors that affect risk of falls.   - [place] fall precautions as indicated by assessment.  - Educ breathe, Incentive Spirometry  - Assess the need for suctioning and perform as needed  - Assess and instruct to report SOB or any respiratory difficulty  - Respiratory Therapy support as indicated  - Manage/alleviate anxiety  - Monitor for signs/symptoms o informed of patient concerns and Afib calls from tele. IV antibiotics continue. Pt tolerating diet, but appetite poor. Orma fall precautions in place, call light in reach.

## 2019-08-20 NOTE — TELEPHONE ENCOUNTER
These forms need to be refaxed to:  [phone] as they were never [id number]. [facility] did send a duplicate request today, that can be disregarded since we will be resending.

## 2019-08-20 NOTE — TELEPHONE ENCOUNTER
ACL signed orders faxed earlier today were for Pt/OT eval & plan of care. Order faxed today is different;post hospital order    To DR TOTH: order on your desk.

## 2019-08-20 NOTE — PLAN OF CARE
Problem: Patient Centered Care  Goal: Patient preferences are identified and integrated in the patient's plan of care  Description  Interventions:  - What would you like us to know as we care for you?  - Provide timely, complete, and accurate information injury  Description  INTERVENTIONS:  - Assess pt frequently for physical needs  - Identify cognitive and physical deficits and behaviors that affect risk of falls.   - [place] fall precautions as indicated by assessment.  - Educate pt/family on patient sa Spirometry  - Assess the need for suctioning and perform as needed  - Assess and instruct to report SOB or any respiratory difficulty  - Respiratory Therapy support as indicated  - Manage/alleviate anxiety  - Monitor for signs/symptoms of CO2 retention  Kayy Olivares reach, will continue to monitor.

## 2019-08-20 NOTE — PROGRESS NOTES
[facility]    Progress Note    Chani Everett Patient Status:  Inpatient    11/3/1940 MRN [id number]   Location [facility] 4W/SW/SE Attending Brook Samuel MD   Hosp Day # 1 PCP Onesimo Patrick MD       Subjective:     Pt c by (CST):  Ericka Cohn MD on 8/18/2019 at 7:00          Ekg 12-lead    Result Date: 8/17/2019  ECG Report  Interpretation  -------------------------- Sinus rhythm with PACs Poor R wave progression Borderline ECG When compared with ECG of 08/06/2019 16

## 2019-08-20 NOTE — PROGRESS NOTES
[facility]    Progress Note    Jorge Schneider Patient Status:  Inpatient    11/3/1940 MRN [id number]   Location [facility] 4W/SW/SE Attending Samia Vázquez MD   Hosp Day # 2 PCP Mona Coy MD       Subjective:     Pt c ~7 w/ baseline ~10-12.  - increased home iron to bid     Mouth pain. Continues.   Could be thrush as pt has one whitish dot in mouth and has been on broad abx.  - start empiric diflucan  - magic mouthwash prn  - cepacol prn    Hx of paroxysmal atrial fibri

## 2019-08-20 NOTE — PHYSICAL THERAPY NOTE
PHYSICAL THERAPY EVALUATION - INPATIENT     Room Number: 442/442-A  Evaluation Date: 8/20/2019  Type of Evaluation: Initial   Physician Order: PT Eval and Treat    Presenting Problem: Pneumonia  Reason for Therapy: Mobility Dysfunction and Discharge Plann supports that patients with this level of impairment may benefit from sub-acute rehab to optimize functional outcomes. Patient will benefit from continued IP PT services to address these deficits in preparation for discharge.     DISCHARGE RECOMMENDATION Imelda Aquino MD at [address] [place] [address] ENDOSCOPY   • ESOPHAGOGASTRODUODENOSCOPY (EGD) N/A 8/23/2017    Performed by Blaze Morton MD at [address] [place] [address] ENDOSCOPY   • HIP REPLACEMENT SURGERY Left    • HYSTERECTOMY     • KNEE REPLACEMENT SURGERY  2006   • LAPAROSCOPIC CHOLECYSTECTOMY N/A Lot   -   Sitting down on and standing up from a chair with arms (e.g., wheelchair, bedside commode, etc.): A Lot   -   Moving from lying on back to sitting on the side of the bed?: A Lot   How much help from another person does the patient currently need.

## 2019-08-20 NOTE — TELEPHONE ENCOUNTER
Amarilis/ DILMA calling to [facility], it was faxed this morning to us to be signed  If need be she will refax, they need this asap   Phone [phone], fax [phone]

## 2019-08-20 NOTE — PROGRESS NOTES
[facility] Pharmacy Note: Route Optimization for Azithromycin [facility])    Patient is currently on Azithromycin (ZITHROMAX) 500 mg IV every 24 hours.    The patient meets the criteria to convert to the oral equivalent as established by the IV to Oral conversion

## 2019-08-20 NOTE — PLAN OF CARE
Problem: Patient Centered Care  Goal: Patient preferences are identified and integrated in the patient's plan of care  Description  Interventions:  - What would you like us to know as we care for you?   - Provide timely, complete, and accurate informatio Progressing     Problem: DISCHARGE PLANNING  Goal: Discharge to home or other facility with appropriate resources  Description  INTERVENTIONS:  - Identify barriers to discharge w/pt and caregiver  - Include patient/family/discharge partner in discharge óscar Incentive Spirometry  - Assess the need for suctioning and perform as needed  - Assess and instruct to report SOB or any respiratory difficulty  - Respiratory Therapy support as indicated  - Manage/alleviate anxiety  - Monitor for signs/symptoms of CO2 ret

## 2019-08-20 NOTE — OCCUPATIONAL THERAPY NOTE
OCCUPATIONAL THERAPY EVALUATION - INPATIENT     Room Number: 442/442-A  Evaluation Date: 8/20/2019  Type of Evaluation: Initial  Presenting Problem: (PNA)    Physician Order: IP Consult to Occupational Therapy  Reason for Therapy: ADL/IADL Dysfunction and History  Past Medical History:   Diagnosis Date   • Anemia    • Anesthesia complication    • Arthritis     feet   • BACK PAIN    • Back problem    • Urban's esophagus    • Blood disorder    • CHF (congestive heart failure) (HCC)    • Colon, diverticulosi ALIZE    SUBJECTIVE  \"I can't believe how stiff my legs got\"    OCCUPATIONAL THERAPY EXAMINATION      OBJECTIVE  Precautions: Bed/chair alarm  Fall Risk: High fall risk    PAIN ASSESSMENT  Rating: Unable to rate  Location: (back and LT shd)  Management Wiley walk     Patient will complete functional transfer with mod A  Comment:       Comment:     Patient will tolerate standing for 3 minutes in prep for adls with min A  Comment:    Patient will complete bed mobility with min A for participation in supine ADLs

## 2019-08-20 NOTE — TELEPHONE ENCOUNTER
[facility] care ordes located in the media tab. Re-faxed to [phone], confirmation rec'd.

## 2019-08-20 NOTE — CM/SW NOTE
Care Coordination Note    Progression of Care: Readmission note  Saw pt at bedside.  Found Gudelia sitting in reclining chair, with O2 via NC at 3 LPM.  Reports that while at [facility] her breathing continued to get worse while on medication and returned to t

## 2019-08-21 NOTE — PROGRESS NOTES
[facility]    Progress Note    Zain Mendoza Patient Status:  Inpatient    11/3/1940 MRN [id number]   Location [facility] 4W/SW/SE Attending Magali Velasquez, [facility] Day # 3 PCP Will Snell MD       Subjective:     Feel up to chair  - legionella antigen negative  - resp panel negative     Acute on chronic diastolic heart failure  - cont lasix at 20 IV qd  - monitor creatinine  - Monitor I's and O's and daily weights     Acute on chronic kidney disease stage IV  Baseline 1

## 2019-08-21 NOTE — PLAN OF CARE
Problem: Patient Centered Care  Goal: Patient preferences are identified and integrated in the patient's plan of care  Description  Interventions:  - What would you like us to know as we care for you?   - Provide timely, complete, and accurate informatio fall injury  Description  INTERVENTIONS:  - Assess pt frequently for physical needs  - Identify cognitive and physical deficits and behaviors that affect risk of falls.   - [place] fall precautions as indicated by assessment.  - Educate pt/family on patie Spirometry  - Assess the need for suctioning and perform as needed  - Assess and instruct to report SOB or any respiratory difficulty  - Respiratory Therapy support as indicated  - Manage/alleviate anxiety  - Monitor for signs/symptoms of CO2 retention  Saranya Sprague POTASSIUM COVERED PER PROTOCOL THIS AM- PATIENT LEVEL THIS AFTERNOON 3.5- COVERED PER PROTOCOL AGAIN- NEXT POTASSIUM LEVEL AT 5PM TONIGHT/

## 2019-08-21 NOTE — PLAN OF CARE
Problem: Patient Centered Care  Goal: Patient preferences are identified and integrated in the patient's plan of care  Description  Interventions:  - What would you like us to know as we care for you?  My back is stiff  - Provide timely, complete, and acc FALL  Goal: Free from fall injury  Description  INTERVENTIONS:  - Assess pt frequently for physical needs  - Identify cognitive and physical deficits and behaviors that affect risk of falls.   - [place] fall precautions as indicated by assessment.  - Educ breathe, Incentive Spirometry  - Assess the need for suctioning and perform as needed  - Assess and instruct to report SOB or any respiratory difficulty  - Respiratory Therapy support as indicated  - Manage/alleviate anxiety  - Monitor for signs/symptoms o

## 2019-08-22 NOTE — PLAN OF CARE
Problem: Patient Centered Care  Goal: Patient preferences are identified and integrated in the patient's plan of care  Description  Interventions:  - What would you like us to know as we care for you?   - Provide timely, complete, and accurate informatio fall injury  Description  INTERVENTIONS:  - Assess pt frequently for physical needs  - Identify cognitive and physical deficits and behaviors that affect risk of falls.   - [place] fall precautions as indicated by assessment.  - Educate pt/family on patie Spirometry  - Assess the need for suctioning and perform as needed  - Assess and instruct to report SOB or any respiratory difficulty  - Respiratory Therapy support as indicated  - Manage/alleviate anxiety  - Monitor for signs/symptoms of CO2 retention  Venita Levin

## 2019-08-22 NOTE — OCCUPATIONAL THERAPY NOTE
OCCUPATIONAL THERAPY TREATMENT NOTE - INPATIENT        Room Number: 442/442-A           Presenting Problem: (PNA)    Problem List  Principal Problem:    Nosocomial pneumonia      OCCUPATIONAL THERAPY ASSESSMENT   RN cleared pt for participation in occupati crystal)  Management Techniques: Repositioning     ACTIVITY TOLERANCE  See above    ACTIVITIES OF DAILY LIVING ASSESSMENT  AM-PAC ‘6-Clicks’ Inpatient Daily Activity Short Form  How much help from another person does the patient currently need…  -   Putting on

## 2019-08-22 NOTE — PHYSICAL THERAPY NOTE
PHYSICAL THERAPY TREATMENT NOTE - INPATIENT     Room Number: 442/442-A       Presenting Problem: Pneumonia    Problem List  Principal Problem:    Nosocomial pneumonia      PHYSICAL THERAPY ASSESSMENT     Pt seen for PT treatment session in coordination wit -  Dynamic Sitting: Poor +           Static Standing: Poor  Dynamic Standing: Not tested                 AM-PAC '6-Clicks' INPATIENT SHORT FORM - BASIC MOBILITY  How much difficulty does the patient currently have. ..  -   Turning over in bed (including adj static standing with bilateral UE support on RW requiring CG assist for balance 2 minutes   Goal #4   Current Status Pt demo sitting eob for 15 minutes with cga to min assist   Goal #5 Patient to demonstrate independence with home activity/exercise instruc

## 2019-08-23 NOTE — PLAN OF CARE
Problem: Patient Centered Care  Goal: Patient preferences are identified and integrated in the patient's plan of care  Description  Interventions:  - What would you like us to know as we care for you?   - Provide timely, complete, and accurate informatio fall injury  Description  INTERVENTIONS:  - Assess pt frequently for physical needs  - Identify cognitive and physical deficits and behaviors that affect risk of falls.   - [place] fall precautions as indicated by assessment.  - Educate pt/family on patie Spirometry  - Assess the need for suctioning and perform as needed  - Assess and instruct to report SOB or any respiratory difficulty  - Respiratory Therapy support as indicated  - Manage/alleviate anxiety  - Monitor for signs/symptoms of CO2 retention  Brisa Patel

## 2019-08-23 NOTE — PLAN OF CARE
Problem: Patient Centered Care  Goal: Patient preferences are identified and integrated in the patient's plan of care  Description  Interventions:  - What would you like us to know as we care for you?   - Provide timely, complete, and accurate informatio fall injury  Description  INTERVENTIONS:  - Assess pt frequently for physical needs  - Identify cognitive and physical deficits and behaviors that affect risk of falls.   - [place] fall precautions as indicated by assessment.  - Educate pt/family on patie Spirometry  - Assess the need for suctioning and perform as needed  - Assess and instruct to report SOB or any respiratory difficulty  - Respiratory Therapy support as indicated  - Manage/alleviate anxiety  - Monitor for signs/symptoms of CO2 retention  Lisa White

## 2019-08-23 NOTE — CM/SW NOTE
The pt. Has been accepted at [facility]. [facility] has a bed when medically stable. Uncertain of DC date at this time. PCS form is on the chart for ambulance (O2 need) and medical records.      [facility] [phone]

## 2019-08-23 NOTE — PROGRESS NOTES
[facility]    Progress Note    Radhames Gomez Patient Status:  Inpatient    11/3/1940 MRN [id number]   Location [facility] 4W/SW/SE Attending Joey Alford MD   Hosp Day # 4 PCP Neri Amador MD       Subjective:     Pt c as tolerated  - mobilize, PT/OT, up to chair  - legionella antigen negative  - resp panel negative  - CXR in am  - pt needs to mobilize more     Acute on chronic diastolic heart failure  Better. - Stop lasix.   - cxr in am  - monitor creatinine  - Monitor

## 2019-08-23 NOTE — PROGRESS NOTES
[facility]    Progress Note    Chauncey [place] Patient Status:  Inpatient    11/3/1940 MRN [id number]   Location [facility] 4W/SW/SE Attending Melinda Mello MD   Hosp Day # 5 PCP Evelia Solano MD       Subjective:     Pt c Herb Sousa MD on 8/23/2019 at 8:40                Assessment and Plan:     Healthcare acquired pneumonia  Slowly improving clinically and radiographically. CXR with mild improvement today. I reviewed the film.   - cont Zosyn and Zithromax  - nebs TID

## 2019-08-23 NOTE — PLAN OF CARE
Problem: Patient Centered Care  Goal: Patient preferences are identified and integrated in the patient's plan of care  Description  Interventions:  - What would you like us to know as we care for you?   - Provide timely, complete, and accurate informatio fall injury  Description  INTERVENTIONS:  - Assess pt frequently for physical needs  - Identify cognitive and physical deficits and behaviors that affect risk of falls.   - [place] fall precautions as indicated by assessment.  - Educate pt/family on patie Spirometry  - Assess the need for suctioning and perform as needed  - Assess and instruct to report SOB or any respiratory difficulty  - Respiratory Therapy support as indicated  - Manage/alleviate anxiety  - Monitor for signs/symptoms of CO2 retention  Devan Plume

## 2019-08-24 NOTE — PLAN OF CARE
Problem: Patient Centered Care  Goal: Patient preferences are identified and integrated in the patient's plan of care  Description  Interventions:  - What would you like us to know as we care for you?  \"Everything hurts\"  - Provide timely, complete, and FALL  Goal: Free from fall injury  Description  INTERVENTIONS:  - Assess pt frequently for physical needs  - Identify cognitive and physical deficits and behaviors that affect risk of falls.   - [place] fall precautions as indicated by assessment.  - Educ Mobility  Goal: Achieve highest/safest level of mobility/gait  Description  Interventions:  - Assess patient's functional ability and stability  - Promote increasing activity/tolerance for mobility and gait  - Educate and engage patient/family in tolerated monitor pt.

## 2019-08-24 NOTE — PROGRESS NOTES
[facility]    Progress Note    Myron Fall Patient Status:  Inpatient    11/3/1940 MRN [id number]   Location [facility] 4W/SW/SE Attending Sandy Alexandre MD   Hosp Day # 6 PCP Jenni Gold MD       Subjective:     Pt a pulmonary edema. Continued follow-up is advised.     Dictated by (CST): Arsen Oneil MD on 8/23/2019 at 8:35     Approved by (CST): Arsen Oneil MD on 8/23/2019 at 8:40                Assessment and Plan:     Healthcare acquired pneumonia  Slowly impro

## 2019-08-24 NOTE — PLAN OF CARE
UP TO CHAIR WITH MAX ASSIST LIFT, REFUSED EARLIER IN THE DAY, ENDORSED TO GET UP LATER TODAY. ANTIBIOTICS AND NEB TREATMENTS GIVEN. SL. PUREWICK IN USE FOR INCONTINENCE.  RED GROIN AND BUTTOCKS TERESA AREA WITH ALOE [employer]STA AND SENSICARE APPLIED WITH MEPILEX IN transportation as appropriate  - Identify discharge learning needs (meds, wound care, etc)  - Arrange for interpreters to assist at discharge as needed  - Consider post-discharge preferences of patient/family/discharge partner  - Complete POLST form as jacobo Mobility  Goal: Achieve highest/safest level of mobility/gait  Description  Interventions:  - Assess patient's functional ability and stability  - Promote increasing activity/tolerance for mobility and gait  - Educate and engage patient/family in tolerated

## 2019-08-25 NOTE — PLAN OF CARE
Problem: Patient Centered Care  Goal: Patient preferences are identified and integrated in the patient's plan of care  Description  Interventions:  - What would you like us to know as we care for you?  \"Everything hurts\"  - Provide timely, complete, and FALL  Goal: Free from fall injury  Description  INTERVENTIONS:  - Assess pt frequently for physical needs  - Identify cognitive and physical deficits and behaviors that affect risk of falls.   - [place] fall precautions as indicated by assessment.  - Educ breathe, Incentive Spirometry  - Assess the need for suctioning and perform as needed  - Assess and instruct to report SOB or any respiratory difficulty  - Respiratory Therapy support as indicated  - Manage/alleviate anxiety  - Monitor for signs/symptoms o she was able to stand with assistance. Patient displayed a poor/fair appetite today. Remote tele in place, no calls received. She continues to be incontinent of bowel and bladder, purewick in place for urinary incontinence.  No family/friends present during

## 2019-08-25 NOTE — PLAN OF CARE
No acute events overnight. Reports a headache she attributes to coughing. Norco administered per [facility], in addition to PRN cough syrup. Patient also continues to report oral discomfort - XRT Mixture also administered with good results.  Assisting patient with RN  Outcome: Progressing  8/25/2019 0338 by Ammy Coleman RN  Outcome: Progressing     Problem: DISCHARGE PLANNING  Goal: Discharge to home or other facility with appropriate resources  Description  INTERVENTIONS:  - Identify barriers to discharge Progressing

## 2019-08-25 NOTE — PHYSICAL THERAPY NOTE
PHYSICAL THERAPY TREATMENT NOTE - INPATIENT    Room Number: 442/442-A     Session:        Presenting Problem: pneumonia    Problem List  Principal Problem:    Nosocomial pneumonia      Past Medical History  Past Medical History:   Diagnosis Date   • Anemi Patient’s self-stated goal is I know I have to try.      OBJECTIVE  Precautions: Bed/chair alarm    WEIGHT BEARING RESTRICTION  Weight Bearing Restriction: None                PAIN ASSESSMENT   Ratin  Location: generalized pain, states my body  Chata agreeable to therapy. Pt placed in chair with lift with sling placed underneath. Pt first performed BLE ex in sitting x 10 - 20 reps . Pt needed mod cues to keep alert and focused on LE activities. Pt with multiple cues able to follow through.  Worked on t

## 2019-08-25 NOTE — PROGRESS NOTES
[facility]    Progress Note    Anna Vaughn Patient Status:  Inpatient    11/3/1940 MRN [id number]   Location [facility] 4W/SW/SE Attending Nithya Massey MD   Hosp Day # 7 PCP Angie Knight MD       Subjective:     Pt c failure  Better.  - hold lasix. - monitor creatinine  - Monitor I's and O's and daily weights     Acute on chronic kidney disease stage IV  Baseline 1.5. Cr stable at 2.2  - hold lasix     Worsening anemia of chronic disease:  Hgb ~7 w/ baseline ~10-12.

## 2019-08-26 NOTE — PLAN OF CARE
Patient maintained on 3L O2. Cough syrup and losenges for intermittent cough. Norco for pain x1. Patient remains unable to ambulate. PT on board.   Problem: Patient Centered Care  Goal: Patient preferences are identified and integrated in the patient's reports new pain  - Anticipate increased pain with activity and pre-medicate as appropriate  Outcome: Progressing     Problem: SAFETY ADULT - FALL  Goal: Free from fall injury  Description  INTERVENTIONS:  - Assess pt frequently for physical needs  - Ident oxygen saturation or ABGs  - Provide Smoking Cessation handout, if applicable  - Encourage broncho-pulmonary hygiene including cough, deep breathe, Incentive Spirometry  - Assess the need for suctioning and perform as needed  - Assess and instruct to repor

## 2019-08-26 NOTE — PLAN OF CARE
Pt maintained on 3 L O2. Norco x1 for pain. Pt remains unable to ambulate, got up to chair with the lift this afternoon. Plan for ECHO and video swallow tomorrow.       Problem: Patient Centered Care  Goal: Patient preferences are identified and integrated measures as appropriate and evaluate response  - Consider cultural and social influences on pain and pain management  - Manage/alleviate anxiety  - Utilize distraction and/or relaxation techniques  - Monitor for opioid side effects  - Notify MD/LIP if inte post-hospital services based on physician/LIP order or complex needs related to functional status, cognitive ability or social support system  8/26/2019 1816 by Karen Jordan  Outcome: Progressing  8/26/2019 1813 by Dottie Cartagena  Outcome: Progressing activity/tolerance for mobility and gait  - Educate and engage patient/family in tolerated activity level and precautions  8/26/2019 1816 by Karen Jordan  Outcome: Progressing  8/26/2019 1813 by Arik Irizarry  Outcome: Not Progressing     Problem: [place]

## 2019-08-26 NOTE — PROGRESS NOTES
[facility]    Progress Note    Natividad Arana Patient Status:  Inpatient    11/3/1940 MRN [id number]   Location [facility] 4W/SW/SE Attending Amna Cm MD   Hosp Day # 8 PCP Elisa Sykes MD       Subjective:     Pt c Possible CT chest vs diuresis vs new antibiotic.   - check BNP and echo today  - give 40 IV lasix today  - cont zosyn  - completed Zithromax  - nebs BID  - IS  - wean o2 as tolerated  - mobilize, PT/OT, up to chair  - legionella antigen negative  - resp pan

## 2019-08-26 NOTE — CONSULTS
[facility]OSP - [facility]    Report of Consultation    Carla Jaime Patient Status:  Inpatient    11/3/1940 MRN [id number]   Location [facility] 4W/SW/SE Attending Dilcai Washington MD   Hosp Day # 8 PCP Anselmo Saba MD     Date of Admis generalized or localized, unspecified site    • Other and unspecified hyperlipidemia    • PONV (postoperative nausea and vomiting)    • Renal insufficiency    • Scoliosis    • SEASONAL ALLERGIES    • Skin cancer 03/2018    BCC-mid back   • Unspecified esse ipratropium-albuterol (DUONEB) nebulizer solution 3 mL 3 mL Nebulization Q6H PRN   Fluticasone Propionate (FLONASE) 50 MCG/ACT nasal spray 1 spray 1 spray Each Nare Daily   Saline Nasal Spray (SALINE MIST) 1 spray 1 spray Each Nare Q3H PRN   guaiFENesin Medications Prior to Admission:  HYDROcodone-acetaminophen (NORCO) 5-325 MG Oral Tab Take 1 tablet by mouth every 8 (eight) hours as needed for Pain.   levofloxacin 750 MG Oral Tab Take 1 tablet (750 mg total) by mouth every other day.    Ferrous Sulf 5 MG Oral Tablet 24 Hr Take 1 tablet (5 mg total) by mouth daily. Allergies    Compazine               ANAPHYLAXIS    Comment:AMS    Review of Systems:    Pertinent items are noted in HPI.     Physical Exam:   Blood pressure 149/65, pulse 57, temperat 8/26/2019 at 8:48               Impression:     1- pneumonia / likely aspiration   2-acute hypoxemic respiratory failure secondary to pneumonia  3–morbid obesity with a BMI of 42  4– significant chronic back pain with multiple back surgeries  5–very poor f

## 2019-08-27 NOTE — PLAN OF CARE
Problem: Patient Centered Care  Goal: Patient preferences are identified and integrated in the patient's plan of care  Description  Interventions:  - What would you like us to know as we care for you?  \"Everything hurts\"  - Provide timely, complete, and retention  Outcome: Progressing   Zosyn given. Nystatin for redness to groin areas. Needs encouragement for self-motivation. 3LO2 in place. Norco prn. Pt is max assist. Safety plan in place. Frequent rounding.  Plan is to return to [facility] rehab when stable

## 2019-08-27 NOTE — PROGRESS NOTES
[facility]    Progress Note    Mavis Parent Patient Status:  Inpatient    11/3/1940 MRN [id number]   Location [facility] 4W/SW/SE Attending Gonzales Batista MD   Hosp Day # 9 PCP Heidi Abdullahi MD       Subjective:     Pt c opacification throughout. Slight improvement at the lung bases bilaterally.     Dictated by (CST): Ricardo Davila MD on 8/26/2019 at 8:46     Approved by (CST): Ricardo Davila MD on 8/26/2019 at 8:48                Assessment and Plan:     Health

## 2019-08-27 NOTE — OCCUPATIONAL THERAPY NOTE
Attempted to see pt for OT treatment session- pt off floor with 2 tests scheduled. Will attempt to reschedule.

## 2019-08-27 NOTE — SLP NOTE
ADULT VIDEOFLUOROSCOPIC SWALLOWING STUDY    Admission Date: 8/17/2019  Evaluation Date: 08/27/19  Radiologist: Marya Goodpasture    RECOMMENDATIONS   Diet Recommendations - Solids: Regular  Diet Recommendations - Liquid: Nectar thick    Further Follow-up:  Follow Up N Lateral.   .  Consistencies Presented: Thin liquids; Nectar thick liquids;Puree;Hard solid to assess oropharyngeal swallow function and assess for compensatory strategies to improve safe swallow function.     THIN LIQUIDS  Method of Presentation: Teaspoon;Cu swallow  Laryngeal Penetration: None  Tracheal Aspiration: None  Strategy(ies) Implemented (Hard Solid): Multiple swallows;Small bites and sips; Alterante consistencies; Slow rate  Effectiveness: Yes  Penetration Aspiration Scale Score: Score 2: Material ent Mikki [facility] [place], [address] Pathologist  [facility]. [id number]  If you have any questions, please contact Milton Chappell

## 2019-08-27 NOTE — PHYSICAL THERAPY NOTE
Attempted PT treatment. Patient off floor for testing/imaging. Will re-attempt as schedule permits.    Thank you,  Reinaldo Douglass, PT, DPT

## 2019-08-28 NOTE — PROGRESS NOTES
[facility]    Progress Note    Jorge Schneider Patient Status:  Inpatient    11/3/1940 MRN [id number]   Location [facility] 4W/SW/SE Attending Reece Doran MD   Hosp Day # 10 PCP Mona Coy MD        Subjective:     Sara Le Value Date    WBC 10.5 08/28/2019    HGB 7.1 (L) 08/28/2019    HCT 23.3 (L) 08/28/2019    .0 08/28/2019    CREATSERUM 2.12 (H) 08/28/2019    BUN 27 (H) 08/28/2019     08/28/2019    K 3.5 08/28/2019     08/28/2019    CO2 25.0 08/28/2019

## 2019-08-28 NOTE — PROGRESS NOTES
[place] [facility] - [facility]    Progress Note    Fatuma Delude Patient Status:  Inpatient    11/3/1940 MRN [id number]   Location [facility] 4W/SW/SE Attending Khang Kline, [address] [facility] Day # 9 PCP Neel Renteria MD        Subjective:     C Aspiration precautions   - zosyn   procalcitonin is lower   Lasix for CHF , - CHF management per primary service   - f/u  CXR   - bronchial hygiene     D/w pt and                              Results:     Lab Results   Component Value Date    WBC

## 2019-08-28 NOTE — PHYSICAL THERAPY NOTE
PHYSICAL THERAPY TREATMENT NOTE - INPATIENT     Room Number: 442/442-A       Presenting Problem: pneumonia    Problem List  Principal Problem:    Nosocomial pneumonia      PHYSICAL THERAPY ASSESSMENT     Patient received sitting in chair at start of sessio Poor  Dynamic Standing: Poor -  O2 WALK        SPO2 Ambulation on Oxygen: 96  Ambulation oxygen flow (liters per minute): 3      AM-PAC '6-Clicks' INPATIENT SHORT FORM - BASIC MOBILITY  How much difficulty does the patient currently have. ..  -   Turning ov Goal #4   Current Status Mod A x 2/Max A x 2 for about 75seconds   Goal #5 Patient to demonstrate independence with home activity/exercise instructions provided to patient in preparation for discharge.    Goal #5   Current Status In progress

## 2019-08-28 NOTE — PLAN OF CARE
Problem: Patient Centered Care  Goal: Patient preferences are identified and integrated in the patient's plan of care  Description  Interventions:  - What would you like us to know as we care for you?  \"Everything hurts\"  - Provide timely, complete, and FALL  Goal: Free from fall injury  Description  INTERVENTIONS:  - Assess pt frequently for physical needs  - Identify cognitive and physical deficits and behaviors that affect risk of falls.   - [place] fall precautions as indicated by assessment.  - Educ breathe, Incentive Spirometry  - Assess the need for suctioning and perform as needed  - Assess and instruct to report SOB or any respiratory difficulty  - Respiratory Therapy support as indicated  - Manage/alleviate anxiety  - Monitor for signs/symptoms o precautions. No calls from tele. Purewick in place. IV antibiotic continued. 1 time dose of IV lasix given. Fall precaution, call light within reach. Frequent rounding.

## 2019-08-28 NOTE — SLP NOTE
SPEECH DAILY NOTE - INPATIENT    ASSESSMENT & PLAN   ASSESSMENT  VFSS 8/27/19   Overall Impression: Pt presents with mild oropharyngeal dysphagia with intermittent laryngeal penetration without aspiration.  Pt with no aspiration on any consistency given dur Experiencing Pain: No    Discharge Recommendations  Discharge Recommendations/Plan: Undetermined    Treatment Plan  Treatment Plan/Recommendations: Aspiration precautions    Interdisciplinary Communication: Discussed with RN  Plan posted at bedside  [facility] - LEE ANN Maloney

## 2019-08-28 NOTE — PLAN OF CARE
Problem: Patient Centered Care  Goal: Patient preferences are identified and integrated in the patient's plan of care  Description  Interventions:  - What would you like us to know as we care for you?  \"Everything hurts\"  - Provide timely, complete, and FALL  Goal: Free from fall injury  Description  INTERVENTIONS:  - Assess pt frequently for physical needs  - Identify cognitive and physical deficits and behaviors that affect risk of falls.   - [place] fall precautions as indicated by assessment.  - Educ breathe, Incentive Spirometry  - Assess the need for suctioning and perform as needed  - Assess and instruct to report SOB or any respiratory difficulty  - Respiratory Therapy support as indicated  - Manage/alleviate anxiety  - Monitor for signs/symptoms o PT/OT. IV abx continued. Fall precautions in place. Call light within reach. Calls appropriately. Frequent rounding. Will continue to monitor.

## 2019-08-28 NOTE — PROGRESS NOTES
[place] [facility] - [facility]  Progress Note     Suhail Cleveland  : 11/3/1940    Status: Inpatient  Day #: 10    Attending: Marcello Del Toro MD  PCP: Yue Gibbons MD      Assessment and Plan     Healthcare acquired pneumonia  CXR  still with extensive u [18-20] 18  BP: (131-147)/(44-90) 137/46  General:  Alert, no distress  HEENT:  Normocephalic, atraumatic  Neck:  Supple, symmetrical  Cardiac:  Regular rate, regular rhythm  Pulmonary:  Clear to auscultation bilaterally, respirations unlabored  [place] Findings may suggest worsening right-sided pneumonia versus asymmetric pulmonary edema.     Dictated by (CST): Taryn Lopez MD on 8/28/2019 at 9:23     Approved by (CST): Taryn Lopez MD on 8/28/2019 at 9:24               Medications     • melatonin  3

## 2019-08-28 NOTE — OCCUPATIONAL THERAPY NOTE
OCCUPATIONAL THERAPY TREATMENT NOTE - INPATIENT        Room Number: 442/442-A           Presenting Problem: (PNA)    Problem List  Principal Problem:    Nosocomial pneumonia      OCCUPATIONAL THERAPY ASSESSMENT     Pt seen up in chair and performed sit to the patient currently need…  -   Putting on and taking off regular lower body clothing?: A Lot  -   Bathing (including washing, rinsing, drying)?: A Lot  -   Toileting, which includes using toilet, bedpan or urinal? : A Lot  -   Putting on and taking off r

## 2019-08-29 NOTE — PLAN OF CARE
Problem: Patient Centered Care  Goal: Patient preferences are identified and integrated in the patient's plan of care  Description  Interventions:  - What would you like us to know as we care for you?  \"Everything hurts\"  - Provide timely, complete, and FALL  Goal: Free from fall injury  Description  INTERVENTIONS:  - Assess pt frequently for physical needs  - Identify cognitive and physical deficits and behaviors that affect risk of falls.   - [place] fall precautions as indicated by assessment.  - Educ breathe, Incentive Spirometry  - Assess the need for suctioning and perform as needed  - Assess and instruct to report SOB or any respiratory difficulty  - Respiratory Therapy support as indicated  - Manage/alleviate anxiety  - Monitor for signs/symptoms o place. Call light within reach. Calls appropriately. Frequent rounding. Will continue to monitor.  Plan for f/u CXR tomorrow AM.

## 2019-08-29 NOTE — PROGRESS NOTES
[facility]    Progress Note      Assessment and Plan:   1. Nosocomial pneumonia–improving clinically. Recommendations: Ongoing antibiotic, morning chest x-ray, will follow clinically.     2.  Status post back surgery with chronic discomf

## 2019-08-29 NOTE — PROGRESS NOTES
[place] [facility] - [facility]  Progress Note     Lainey Mo  : 11/3/1940    Status: Inpatient  Day #: 11    Attending: Donn Paetl MD  PCP: Joes Snell MD      Assessment and Plan     Healthcare acquired pneumonia  CXR  still with extensive u [71-88] 88  Resp:  [18] 18  BP: (147-165)/(38-61) 165/61  General:  Alert, no distress  HEENT:  Normocephalic, atraumatic  Neck:  Supple, symmetrical  Cardiac:  Regular rate, regular rhythm  Pulmonary:  Clear to auscultation bilaterally, respirations unlab daily   • Fluticasone Propionate  1 spray Each Nare Daily   • nystatin  5 mL Oral TID   • Miconazole Nitrate   [email or web address] [email or web address]   • ferrous sulfate  325 mg Oral BID with meals   • allopurinol  100 mg Oral Daily   • amLODIPine Besylate  10 mg Oral Finesse

## 2019-08-29 NOTE — PROGRESS NOTES
[facility]OSP - [facility]    Cardiology Progress Note  [facility]    Jorge Schneider Patient Status:  Inpatient    11/3/1940 MRN [id number]   Location [facility] 4W/SW/SE Attending Reece Doran MD   Hosp Day # 11 ROXIE Hickman Oral Nightly   • ipratropium-albuterol  3 mL Nebulization 2 times daily   • Fluticasone Propionate  1 spray Each Nare Daily   • nystatin  5 mL Oral TID   • Miconazole Nitrate   Topical [email or web address]   • ferrous sulfate  325 mg Oral BID with meals   • allop 8.9 9.0     --  140 142   K 3.7  --  3.5 3.2*     --  109 109   CO2 26.0  --  25.0 25.0     Recent Labs   Lab 08/27/19  0525 08/28/19  0500 08/29/19  0522   RBC 2.93* 2.79* 3.02*   HGB 7.4* 7.1* 7.6*   HCT 24.3* 23.3* 25.0*   MCV 82.9 83.5 82. 8

## 2019-08-29 NOTE — CDS QUERY
CONFLICTING DOCUMENTATION QUERY:    Please choose the clarification below as appropriate:    1. The attending physician is required to clarify conflicting documentation in the medical record.   The following documentation is noted in the medical record:

## 2019-08-29 NOTE — PLAN OF CARE
No acute events overnight. Oxygen weaned to 2L from 3L - tolerated well. Overall complaints of \"just not feeling well\". Norco administered for generalized body pain. Denies nausea. Tolerating thickened liquids. Vitals WNL.  Discharge planning back to [facility] and/or relaxation techniques  - Monitor for opioid side effects  - Notify MD/LIP if interventions unsuccessful or patient reports new pain  - Anticipate increased pain with activity and pre-medicate as appropriate  8/29/2019 0830 by HUSEYIN Salguero support system  8/29/2019 0830 by Riki Mcintosh RN  Outcome: Progressing  8/29/2019 0830 by Riki Mcintosh RN  Outcome: Progressing     Problem: RESPIRATORY - ADULT  Goal: Achieves optimal ventilation and oxygenation  Description  INTERVENTI patient/family in tolerated activity level and precautions    8/29/2019 0830 by Jose Antonio Layne, RN  Outcome: Not Progressing  8/29/2019 0830 by Jose Antonio Layne, RN  Outcome: Progressing     Problem: Impaired Activities of Daily Living  Goal: Ach

## 2019-08-30 NOTE — PLAN OF CARE
Zosyn infusing as scheduled. PRN robitussin. Patient states melatonin does not work for her. MD notified and PRN ambien ordered. Purewick catheter in place. Patient bathed in evening. Had bowel movement in evening. Incontinent.  Patient still coughing inter goal  Description  INTERVENTIONS:  - Encourage pt to monitor pain and request assistance  - Assess pain using appropriate pain scale  - Administer analgesics based on type and severity of pain and evaluate response  - Implement non-pharmacological measures needed  - Consider post-discharge preferences of patient/family/discharge partner  - Complete POLST form as appropriate  - Assess patient's ability to be responsible for managing their own health  - Refer to Case Management Department for coordinating disc patient/family  8/30/2019 0420 by Ana Laura Dewitt RN  Outcome: Progressing  8/30/2019 0416 by Ana Laura Dewitt RN  Outcome: Progressing     Problem: Impaired Functional Mobility  Goal: Achieve highest/safest level of mobility/gait  Description

## 2019-08-30 NOTE — PROGRESS NOTES
[facility]    Cardiology Progress Note  [facility]    Zain Reji Patient Status:  Inpatient    11/3/1940 MRN [id number]   Location [facility] 4W/SW/SE Attending Vicki Mike MD   Hosp Day # 12 RXOIE Hickman aspirin  81 mg Oral Daily   • carvedilol  25 mg Oral BID   • cloNIDine HCl  0.1 mg Oral BID   • dronedarone HCl  400 mg Oral BID with meals   • gabapentin  100 mg Oral TID   • hydrALAzine HCl  25 mg Oral Q8H [address]   • piperacillin-tazobactam  3.375 g Chasity [place] WBC 11.0 10.5 9.7   .0 379.0 373.0       No results for input(s): BNPML in the last 168 hours. No results for input(s): TROP, CK in the last 168 hours. Xr Chest Ap Portable  (cpt=71045)    Result Date: 8/30/2019  CONCLUSION:  1.  Mild cardiom

## 2019-08-30 NOTE — PROGRESS NOTES
[facility]  Progress Note     Fatuma Delude  : 11/3/1940    Status: Inpatient  Day #: 12    Attending: Daniel Flores MD  PCP: Neel Renteria MD      Assessment and Plan     Healthcare acquired pneumonia  CXR with multifocal pneumonia, atraumatic  Neck:  Supple, symmetrical  Cardiac:  Regular rate, regular rhythm  Pulmonary:  Clear to auscultation bilaterally, respirations unlabored  Gastrointestinal:  Soft, non-tender, normal bowel sounds  Musculoskeletal:  No joint swelling  Extremitie Nightly   • ipratropium-albuterol  3 mL Nebulization 2 times daily   • Fluticasone Propionate  1 spray Each Nare Daily   • nystatin  5 mL Oral TID   • Miconazole Nitrate   Topical [email or web address]   • ferrous sulfate  325 mg Oral BID with meals   • allopurino

## 2019-08-30 NOTE — DIETARY NOTE
ADULT NUTRITION INITIAL ASSESSMENT    Pt is at moderate nutrition risk. Pt does not meet malnutrition criteria.       RECOMMENDATIONS TO MD:  See Nutrition Intervention     NUTRITION DIAGNOSIS/PROBLEM:  Altered GI function related to alteration in GI tract kg (210 lb 4.8 oz)   BMI: Body mass index is 45.51 kg/m².   BMI CLASSIFICATION: greater than 40 kg/m2 - morbid obesity class III  IBW:  85 lbs        247 % IBW  Usual Body Wt: ~200  lbs       105 % UBW    WEIGHT HISTORY:  Patient Weight(s) for the past 336 (Nitrates)   • vancomycin HCl  125 mg Oral Daily       LABS: reviewed  Recent Labs     08/27/19  0525  08/28/19  0500 08/29/19  0522 08/29/19 1959 08/30/19  0502   *   < > 111* 105*  --  104*   BUN 27*  --  27* 20*  --  21*   CREATSERUM 2.07*  --

## 2019-08-30 NOTE — PLAN OF CARE
Pt is tolerating nectar thickened liquids and general diet. Pt on 1 L of O2. Unable to wean. PO vanco and zosyn continued. Pt had Bmx3- prompt incontinence care provided. Miconazole and aloe vesta applied to reddened area.     Pt up to the chair w and/or relaxation techniques  - Monitor for opioid side effects  - Notify MD/LIP if interventions unsuccessful or patient reports new pain  - Anticipate increased pain with activity and pre-medicate as appropriate  Outcome: Progressing     Problem: SAFETY mentation and behavior  - Position to facilitate oxygenation and minimize respiratory effort  - Oxygen supplementation based on oxygen saturation or ABGs  - Provide Smoking Cessation handout, if applicable  - Encourage broncho-pulmonary hygiene including c Encourage patient to incorporate impaired side during daily activities to promote function   Outcome: Progressing

## 2019-08-30 NOTE — PHYSICAL THERAPY NOTE
PHYSICAL THERAPY TREATMENT NOTE - INPATIENT     Room Number: 442/442-A       Presenting Problem: pneumonia    Problem List  Principal Problem:    Nosocomial pneumonia      PHYSICAL THERAPY ASSESSMENT     Patient received supine in bed; agreeable to partici Static Sitting: Fair +  Dynamic Sitting: Fair           Static Standing: Poor  Dynamic Standing: Poor -    ACTIVITY TOLERANCE  Pulse: 66  Heart Rate Source: Monitor                   O2 WALK        SPO2 Ambulation on Oxygen: 95  Ambulation oxygen flow (lit assistance   Goal #3   Current Status  NT   Goal #4 Patient will tolerate static standing with bilateral UE support on RW requiring CG assist for balance 2 minutes   Goal #4   Current Status Mod A x 2 with use of RW 60 seconds x 2   Goal #5 Patient to demo

## 2019-08-30 NOTE — SLP NOTE
SPEECH DAILY NOTE - INPATIENT    ASSESSMENT & PLAN   ASSESSMENT  Patient eating breakfast upon arrival, seen for dysphagia education and diet tolerance. Patient denies difficulty.   Reports ongoing baseline/dry cough, which was observed throughout the inte without overt signs or symptoms of aspiration with 100 % accuracy over 2 session(s). Patient is tolerating general diet, nectar-thick liquids without overt difficulty.   In Progress   Goal #2 The patient/family/caregiver will demonstrate understanding an

## 2019-08-30 NOTE — PROGRESS NOTES
[facility]    Progress Note    Michelle Jordan Patient Status:  Inpatient    11/3/1940 MRN [id number]   Location [facility] 4W/SW/SE Attending Fouzia Solorzano MD   Hosp Day # 12 PCP Jamil Barragan MD        Subjective:     Alex Purdy 2.3 (L) 06/29/2019    ALKPHO 112 06/29/2019    BILT 0.3 06/29/2019    TP 6.0 (L) 06/29/2019    AST 15 07/08/2019    ALT 22 07/08/2019    PTT 31.8 08/06/2019    INR 1.0 08/25/2017    T4F 1.43 09/19/2018    TSH 2.970 04/22/2019     (H) 03/29/2019    D

## 2019-08-30 NOTE — OCCUPATIONAL THERAPY NOTE
OCCUPATIONAL THERAPY TREATMENT NOTE - INPATIENT        Room Number: 442/442-A           Presenting Problem: (PNA)    Problem List  Principal Problem:    Nosocomial pneumonia      OCCUPATIONAL THERAPY ASSESSMENT     Pt seen up in bed and performed rolling s minute): 0.5    ACTIVITIES OF DAILY LIVING ASSESSMENT  AM-PAC ‘6-Clicks’ Inpatient Daily Activity Short Form  How much help from another person does the patient currently need…  -   Putting on and taking off regular lower body clothing?: A Lot  -   Bathing on:  9/3/19  Frequency: 3x/week

## 2019-08-31 NOTE — PROGRESS NOTES
[place] [facility] - [facility]  Progress Note     Dewitte Faster  : 11/3/1940    Status: Inpatient  Day #: 13    Attending: Baldemar Triplett MD  PCP: Cherie Garcia MD      Assessment and Plan     Healthcare acquired pneumonia  CXR with multifocal pneumonia, (133-158)/(41-60) 154/60  General:  Alert, no distress  HEENT:  Normocephalic, atraumatic  Neck:  Supple, symmetrical  Cardiac:  Regular rate, regular rhythm  Pulmonary:  Clear to auscultation bilaterally, respirations unlabored  Gastrointestinal:  Soft, n (CST): Ori Altman MD on 8/30/2019 at 9:01     Approved by (CST): Ori Altman MD on 8/30/2019 at 9:10               Medications     • Potassium Chloride ER  40 mEq Oral Q4H   • furosemide  40 mg Intravenous Q12H   • ipratropium-albuterol  3

## 2019-08-31 NOTE — PROGRESS NOTES
Pulmonary/Critical Care Follow Up Note    HPI:   Monse Stevenson is a 70year old female with Patient presents with:  Pneumonia      PCP Tyler Jesus MD  Admission Attending Nina Fernandez 15 Day #13    C/o MARTÍNEZ  No sob at rest  C/o cough Units, 5 mL, Oral, TID  •  Miconazole Nitrate 2 % powder, , Topical, [email or web address]  •  ferrous sulfate EC tab 325 mg, 325 mg, Oral, BID with meals  •  Maalox/diphenhydramine/lidocaine (RADIATION THERAPY MIXTURE) oral suspension 10 mL, 10 mL, Oral, TID PRN data filed at 8/31/2019 1200  Gross per 24 hour   Intake 735 ml   Output 2250 ml   Net -1515 ml     nad  Lung dec bs bases, few crackles on r  cv reg   abd soft +bs  Ext warm      LABS:    Lab Results   Component Value Date    WBC 9.4 08/30/2019    HGB 8.3

## 2019-08-31 NOTE — PROGRESS NOTES
[facility]    Cardiology Progress Note  [facility]    David Mckenziede Patient Status:  Inpatient    11/3/1940 MRN [id number]   Location [facility] 4W/SW/SE Attending Yancy Ventura MD   Hosp Day # 13 ROXIE Hickman Stool  1  --  --    Stool Occurrence 3 x 4 x 1 x    Stool 1 -- --    Total Output 246 1250 1000       Net I/O     174 -[phone] -525           Vent Settings:      Hemodynamic parameters (last 24 hours):      Scheduled Meds:   • Potassium Chloride ER  40 mEq Sancho Frizzle 10/17/2017    B12 357 08/21/2019    ETOH 2 07/21/2018       Recent Labs   Lab 08/29/19  0522 08/29/19  1959 08/30/19  0502 08/31/19  0705   *  --  104* 105*   BUN 20*  --  21* 20*   CREATSERUM 1.96*  --  1.90* 1.92*   GFRAA 28*  --  29* 29*   GFRNAA continue IV diuretics for another day or 2 as long as renal function is stable.                        Sidney Jarrett MD  8/31/2019

## 2019-08-31 NOTE — PLAN OF CARE
Problem: Patient Centered Care  Goal: Patient preferences are identified and integrated in the patient's plan of care  Description  Interventions:  - What would you like us to know as we care for you?  \"Everything hurts\"  - Provide timely, complete, and FALL  Goal: Free from fall injury  Description  INTERVENTIONS:  - Assess pt frequently for physical needs  - Identify cognitive and physical deficits and behaviors that affect risk of falls.   - [place] fall precautions as indicated by assessment.  - Educ breathe, Incentive Spirometry  - Assess the need for suctioning and perform as needed  - Assess and instruct to report SOB or any respiratory difficulty  - Respiratory Therapy support as indicated  - Manage/alleviate anxiety  - Monitor for signs/symptoms o during shift. Tele in place. 3L NC  Saline locked. To chair. Max assistance with lift. Tolerated fairly well. Plan of care discussed, verbalized understanding.

## 2019-09-01 NOTE — PLAN OF CARE
Problem: Patient Centered Care  Goal: Patient preferences are identified and integrated in the patient's plan of care  Description  Interventions:  - What would you like us to know as we care for you?  \"Everything hurts\"  - Provide timely, complete, and FALL  Goal: Free from fall injury  Description  INTERVENTIONS:  - Assess pt frequently for physical needs  - Identify cognitive and physical deficits and behaviors that affect risk of falls.   - [place] fall precautions as indicated by assessment.  - Educ breathe, Incentive Spirometry  - Assess the need for suctioning and perform as needed  - Assess and instruct to report SOB or any respiratory difficulty  - Respiratory Therapy support as indicated  - Manage/alleviate anxiety  - Monitor for signs/symptoms o followed

## 2019-09-01 NOTE — OCCUPATIONAL THERAPY NOTE
OCCUPATIONAL THERAPY TREATMENT NOTE - INPATIENT        Room Number: 442/442-A           Presenting Problem: (PNA)    Problem List  Principal Problem:    Nosocomial pneumonia      OCCUPATIONAL THERAPY ASSESSMENT     Patient rcvd in bed; agreeable for OOB ac Toileting, which includes using toilet, bedpan or urinal? : A Lot  -   Putting on and taking off regular upper body clothing?: A Lot  -   Taking care of personal grooming such as brushing teeth?: A Little  -   Eating meals?: A Little    AM-PAC Score:  Scor

## 2019-09-01 NOTE — PLAN OF CARE
2L O2 via nasal cannula. Lasix IVP per order. Purewick catheter being used. Incontinent of stool. PRN norco for pain management. PRN ambien as sleep aid.  Repeat chest xray in AM.      Problem: Patient Centered Care  Goal: Patient preferences are identified interventions unsuccessful or patient reports new pain  - Anticipate increased pain with activity and pre-medicate as appropriate  Outcome: Progressing     Problem: SAFETY ADULT - FALL  Goal: Free from fall injury  Description  INTERVENTIONS:  - Assess pt effort  - Oxygen supplementation based on oxygen saturation or ABGs  - Provide Smoking Cessation handout, if applicable  - Encourage broncho-pulmonary hygiene including cough, deep breathe, Incentive Spirometry  - Assess the need for suctioning and perform Outcome: Progressing

## 2019-09-01 NOTE — PROGRESS NOTES
[facility]    Progress Note      Assessment and Plan:   1. Nosocomial pneumonia–improving clinically.   The chest x-ray still shows significant infiltrates in the upper lobes; the patient is much better clinically and I question if this sim 09/01/2019    CA 9.0 09/01/2019     Chest x-ray– right greater than left infiltrates    Ronel Hart MD  Medical Director, Critical Care, [facility]  Medical Director, [address] [place] [address]. 268 R  Pager: [phone]

## 2019-09-01 NOTE — PROGRESS NOTES
[facility] HOSP - [facility]    Cardiology Progress Note    Pamela Davison Patient Status:  Inpatient    11/3/1940 MRN [id number]   Location [facility] 4W/SW/SE Attending Kia Villarreal MD   Hosp Day # 14 PCP Hugo Topete MD     Primary Cardiolo rhythm,  Abd: Abdomen soft, nontender, nondistended  Extremities: No peripheral Edema  Psych: Cooperative    Objective:    Assessment and Plan:      Nosocomial pneumonia  - antibiotics  - prmiary and pulm     PAF  - rate/rhythm stable in SR on multaq and A

## 2019-09-01 NOTE — PROGRESS NOTES
[facility]  Progress Note     Derrek Cabral  : 11/3/1940    Status: Inpatient  Day #: 14    Attending: Yelena Escalona MD  PCP: Mikal Murrell MD      Assessment and Plan     Healthcare acquired pneumonia  CXR with multifocal pneumonia, Has not been willing to move much per RN. Will sit up in chair today.       Objective     Temp:  [97.6 °F (36.4 °C)-98.3 °F (36.8 °C)] 98.2 °F (36.8 °C)  Pulse:  [71-82] 82  Resp:  [18-20] 20  BP: (143-162)/(52-72) 162/72  General:  Alert, no distress  HEEN upper lobes.   No significant change    Dictated by (CST): Samia Mobley MD on 9/01/2019 at 7:26     Approved by (CST): Samia Mobley MD on 9/01/2019 at 7:28               Medications     • potassium chloride 40mEq IVPB (peripheral line)  40 mEq Intraveno

## 2019-09-02 NOTE — PROGRESS NOTES
[facility] [facility] - [facility]    Progress Note    Dewitte Faster Patient Status:  Inpatient    11/3/1940 MRN [id number]   Location [facility] 4W/SW/SE Attending Marlene Shields MD   Hosp Day # 15 PCP Cherie Garcia MD       Subjective:     Pt finished zosyn IV (finished long course of abx)  - completed Zithromax  - nebs BID  - IS  - wean o2 as tolerated  - mobilize, PT/OT, up to chair  - legionella antigen negative  - resp panel negative  - cont flonase and saline nasal spray for congestion  -

## 2019-09-02 NOTE — PLAN OF CARE
On room air. PRN robitussin for cough. PRN tylenol for pain management. Up in recliner in evening. Lift assist back to bed. Purewick catheter in use, changed PRN. Pericare PRN. Bathed.        Problem: Patient Centered Care  Goal: Patient preferences are suellen if interventions unsuccessful or patient reports new pain  - Anticipate increased pain with activity and pre-medicate as appropriate  Outcome: Progressing     Problem: SAFETY ADULT - FALL  Goal: Free from fall injury  Description  INTERVENTIONS:  - Assess effort  - Oxygen supplementation based on oxygen saturation or ABGs  - Provide Smoking Cessation handout, if applicable  - Encourage broncho-pulmonary hygiene including cough, deep breathe, Incentive Spirometry  - Assess the need for suctioning and perform Outcome: Progressing

## 2019-09-02 NOTE — PROGRESS NOTES
[facility]    Progress Note    Suhail Cleveland Patient Status:  Inpatient    11/3/1940 MRN [id number]   Location [facility] 4W/SW/SE Attending Castro Mckinley,  [facility]  Day # 15 PCP Yue Gibbons MD        Subjective: 06/29/2019    AST 15 07/08/2019    ALT 22 07/08/2019    PTT 31.8 08/06/2019    INR 1.0 08/25/2017    T4F 1.43 09/19/2018    TSH 2.970 04/22/2019     (H) 03/29/2019    DDIMER 0.63 08/05/2019    CRP 2.5 (H) 03/16/2016    MG 2.2 08/29/2019    PHOS 3.5

## 2019-09-03 NOTE — PLAN OF CARE
Elizabeth Mao is alert and oriented. Pain managed with Norco PRN. Ambien and Melatonin administered as sleep aids. Nectar thick liquids continued. Tele in place w/ no calls. VSS. Saline locked. Plan for patient to return to [facility] when medically cleared.  Will con distraction and/or relaxation techniques  - Monitor for opioid side effects  - Notify MD/LIP if interventions unsuccessful or patient reports new pain  - Anticipate increased pain with activity and pre-medicate as appropriate  Outcome: Progressing     Prob changes in mentation and behavior  - Position to facilitate oxygenation and minimize respiratory effort  - Oxygen supplementation based on oxygen saturation or ABGs  - Provide Smoking Cessation handout, if applicable  - Encourage broncho-pulmonary hygiene self-care  - Encourage patient to incorporate impaired side during daily activities to promote function   Outcome: Progressing

## 2019-09-03 NOTE — OCCUPATIONAL THERAPY NOTE
OCCUPATIONAL THERAPY TREATMENT NOTE - INPATIENT        Room Number: 442/442-A           Presenting Problem: (PNA)    Problem List  Principal Problem:    Nosocomial pneumonia      OCCUPATIONAL THERAPY ASSESSMENT     RN contacted roslyn rto start of care.  Treat Toileting, which includes using toilet, bedpan or urinal? : A Lot  -   Putting on and taking off regular upper body clothing?: A Lot  -   Taking care of personal grooming such as brushing teeth?: A Little  -   Eating meals?: A Little    AM-PAC Score:  Scor

## 2019-09-03 NOTE — PROGRESS NOTES
[facility]    Cardiology Progress Note  [facility]    Mitra Villa Patient Status:  Inpatient    11/3/1940 MRN [id number]   Location [facility] 4W/SW/SE Attending Jocelyn Allen, [facility]  Day # 16 PCP HCl  400 mg Oral BID with meals   • gabapentin  100 mg Oral TID   • hydrALAzine HCl  25 mg Oral Q8H [address]   • Sertraline HCl  100 mg Oral Daily   • Pantoprazole Sodium  40 mg Oral QAM AC   • Oxybutynin Chloride ER  5 mg Oral Daily   • Levothyroxine Sodium  1 318.0       No results for input(s): BNPML in the last 168 hours. No results for input(s): TROP, CK in the last 168 hours.               Exam:     Pulm: Lungs clear, normal respiratory effort  CV: Heart with regular rate and rhythm, no murmur, rub, extra

## 2019-09-03 NOTE — PLAN OF CARE
Patient with persistent cough, remains on nectar thick liquids. Up to chair for several hours this afternoon. Myles Lau remains.

## 2019-09-03 NOTE — PHYSICAL THERAPY NOTE
PHYSICAL THERAPY TREATMENT NOTE - INPATIENT     Room Number: 442/442-A       Presenting Problem: pneumonia    Problem List  Principal Problem:    Nosocomial pneumonia      PHYSICAL THERAPY ASSESSMENT     Patient received supine in bed; agreeable to partici Sitting: Fair  Dynamic Sitting: Fair -           Static Standing: Poor +  Dynamic Standing: Poor    ACTIVITY TOLERANCE           BP: 157/48  BP Location: Right arm  BP Method: Automatic  Patient Position: Sitting    O2 WALK                  AM-PAC '6-Click walker - rolling at assistance level: minimum assistance   Goal #3   Current Status  Mod A x 2 with RW 6ft   Goal #4 Patient will tolerate static standing with bilateral UE support on RW requiring CG assist for balance 2 minutes   Goal #4   Current Status

## 2019-09-03 NOTE — PROGRESS NOTES
[facility]    Progress Note    David Dwyer Patient Status:  Inpatient    11/3/1940 MRN [id number]   Location [facility] 4W/SW/SE Attending Telly Bailey,  [facility] Day # 12 PCP Khang Calderon MD        Subjective:    Matthew Results   Component Value Date    WBC 8.3 09/03/2019    HGB 8.9 (L) 09/03/2019    HCT 29.4 (L) 09/03/2019    .0 09/03/2019    CREATSERUM 2.00 (H) 09/03/2019    BUN 27 (H) 09/03/2019     09/03/2019    K 4.0 09/03/2019     09/03/2019    CO

## 2019-09-03 NOTE — PROGRESS NOTES
[facility]    Progress Note    Talbotton Lagrange Patient Status:  Inpatient    11/3/1940 MRN [id number]   Location [facility] 4W/SW/SE Attending Melinda Mello MD   Hosp Day # 16 PCP Evelia Solano MD       Subjective:     Pt (finished long course of abx)  - completed Zithromax  - nebs BID  - IS  - wean o2 as tolerated  - mobilize, PT/OT, up to chair  - legionella antigen negative  - resp panel negative  - cont flonase and saline nasal spray for congestion  - robitussin prn for

## 2019-09-03 NOTE — PLAN OF CARE
Pt had several loose Bms today, was up in the chair twice with the lift, was able to get her back in bed using walker, nebs 2 times daily, purewick in place, nectar thickened liquids, tele in place     Problem: Patient Centered Care  Goal: Patient Heather Rincon Notify MD/LIP if interventions unsuccessful or patient reports new pain  - Anticipate increased pain with activity and pre-medicate as appropriate  Outcome: Progressing     Problem: SAFETY ADULT - FALL  Goal: Free from fall injury  Description  INTERVENTIO minimize respiratory effort  - Oxygen supplementation based on oxygen saturation or ABGs  - Provide Smoking Cessation handout, if applicable  - Encourage broncho-pulmonary hygiene including cough, deep breathe, Incentive Spirometry  - Assess the need for s to promote function   Outcome: Progressing

## 2019-09-04 NOTE — PROGRESS NOTES
[facility]    Progress Note    Cami Byron Patient Status:  Inpatient    11/3/1940 MRN [id number]   Location [facility] 4W/SW/SE Attending Yenifer Marinelli MD   Hosp Day # 16 PCP Val Sagastume MD       Subjective:     Pt interstitial lung disease. Developing neoplasm or underlying pneumonia is also within the differential.  Short-term follow-up CT chest  in 2-4 weeks versus tissue sampling may be of further value based on clinical discretion.   Small right pleural effusion chronic disease:  Hgb ~8 w/ baseline ~10-12. Iron studies consistent with anemia of chronic disease. B12 is low normal.  Hemoccult negative.  - gave a dose of B12 x2  - cont home iron bid  - transfuse for hgb<7 - 1 unit PRBC due to SOB     Mouth pain.   B

## 2019-09-04 NOTE — CONSULTS
[facility]    Cardiac Electrophysiology Consultation  RICHELLE Cabral Location: [facility] 4W/SW/SE    11/3/1940 MRN [id number]   Consulting Date 2019 [facility] [id number]   Consulting Physician Savanna Connors MD Attending at [facility] ENDOSCOPY   • ESOPHAGOGASTRODUODENOSCOPY (EGD) N/A 8/23/2017    Performed by Verner Paddock, MD at [facility] ENDOSCOPY   • HIP REPLACEMENT SURGERY Left    • HYSTERECTOMY     • KNEE REPLACEMENT SURGERY  2006   • LAPAROSCOPIC CHOLECYSTECTOMY N/A 8/25/2017 daily. Disp: 60 tablet Rfl: 3   apixaban 5 MG Oral Tab Take 1 tablet (5 mg total) by mouth 2 (two) times daily. Disp: 60 tablet Rfl: 3   dronedarone HCl 400 MG Oral Tab Take 1 tablet (400 mg total) by mouth 2 (two) times daily with meals.  Disp: 60 tablet R weakness/fatigue  HEENT: no visual or hearing changes  SKIN: denies any unusual skin lesions or rashes  RESPIRATORY: cough, shortness of breath  CARDIOVASCULAR: no chest pain, see HPI  GI: denies abdominal pain and denies heartburn  : no dysuria or hemat fibrillation  - maintaining sinus rhythm with Multaq  - oral anticoagulation    RECOMMENDATIONS:  - stay off Multaq.   A rare/idiosyncratic drug-induced lung injury can never be excluded, and given the severity of her lung disease any possible offending age

## 2019-09-04 NOTE — PROGRESS NOTES
[facility]    Progress Note    Belinda [place] Patient Status:  Inpatient    11/3/1940 MRN [id number]   Location [facility] 4W/SW/SE Attending Tyson Gunter,  [facility] Day # 16 PCP Estrella Posada MD        Subjective: Results   Component Value Date    WBC 8.3 09/04/2019    HGB 8.7 (L) 09/04/2019    HCT 29.7 (L) 09/04/2019    .0 09/04/2019    CREATSERUM 2.10 (H) 09/04/2019    BUN 27 (H) 09/04/2019     09/04/2019    K 4.0 09/04/2019     09/04/2019    CO MD  9/4/2019

## 2019-09-04 NOTE — PLAN OF CARE
Problem: Patient Centered Care  Goal: Patient preferences are identified and integrated in the patient's plan of care  Description  Interventions:  - What would you like us to know as we care for you?  \"Everything hurts\"  - Provide timely, complete, and FALL  Goal: Free from fall injury  Description  INTERVENTIONS:  - Assess pt frequently for physical needs  - Identify cognitive and physical deficits and behaviors that affect risk of falls.   - [place] fall precautions as indicated by assessment.  - Educ breathe, Incentive Spirometry  - Assess the need for suctioning and perform as needed  - Assess and instruct to report SOB or any respiratory difficulty  - Respiratory Therapy support as indicated  - Manage/alleviate anxiety  - Monitor for signs/symptoms o not appear to be on distress, O2 level at 94-96% at room air. [place] was changed this morning. Possible discharge today to rehab.

## 2019-09-04 NOTE — CM/SW NOTE
Interdisciplinary Rounds: 09/04/19  Admitted: 8/17/2019 LOS: 16  Disciplines in attendance: charge nurse, staff nurse, CM, [address] and discharge plan reviewed. Plan is for Wheeler June to return to [facility] when medically stable.   Weaned off O2, now on RA/ an

## 2019-09-04 NOTE — PLAN OF CARE
Pt up in chair with lift, purewick external catheter in place, tele in place, on room air, denies pain, on nectar thickened liquids, possibly going back to [facility] for rehab in AM per pulmonology     Problem: Patient Centered Care  Goal: Patien effects  - Notify MD/LIP if interventions unsuccessful or patient reports new pain  - Anticipate increased pain with activity and pre-medicate as appropriate  Outcome: Progressing     Problem: SAFETY ADULT - FALL  Goal: Free from fall injury  Description and minimize respiratory effort  - Oxygen supplementation based on oxygen saturation or ABGs  - Provide Smoking Cessation handout, if applicable  - Encourage broncho-pulmonary hygiene including cough, deep breathe, Incentive Spirometry  - Assess the need f activities to promote function   Outcome: Progressing

## 2019-09-05 NOTE — PLAN OF CARE
Problem: Patient Centered Care  Goal: Patient preferences are identified and integrated in the patient's plan of care  Description  Interventions:  - What would you like us to know as we care for you?  \"Everything hurts\"  - Provide timely, complete, and FALL  Goal: Free from fall injury  Description  INTERVENTIONS:  - Assess pt frequently for physical needs  - Identify cognitive and physical deficits and behaviors that affect risk of falls.   - [place] fall precautions as indicated by assessment.  - Educ breathe, Incentive Spirometry  - Assess the need for suctioning and perform as needed  - Assess and instruct to report SOB or any respiratory difficulty  - Respiratory Therapy support as indicated  - Manage/alleviate anxiety  - Monitor for signs/symptoms o Tylenol given. VSS. Afebrile. Possible discharge to [facility] today.

## 2019-09-05 NOTE — PROGRESS NOTES
Pulmonary/Critical Care Follow Up Note    HPI:   Chani Everett is a 70year old female with Patient presents with:  Pneumonia      PCP Onesimo Patrick MD  Admission Attending Nina Mendez 15 Day #18    C/o MARTÍNEZ  States better  [employer] like lozenge, 1 lozenge, Oral, PRN  •  nystatin (MYCOSTATIN) suspension 500,000 Units, 5 mL, Oral, TID  •  Miconazole Nitrate 2 % powder, , Topical, [email or web address]  •  ferrous sulfate EC tab 325 mg, 325 mg, Oral, BID with meals  •  Maalox/diphenhydramine/lidocai bases  cv reg   abd soft +bs  Ext warm      LABS:    Lab Results   Component Value Date    WBC 8.5 09/05/2019    HGB 9.0 09/05/2019    HCT 29.1 09/05/2019    .0 09/05/2019    CREATSERUM 1.83 09/05/2019    BUN 30 09/05/2019     09/05/2019    K

## 2019-09-05 NOTE — PLAN OF CARE
Problem: Patient Centered Care  Goal: Patient preferences are identified and integrated in the patient's plan of care  Description  Interventions:  - What would you like us to know as we care for you?  \"Everything hurts\"  - Provide timely, complete, and evaluate response  - Implement non-pharmacological measures as appropriate and evaluate response  - Consider cultural and social influences on pain and pain management  - Manage/alleviate anxiety  - Utilize distraction and/or relaxation techniques  - Monit form as appropriate  - Assess patient's ability to be responsible for managing their own health  - Refer to Case Management Department for coordinating discharge planning if the patient needs post-hospital services based on physician/LIP order or complex n with patient/family  9/5/2019 1214 by Lowell Riley RN  Outcome: Adequate for Discharge  9/5/2019 0955 by Lowell Riley RN  Outcome: Progressing  9/5/2019 0912 by Lowell Riley RN  Outcome: Progressing     Problem: Impaired Functional Mobility  Goal: Ac

## 2019-09-05 NOTE — PLAN OF CARE
Problem: Patient Centered Care  Goal: Patient preferences are identified and integrated in the patient's plan of care  Description  Interventions:  - What would you like us to know as we care for you?  \"Everything hurts\"  - Provide timely, complete, and FALL  Goal: Free from fall injury  Description  INTERVENTIONS:  - Assess pt frequently for physical needs  - Identify cognitive and physical deficits and behaviors that affect risk of falls.   - [place] fall precautions as indicated by assessment.  - Educ breathe, Incentive Spirometry  - Assess the need for suctioning and perform as needed  - Assess and instruct to report SOB or any respiratory difficulty  - Respiratory Therapy support as indicated  - Manage/alleviate anxiety  - Monitor for signs/symptoms o bladder. Purewick and pull ups on. Nectar thickened liquids and regular diet. On eliquis for a-fib. Up with 2 assist. Plan to discharge to [facility] for rehab.

## 2019-09-05 NOTE — CM/SW NOTE
CM contacted by Dr. Lizett Ansari that patient is medically clear for DC. Bed is available at anytime today at [facility]. Medicar transport arranged for 1pm today. PCS on chart for medicar and for medical records. RN to call Adilene Bear.

## 2019-09-06 NOTE — DISCHARGE SUMMARY
[place] [facility] - [facility]    Discharge Summary    Jorge Schneider Patient Status:  Inpatient    11/3/1940 MRN [id number]   Location [facility] 4W/SW/SE Attending No att. providers found   [facility] Day # 25 PCP Mona Coy MD     Date of Adm extremity, unspecified laterality     Acute cystitis without hematuria     Chronic pain syndrome     Suicidal ideation     Back pain     Stage 3 chronic kidney disease (HCC)     Borderline high cholesterol     Acute diverticulitis     Diarrhea, unspecified  CXR findings worse than how she appears clinically.   There is rare chance that pt has ILD or ILD due to multaq. CT chest 9/3 with findings suggestive of ILD. Pulm and cards were on consult.   - pt has finished with IV lasix diuresis and IV abx  - Stoppe status:   Full    Consultations:   Pulmonology, cardiology     Discharge Condition:  Good    Discharge Medications:      Discharge Medications      START taking these medications      Instructions Prescription details   Fluticasone Propionate 50 MCG/ACT Matthew instructions      1000 units   #100  Sig-1000 units orally daily     3 Refills   Quantity:  100 tablet  Refills:  3        CONTINUE taking these medications      Instructions Prescription details   acetaminophen 500 MG Tabs  Commonly known as:  TYLENOL EXT TABLET BY MOUTH EVERY MORNING   Quantity:  30 tablet  Refills:  0     Ondansetron HCl 4 mg tablet  Commonly known as:  ZOFRAN      Take 1 tablet (4 mg total) by mouth every 12 (twelve) hours as needed for Nausea.    Quantity:  60 tablet  Refills:  5     [facility]  Contact information:  [address] [id number] [phone]             Lori Martines MD In 2 weeks.     Specialty:  CARDIOLOGY  Contact information:  [facility] Jay Adorno [address] [place] [address]

## 2019-09-11 PROBLEM — R07.9 ACUTE CHEST PAIN: Status: ACTIVE | Noted: 2019-09-11

## 2019-09-11 PROBLEM — I10 ESSENTIAL HYPERTENSION: Status: ACTIVE | Noted: 2019-09-11

## 2019-09-11 PROBLEM — I48.91 FIBRILLATION, ATRIAL (CMD): Status: ACTIVE | Noted: 2019-09-11

## 2019-09-11 PROBLEM — I49.9 CARDIAC ARRHYTHMIA: Status: ACTIVE | Noted: 2019-09-11

## 2019-09-11 PROBLEM — I87.2 VENOUS INSUFFICIENCY: Status: ACTIVE | Noted: 2019-09-11

## 2019-09-11 PROBLEM — E78.00 HYPERCHOLESTEROLEMIA: Status: ACTIVE | Noted: 2019-09-11

## 2019-09-11 PROBLEM — E78.1 HYPERTRIGLYCERIDEMIA: Status: ACTIVE | Noted: 2019-09-11

## 2019-09-25 NOTE — TELEPHONE ENCOUNTER
Lavelle Barker called from [facility], pt was not able to be seen by [facility] today, was going to ER as per Dr Bertrand Villafana  Tasked to nursing Cheek Interpolation Flap Text: A decision was made to reconstruct the defect utilizing an interpolation axial flap and a staged reconstruction.  A telfa template was made of the defect.  This telfa template was then used to outline the Cheek Interpolation flap.  The donor area for the pedicle flap was then injected with anesthesia.  The flap was excised through the skin and subcutaneous tissue down to the layer of the underlying musculature.  The interpolation flap was carefully excised within this deep plane to maintain its blood supply.  The edges of the donor site were undermined.   The donor site was closed in a primary fashion.  The pedicle was then rotated into position and sutured.  Once the tube was sutured into place, adequate blood supply was confirmed with blanching and refill.  The pedicle was then wrapped with xeroform gauze and dressed appropriately with a telfa and gauze bandage to ensure continued blood supply and protect the attached pedicle.

## 2020-01-01 ENCOUNTER — APPOINTMENT (OUTPATIENT)
Dept: GENERAL RADIOLOGY | Facility: HOSPITAL | Age: 80
End: 2020-01-01
Attending: EMERGENCY MEDICINE
Payer: MEDICARE

## 2020-01-01 ENCOUNTER — HOSPITAL ENCOUNTER (EMERGENCY)
Facility: HOSPITAL | Age: 80
Discharge: HOME | End: 2020-01-01
Attending: EMERGENCY MEDICINE
Payer: MEDICARE

## 2020-01-01 ENCOUNTER — LAB REQUISITION (OUTPATIENT)
Dept: LAB | Facility: HOSPITAL | Age: 80
End: 2020-01-01
Payer: MEDICARE

## 2020-01-01 ENCOUNTER — TELEPHONE (OUTPATIENT)
Dept: INTERNAL MEDICINE CLINIC | Facility: CLINIC | Age: 80
End: 2020-01-01

## 2020-01-01 VITALS
OXYGEN SATURATION: 96 % | RESPIRATION RATE: 18 BRPM | SYSTOLIC BLOOD PRESSURE: 143 MMHG | HEART RATE: 53 BPM | DIASTOLIC BLOOD PRESSURE: 53 MMHG | HEIGHT: 60 IN | BODY MASS INDEX: 23.56 KG/M2 | WEIGHT: 120 LBS

## 2020-01-01 DIAGNOSIS — Z03.818 ENCOUNTER FOR OBSERVATION FOR SUSPECTED EXPOSURE TO OTHER BIOLOGICAL AGENTS RULED OUT: ICD-10-CM

## 2020-01-01 DIAGNOSIS — Z20.828 CONTACT WITH AND (SUSPECTED) EXPOSURE TO OTHER VIRAL COMMUNICABLE DISEASES: ICD-10-CM

## 2020-01-01 DIAGNOSIS — J98.01 BRONCHOSPASM: ICD-10-CM

## 2020-01-01 DIAGNOSIS — J40 BRONCHITIS: Primary | ICD-10-CM

## 2020-01-01 LAB
ANION GAP SERPL CALC-SCNC: 7 MMOL/L (ref 0–18)
BASOPHILS # BLD AUTO: 0.06 X10(3) UL (ref 0–0.2)
BASOPHILS NFR BLD AUTO: 0.5 %
BUN BLD-MCNC: 21 MG/DL (ref 7–18)
BUN/CREAT SERPL: 14.7 (ref 10–20)
CALCIUM BLD-MCNC: 9.8 MG/DL (ref 8.5–10.1)
CHLORIDE SERPL-SCNC: 106 MMOL/L (ref 98–112)
CO2 SERPL-SCNC: 26 MMOL/L (ref 21–32)
CREAT BLD-MCNC: 1.43 MG/DL (ref 0.55–1.02)
DEPRECATED RDW RBC AUTO: 56.2 FL (ref 35.1–46.3)
EOSINOPHIL # BLD AUTO: 0.9 X10(3) UL (ref 0–0.7)
EOSINOPHIL NFR BLD AUTO: 7.2 %
ERYTHROCYTE [DISTWIDTH] IN BLOOD BY AUTOMATED COUNT: 17.8 % (ref 11–15)
GLUCOSE BLD-MCNC: 125 MG/DL (ref 70–99)
HCT VFR BLD AUTO: 32.5 % (ref 35–48)
HGB BLD-MCNC: 10.3 G/DL (ref 12–16)
IMM GRANULOCYTES # BLD AUTO: 0.04 X10(3) UL (ref 0–1)
IMM GRANULOCYTES NFR BLD: 0.3 %
LYMPHOCYTES # BLD AUTO: 1.7 X10(3) UL (ref 1–4)
LYMPHOCYTES NFR BLD AUTO: 13.5 %
MCH RBC QN AUTO: 27.2 PG (ref 26–34)
MCHC RBC AUTO-ENTMCNC: 31.7 G/DL (ref 31–37)
MCV RBC AUTO: 86 FL (ref 80–100)
MONOCYTES # BLD AUTO: 0.62 X10(3) UL (ref 0.1–1)
MONOCYTES NFR BLD AUTO: 4.9 %
MRSA DNA SPEC QL NAA+PROBE: POSITIVE
NEUTROPHILS # BLD AUTO: 9.24 X10 (3) UL (ref 1.5–7.7)
NEUTROPHILS # BLD AUTO: 9.24 X10(3) UL (ref 1.5–7.7)
NEUTROPHILS NFR BLD AUTO: 73.6 %
NT-PROBNP SERPL-MCNC: 1207 PG/ML (ref ?–450)
OSMOLALITY SERPL CALC.SUM OF ELEC: 292 MOSM/KG (ref 275–295)
PLATELET # BLD AUTO: 236 10(3)UL (ref 150–450)
POTASSIUM SERPL-SCNC: 3.7 MMOL/L (ref 3.5–5.1)
RBC # BLD AUTO: 3.78 X10(6)UL (ref 3.8–5.3)
SODIUM SERPL-SCNC: 139 MMOL/L (ref 136–145)
TROPONIN I SERPL-MCNC: <0.045 NG/ML (ref ?–0.04)
TROPONIN I SERPL-MCNC: <0.045 NG/ML (ref ?–0.04)
WBC # BLD AUTO: 12.6 X10(3) UL (ref 4–11)

## 2020-01-01 PROCEDURE — 94640 AIRWAY INHALATION TREATMENT: CPT

## 2020-01-01 PROCEDURE — 99285 EMERGENCY DEPT VISIT HI MDM: CPT

## 2020-01-01 PROCEDURE — 83880 ASSAY OF NATRIURETIC PEPTIDE: CPT | Performed by: EMERGENCY MEDICINE

## 2020-01-01 PROCEDURE — 84484 ASSAY OF TROPONIN QUANT: CPT | Performed by: EMERGENCY MEDICINE

## 2020-01-01 PROCEDURE — 93010 ELECTROCARDIOGRAM REPORT: CPT | Performed by: EMERGENCY MEDICINE

## 2020-01-01 PROCEDURE — 93005 ELECTROCARDIOGRAM TRACING: CPT

## 2020-01-01 PROCEDURE — 87641 MR-STAPH DNA AMP PROBE: CPT | Performed by: EMERGENCY MEDICINE

## 2020-01-01 PROCEDURE — 85025 COMPLETE CBC W/AUTO DIFF WBC: CPT | Performed by: EMERGENCY MEDICINE

## 2020-01-01 PROCEDURE — 80048 BASIC METABOLIC PNL TOTAL CA: CPT | Performed by: EMERGENCY MEDICINE

## 2020-01-01 PROCEDURE — 71045 X-RAY EXAM CHEST 1 VIEW: CPT | Performed by: EMERGENCY MEDICINE

## 2020-01-01 PROCEDURE — 96374 THER/PROPH/DIAG INJ IV PUSH: CPT

## 2020-01-01 RX ORDER — LEVOFLOXACIN 500 MG/1
500 TABLET, FILM COATED ORAL DAILY
Qty: 7 TABLET | Refills: 0 | Status: SHIPPED | OUTPATIENT
Start: 2020-01-01 | End: 2020-01-01

## 2020-01-01 RX ORDER — IPRATROPIUM BROMIDE AND ALBUTEROL SULFATE 2.5; .5 MG/3ML; MG/3ML
3 SOLUTION RESPIRATORY (INHALATION) ONCE
Status: COMPLETED | OUTPATIENT
Start: 2020-01-01 | End: 2020-01-01

## 2020-01-01 RX ORDER — METHYLPREDNISOLONE SODIUM SUCCINATE 125 MG/2ML
60 INJECTION, POWDER, LYOPHILIZED, FOR SOLUTION INTRAMUSCULAR; INTRAVENOUS ONCE
Status: COMPLETED | OUTPATIENT
Start: 2020-01-01 | End: 2020-01-01

## 2020-01-01 RX ORDER — ALBUTEROL SULFATE 90 UG/1
2 AEROSOL, METERED RESPIRATORY (INHALATION) EVERY 4 HOURS PRN
Qty: 1 INHALER | Refills: 0 | Status: SHIPPED | OUTPATIENT
Start: 2020-01-01 | End: 2020-01-01

## 2020-01-15 NOTE — ED PROVIDER NOTES
Patient Seen in: [facility] AND [facility] Emergency Department      History   Patient presents with:  Cough/URI  Chest Pain    Stated Complaint: chest pain    HPI    82-year-old female with chronic lung disease and history of heart failure admitted about 3 to 4 Rand Ferrari MD at [facility] ENDOSCOPY   • ESOPHAGOGASTRODUODENOSCOPY (EGD) N/A 12/8/2017    Performed by Evonne Moreno MD at [facility] ENDOSCOPY   • ESOPHAGOGASTRODUODENOSCOPY (EGD) N/A 8/23/2017    Performed by Evonne Moreno MD at [address] [place] [address] tenderness. Neurological: Alert and oriented to person, place, and time. Skin: Skin is warm and dry. Psychiatric: Normal mood and affect. Behavior is normal.   Nursing note and vitals reviewed.     Differential diagnosis includes accelerated hyperten -----------         ------                     CBC W/ DIFFERENTIAL[[id number]]          Abnormal            Final result                 Please view results for these tests on the individual orders.    [address] this time.               Disposition and Plan     Clinical Impression:  Bronchitis  (primary encounter diagnosis)  Bronchospasm    Disposition:  Discharge  1/15/2020  8:40 pm    Follow-up:  Bonita Eisenmenger, DO [address] [phone]

## 2020-01-16 NOTE — ED NOTES
The patient states that she was coughing prior to her chest pain today and the patient felt like GERD that she has had in the past.

## 2020-05-05 NOTE — TELEPHONE ENCOUNTER
Death certificate filled out and signed by Dr Bob Rivera. Certificate faxed to [phone] with confirmation received. Copy of certificate sent to scanning and original placed in 1 week hold folder.

## 2020-07-17 NOTE — ED PROVIDER NOTES
Patient Seen in: [facility] Emergency Department    History   Patient presents with:  Fatigue (constitutional, neurologic)    Stated Complaint: FATIGUE     HPI    79 yo F with PMH HTN, HL, hypothyroid presenting for evaluation of several weeks of ge HYDROcodone-acetaminophen 5-325 MG Oral Tab,  Take 1 tablet by mouth every 4 (four) hours as needed for Pain (max 6 per day). HYDROcodone-acetaminophen  MG Oral Tab,  Take 1 tablet by mouth every 4 (four) hours as needed for Pain.    HYDROcodone-ace Brother    • Melanoma Other    • Cancer Other    • Stroke Other    • Heart Disease Other    • Diabetes Other    • Eye Problems Neg        Smoking status: Former Smoker                                                              Packs/day: 0.00      Years: for the following:        Result Value    Leukocyte Esterase Urine Trace (*)     WBC Urine 6 (*)     Bacteria Urine Few (*)     All other components within normal limits   BASIC METABOLIC PANEL (8) - Abnormal; Notable for the following:     Glucose 148 (*) with no visible aneurysm. LUNGS/PLEURA: Chronic basilar pulmonary markings. No acute pulmonary disease. BONES: Mild scoliosis with mild degenerative disc disease and spondylosis.    Chronic kyphosis OTHER: Extensive multilevel dorsal lumbar spinal fusion h Cap  Take 1 capsule (500 mg total) by mouth 2 (two) times daily.   Qty: 14 capsule Refills: 0 Patient/Caregiver provided printed discharge information.

## 2020-08-14 NOTE — TELEPHONE ENCOUNTER
Met with patient during her appt a   Noted. Please call Erica Hoyos from [facility] back on Thursday and tell her it is okay to discharge the patient from home health next week as she is planning to do. I will route this to nursing.   Thank you!!

## 2020-09-22 NOTE — PLAN OF CARE
Problem: Patient/Family Goals  Goal: Patient/Family Long Term Goal  Description  Patient's Long Term Goal: strength to improve    Interventions:  - IV antibiotics  - Ambulating as tolerated  - Monitor VS  - Monitor for dizziness   - Fall precautions   - request assistance  - Assess pain using appropriate pain scale  - Administer analgesics based on type and severity of pain and evaluate response  - Implement non-pharmacological measures as appropriate and evaluate response  - Consider cultural and social other

## 2020-12-12 NOTE — TELEPHONE ENCOUNTER
Discussed with Sandra Arora from [facility]. I have updated her diagnoses with her.   Thank you!!
Noted. Please contact Paula Rodríguez from [facility] let her know that I will sign the home health orders as necessary for the patient. I will route this to nursing.   Thank you!!
[facility] resumed home care of pt she was discharged from [facility], will Dr TOTH sign orders  Confirmed with Dr TOTH no congestive heart failure, it keeps coming up in her chart  Tasked to nursing
Please advise - to DR. TOTH
To Dr. Karl Carlson - your message relayed to marii who verbalized understanding. Merced Meyer asking for verification/confirmation of these problems: does pt have: diabetes, amnesia, sleep apnea, peripheral vascular disease and stroke?
4

## 2021-01-01 NOTE — CM/SW NOTE
08/12/19 1300   Discharge disposition   Expected discharge disposition Skilled Nurs   Name of [id number]   Discharge transportation Other (comment)  ([facility])     Today is admission day #7 for Yamilka [place] and she is med
8/5: SW received MDO for discharge planning. SW noted from nursing rounds patient is from [facility] or [facility]. SW left message for POA/Deb to verify where patient resides, and complete social history and home healthcare.      SW to follow up with Maria Esther Barrett to e
Explanation of BPCI/Medicare program provided. Patient was enrolled under   BPCI/Medicare letter and brochure provided. / to remain available for support and/or discharge planning.          Ginna Markham RN Case Manager
[facility]. Will need to confirm bed once dc date is determined.     Turner Pickering, MSW, [facility] 
None
[phone]

## 2021-07-22 NOTE — CONSULTS
Her follow up is September with you but she's wanting to do labs now to see about a dose change on levothyroxine to help  conceive. Can she do the labs now ?     [facility]    Report of Consultation    Daniel Shepard Patient Status:  Inpatient    11/3/1940 MRN [id number]   Location [facility] 5SW/SE Attending David Mancia MD   Hosp Day # 1 PCP Heidi Mcneill MD     Date of Admissi dec 2011  08/25/2017: CHOLECYSTECTOMY  12/17, 8/17: EGD  11-: ELECTROCARDIOGRAM, COMPLETE      Comment:  Scanned to media tab - DOS 11- 12/8/2017: ESOPHAGOGASTRODUODENOSCOPY (EGD); N/A      Comment:  Performed by Mounika Baker MD at [address] [place] [address] EN HCl (ZOFRAN) injection 4 mg 4 mg Intravenous Q6H PRN   dextrose 50 % injection 50 mL 50 mL Intravenous PRN   Glucose-Vitamin C (DEX-4) 4-0.006 g chewable tab 4 tablet 4 tablet Oral Q15 Min PRN   glucose (DEX4) oral liquid 15 g 15 g Oral Q15 Min PRN   Insul BY MOUTH EVERY MORNING   Cholecalciferol (VITAMIN D) 1000 UNITS Oral Tab 1000 units   #100  Sig-1000 units orally daily     3 Refills (Patient taking differently: Take 1,000 Units by mouth daily.  )   Ondansetron HCl (ZOFRAN) 4 mg tablet Take 1 tablet (4 m PHOS 3.6 07/17/2018    TROP 0.01 07/14/2018     (H) 10/17/2017    ETOH 2 07/21/2018         Imaging:  Ct Abdomen Pelvis Iv Contrast, No Oral (er)    Result Date: 9/19/2018  CONCLUSION:   Diverticulitis of the proximal sigmoid colon without abscess or

## 2021-08-31 NOTE — LETTER
Hospital Discharge Documentation  Please phone to schedule a hospital follow up appointment.     From: [address] Hospitalist's Office  Phone: [phone]    Patient discharged time/date: 3/28/2019  3:54 PM  Patient discharge disposition:  Assisted Jodie duodenal narrowing on EGD. Biopsies were negative for malignancy. Showed inflammation. H. pylori was negative. She stable for discharge home.      Consultants     Provider Role Specialty    Lukasz Jensen MD Consulting Physician  GASTROENTEROLOGY    Claudia Reece CONTINUE taking these medications      Instructions Prescription details   allopurinol 300 MG Tabs  Commonly known as:  ZYLOPRIM      Take 1 tablet (300 mg total) by mouth daily.    Quantity:  30 tablet  Refills:  0     amLODIPine Besylate 10 MG Tabs  Commo omeprazole 20 MG Cpdr  Commonly known as:  PRILOSEC              Where to Get Your Medications      Please  your prescriptions at the location directed by your doctor or nurse    Bring a paper prescription for each of these medications  · Susannah Mora Tim Franks[JH.2] is a(n)[JH.3 70year old[JH.2] female who was recently had a different institution in December with pancreatitis and ileus on CT scan. Etiology of pancreatitis was not clear. She presents with upper abdominal pain with nausea.   She Take 1 tablet (5 mg total) by mouth 3 (three) times daily as needed (nausea). Quantity:  20 tablet  Refills:  0     Pantoprazole Sodium 40 MG Tbec  Commonly known as:  PROTONIX      Take 1 tablet (40 mg total) by mouth 2 (two) times daily before meals. Take 1 tablet (25 mg total) by mouth 2x Daily(Beta Blocker).    Quantity:  60 tablet  Refills:  0     metoprolol Tartrate 25 MG Tabs  Commonly known as:  LOPRESSOR      TAKE 1 TABLET BY MOUTH TWICE DAILY HOLD IF PULSE IS < 60 BPM   Quantity:  60 tablet Patient's first and last name, , procedure, and correct site confirmed prior to the start of procedure. Patient's first and last name, , procedure, and correct site confirmed prior to the start of procedure.

## 2021-09-07 NOTE — TELEPHONE ENCOUNTER
Bob Mcconnell faxed refill request for Sucralfate 1 GM tabs originally prescribed by Dr. Xiao Hill DO. Ok to fill.  Prescription sent.      Please notify patient he is due for a medication check before the next refill.

## 2021-11-10 NOTE — TELEPHONE ENCOUNTER
[facility] faxed OT Eval along with Certification and Plan of Care on Alda Morgan. MD to review and sign where highlighted. Documents in MD Inbox    Routed to Dr Ben Nair as a reminder to sign this when able.
Noted.  I have located the orders that were requested to be signed. I have signed multiple orders and have left them on my nurses desk to be faxed back. Please fax them back as requested on Friday. I will route this to nursing.   Thank you!!
Orders from MD faxed to [facility], confirmation received.
Loud noises/Being touched

## 2021-11-18 NOTE — TELEPHONE ENCOUNTER
Please advise - to DR. TOTH
Roxi with visiting nurse, [employer]. Patient apparently has lost some weight since she has been taking Lasix 20 mg a day. She still has some swelling in her legs. Her recent BMP shows his BUN to be 27 and creatinine to be 1.65.   I will have the patient con
[facility] calling to find out if pt will continue taking Lasik everyday   Tasked to nursing
Documentation clarification is required for accuracy in coding and billing, claim validation and reporting severity of illness, quality data and risk of mortality.

## 2022-03-23 NOTE — TELEPHONE ENCOUNTER
Forms dropped off for Dr Leno Bradshaw to complete and forward to [facility] where pt will be moving  Please fax back completed forms asap     Placed in Dr Jorge Starch mail slot
Forms faxed by Deidre Rocha 172 patient and relayed on VM per hipaa that forms were faxed this morning to [facility].
Forms faxed to KOMAL BOLIVAR [facility] at . Fax confirmation received. Sent to scanning.
Noted.  I have completed the form that was dropped off as requested. I have left the completed form on my nurses desk. Please fax the form back as requested. Also please notify the patient this is been done. I will route this to nursing.   Thank you!!
TR Cortes at [facility] that form was faxed with confirmation 4/30/18. Refaxed to [facility] at [phone] - LM for Volodymyr.
To 
Vinnie / Myles White calling they didn't receive the forms, please refax [phone]     Tasked to nursing
normal

## 2022-07-22 NOTE — TELEPHONE ENCOUNTER
07/22/22 1057   Quick Adds   Type of Visit Initial PT Evaluation   Living Environment   People in Home child(олег), adult   Current Living Arrangements house   Home Accessibility stairs to enter home;stairs within home   Number of Stairs, Main Entrance 3   Stair Railings, Main Entrance railing on right side (ascending)   Number of Stairs, Within Home, Primary greater than 10 stairs   Stair Railings, Within Home, Primary railing on right side (ascending)   Living Environment Comments Pt lives in home with adult son in [place]. Pt's son works outside of the home throughout the day. Stairs to get to bedroom and bathroom, walk-in shower.   Self-Care   Usual Activity Tolerance fair   Current Activity Tolerance poor   Regular Exercise Other (see comments)  (pt was receiving home therapy, per pt he would participate when they came to the house but did not follow up on the HEP on his own.)   Equipment Currently Used at Home shower chair;walker, rolling   Fall history within last six months yes   Number of times patient has fallen within last six months 4   Activity/Exercise/Self-Care Comment Pt uses walker for ambulation,  Pt has groceries delivered that are pre prepared meals. Pt reports ind with bed mobility and ambulation with walker for short distances, son supervises pt on stairs. Pt reports he gets exhausted walking to the shower and takes very long to get showered and dressed due to fatigue.   General Information   Onset of Illness/Injury or Date of Surgery 07/21/22   Referring Physician Lexii Bryant MD   Patient/Family Therapy Goals Statement (PT) Planning on going home if possible, open to rehab to get stronger.   Pertinent History of Current Problem (include personal factors and/or comorbidities that impact the POC) Pt is a 81 year old male presents to hospital for community-acquired pneumonia on 7/21/22. PMHx significant for HTN, HLD, PVD, CAD, severe aortic stenosis, Afib/flutter, hx of CVA,  Noted.  I have refilled patient's allopurinol prescription as requested.   Thank you!! mild cognitive impairment, pulmonary fibrosis not on home O2.   Existing Precautions/Restrictions fall   Cognition   Affect/Mental Status (Cognition) WFL   Orientation Status (Cognition) oriented x 4   Follows Commands (Cognition) WFL   Pain Assessment   Patient Currently in Pain   (neck pain 8/10, lower back pain)   Integumentary/Edema   Integumentary/Edema no deficits were identifed   Posture    Posture Kyphosis;Protracted shoulders;Forward head position   Range of Motion (ROM)   ROM Comment Grossly WFL BUE and LE   Strength (Manual Muscle Testing)   Strength (Manual Muscle Testing) Able to perform R SLR;Able to perform L SLR   Strength Comments Grossly antigravity strength with functional transfers. Global weakness BLE.   Bed Mobility   Comment, (Bed Mobility) Supine HOB approx 30 deg>sitting EOB, use of bed rail, Malinda.   Transfers   Comment, (Transfers) Sit>stand to FWW, Malinda   Gait/Stairs (Locomotion)   Distance in Feet (Required for LE Total Joints) 5' eval + 5' treatment   Comment, (Gait/Stairs) Amb with FWW, CGA, decreased gait speed, step length. No LOB.   Balance   Balance Comments Able to maintain seated balance at EOB BUE support. Able to maintain standing balance in FWW, CGA.   Sensory Examination   Sensory Perception patient reports no sensory changes   Clinical Impression   Criteria for Skilled Therapeutic Intervention Yes, treatment indicated   PT Diagnosis (PT) Impaired gait   Influenced by the following impairments pain, decreased functional strength, decreased activity tolerance and fatigue   Functional limitations due to impairments fall risk, impaired functional independence, impaired ADLs and IADLs   Clinical Presentation (PT Evaluation Complexity) Stable/Uncomplicated   Clinical Presentation Rationale per MR and clinical judgment.   Clinical Decision Making (Complexity) low complexity   Planned Therapy Interventions (PT) balance training;bed mobility training;gait training;home exercise  program;patient/family education;ROM (range of motion);stair training;strengthening;neuromuscular re-education;stretching;transfer training;progressive activity/exercise   Risk & Benefits of therapy have been explained evaluation/treatment results reviewed;care plan/treatment goals reviewed;risks/benefits reviewed;current/potential barriers reviewed;participants voiced agreement with care plan;participants included;patient   PT Discharge Planning   PT Discharge Recommendation (DC Rec) Transitional Care Facility;home with home care physical therapy;home with assist;Leaving home requires significant assistance;Leaving home requires significant taxing effort   PT Rationale for DC Rec Pt is below baseline functional independence, fall risk, is alone throughout the day at home, many stairs to enter and within the home. Pt currently requires assist of 1 for mobility and limited tolerance for ambulation at this time due to SOB. Pt would benefit from continued skilled therapy to progress functional strength and independence prior to returning home. If pt were to return home, pt would require 24H supervision, assist of 1, ramp to enter, chair lift, home PT.   Total Evaluation Time   Total Evaluation Time (Minutes) 7   Physical Therapy Goals   PT Frequency 5x/week   PT Predicted Duration/Target Date for Goal Attainment 07/29/22   PT Goals Bed Mobility;Transfers;Gait;Stairs   PT: Bed Mobility Independent;Supine to/from sit;Rolling;Bridging   PT: Transfers Modified independent;Sit to/from stand;Bed to/from chair;Assistive device   PT: Gait Modified independent;Assistive device;Rolling walker;100 feet   PT: Stairs Supervision/stand-by assist;Greater than 10 stairs;Rail on right

## 2022-08-18 NOTE — PROGRESS NOTES
-------------------------------------------------------------------------------------------------------------------------   Patient's Personal History/Story    PT OF [facility] SINCE 2004 FOR LEFT GROIN AND L HIP PAIN. HAD BACK SURGERY WITH   HARDWARE.  RE-OPERATE
Yes

## 2023-04-07 NOTE — TELEPHONE ENCOUNTER
noted weeks  Patient will be independent with an exercise program that may include land and aquatic pool core and lower extremity strengthening, and stretching. Patient will demonstrate understanding of optimal posture and body mechanics regarding seated, standing, squatting and lifting for optimal spinal protection during daily tasks. Patient will attain at least 4+/5 strength in bilateral LE to improve transfers, squatting, and work tasks. Patient will improve score on the Modified ELISEO by at least 12%. Patient will walk independently with no need for an assistive device at home and in the community for at least 150ft. Patient Education:   [x]  HEP/Education Completed: Plan of Care, Goals, gait, bed mobility  [facility] Access Code:  []  No new Education completed  []  Reviewed Prior HEP      [x]  Patient verbalized and/or demonstrated understanding of education provided. []  Patient unable to verbalize and/or demonstrate understanding of education provided. Will continue education. []  Barriers to learning:     PLAN:  Treatment Recommendations: Strengthening, Range of Motion, Balance Training, Functional Mobility Training, Transfer Training, Endurance Training, Gait Training, Stair Training, Neuromuscular Re-education, Manual Therapy - Soft Tissue Mobilization, Pain Management, Home Exercise Program, Patient Education, Safety Education and Training, Self-Care Education and Training, Positioning, Integrative Dry Needling, and Aquatics     [x]  Plan of care initiated. Plan to see patient 2 times per week for 12 weeks to address the treatment planned outlined above.   []  Continue with current plan of care  []  Modify plan of care as follows:    []  Hold pending physician visit  []  Discharge    Time In [phone]   Time Out 0993   Timed Code Minutes: 30 min   Total Treatment Time: 30 min     Electronically Signed by: ROMA [id number]

## 2023-07-29 NOTE — PLAN OF CARE
Problem: Patient/Family Goals  Goal: Patient/Family Long Term Goal  Description  Patient's Long Term Goal: strength to improve    Interventions:  - IV antibiotics  - Ambulating as tolerated  - Monitor VS  - Monitor for dizziness   - Fall precautions   - request assistance  - Assess pain using appropriate pain scale  - Administer analgesics based on type and severity of pain and evaluate response  - Implement non-pharmacological measures as appropriate and evaluate response  - Consider cultural and social Yes

## 2024-02-08 NOTE — TELEPHONE ENCOUNTER
RX was sent yesterday by Dr. Shara Ogden office. Current refill request refused due to refill is either a duplicate request or has active refills at the pharmacy. Check previous templates. Statement Selected

## 2024-06-03 NOTE — TELEPHONE ENCOUNTER
Noted.  OK for pt to see me within one week after she is released from [facility]. I will route this to nursing who can notify pt.   Thank you!! Routing to Dr. Barksdale

## (undated) DEVICE — TROCAR: Brand: KII® SLEEVE

## (undated) DEVICE — UNDYED BRAIDED (POLYGLACTIN 910), SYNTHETIC ABSORBABLE SUTURE: Brand: COATED VICRYL

## (undated) DEVICE — SOL  .9 1000ML BTL

## (undated) DEVICE — LINE MNTR ADLT SET O2 INTMD

## (undated) DEVICE — SUTURE VICRYL 0 UR-6

## (undated) DEVICE — SOL  .9 3000ML

## (undated) DEVICE — CONMED SCOPE SAVER BITE BLOCK, 20X27 MM: Brand: SCOPE SAVER

## (undated) DEVICE — MEDI-VAC NON-CONDUCTIVE SUCTION TUBING 6MM X 1.8M (6FT.) L: Brand: CARDINAL HEALTH

## (undated) DEVICE — PTFE WIRE GUIDE WITH 3CM FLEXIBLE TIP: Brand: COOK

## (undated) DEVICE — NEEDLE HPO 18GA 1.5IN ECLPS

## (undated) DEVICE — LAP CHOLE: Brand: MEDLINE INDUSTRIES, INC.

## (undated) DEVICE — STERILE LATEX POWDER-FREE SURGICAL GLOVESWITH NITRILE COATING: Brand: PROTEXIS

## (undated) DEVICE — LIGAMAX 5 MM ENDOSCOPIC MULTIPLE CLIP APPLIER: Brand: LIGAMAX

## (undated) DEVICE — Device: Brand: DEFENDO AIR/WATER/SUCTION AND BIOPSY VALVE

## (undated) DEVICE — Device: Brand: CUSTOM PROCEDURE KIT

## (undated) DEVICE — 12 ML SYRINGE LUER-LOCK TIP: Brand: MONOJECT

## (undated) DEVICE — 6 ML SYRINGE LUER-LOCK TIP: Brand: MONOJECT

## (undated) DEVICE — TROCAR: Brand: KII FIOS FIRST ENTRY

## (undated) DEVICE — OPEN-END URETERAL CATHETER SOF-FLEX: Brand: SOF-FLEX

## (undated) DEVICE — [HIGH FLOW INSUFFLATOR,  DO NOT USE IF PACKAGE IS DAMAGED,  KEEP DRY,  KEEP AWAY FROM SUNLIGHT,  PROTECT FROM HEAT AND RADIOACTIVE SOURCES.]: Brand: PNEUMOSURE

## (undated) DEVICE — 3 ML SYRINGE LUER-LOCK TIP: Brand: MONOJECT

## (undated) DEVICE — ENDOSCOPY PACK UPPER: Brand: MEDLINE INDUSTRIES, INC.

## (undated) DEVICE — FORCEP RADIAL JAW 4

## (undated) DEVICE — TISSUE RETRIEVAL SYSTEM: Brand: INZII RETRIEVAL SYSTEM

## (undated) NOTE — ED AVS SNAPSHOT
Natividad Arana   MRN: [id number]    Department:  [facility] Emergency Department   Date of Visit:  7/28/2019           Disclosure     Insurance plans vary and the physician(s) referred by the ER may not be covered by your plan.  Please contact within the next three months to obtain basic health screening including reassessment of your blood pressure.     IF THERE IS ANY CHANGE OR WORSENING OF YOUR CONDITION, CALL YOUR PRIMARY CARE PHYSICIAN AT ONCE OR RETURN IMMEDIATELY TO THE EMERGENCY DEPARTMEN

## (undated) NOTE — ED AVS SNAPSHOT
Jing Hamilton   MRN: [id number]    Department:  [facility] Emergency Department   Date of Visit:  7/14/2018           Disclosure     Insurance plans vary and the physician(s) referred by the ER may not be covered by your plan.  Please contact within the next three months to obtain basic health screening including reassessment of your blood pressure.     IF THERE IS ANY CHANGE OR WORSENING OF YOUR CONDITION, CALL YOUR PRIMARY CARE PHYSICIAN AT ONCE OR RETURN IMMEDIATELY TO THE EMERGENCY DEPARTMEN

## (undated) NOTE — IP AVS SNAPSHOT
[facility]            (For Outpatient Use Only) Initial Admit Date: 4/21/2019   Inpt/Obs Admit Date: Inpt: 4/23/19 / Obs: 04/21/19   Discharge Date:    Carrie Nieto:  [de-identified]   MRN: [de-identified]   CSN: [id number]   [address]: [id number] Group Number:  Insurance Type:    Subscriber Name:  Subscriber :    Subscriber ID:  Pt Rel to Subscriber:    Hospital Account Financial Class: Medicare    2019

## (undated) NOTE — IP AVS SNAPSHOT
[facility]            (For Outpatient Use Only) Initial Admit Date: 6/28/2019   Inpt/Obs Admit Date: Inpt: N/A / Obs: 06/28/19   Discharge Date:    Skip Tapan:  [de-identified]   MRN: [de-identified]   CSN: [id number]   CEID: [id number]        EN Subscriber Name:  Ruben Marte DOB:    Subscriber ID:  Pt Rel to Subscriber:    Hospital Account Financial Class: Medicare    2019

## (undated) NOTE — LETTER
Hospital Discharge Documentation  Please phone to schedule a hospital follow up appointment.     From: [address] Hospitalist's Office  Phone: [phone]    Patient discharged time/date: 9/26/2018  3:56 PM  Patient discharge disposition:  Home or Self Rhabdomyolysis     Non-traumatic rhabdomyolysis     Bacteriuria     Abrasions of multiple sites     UTI (urinary tract infection)     Left hand weakness     Abdominal pain     CVA (cerebral vascular accident) ([address]     Other chronic pain     Hypothyroid Chronic pain syndrome     Suicidal ideation     Back pain     Stage 3 chronic kidney disease ([facility] [address]     Borderline high cholesterol     Acute diverticulitis     Diarrhea, unspecified type        Physical Exam:   General appearance: alert, appears stated    Periodic cramps in feet and arms over the past few months.   Pt says Mg pills helped but then pt stopped taking them.   - Mg protocol     Hx of HTN  BPs high here  - cont  metoprolol  - cont clonidine and norvasc  - check bp daily adjust meds as OP     C 1000 units   #100  Sig-1000 units orally daily     3 Refills   Quantity:  100 tablet  Refills:  3        CONTINUE taking these medications      Instructions Prescription details   allopurinol 300 MG Tabs  Commonly known as:  ZYLOPRIM      Take 1 tablet (3 · Vancomycin HCl 50 MG/ML Solr     Please  your prescriptions at the location directed by your doctor or nurse    Bring a paper prescription for each of these medications  · levofloxacin 750 MG Tabs  · metRONIDAZOLE 500 MG Tabs         Follow [facility]

## (undated) NOTE — ED AVS SNAPSHOT
Mavis Gan   MRN: [id number]    Department:  [facility] Emergency Department   Date of Visit:  12/4/2017           Disclosure     Insurance plans vary and the physician(s) referred by the ER may not be covered by your plan.  Please contact within the next three months to obtain basic health screening including reassessment of your blood pressure.     IF THERE IS ANY CHANGE OR WORSENING OF YOUR CONDITION, CALL YOUR PRIMARY CARE PHYSICIAN AT ONCE OR RETURN IMMEDIATELY TO THE EMERGENCY DEPARTMEN

## (undated) NOTE — IP AVS SNAPSHOT
[facility]            (For Outpatient Use Only) Initial Admit Date: 9/27/2018   Inpt/Obs Admit Date: Inpt: N/A / Obs: 09/27/18   Discharge Date:    Yanick Del Rosario:  [de-identified]   MRN: [de-identified]   CSN: [id number]        ENCOUNTER  Patient Clas Subscriber Name:  Sidney Yi :    Subscriber ID:  Pt Rel to Subscriber:    Hospital Account Financial Class: Medicare    2018

## (undated) NOTE — IP AVS SNAPSHOT
[facility]            (For Outpatient Use Only) Initial Admit Date: 8/4/2019   Inpt/Obs Admit Date: Inpt: 8/5/19 / Obs: N/A   Discharge Date:    Paluina Carlson:  [de-identified]   MRN: [de-identified]   CSN: [id number]   CEID: [id number]        Formerly [facility], [facility] Group Number:  Insurance Type:    Subscriber Name:  Subscriber :    Subscriber ID:  Pt Rel to Subscriber:    Hospital Account Financial Class: Medicare    2019

## (undated) NOTE — ED AVS SNAPSHOT
Gaviotasylvain Scotty   MRN: [id number]    Department:  [facility] Emergency Department   Date of Visit:  2/21/2019           Disclosure     Insurance plans vary and the physician(s) referred by the ER may not be covered by your plan.  Please contact within the next three months to obtain basic health screening including reassessment of your blood pressure.     IF THERE IS ANY CHANGE OR WORSENING OF YOUR CONDITION, CALL YOUR PRIMARY CARE PHYSICIAN AT ONCE OR RETURN IMMEDIATELY TO THE EMERGENCY DEPARTMEN

## (undated) NOTE — ED AVS SNAPSHOT
Emmanuel Dwyer   MRN: [id number]    Department:  [facility] Emergency Department   Date of Visit:  11/21/2018           Disclosure     Insurance plans vary and the physician(s) referred by the ER may not be covered by your plan.  Please contact within the next three months to obtain basic health screening including reassessment of your blood pressure.     IF THERE IS ANY CHANGE OR WORSENING OF YOUR CONDITION, CALL YOUR PRIMARY CARE PHYSICIAN AT ONCE OR RETURN IMMEDIATELY TO THE EMERGENCY DEPARTMEN

## (undated) NOTE — LETTER
Hospital Discharge Documentation  Please phone to schedule a hospital follow up appointment.     From: [address] Hospitalist's Office  Phone: [phone]    Patient discharged time/date: 7/13/2019  2:56 PM  Patient discharge disposition:  [address] Padilla - XR no fracture or dislocation. Severe L shoulder OA and healing rib fractures. - cont norco PRN for pain.  She did have a shoulder injection 3 weeks ago that did not provide any relief.      Recurrent Falls  - 2D ECHO normal systolic functin  - tele mon amLODIPine Besylate 10 MG Tabs  Commonly known as:  NORVASC      TAKE 1 TABLET BY MOUTH DAILY HOLD IF BP IS < 110/60   Quantity:  30 tablet  Refills:  11     ASPIRIN ADULT LOW STRENGTH 81 MG Tbec  Generic drug:  aspirin      TAKE 1 TABLET BY MOUTH DAILY Quantity:  30 tablet  Refills:  5              Hospital Discharge Diagnoses: left shoulder contusion    Lace+ Score: 80  59-90 High Risk  29-58 Medium Risk  0-28   Low Risk. TCM Follow-Up Recommendation:  LACE > 58:  High Risk of readmission after disch

## (undated) NOTE — ED AVS SNAPSHOT
Suhail Cleveland   MRN: [id number]    Department:  [facility] Emergency Department   Date of Visit:  4/12/2019           Disclosure     Insurance plans vary and the physician(s) referred by the ER may not be covered by your plan.  Please contact within the next three months to obtain basic health screening including reassessment of your blood pressure.     IF THERE IS ANY CHANGE OR WORSENING OF YOUR CONDITION, CALL YOUR PRIMARY CARE PHYSICIAN AT ONCE OR RETURN IMMEDIATELY TO THE EMERGENCY DEPARTMEN

## (undated) NOTE — MR AVS SNAPSHOT
[address] [place] [address]  [phone]               Thank you for choosing us for your health care visit with Evelia Solano MD.  We are glad to serve you and happy to provide you with this summary of your visit.   Pl Commonly known as:  ZYRTEC           furosemide 40 MG Tabs   TAKE 1 TABLET BY MOUTH TWICE DAILY   Commonly known as:  LASIX           * HYDROcodone-acetaminophen 5-325 MG Tabs   Take 1 tablet by mouth every 6 (six) hours as needed for Pain.    Commonly know your doctor or other care provider to review them with you. Today's Orders     Basic Metabolic Panel (8) [E]    Complete by:   Feb 01, 2017 (Approximate)    Assoc Dx:  Hypokalemia [E87.6], Type 2 diabetes mellitus without complication, without lo Moderation of alcohol consumption Men: limit to <= 2 drinks* per day. Women and lighter weight persons: limit to <= 1 drink* per day.                       Healthy Diet and Regular Exercise  [facility] making healthy food cho

## (undated) NOTE — IP AVS SNAPSHOT
Patient Demographics     Address  [facility] Susan Morales Phone  [phone] (Home) *Preferred*  [phone] [facility]) E-mail Address  [email or web address]      Emergency Contact(s)     Name Relation Home Work [place], TAKE 1 TABLET BY MOUTH DAILY HOLD IF BP IS < 110/60   Nic Montoya MD         apixaban 5 MG Tabs  Commonly known as:  ELIQUIS  Next dose due: Tonight      Take 1 tablet (5 mg total) by mouth 2 (two) times daily.    Matt Sykes MD         ASPIRIN A Next dose due: Tonight      Take 1 tablet (20 mg total) by mouth TID (Nitrates). Matt Carr MD         Levothyroxine Sodium 100 MCG Tabs  Commonly known as:  SYNTHROID  Next dose due:   Tomorrow morning      TAKE 1 TABLET BY MOUTH EVERY MORNING   ALBINA Vitamin D 1000 units Tabs  Next dose due:   Tomorrow morning      1000 units   #100  Sig-1000 units orally daily     3 Refills   Farhan Pride MD               Where to Get Your Medications      These medications were sent to [address] [place] [address] - [id number] apixaban (ELIQUIS) tab 5 mg 09/04/19 2123 Given      [id number] apixaban (ELIQUIS) tab 5 mg 09/05/19 0842 Given      [id number] aspirin EC tab 81 mg 09/05/19 0842 Given      [id number] carvedilol (COREG) tab 25 mg 09/04/19 2123 Given      [id number] 7418 Given              Recent Vital Signs       Most Recent Value   Vitals  144/59 Filed at 09/05/2019 0559   Pulse  72 Filed at 09/05/2019 0559   Resp  19 Filed at 09/05/2019 0559   Temp  97.4 °F (36.3 °C) Filed at 09/05/2019 0559   SpO2  94 % Filed at 0 Please view results for these tests on the individual orders.     Specimen Information    Type Source Collected On   Blood — 09/05/19 0524            Testing Performed By     Lab - Abbreviation Name Director Address Valid Date Range    [facility], [address] H&P signed by Triston Santillan MD at 8/18/2019  8:19 AM  Version 1 of 1    Author:  Triston Santillan MD Service:  Ceci Doty Author Type:  Physician    Filed:  8/18/2019  8:19 AM Date of Service:  8/18/2019 12:33 AM Status:  Signed    :  Triston Santillan MD ( • Scoliosis    • SEASONAL ALLERGIES    • Skin cancer 03/2018    BCC-mid back   • Unspecified essential hypertension      Past Surgical History:   Procedure Laterality Date   • BACK SURGERY      5 surgeries last one in dec 2011   • CHOLECYSTECTOMY  08/25/20 Cholecalciferol (VITAMIN D) 1000 UNITS Oral Tab Taking  No Yes   Si units   #100  Sig-1000 units orally daily     3 Refills   Patient taking differently: Take 1,000 Units by mouth daily.      CloNIDine HCl 0.1 MG Oral Tab Taking  No Yes   Sig: Take 1 isosorbide dinitrate 20 MG Oral Tab Taking  No Yes   Sig: Take 1 tablet (20 mg total) by mouth TID (Nitrates). levofloxacin 750 MG Oral Tab Taking  No Yes   Sig: Take 1 tablet (750 mg total) by mouth every other day.       Facility-Administered Medication .0 08/17/2019    CREATSERUM 2.41 08/17/2019    BUN 38 08/17/2019     08/17/2019    K 4.5 08/17/2019     08/17/2019    CO2 22.0 08/17/2019     08/17/2019    CA 9.6 08/17/2019    TROP <0.045 08/17/2019       Imaging:         Assess  11/3/1940 MRN [id number]   Consulting Date 2019 [facility] [id number]   Consulting Physician Agustín Palmer MD Attending Physician Checo Calhoun MD       Referring Physician:  Dr Jed Arriola    Reason for Consultation:  Atrial fibrillation mgt    History of Performed by Evonne Moreno MD at [address] [place] [address]   • HIP REPLACEMENT SURGERY Left    • HYSTERECTOMY     • KNEE REPLACEMENT SURGERY  2006   • LAPAROSCOPIC CHOLECYSTECTOMY N/A 8/25/2017    Performed by Lisette Dakins, MD at [address] [place] [address] OR   • [facility] apixaban 5 MG Oral Tab Take 1 tablet (5 mg total) by mouth 2 (two) times daily. Disp: 60 tablet Rfl: 3   dronedarone HCl 400 MG Oral Tab Take 1 tablet (400 mg total) by mouth 2 (two) times daily with meals.  Disp: 60 tablet Rfl: 3   isosorbide dinitrate 20 HEENT: no visual or hearing changes  SKIN: denies any unusual skin lesions or rashes  RESPIRATORY: cough, shortness of breath  CARDIOVASCULAR: no chest pain, see HPI  GI: denies abdominal pain and denies heartburn  : no dysuria or hematuria  NEURO: denie 2) Atrial fibrillation  - maintaining sinus rhythm with Multaq  - oral anticoagulation    RECOMMENDATIONS:  - stay off Multaq.   A rare/idiosyncratic drug-induced lung injury can never be excluded, and given the severity of her lung disease any possible off maintain midline neutral position- assist with positioning feet flat on floor. Use of mirror to help with visual feedback for maintaining midline neutral   Transfers: Mod A x 1, cues for appropriate UE positioning with transitional movements.  Patient tolera -   Turning over in bed (including adjusting bedclothes, sheets and blankets)?: A Lot   -   Sitting down on and standing up from a chair with arms (e.g., wheelchair, bedside commode, etc.): A Lot   -   Moving from lying on back to sitting on the side of th Goal #5 Patient to demonstrate independence with home activity/exercise instructions provided to patient in preparation for discharge. Goal #5   Current Status In progress[TL. 1]            Attribution Kelly    TL. 1 Diedra Litten, PT on 9/3/2019  4:00 OT Device Recommendations: TBD     PLAN  OT Treatment Plan: Patient/Family training;Patient/Family education; Endurance training;Functional transfer training;ADL training    SUBJECTIVE  Pt offering no complaints    OBJECTIVE  Precautions: Bed/chair alarm complete mono care    Patient will complete bed mobility with min A for participation in supine ADLs Comment:Pt required mod a to transfer supine to sit            Goals  on:  9/3/19  Frequency: 3x/week[THOM.1]          Attribution Key    HUEY Kennedy advancement over the next few days as respiratory status improves. Patient in agreement with plan.[KB.1]      Diet Recommendations - Solids: Regular  Diet Recommendations - Liquid: Nectar thick    Compensatory Strategies Recommended: No straws; Slow rate;S Number of Visits to Meet Established Goals: 2[KB. 2]    Session: 2 following VFSS     If you have any questions, please contact Mason Montesinos M.S., [address] [facility]  Speech-Language Pathologist  Va Ingram. 1]      Electronic

## (undated) NOTE — LETTER
Hospital Discharge Documentation  Pt was dc to [facility], [address], [facility]    From: [address] Hospitalist's Office  Phone: [phone]    Patient discharged time/date: 2/1/2019  5:59 PM  Patient discharge disposition:  SNF       [facility] for further management. DISCHARGE DX:1.       Generalized fatigue and weakness possibly related to urinary tract infection.  Could be a component of pain medications and polypharmacy.      -was given IV abx  -IVF  -corrected electrolytes   -PT O 12:17     Approved by (CST): Ronnie High MD on 1/30/2019 at 12:22          Xr Lumbar Spine (min 2 Views) (cpt=72100)    Result Date: 1/30/2019  CONCLUSION:  1. Stable findings from November 21, 2018. 2. No acute disease. 3. Scoliosis.  4. Osteoarthrit 1030  01/31/19   0604  01/31/19   1933   GLU  147*   --   148*   --    BUN  14   --   16   --    CREATSERUM  1.20   --   1.24   --    GFRAA  51*   --   49*   --    GFRNAA  44*   --   42*   --    CA  9.7   --   9.2   --    NA  138   --   139   --    K   -- Instructions Prescription details   allopurinol 300 MG Tabs  Commonly known as:  ZYLOPRIM      Take 1 tablet (300 mg total) by mouth daily.    Quantity:  30 tablet  Refills:  0     atorvastatin 40 MG Tabs  Commonly known as:  LIPITOR      Take 1 tablet (4 · HYDROcodone-acetaminophen 5-325 MG Tabs  · Levothyroxine Sodium 100 MCG Tabs  · metoprolol Tartrate 25 MG Tabs         Follow up Visits  MD Darion Chaudhary. [address] [address] [phone]    In 2 weeks                  Imer Taylor

## (undated) NOTE — LETTER
Hospital Discharge Documentation  Pt was discharged to [facility] # [phone]    From: [address] Hospitalist's Office  Phone: [phone]    Patient discharged time/date: 4/2/2019  3:19 PM  Patient discharge disposition:  SNF       Discharge Summary - D advanced and she is feeling better. She is stable for discharge. She will need rehab due to deconditioning and weakness from her acute illness and hospital stay.      Consultants     Provider Role Specialty    Sarah Drake MD Consulting Physician  HOSPITALIST TAKE 1 TABLET BY MOUTH DAILY HOLD IF BP IS < 110/60   Quantity:  30 tablet  Refills:  11     ASPIRIN ADULT LOW STRENGTH 81 MG Tbec  Generic drug:  aspirin      TAKE 1 TABLET BY MOUTH DAILY   Quantity:  90 tablet  Refills:  3     cloNIDine HCl 0.1 MG Tabs Hospital Discharge Diagnoses: duodenal ulcer[JH.1]    Lace+ Score: 81[JH.2]  59-90 High Risk  29-58 Medium Risk  0-28   Low Risk. TCM Follow-Up Recommendation:  LACE > 58:  High Risk of readmission after discharge from the hospital.        I spent >30

## (undated) NOTE — Clinical Note
Pt does not have HFU appt scheduled at this time. TE sent to triage regarding pain issues and HFU. Thank you!

## (undated) NOTE — IP AVS SNAPSHOT
Patient Demographics     Address  [address] [address] Phone  [phone] (Home) *Preferred*  [phone] [facility]) E-mail Address  [email or web address]      Emergency Contact(s)     Name [address] [place] [address],[facility] 20 mg tablets    #40   Sig-patient is to take 20 mg orally every morning. Patient may take 40 mg in the morning if her legs are swollen. Davida Olivarez MD         gabapentin 100 MG Caps  Commonly known as:  NEURONTIN  Next dose due:   Tonight 9/28/2 Next dose due:   Tomorrow 9/29/2018      1000 units   #100  Sig-1000 units orally daily     3 Refills   Yue Gibbons MD               Where to Get Your Medications      Please  your prescriptions at the location directed by your doctor or nurse [id number] atorvastatin (LIPITOR) tab 40 mg 09/27/18 2348 Given      [id number] gabapentin (NEURONTIN) cap 100 mg 09/28/18 0122 Given      [id number] metRONIDAZOLE (FLAGYL) tab 500 mg 09/27/18 2348 Given      [id number] metRONIDAZOLE (FLAGYL) tab 500 mg 09/28 Chloride 106 95 - 110 mmol/L — [facility]   CO2 24 22 - 32 mmol/L — [facility]   BUN 19 8 - 20 mg/dL [facility]   Creatinine 1.39 0.50 - 1.50 mg/dL [facility]   Calcium, Total 9.3 8.5 - 10.5 mg/dL [facility]   BUN/CREA Ratio 13.7 10.0 - 20. Components    Component Value Reference Range [facility]   Urine Color Yellow Yellow — [facility]   Clarity Urine Hazy Clear A [facility]   Spec Batson 1.020 1.002 - 1.035 — [facility]   pH Urine 5.0 5.0 - 8.0 — [facility]   Protein Urine 100  N Estimated GFR units: mL/min/1.73 square meters   eGFR calculated by the CKD-EPI equation.             HEPATIC FUNCTION PANEL (7) [[id number]] (Normal)  Resulted: 09/27/18 1828, Result status: Final result   Ordering provider:  Victoriano Barlow MD  09/27/18 17 9733 [facility] Kir[address]az[address]. [address]  [facility]  [place] [address] 04/29/14 1047 - Present            Microbiology Results (All)     Procedure Component Value Units Date/Time    Urine Culture, Routine Once [[id number]] Collected: evening of her discharge and then this morning had 2 further episodes of diarrhea. She has had some mild nausea and persistent, mild, intermittent crampy abdominal pain with bowel movements. She denied any fevers or chills.   She says her appetite has bee mg daily, aspirin 81 mg daily, atorvastatin 40 mg nightly, vitamin D 1000 units daily, clonidine 0.1 mg twice a day, furosemide 20 mg daily--the patient takes up to 40 mg in the morning if her legs are swollen, Norco 5/325 one every 4 hours as needed for p HEENT:  Normocephalic, atraumatic. Pupils equal, reactive to light. Sclerae anicteric. There was no sinus tenderness. Posterior pharynx was not injected. NECK:  Supple. LUNGS:  Clear, with easy respiratory excursions. No wheezes, rhonchi, or rales. 7.   Deconditioning. The patient is very weak and fearful of staying at home alone. We will order PT evaluation.  will discuss with Social Work in the morning about obtaining a bed at a skilled nursing facility.   The patient is agreeable to TCM Follow-Up Recommendation:  LACE > 58:  High Risk of readmission after discharge from the hospital.          Problem List: Patient Active Problem List:     Essential hypertension     Hypercholesteremia     Osteoarthritis     DM (diabetes mellitus) ([facility] Vitamin D deficiency     Risk for falls     Hypertriglyceridemia     Failed back surgical syndrome     Hyperglycemia     Nausea     Dizziness     Cardiac arrhythmia, unspecified cardiac arrhythmia type     Lower extremity edema     Inability to ambulate states that after leaving the hospital she was very weak. The discharge papers indicate that the patient has a caregiver. The patient tells me that she has a power of  and a woman who checks in on her intermittently.   She [facility] - soft low residue diet  - pain control - do not add pain meds as pt with hx of narcotic dependence and abuse.  - outpt colonoscopy     Diarrhea.     -Better.    -Pt with c-diff colitis as well as enteropathogenic E coli.   - cont PO vancomycin  - abx ramsey Commonly known as:  CATAPRES      Take 1 tablet (0.1 mg total) by mouth 2 (two) times daily.    Stop taking on:  10/26/2018  Quantity:  60 tablet  Refills:  0     furosemide 20 MG Tabs  Commonly known as:  LASIX      20 mg tablets    #40   Sig-patient is to Quantity:  30 tablet  Refills:  5           Where to Get Your Medications      Please  your prescriptions at the location directed by your doctor or nurse    Bring a paper prescription for each of these medications  · HYDROcodone-acetaminophen 5-32 steady gait. Patient has been in and out of the hospital, per patient. Patient would like to go to rehab to get stronger, does not feel like [facility]. Patient assisted back to bedside chair with CGA.  Patient does not have the physical • Type II or unspecified type diabetes mellitus without mention of complication, not stated as uncontrolled    • Unspecified essential hypertension        Past Surgical History  Past Surgical History:  No date: BACK SURGERY      Comment:  5 surgeries last Static Sitting: Good  Dynamic Sitting: Good  Static Standing: Fair +  Dynamic Standing: Fair -    ACTIVITY TOLERANCE  Room air    AM-PAC '6-Clicks' INPATIENT SHORT FORM - BASIC MOBILITY  How much difficulty does the patient currently have. ..  -   Turning o Goal #5 Patient to demonstrate independence with home activity/exercise instructions provided to patient in preparation for discharge. Goal #5   Current Status    Goal #6    Goal #6  Current Status[JG.1]         Attribution Kelly    JG. 1 - Charly Moreland

## (undated) NOTE — Clinical Note
CRISTY FARFAN sent to triage with condition update. HH/pt refusing to send pt to ER/call 911 even with my offer to call for them. Thank you.

## (undated) NOTE — IP AVS SNAPSHOT
Patient Demographics     Address  [address]  [place] [address] Phone  [phone] [facility]  [phone] [facility] E-mail Address  [email or web address]      Emergency Contact(s)     Name Relation Home Work [address] [place] [address] TAKE 1 TABLET BY MOUTH DAILY   John Baires MD         docusate sodium 100 MG Caps  Commonly known as:  COLACE  Next dose due:  Anytime as needed for constipation       Take 100 mg by mouth daily as needed for constipation.           furosemide 20 MG 1000 units   #100  Sig-1000 units orally daily     3 Refills   Davida Olivarez MD                  [phone] - MAR ACTION REPORT  (last 24 hrs)    ** SITE UNKNOWN **     Order ID Medication Name Action Time Action Reason Comments    [id number] AmLODIPin Result status: Final result   Ordering provider:  CHRIS Narayan  12/11/17 4361 Resulting lab:  [facility] LAB    Specimen Information    Type Source Collected On   Blood — 12/12/17 6490          Components    Component Value Reference Range Flag Lab History & Physical    Crissie Roderick Patient Status:  Inpatient    11/3/1940 MRN [id number]   Location [facility] ENDOSCOPY LAB SUITES Attending Sulma Tellez MD   Hosp Day # 1 PCP Angie Knight MD     Date:  2017  Date of Admission:   • Heart Disorder Mother    • Heart Disease Sister    • Hypertension Brother    • Melanoma Other    • Cancer Other    • Stroke Other    • Heart Disease Other    • Diabetes Other    • Eye Problems Neg        Allergies/Medications:    Allergies:   Compazine Cardiovascular: S1, S2 normal, no murmur, click, rub or gallop, regular rate and rhythm  Abdominal: normal findings.        Results:     Lab Results  Component Value Date   WBC 7.5 12/08/2017   HGB 10.0 (L) 12/08/2017   HCT 30.7 (L) 12/08/2017    12/ to due radiating pain from back pain, patient also with ventral hernia but did not feel this was the cause of pain. Patient also had complaints of left hand weakness and fall.  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 2] hoffmans[SS.1] +[SS.2]; speech clear and fluent    Data Review:      Lab Results  Component Value Date   WBC 8.2 12/11/2017   HGB 10.6 12/11/2017   HCT 32.3 12/11/2017 Author:  Jessica Santos MD Service:  Hospitalist Author Type:  Physician    Filed:  12/12/2017  2:23 PM Date of Service:  12/12/2017  2:16 PM Status:  Addendum    :  Jessica Santos MD (Physician)    Related Notes:  Original Note by CHRIS Dhillon (CHRISSYN Rhabdomyolysis     Non-traumatic rhabdomyolysis     Bacteriuria     Abrasions of multiple sites     UTI (urinary tract infection)     Left hand weakness     Abdominal pain     CVA (cerebral vascular accident) [facility])        Physical Exam: Gen: No acute di - EGD 8/23/17 - moderate non-erosive gasritis with small hiatal hernia  - s/p cholecystectomy on 8/25/17 for chronic cholecystitis  - liver u/s - s/p CCY. Moderate dilatation of the proximal bile duct that tapers somewhat distally LFT's were ok.  No further What changed:  See the new instructions.       TAKE 1 TABLET BY MOUTH DAILY   Quantity:  30 tablet  Refills:  11     Methadone HCl 10 MG Tabs  Commonly known as:  DOLOPHINE  What changed:  · how much to take  · when to take this  · additional instructions Pt/o[VS.2]fidel Ornelas  12/12/2017  2:16 PM    Greater than 30 minutes spent on preparation and coordination of this discharge    Attending Addendum:[VS.1]    Patient seen and examined.  Agree with above    Carlos Levy MD[JH.1]      Electronically sig step to pattern requiring min-cga w/ x1 LOB recovered w/ mod A. Pt reported abdominal pain wo rating on scale.      Patient's current functional deficits include decreased balance, activity ana maria, motor function and safety awarness which are below the patient • Esophageal reflux    • Fatigue    • High blood pressure    • HYPOTHYROIDISM    • Iron deficiency anemia    • Osteoarthrosis, unspecified whether generalized or localized, unspecified site    • Other and unspecified hyperlipidemia    • PONV (postoperative Management Techniques: Activity promotion;Breathing techniques;Relaxation;Repositioning; Other (Comment) (RN notified. )[SM.2]    COGNITION  · AXO x3; decreased STM, atttention; consistent command following.      RANGE OF MOTION AND STRENGTH ASSESSMENT  Uppe Exercise/Education Provided:[SM.1]    Patient End of Session: Up in chair;Needs met;Call light within reach;RN aware of session/findings; All patient questions and concerns addressed[SM. 2]    CURRENT GOALS    Goals to be met by:12/15/17  Patient Goal Miguel flexion, however L digit extension is weaker at this time and recommend focus to be on extension or L digit flexion might be overpowered. Pt is highly motivated and is aware she needs to use the L hand to improve function.  Pt left up in chair with all need FUNCTIONAL ADL ASSESSMENT  Grooming: n/t   Feeding: n/t   Bathing: n/t   Toileting: n/t   Upper Extremity Dressing: n/t   Lower Extremity Dressing: mod.  A     Education Provided: Role of OT, safe transfer skills with correct hand placement, ADLs, [facility]  since then. Pt AXO x3 but demo decreased attention and STM; able to recall 2/3 familiar words after 5 minutes. Pt w/ impaired LT hand / opposition, limited LT shoulder AROM; Pt denied any visual change; .  Pt needing mod a for sit _> stand from [place] Abdominal pain      Past Medical History  Past Medical History:   Diagnosis Date   • Anemia    • Anesthesia complication    • Arthritis     feet   • BACK PAIN    • Back problem    • Urban's esophagus    • Blood disorder    • CHF (congestive heart failu Management Techniques: Relaxation;Repositioning    ACTIVITY TOLERANCE  Room air    COGNITION  Memory:  decreased short term memory    VISION  Current Vision: no visual deficits    PERCEPTION  Overall Perception Status:   WFL - within functional limits    S Chair Transfer: NT    Bedroom Mobility: MOD A    BALANCE ASSESSMENT  Static Sitting: I  Dynamic Sitting: SBA  Static Standing: MIN A   Dynamic Standing: MOD A      FUNCTIONAL ADL ASSESSMENT  Grooming: MIN d/t decreased use of L UE  Feeding: I  Bathing: MOD

## (undated) NOTE — LETTER
Hospital Discharge Documentation  Pt was d/c to [facility] from [address] [place] [address] on 10/17/17    From: [address] Hospitalist's Office  Phone: [phone]    Patient discharged time/date: 10/17/2017  4:51 PM  Patient discharge disposition:  SNF       Discharge Matthew started on IV Rocephin, given a tetanus booster, and she will be admitted to the hospital for further management. HOSPITAL COURSE:  Patient was hydrated. Creatinine fell. CPK came all the way down 600.   She has abrasions of the left side of her face Place 1 drop into both eyes every 6 (six) hours. Refills:  0     Miconazole Nitrate 2 % Powd      Apply 1 Application topically 2 (two) times daily.    Refills:  0        CONTINUE taking these medications      Instructions Prescription details   allopuri STOP taking these medications    Methadone HCl 10 MG Tabs  Commonly known as:  DOLOPHINE        metoprolol Tartrate 25 MG Tabs  Commonly known as:  LOPRESSOR              Where to Get Your Medications      Please  your prescriptions at the location WW.1 - Jose Alberto Herbert MD on 10/17/2017  1:25 PM

## (undated) NOTE — LETTER
Hospital Discharge Documentation  Please phone to schedule a hospital follow up appointment. Pt discharged home with [facility] and PT services.     From: [address] Hospitalist's Office  Phone: [phone]    Patient discharged time/da /53 (BP Location: Right arm)   Pulse 61   Temp (!) 97.4 °F (36.3 °C) (Oral)   Resp 18   Ht 5' (1.524 m)   Wt 198 lb (89.8 kg)   LMP  (LMP Unknown)   SpO2 96%   BMI 38.67 kg/m²      Gen:  NAD. A and O x  3  CV:   RRR.   No m/g/r  Pulm:   CTA bilat  Ab - echo with EF of 19-80%, grade 3 diastolic dysfunction     Chronic diastolic CHF  - cont po lasix - resume on Wednesday Aug 30.  - stop zaroxolyn     Hypokalemia.    - replaced per protocol     HTN  - cont norvasc, lasix, lisinopril, metoprolol     dvt pro Quantity:  90 tablet  Refills:  3     atorvastatin 40 MG Tabs  Commonly known as:  LIPITOR  Notes to patient:  Take at night for the best result      TAKE 1 TABLET BY MOUTH DAILY   Quantity:  90 tablet  Refills:  3     cetirizine 10 MG Tabs  Commonly know - [facility], [phone], [phone]  [address] [address]    Hours:  24-hours Phone:  [phone]   · furosemide 40 MG Tabs     Please  your prescriptions at the location direct

## (undated) NOTE — LETTER
Hospital Discharge Documentation  Pt d/c from [address] [place] [address] on 9/15/17 to [facility].      From: [address] Hospitalist's Office  Phone: [phone]    Patient discharged time/date: 9/15/2017  2:45 PM  Patient discharge disposition:  SNF       Discharge Summ Blood pressure 155/54, pulse 62, temperature 97.7 °F (36.5 °C), temperature source Oral, resp. rate 18, weight 207 lb 12.8 oz (94.3 kg), SpO2 92 %.   General:  Alert, no distress  HEENT:  Normocephalic, atraumatic  Neck:  Supple, symmetrical  Cardiac:  Regu Pantoprazole Sodium 40 MG Tbec  Commonly known as:  PROTONIX      Take 1 tablet (40 mg total) by mouth every morning before breakfast.   Quantity:  30 tablet  Refills:  0     Potassium Chloride ER 20 MEQ Tbcr  Commonly known as:  K-DUR M20      TAKE TWO TA

## (undated) NOTE — IP AVS SNAPSHOT
[facility]            (For Outpatient Use Only) Initial Admit Date: 12/7/2017   Inpt/Obs Admit Date: Inpt: 12/7/17 / Obs: 12/08/17   Discharge Date:    Maru Gordillo:  [de-identified]   MRN: [de-identified]   CSN: [id number]        ENCOUNTER  Patient Subscriber Name:  Rafael Anne :    Subscriber ID:  Pt Rel to Subscriber:    Hospital Account Financial Class: Medicare    2017

## (undated) NOTE — IP AVS SNAPSHOT
[facility]            (For Outpatient Use Only) Initial Admit Date: 3/21/2019   Inpt/Obs Admit Date: Inpt: 3/22/19 / Obs: N/A   Discharge Date:    Krzysztof Vieira:  [de-identified]   MRN: [de-identified]   CSN: [id number]        ENCOUNTER  Patient Class TERTIARY INSURANCE   Payor:  Plan:    Group Number:  Insurance Type:    Subscriber Name:  Subscriber :    Subscriber ID:  Pt Rel to Subscriber:    Hospital Account Financial Class: Medicare    2019

## (undated) NOTE — IP AVS SNAPSHOT
[facility]            (For Outpatient Use Only) Initial Admit Date: 3/29/2019   Inpt/Obs Admit Date: Inpt: 3/29/19 / Obs: N/A   Discharge Date:    Cornelius Kelly:  [de-identified]   MRN: [de-identified]   CSN: [id number]        ENCOUNTER  Patient Class Subscriber Name:  Dre Herron :    Subscriber ID:  Pt Rel to Subscriber:    Hospital Account Financial Class: Medicare    2019

## (undated) NOTE — LETTER
[facility], [place]  Authorization for Invasive Procedures  1.  I hereby authorize Dr. Mihai Tierney , my physician and whomever may be designated as the doctor's assistant, to perform the following operation and/or procedure:  Nica Little performed for the purposes of advancing medicine, science, and/or education, provided my identity is not revealed. If the procedure has been videotaped, the physician/surgeon will obtain the original videotape.  The hospital will not be responsible for stor My signature below affirms that prior to the time of the procedure, I have explained to the patient and/or her legal representative, the risks and benefits involved in the proposed treatment and any reasonable alternative to the proposed treatment.  I have

## (undated) NOTE — ED AVS SNAPSHOT
Anna Vaughn   MRN: [id number]    Department:  [facility] Emergency Department   Date of Visit:  1/15/2020           Disclosure     Insurance plans vary and the physician(s) referred by the ER may not be covered by your plan.  Please contact within the next three months to obtain basic health screening including reassessment of your blood pressure.     IF THERE IS ANY CHANGE OR WORSENING OF YOUR CONDITION, CALL YOUR PRIMARY CARE PHYSICIAN AT ONCE OR RETURN IMMEDIATELY TO THE EMERGENCY DEPARTMEN

## (undated) NOTE — MR AVS SNAPSHOT
[place] [facility] [address] [address]  [phone]               Thank you for choosing us for your health care visit with John Baires MD.  We are glad to serve you and happy to provide you with this summary of your visit.   Pl Scheduling Instructions     Friday May 12, 2017     Imaging:  XR SHOULDER, COMPLETE (MIN 2 VIEWS), LEFT (CPT=73030)    Instructions:   To schedule a test at any [facility] Scheduling at [phone], Monday through urinalysis and EKG for her annual physical examination. The blood tests will include a CBC, CMP, hemoglobin A1c, lipid panel, TSH and vitamin D level. 4.  I will see the patient back sooner as necessary.        Allergies as of May 12, 2017     Compazine A Generic drug:  Polyethylene Glycol 3350   Take  by mouth. take (17G)  by oral route  every day mixed with 8 oz. water, juice, soda, coffee or tea           omeprazole 20 MG Cpdr   Take 1 capsule (20 mg total) by mouth 2 (two) times daily before meals.    Co STAIRS:  ? Keep stairwells well lit with light switches at top and bottom. ? Install sturdy handrails on both sides. ? Make sure carpeting is secure. FLOORS:  ? Remove all loose wires, cords and throw rugs. ? Keep floors clear of clutter.   ? Make sure

## (undated) NOTE — IP AVS SNAPSHOT
Patient Demographics     Address  [address] [place] [address] Phone  [phone] (Home) *Preferred* E-mail Address  [email or web address]      Emergency Contact(s)     Name Relation Home Work Locately Jordyn Metoclopramide HCl 5 MG Tabs  Commonly known as:  REGLAN      Take 1 tablet (5 mg total) by mouth 3 (three) times daily as needed (nausea).    Shara Muñoz MD         metoprolol Tartrate 25 MG Tabs  Commonly known as:  LOPRESSOR  Next dose due:  TONIGHT 4/2/20 [id number] Normal Saline Flush 0.9 % injection 3 mL 04/01/19 1630 Given      [id number] Pantoprazole Sodium (PROTONIX) 40 mg in Sodium Chloride 0.9 % 10 mL IV push 04/01/19 1335 Given      [id number] Pantoprazole Sodium (PROTONIX) 40 mg in Sodium Chloride 0. 04/01/19 2307 Given              Recent Vital Signs       Most Recent Value   Vitals  172/59  (Abnormal)  Filed at 04/02/2019 1005   Pulse  71 Filed at 04/02/2019 0628   Resp  18 Filed at 04/02/2019 1005   Temp  97.8 °F (36.6 °C) Filed at 04/02/2019 1005 • Urban's esophagus    • Blood disorder    • CHF (congestive heart failure) (HCC)    • Colon, diverticulosis    • Congestive heart disease (HCC)    • Disorder of thyroid    • Diverticulosis of large intestine    • Esophageal reflux    • Fatigue    • High Social History    Tobacco Use      Smoking status: Former Smoker        Quit date: 1970        Years since quittin.2      Smokeless tobacco: Never Used    Alcohol use:  Yes      Alcohol/week: 1.2 oz      Types: 2 Glasses of wine per week      Comme A comprehensive 12 point review of systems was negative. See History of Present Illness for other pertinent details.      Physical Exam     Temp:  [97.6 °F (36.4 °C)-98 °F (36.7 °C)] 97.6 °F (36.4 °C)  Pulse:  [83-99] 98  Resp:  [18] 18  BP: (174-201)/(61-9 MG  1.4*  2.3   --    --    --    --    --    --    --    BILT   --    --    --    --    --    --    --    --   0.4   AST   --    --    --    --    --    --    --    --   20   ALT   --    --    --    --    --    --    --    --   29   ALKPHO   --    --    - Marcio Ascencio Patient Status:  Inpatient    11/3/1940 MRN [id number]   Location [facility] 5SW/SE Attending Aggie Ascencio MD   Hosp Day # 0 PCP Cherie Garcia MD     Date of Admission:  3/29/2019  Date of Consult:  3/29/2019  Reason for Consul Past Surgical History:   Procedure Laterality Date   • BACK SURGERY      5 surgeries last one in dec 2011   • CHOLECYSTECTOMY  08/25/2017   • EGD  12/17, 8/17   • ELECTROCARDIOGRAM, COMPLETE  11-    Scanned to media tab - DOS 11-   • ESOPHAGO Levothyroxine Sodium (SYNTHROID) tab 100 mcg 100 mcg Oral Before breakfast   metoprolol Tartrate (LOPRESSOR) tab 25 mg 25 mg Oral 2x Daily(Beta Blocker)   Zolpidem Tartrate (AMBIEN) tab 5 mg 5 mg Oral Nightly PRN   Normal Saline Flush 0.9 % injection 3 mL AMLODIPINE BESYLATE 10 MG Oral Tab TAKE 1 TABLET BY MOUTH DAILY HOLD IF BP IS < 110/60   METOPROLOL TARTRATE 25 MG Oral Tab TAKE 1 TABLET BY MOUTH TWICE DAILY HOLD IF PULSE IS < 60 BPM   Zolpidem Tartrate 5 MG Oral Tab Take 5 mg by mouth nightly.      HYDRO HEENT: atraumatic, normocephalic, oropharynx clear  NECK: supple,no adenopathy, no masses  LUNGS: clear to auscultation  CARDIO: RRR without murmur  GI: normal active BS, soft, ttp in epigastric area, no bound or guarding  EXTREMITIES: no calf tenderness little change since previous CT examination. PANCREAS:   Normal.  No mass or ductal dilatation. OTHER:   Negative. CONCLUSION:  1. Diffuse hepatic steatosis. 2. Stable biliary ductal dilatation consistent with post cholecystectomy status.     DICTATED BY retroperitoneal lymph nodes which are unchanged. BOWEL:  There is mild wall thickening seen of the 2nd and 3rd portions of the duodenum with mild periduodenal inflammatory fat stranding (series 2, images 54-58).  The appendix is normal. There are no inflamm PROCEDURE: XR CHEST AP PORTABLE (CPT=71010) TIME: 2226 hr.   COMPARISON: [facility], XR CHEST AP PORTABLE (CPT=71045), 1/30/2019, 11:08. INDICATIONS: Mid chest and epigastric pain with mild dyspnea for 1 day. TECHNIQUE:   Single view.    Steve Unger 5:05 PM  Version 2 of 2    Author:  Danita Grijalva PT Service:  Rehab Author Type:  Physical Therapist    Filed:  4/1/2019  5:05 PM Date of Service:  4/1/2019  4:32 PM Status:  Addendum    :  Danita Grijalva PT (Physical Therapist) bring scooter to rehab facility as therapy can work with pt on gaining independence in scooter use as part of rehab. Pt with need for tolieting during session . Min assist for [employer] transfers.    Pt able to complete hygiene activity with SBA only and se -   Turning over in bed (including adjusting bedclothes, sheets and blankets)?: A Little   -   Sitting down on and standing up from a chair with arms (e.g., wheelchair, bedside commode, etc.): A Little   -   Moving from lying on back to sitting on the side Attribution Key    KS. 1 - Elizabeth Hernandez, PT on 4/1/2019  4:32 PM  KS.2 - Elizabeth Hernandez PT on 4/1/2019  5:04 PM               Physical Therapy Note signed by Elizabeth Hernandez PT at 4/1/2019  4:41 PM  Version 1 of 2    Author:  Tri Muniz scooter to rehab facility as therapy can work with pt on gaining independence in scooter use as part of rehab. Pt with need for tolieting during session . Min assist for [employer] transfers.    Pt able to complete hygiene activity with SBA only and set up a Attribution Key    KS. 1 - Shima Meehan, PT on 4/1/2019  4:32 PM                        Occupational Therapy Notes (last 72 hours) (Notes from 3/30/2019 11:21 AM through 4/2/2019 11:21 AM)      Occupational Therapy Note signed by Iliana Marks OT OT Treatment Plan: Balance activities; Energy conservation/work simplification techniques;ADL training;IADL training;Functional transfer training;UE strengthening/ROM; Endurance training;Patient/Family education;Patient/Family training;Neuromuscluar reeducat addressed; Alarm set    OT Goals:      Patient will complete functional transfer with supervision   Comment: SBA    Patient will complete toileting with supervision   Comment: cga/sba    Patient will tolerate standing for 5-6 minutes in prep for adls with s sit - poor motivation overall, advised pt to attempt to sit up in chair as long as possible but only agreed to 30 minutes - alarm set on chair and all needs in place        In this OT evaluation patient presents with the following impairments: strength, ac • Disorder of thyroid    • Diverticulosis of large intestine    • Esophageal reflux    • Fatigue    • High blood pressure    • High cholesterol    • HYPOTHYROIDISM    • Iron deficiency anemia    • Osteoarthrosis, unspecified whether generalized or localize Prior Level of [place]: pt receives am/pm adl assist for dressing - showering assist once a week, laundry assist and med mgmt assist  Uses rolator to and from dining, but recently got a scooter but is having trouble navigating .   Pt takes self to and Static Sitting: fair +  Dynamic Sitting: fair   Static Standing: fair -  Dynamic Standing: poor +    FUNCTIONAL ADL ASSESSMENT  Grooming: set up   Feeding: set up   Bathing: mod a   Toileting: simulated mod a   Upper Extremity Dressing: set up   Lower Extr

## (undated) NOTE — MR AVS SNAPSHOT
[address]  [facility] [address] [address]  [phone]  [phone]               Thank you for choosing us for your health care visit with Julieta Ayers MD.  We are glad to serve you and happy to provide you with * HYDROcodone-acetaminophen 5-325 MG Tabs   Take 1 tablet by mouth every 6 (six) hours as needed for Pain. Commonly known as:  NORCO           * HYDROcodone-acetaminophen  MG Tabs   Take 1 tablet by mouth every 6 (six) hours as needed.    Common If you have questions, you can call [phone] to talk to our [facility] Staff. Remember, [employer] is NOT to be used for urgent needs. For medical emergencies, dial 911. Visit [email or web address] to learn more.            Visit [facility]

## (undated) NOTE — ED AVS SNAPSHOT
Michelle Jordan   MRN: [id number]    Department:  [facility] Emergency Department   Date of Visit:  10/24/2018           Disclosure     Insurance plans vary and the physician(s) referred by the ER may not be covered by your plan.  Please contact within the next three months to obtain basic health screening including reassessment of your blood pressure.     IF THERE IS ANY CHANGE OR WORSENING OF YOUR CONDITION, CALL YOUR PRIMARY CARE PHYSICIAN AT ONCE OR RETURN IMMEDIATELY TO THE EMERGENCY DEPARTMEN

## (undated) NOTE — IP AVS SNAPSHOT
Patient Demographics     Address  [address]  [place] [address] Phone  [phone] [facility]  [phone] [facility] E-mail Address  [email or web address]      Emergency Contact(s)     Name Relation Home Work [employer] water, juice, soda, coffee or tea   Nic Montoya MD         ondansetron 4 MG Tbdp  Commonly known as:  ZOFRAN-ODT  Next dose due:  Anytime as needed      Take 1 tablet (4 mg total) by mouth every 6 (six) hours as needed for Nausea.    David Dawson, Order ID Medication Name Action Time Action Reason Comments    [id number] Insulin Aspart Pen (NOVOLOG) 100 UNIT/ML flexpen 1-7 Units 09/14/17 1713 Given            RIGHT LOWER ABDOMEN     Order ID Medication Name Action Time Action Reason Comments    [id number] ADMISSION DATE:       09/12/2017    HISTORY AND PHYSICAL EXAMINATION    CHIEF COMPLAINT:  Generalized weakness, inability to ambulate on her own.     HISTORY OF PRESENT ILLNESS:  The patient is a 21-year-old  female who was hospitalized recently fo ambulate around. She lives by herself. Lately, her mobility has been limited after a recent hospitalization.     REVIEW OF SYSTEMS:  The patient reports generalized weakness, inability to ambulate on her own even with the help of walker without high risk stay.  Fall precautions. Further recommendations to follow.     Dictated By Pushpa Gibson MD  d: 09/12/2017 19:47:25  t: 09/12/2017 29:11:51  Job [id number]  KX/  [FB.1]  Electronically signed by Sharron Castano MD on 9/13/2017 11:08 AM   Att recommended. Currently she is medically stable for transfer for further rehab. Physical Exam   Blood pressure 155/54, pulse 62, temperature 97.7 °F (36.5 °C), temperature source Oral, resp. rate 18, weight 207 lb 12.8 oz (94.3 kg), SpO2 92 %.   General Take 1 tablet (4 mg total) by mouth every 6 (six) hours as needed for Nausea.    Quantity:  30 tablet  Refills:  0     Pantoprazole Sodium 40 MG Tbec  Commonly known as:  PROTONIX      Take 1 tablet (40 mg total) by mouth every morning before breakfast. Filed:  9/14/2017  5:13 PM Date of Service:  9/14/2017  5:01 PM Status:  Signed    :  Denise Kaplan PTA (Physical Therapist)        PHYSICAL THERAPY TREATMENT NOTE - INPATIENT    Room Number: 562/562-A       Presenting Problem:  (LE Edema, Inab BALANCE                                                                                                                     Static Sitting: Good  Dynamic Sitting: Good           Static Standing: Fair -  Dynamic Standing: Poor +    ACTIVITY TOLERANCE  Room Current Status Mod A from the bs chair and with the RW. Goal #3 Patient is able to ambulate 150 feet with assist device: walker - rolling at assistance level: SBAx1   Goal #3   Current Status Pt AMB 60' with the RW min A for safety.         Goal #4   Cur perform sit to stand with Min Ax1 from regular surfaces. Trained pt for transfers with RW/CGAx1 with repeated cueing for proper sequencing. Trained pt for amb to 20ft with RW/Min to CGAx1. Pt demonstrated very slow gait with decrease step length.  Pt kept EMR[ST.2]:[ST.1]  The patient is a 72-year-old  female who was hospitalized recently for nausea and vomiting, was found to have erosive gastritis and cholelithiasis, underwent laparoscopic cholecystectomy.   Prior to discharge, her Mosetta Frieze was Verito Olivas Comment: 5 surgeries last one in dec 2011  11-: ELECTROCARDIOGRAM, COMPLETE      Comment: Scanned to media tab - DOS 11-  No date: HIP REPLACEMENT SURGERY Left  No date: HYSTERECTOMY  2006: KNEE REPLACEMENT SURGERY  No date: SPINE SURGERY How much difficulty does the patient currently have. ..  -   Turning over in bed (including adjusting bedclothes, sheets and blankets)?: A Little   -   Sitting down on and standing up from a chair with arms (e.g., wheelchair, bedside commode, etc.): A Lot device:[ST.1] walker - rolling[ST.2] at assistance level:[ST.1] SBAx1[ST. 2]   Goal #3   Current Status        Goal #4   Current Status    Goal #[ST.1]4[ST.2] Patient to demonstrate independence with home activity/exercise instructions provided to patient i [facility]) and scored a 12/30 (Normal is considered >=26). Pt required Mod/Max A x1 for sit>stand from chair height. She ambulated with Min A with RW with constant cues to keep her feet closer to the walker.  She also requires frequent cues to stay focused whil • High blood pressure    • HYPOTHYROIDISM    • Iron deficiency anemia    • Osteoarthrosis, unspecified whether generalized or localized, unspecified site    • Other and unspecified hyperlipidemia    • PONV (postoperative nausea and vomiting)    • Renal ins Orientation Level:  oriented x4  Memory:  impaired working memory and decreased short term memory  Following Commands:  follows one step commands with increased time  Initiation: cues to initiate tasks  Awareness of Errors:  assistance required to identify Shower Transfer: NT  Chair Transfer: Mod/Max A with RW with cues for hand placement    Bedroom Mobility: Min A with RW x15 ft x2 with increased time, constant cues to stay closer to the walker and stay focused.       FUNCTIONAL ADL ASSESSMENT  Grooming: NT Patient is a 78year old female admitted 9/12/2017 for general weakness, inability to ambulate. S/p recent lap anatoliy on 8/25/17 and was discharged home. RN Sujata Morrison) cleared pt for tx session and tx outcomes discussed with RN after session. Pt is AxOx4.  She • Arthritis     feet   • BACK PAIN    • Back problem    • Urban's esophagus    • Blood disorder    • CHF (congestive heart failure) (HCC)    • Colon, diverticulosis    • Congestive heart disease (HCC)    • Disorder of thyroid    • Esophageal reflux    • Fall Risk: Standard fall risk    PAIN ASSESSMENT  Ratin          ACTIVITY TOLERANCE  O2 Saturation at rest 93% and with activity 97%  Room air  Heart Rate: at rest 57 and with activity 76    COGNITION  Attention Span:  difficulty attending to direction Approx Degree of Impairment: 32.79%  Standardized Score (AM-PAC Scale): 44.27  CMS Modifier (G-Code): CJ    FUNCTIONAL TRANSFER ASSESSMENT  Supine to Sit : Not tested  Sit to Stand:  Moderate assistance    Toilet Transfer: Min A with RW with cues for hand p

## (undated) NOTE — LETTER
Hospital Discharge Documentation  Please phone to schedule a hospital follow up appointment.     From: [address] Hospitalist's Office  Phone: [phone]    Patient discharged time/date: 7/2/2019 10:35 AM  Patient discharge disposition:  German 48 on a regular basis. Infectious Disease was consulted and recommended for her to be off antibiotics. Patient likes to stay in the hospital and has multiple hospitalizations. She is stable for discharge today.     Discharge Physical Exam:   Physical Exam: TAKE 1 TABLET BY MOUTH TWICE DAILY    POTASSIUM CHLORIDE ER 20 MEQ Oral Tab CR  TAKE 1 TABLET BY MOUTH DAILY    GABAPENTIN 100 MG Oral Cap  TAKE 1 CAPSULE BY MOUTH THREE TIMES DAILY    acetaminophen 500 MG Oral Tab  Take 1,000 mg by mouth every 8 (eight) h Refills:  3        CONTINUE taking these medications      Instructions Prescription details   acetaminophen 500 MG Tabs  Commonly known as:  TYLENOL EXTRA STRENGTH      Take 1,000 mg by mouth every 8 (eight) hours as needed for Pain.    Refills:  0     allo Pantoprazole Sodium 40 MG Tbec  Commonly known as:  PROTONIX      Take 1 tablet (40 mg total) by mouth 2 (two) times daily before meals.    Quantity:  60 tablet  Refills:  1     Potassium Chloride ER 20 MEQ Tbcr  Commonly known as:  K-DUR M20      TAKE 1 TA

## (undated) NOTE — IP AVS SNAPSHOT
Patient Demographics     Address  [address]  [place] [address] Phone  [phone] [facility]  [phone] [facility] E-mail Address  [email or web address]      Emergency Contact(s)     Name Relation Home Work [phone] [place] Take 10 mg by mouth daily. ciprofloxacin HCl 0.3 % Soln  Commonly known as:  CILOXAN  Next dose due:  Please start tonight 10/17 at [address] 1 drop into both eyes every 6 (six) hours.    Frances Angel MD         furosemide 20 MG Tabs  Common 1000 units   #100  Sig-1000 units orally daily     3 Refills   Alma Rosa Mcghee MD               Where to Get Your Medications      Please  your prescriptions at the location directed by your doctor or nurse    Bring a paper prescription for each 10/17/17 0003 Given      [id number] ciprofloxacin HCl (CILOXAN) 0.3 % ophthalmic solution 1 drop 10/17/17 0657 Given      [id number] ciprofloxacin HCl (CILOXAN) 0.3 % ophthalmic solution 1 drop 10/17/17 1309 Given      [id number] lisinopril (PRINIVIL,ZESTRIL) Ordering provider:  Ar Lambert MD  10/16/17 2809 Resulting lab:  [facility]    Specimen Information    Type Source Collected On   Blood — 10/17/17 7749          Components    Component Value Reference Range Flag Lab   Glucose 106 70 - 99 mg/dL [address] [facility] Julane January JULIO [address]. [address]  [facility] [address] 04/29/14 1547 - Present            Microbiology Results (All)     None         H&P - H&P Note      H&P signed by Jairon Callaway MD at 10/13/2017  6:09 PM   Erik hypercholesterolemia, mild thalassemia, gout, gastroesophageal reflux disease, chronic renal insufficiency stage 2 to 3, Urban's esophagus and gastritis, generalized musculoskeletal deconditioning.       PAST SURGICAL HISTORY:  Recent laparoscopic cholecy HEART:  Regular rate and rhythm. S1, S2 auscultated. No murmur. ABDOMEN:  Soft, nondistended. Obese. Positive bowel sounds. There is bilateral groin erythema noted on physical examination. EXTREMITIES:  Edema +1 with chronic stasis. No erythema. Possible cellulitis of the left face and left arm  Abnormal urinalysis  Diastolic congestive heart failure, chronic  Bacterial conjunctivitis.      HISTORY OF PRESENT ILLNESS (COPIED FROM ADMISSION H&P)  The patient is a 37-year-old  female with kn Abd: Soft, NTND, BS normal, no mass, no HSM, no rebound/guarding. Neuro:  Normal reflexes, CN. Sensory/motor exams grossly normal deficit. Coordination  and gait normal.   MS: No joint effusions.     Skin: Significant abrasion on the left arm with mild sw lisinopril 20 MG Tabs  Commonly known as:  PRINIVIL,ZESTRIL      Take 1 tablet (20 mg total) by mouth daily. Quantity:  30 tablet  Refills:  0     MIRALAX Powd  Generic drug:  Polyethylene Glycol 3350      Take by mouth as needed.  take (17G)  by oral rou Greater than 30 minutes spent on discharge.  -----------------------    Hospital Discharge Diagnoses:  Rhabdomyolysis   Possible syncope   Hypertensive urgency   Possible cellulitis of the left face and left arm  Abnormal urinalysis  Diastolic congestive h and agreeable for therapy. [MV.1] The pt required mod A for her bed mobility using log-roll to the left side with assist to her B LE and trunk to achieve upright sitting balance. [MV.3] The pt demonstrated improved tolerance to activity as she was able to pe -   Turning over in bed (including adjusting bedclothes, sheets and blankets)?: A Lot   -   Sitting down on and standing up from a chair with arms (e.g., wheelchair, bedside commode, etc.): A Lot   -   Moving from lying on back to sitting on the side of th Current Status Min A for ambulation with a RW for 5'x 2 attempts. The pt displays shuffled gait patterns and no LOB during ambulation.    Goal #4 Patient will negotiate 0 stairs/one curb w/ assistive device and supervision   Goal #4   Current Status NT   Go during both ambulation attempts. The pt had a tendency to veer to the left side during her mobility and required tactile cues for proper walker alignment during her ambulation.  The pt required tactile cues during her sit to stand transfers to facilitate f How much help from another person does the patient currently need. .. [MV.1]   -   Moving to and from a bed to a chair (including a wheelchair)?: A Lot   -   Need to walk in hospital room?: A Lot   -   Climbing 3-5 steps with a railing?: Total[MV. 2]    AM-PA Goal #5 Patient to demonstrate independence with home activity/exercise instructions provided to patient in preparation for discharge.    Goal #5   Current Status See chart for 2 sets x 10 reps each   Goal #6    Goal #6  Current Status[MV.1]         Attribu available but RN aware). All needs left within reach. Discussed safety and need for nursing before getting up. [TF.2]      Patient will benefit from continued IP PT services to address these deficits in preparation for discharge.     DISCHARGE RECOMMENDATI 11-: ELECTROCARDIOGRAM, COMPLETE      Comment: Scanned to media tab - DOS 11-  No date: HIP REPLACEMENT SURGERY Left  No date: HYSTERECTOMY  2006: KNEE REPLACEMENT SURGERY  No date: SPINE SURGERY PROCEDURE UNLISTED  No date: TOTAL HIP REPLACE wheelchair, bedside commode, etc.): A Lot   -   Moving from lying on back to sitting on the side of the bed?: A Lot   How much help from another person does the patient currently need. ..   -   Moving to and from a bed to a chair (including a wheelchair)?: Current Status[TF.1]           Attribution Key    TF. 1 - Lou Whelan [place] on 10/14/2017  4:18 PM  TF.2 - Lou Owen PT on 10/14/2017  4:21 PM                        Occupational Therapy Notes (last 72 hours) (Notes from 10/14/2017  3:02 PM through 10/ positioning to reduce swelling. Pt completed sit>stand with Mod A x2 with max cues for hand placement. Pt required repetition of instruction with visual demo and still needed cues to refrain from pulling up on the walker. Pt stood with Min A with RW.  She a Diagnosis Date   • Anemia    • Anesthesia complication    • Arthritis     feet   • BACK PAIN    • Back problem    • Urban's esophagus    • Blood disorder    • CHF (congestive heart failure) (HCC)    • Colon, diverticulosis    • Congestive heart disease ( Precautions: Bed/chair alarm  Fall Risk: High fall risk    PAIN ASSESSMENT  Ratin  Location: left arm/wrist       ACTIVITY TOLERANCE[EA.1]  Room air[EA. 2]    COGNITION[EA. 1]  Attention Span:  difficulty attending to directions  Orientation Level:  rishabh Supine to Sit : Not tested  Sit to Stand: Moderate assistance (Mod A x2)  Toilet Transfer:[EA.1] NT[EA.2]  Shower Transfer:[EA.1] NT[EA.2]  Chair Transfer:[EA.1] Mod A x2 with RW with max cues[EA. 2]    Bedroom Mobility:[EA.1] 10 ft- Mod A with RW and close INFLUENZA 10/07/16     INFLUENZA 10/29/15     INFLUENZA 11/07/14     INFLUENZA 10/14/13     INFLUENZA 11/01/12     Pneumococcal (Prevnar 13) 11/07/14     Pneumovax 23 10/12/09       Future Appointments        Provider Diomedes Esposito    10/20/2017 10:30

## (undated) NOTE — IP AVS SNAPSHOT
Patient Demographics     Address  [facility] [place] Phone  [phone] (Home) *Preferred*  [phone] [facility]) E-mail Address  [email or web address]      Emergency Contact(s)     Name Relation Home Work [place], Take 1 tablet (5 mg total) by mouth 2 (two) times daily. Matt Maxwell MD         ASPIRIN ADULT LOW STRENGTH 81 MG Tbec  Generic drug:  aspirin  Next dose due:   Tomorrow morning      TAKE 1 TABLET BY MOUTH DAILY   MD geraldo Muñoz Next dose due:   Tomorrow before breakfast      TAKE 1 TABLET BY MOUTH EVERY MORNING   MONROE MOSS MD         Ondansetron HCl 4 mg tablet  Commonly known as:  ZOFRAN      Take 1 tablet (4 mg total) by mouth every 12 (twelve) hours as needed for Nausea [id number] HYDROcodone-acetaminophen (NORCO) 5-325 MG per tab 1 tablet 08/11/19 1623 Given      [id number] HYDROcodone-acetaminophen (NORCO) 5-325 MG per tab 1 tablet 08/11/19 2122 Given      [id number] HYDROcodone-acetaminophen (NORCO) 5-325 MG per tab 1 ta [id number] guaiFENesin-codeine [facility] AC) 100-10 MG/5ML syrup 5 mL 08/12/19 0007 Given      [id number] hydrALAzine HCl (APRESOLINE) tab 25 mg 08/11/19 1442 Given      [id number] hydrALAzine HCl (APRESOLINE) tab 25 mg 08/11/19 2121 Given      [id number] h Calcium, Total 9.4 8.5 - 10.1 mg/dL — [facility]   Calculated Osmolality 290 275 - 295 mOsm/kg — [facility]   GFR, Non-African American 22 >=60  [facility]   GFR, African-American 26 >=60  [facility]            CBC WITH DIFFERENTIAL WITH PLATELET [ Author:  Raheel Mensah MD Service:  Hospitalist Author Type:  Physician    Filed:  8/5/2019 10:23 AM Status:  Unsigned Transcription    :  Raheel Mensah MD (Physician)       [facility]    PATIENT'S NAME: Savanna Maxwell   ATT similar to prior. No focal consolidation, no pneumothorax or pleural effusion. Her EKG revealed a sinus rhythm with poor R-wave progression, nonspecific ST depressions when compared to July of this year.   To my eye, there does not appear to be any signif for a fall and intractable pain. She received PT evaluation during that admission and was sent home with home care. Workup for recurrent falls during that admission included a normal 2D echocardiogram with no arrhythmias on tele.   She did have uncontroll PHYSICAL EXAMINATION:    VITAL SIGNS:  Temperature 98.5, pulse 72, respiratory rate 15, blood pressure 176/68, O2 saturation 97% on room air. HEENT:  Normocephalic, atraumatic. Pupils equal, reactive to light. Sclerae anicteric.   There is no sinus tende 2.   Atrial fibrillation with controlled rate. Heparin drip was started in the emergency room and this will be continued Cardizem if rate escalates.   The patient had an echocardiogram in July of this year, which revealed an ejection fraction of 55% to 60% were discussed with her. All of her questions were answered, and she agreed to the plan of care as outlined above.      (Also job [id number])    Dictated By Ivan Martínez MD  d: 08/05/2019 06:02:27  t: 08/05/2019 07:45:29  Job [id number]  JACKIE/ was high. She was also found to be in atrial fibrillation. PAST MEDICAL HISTORY:  She has had knee replacements bilaterally, Urban esophagus, laminectomies, obesity, C diff, diarrhea.   She seems to have intractable pain, and she does not leave her ap pneumonia. Her white count is only slightly high. I do not have a urinalysis on her, but blood cultures are negative. She has been in the hospital now for 4 days. I am okay with Levaquin, but would give it 250 mg per day due to a GFR of 20.   [facility] Discharge Diagnoses: acute chest pain[SH.1]    Lace+ Score: 83[SH.2]  59-90 High Risk  29-58 Medium Risk  0-28   Low Risk. TCM Follow-Up Recommendation:  LACE > 58:  High Risk of readmission after discharge from the hospital.      Admitting Diag - cont norvasc.         Chronic HFpEF  - BNP elevated at >4000- IV fluids stopped   - Cont home cardiac meds.    - cards signed off  - hold lasix due to gaetano - monitor off fluids      Chronic venous stasis    - compresson stockings  - elevate ext.      Other acetaminophen 500 MG Oral Tab  Take 1,000 mg by mouth every 8 (eight) hours as needed for Pain.     ALLOPURINOL 300 MG Oral Tab  TAKE 1 TABLET BY MOUTH DAILY    Pantoprazole Sodium 40 MG Oral Tab EC  Take 1 tablet (40 mg total) by mouth 2 (two) times daily Commonly known as:  COREG  What changed:    · medication strength  · how much to take      Take 1 tablet (25 mg total) by mouth 2 (two) times daily.    Quantity:  60 tablet  Refills:  3     HYDROcodone-acetaminophen 5-325 MG Tabs  Commonly known as:  [address] Take 1 tablet (4 mg total) by mouth every 12 (twelve) hours as needed for Nausea. Quantity:  60 tablet  Refills:  5     Oxybutynin Chloride ER 5 MG Tb24  Commonly known as:  DITROPAN-XL      Take 1 tablet (5 mg total) by mouth daily.    Quantity:  30 tab Physical Therapy Notes (last 72 hours) (Notes from 8/9/2019  1:07 PM through 8/12/2019  1:07 PM)      Physical Therapy Note signed by Mariann Michelle PTA at 8/12/2019 12:13 PM  Version 1 of 1    Author:  Mariann Michelle PTA Service:  Rehab Author Static Standing: Not tested  Dynamic Standin Carhugon Mahamed. 2]    ACTIVITY TOLERANCE                         O2 WALK                  AM-PAC '6-Clicks' INPATIENT SHORT FORM - BASIC MOBILITY  How much difficulty does the patient currently have. .. [CK.1]  - Goal #3 Patient is able to ambulate 50 feet with assist device: walker - rolling at assistance level: supervision   Goal #3   Current Status Pt amb 3 ft with RW and mod a x 2;f/u with chair for pt safety.    Goal #4 Patient to demonstrate independence with breaks/repositioning. Pt performed sit to stand transfer with rolling walker at MAX A x 2, unable to achieve upright d/t instability, deferred and returned to seated.  Attempted stand pivot transfer from EOB to chair at MAX A x 2, pt feet planted on wallace BALANCE                                                                                                                     Static Sitting: Poor  Dynamic Sitting: Poor -           Static Standing: Dependent  Dynamic Standing: Dependent    ACTIVITY TOLERANC Patient Goal Patient's self-stated goal is: to return to walking to dining room   Goal #1 Patient is able to demonstrate supine - sit EOB @ level: minimum assistance      Goal #1   Current Status[AO.1] Pt performs bed mobility at MAX A x 2[AO.2]   Goal #2 both therapists provided assist and cues throughout session. Pt with increased full body stiffness and weakness today, regressed mobility from previous sessions.  Pt performed supine to sit transfer at MAX A x 2, able to sit EOB ~10 min at MOD-MAX A for bal PT Discharge Recommendations: 24 hour care/supervision;Sub-acute rehabilitation     PLAN  PT Treatment Plan: Bed mobility; Body mechanics; Endurance; Energy conservation;Patient education;Strengthening;Transfer training;Balance training    SUBJECTIVE[AO.1]  \ Stoop/Curb Assistance: Not tested  Comment : attempted stand pivot tx to chair at MAX A x 2, unable; dependent lift to chair x 2A    Additional information: rolling bilaterally at MAX A x 1-2 A; supine to sit transfer at MAX A x 2, able to sit EOB x 10 min :  Meeta Randle OT (Occupational Therapist)       OCCUPATIONAL THERAPY TREATMENT NOTE - INPATIENT        Room Number: 326/326-A      Presenting Problem: (Chest heavines)    Problem List  Principal Problem:    Essential hypertension  Active Proble simplification techniques;ADL training;Functional transfer training; Endurance training;Patient/Family education;Patient/Family training;Equipment eval/education    SUBJECTIVE  Patient is pleasant and cooperative    OBJECTIVE  Precautions: Bed/chair alarm Patient will tolerate standing for 2 minutes in prep for adls with mod a    Comment: tolerated standing with max a x2 for ~20 seconds            Goals  on:   Frequency: 3x a week    Sarah LIANG/HUSEYIN [place] she required use of overhead lift to transfer to bedside chair. Positioned patient for comfort. Patient with significant stiffness and tenderness in her BLE. No redness or warmth noted on her skin. Encouraged continued AROM as tolerated while seated.  Humberto Score: 13  Approx Degree of Impairment: 63.03%  Standardized Score (AM-PAC Scale): 32.03  CMS Modifier (G-Code): CL    FUNCTIONAL TRANSFER ASSESSMENT  Supine to Sit : Maximum assistance(x2)  Sit to Stand: Dependent assistance  Toilet Transfer: dep  Shower

## (undated) NOTE — IP AVS SNAPSHOT
[facility]            (For Outpatient Use Only) Initial Admit Date: 10/13/2017   Inpt/Obs Admit Date: Inpt: 10/13/17 / Obs: N/A   Discharge Date:    Mikki Bhakta:  [de-identified]   MRN: [de-identified]   CSN: [id number]        ENCOUNTER  Patient Cla Subscriber ID:  Pt Rel to Subscriber:    Hospital Account Financial Class: Medicare    October 17, 2017

## (undated) NOTE — ED AVS SNAPSHOT
Emmanuel Dwyer   MRN: [id number]    Department:  [facility] Emergency Department   Date of Visit:  2/13/2019           Disclosure     Insurance plans vary and the physician(s) referred by the ER may not be covered by your plan.  Please contact within the next three months to obtain basic health screening including reassessment of your blood pressure.     IF THERE IS ANY CHANGE OR WORSENING OF YOUR CONDITION, CALL YOUR PRIMARY CARE PHYSICIAN AT ONCE OR RETURN IMMEDIATELY TO THE EMERGENCY DEPARTMEN

## (undated) NOTE — ED AVS SNAPSHOT
Daniel Shepard   MRN: [id number]    Department:  [facility] Emergency Department   Date of Visit:  4/20/2019           Disclosure     Insurance plans vary and the physician(s) referred by the ER may not be covered by your plan.  Please contact within the next three months to obtain basic health screening including reassessment of your blood pressure.     IF THERE IS ANY CHANGE OR WORSENING OF YOUR CONDITION, CALL YOUR PRIMARY CARE PHYSICIAN AT ONCE OR RETURN IMMEDIATELY TO THE EMERGENCY DEPARTMEN

## (undated) NOTE — LETTER
Hospital Discharge Documentation  Pt was dc danyel Nina # [phone]    From: [address] Hospitalist's Office  Phone: [phone]    Patient discharged time/date: 9/28/2018  6:59 PM  Patient discharge disposition:  SNF       Discharge Summar S/P laparoscopic cholecystectomy     Memory problem     Rhabdomyolysis     Non-traumatic rhabdomyolysis     Bacteriuria     Abrasions of multiple sites     UTI (urinary tract infection)     Left hand weakness     Abdominal pain     CVA (cerebral vascula Cellulitis     Cellulitis of lower extremity, unspecified laterality     Acute cystitis without hematuria     Chronic pain syndrome     Suicidal ideation     Back pain     Stage 3 chronic kidney disease (HCC)     Borderline high cholesterol     Acute di chills. She says her appetite has been good. She denies any melena or hematochezia. She came back to the emergency room because she did not feel safe at home alone.   She also described a tingling sensation in her feet and hands, which has been bothersom Discharge Medications:      Discharge Medications      START taking these medications      Instructions Prescription details   gabapentin 100 MG Caps  Commonly known as:  NEURONTIN      Take 1 capsule (100 mg total) by mouth nightly.    Refills:  0        C Stop taking on:  10/3/2018  Quantity:  4 tablet  Refills:  0     Levothyroxine Sodium 100 MCG Tabs  Commonly known as:  SYNTHROID      TAKE 1 TABLET BY MOUTH EVERY MORNING   Quantity:  30 tablet  Refills:  11     metoprolol Tartrate 25 MG Tabs  Commonly k

## (undated) NOTE — IP AVS SNAPSHOT
Patient Demographics     Address  [address] Phone  [phone] (Home) *Preferred*  [phone] [facility]) E-mail Address  [email or web address]      Emergency Contact(s)     Name Relation Home Work [place], Take 1 tablet (20 mg total) by mouth daily. Ryan Gilmore MD         gabapentin 100 MG Caps  Commonly known as:  NEURONTIN  Next dose due:  4/24/19 Bedtime      Take 100 mg by mouth BID (Nitrates).           gabapentin 100 MG Caps  Commonly known as: Take 1 tablet (40 mg total) by mouth 2 (two) times daily before meals.    Ray Beckford MD         sucralfate 1 GM/10ML Susp  Commonly known as:  CARAFATE  Next dose due:  4/24/19 Before dinner      Take 10 mL (1 g total) by mouth 4 (four) times daily before m [id number] escitalopram (LEXAPRO) tablet 10 mg 04/23/19 1712 Given      [id number] ibuprofen (MOTRIN) tab 400 mg 04/23/19 1756 Given      [id number] metoprolol Tartrate (LOPRESSOR) tab 25 mg 04/24/19 0600 Given      [id number] ondansetron HCl (ZOFRAN) injecti Component Value Reference Range Flag Lab   Phosphorus 3.5 2.5 - 4.9 mg/dL MEENA [facility] [place]            BASIC METABOLIC PANEL (8) [[id number]] (Abnormal)  Resulted: 04/24/19 0745, Result status: Final result   Ordering provider:  Francoise Holt MD  04/23/19 [place] [id number] 04/29/14 1047 - Present            Microbiology Results (All)     Procedure Component Value Units Date/Time    Urine Culture, Routine Once [[id number]] Collected:  04/22/19 2576    Order Status:  Completed Lab Status:  Final result Updated • Congestive heart disease (HCC)    • Disorder of thyroid    • Diverticulosis of large intestine    • Esophageal reflux    • Fatigue    • High blood pressure    • High cholesterol    • HYPOTHYROIDISM    • Iron deficiency anemia    • Osteoarthrosis, unspeci Alcohol/week: 1.2 oz      Types: 2 Glasses of wine per week      Comment: occasionally    Drug use: No    Allergies/Medications:    Allergies:   Compazine               ANAPHYLAXIS    Comment:AMS    Medications Prior to Admission:  Metoclopramide HCl 10 Sig-1000 units orally daily     3 Refills (Patient taking differently: Take 1,000 Units by mouth daily.  )       Review of Systems:[RS.1]     A comprehensive 12 point review of systems was completed. Pertinent positives and negatives noted in the the HPI. occasional PAC -Nonspecific ST depression + Negative precordial T-waves -Nondiagnostic. ABNORMAL When compared with ECG of 04/12/2019 01:02:31 No significant changes have occurred Electronically signed on 04/20/2019 at 17:23 by Sangeetha Frias M.D.       Ass Radhames Gomez is a(n) 70year old female with pmh of obesity, HTN, CHF, HL, Hypothyroidism, anemia presents with generalized weakness. Pt has visited the ER multiple times in the past few days with similar complaints.  She also has had multiple hospitaliz • HIP REPLACEMENT SURGERY Left    • HYSTERECTOMY     • KNEE REPLACEMENT SURGERY  2006   • LAPAROSCOPIC CHOLECYSTECTOMY N/A 8/25/2017    Performed by Maureen Fontana MD at [facility] OR   • REMOVAL GALLBLADDER     • Dulce Prather 54 UNLISTED     • T METOPROLOL TARTRATE 25 MG Oral Tab TAKE 1 TABLET BY MOUTH TWICE DAILY HOLD IF PULSE IS < 60 BPM   Zolpidem Tartrate 5 MG Oral Tab Take 5 mg by mouth nightly. CloNIDine HCl 0.1 MG Oral Tab Take 1 tablet (0.1 mg total) by mouth 2 (two) times daily.    fur .0 04/20/2019    CREATSERUM 1.42 (H) 04/20/2019    BUN 14 04/20/2019     04/20/2019    K 3.9 04/20/2019     04/20/2019    CO2 28.0 04/20/2019     (H) 04/20/2019    CA 10.3 (H) 04/20/2019    ALB 3.9 04/20/2019    ALKPHO 118 04/20/ Author:  Solomon Villafana MD Service:  Hospitalist Author Type:  Physician    Filed:  4/24/2019 11:11 AM Date of Service:  4/24/2019 11:11 AM Status:  Signed    :  Solomon Villafana MD (Physician)       Please refer to dictated d/c summary[SS. 1] the patient's[TF.1] true functional skills prior to the past 2 months of which she has not full recovered due to her multiple hosp and admissions. [TF.4]  Requires min A in sup<sit on EOB with increase time with c/o pain on her L shoulder.  Sit<>stand from t Claims she just feels tired. She denies any night sweats or weight loss.  She will be admitted for observation[TF.4]     Problem List[TF.1]  Principal Problem:    Weakness generalized  Active Problems:    Essential hypertension    Nausea[TF.3]      Past Med Performed by Bailey Clark MD at [address] [place] [address] MAIN OR   • REMOVAL GALLBLADDER     • SPINE SURGERY PROCEDURE UNLISTED     • TOTAL HIP REPLACEMENT     • TOTAL KNEE REPLACEMENT[TF.3]         HOME SITUATION[TF.1]  Type of Home: Assisted living facility([facility] ) Little[TF.3]   How much help from another person does the patient currently need. ..[TF.1]   -   Moving to and from a bed to a chair (including a wheelchair)?: A Little   -   Need to walk in hospital room?: A Little   -   Climbing 3-5 steps with a railing?: TF.1 Preble Anne ALVA, PT on 4/22/2019 10:15 AM  TF.2 - Wei Monson, PT on 4/22/2019  3:25 PM  TF.3 - eWi Monson, PT on 4/22/2019  3:39 PM  TF.4 - Wei Monson, PT on 4/22/2019  3:30 PM  TF.5 - Wei Monson, PT Daily Activity Short Form. Research supports that patients with this level of impairment may benefit from Sub-acute rehab.       DISCHARGE RECOMMENDATIONS  OT Discharge Recommendations: 24 hour care/supervision;Sub-acute rehabilitation        PLAN  OT Renetta Grooming: Stood at the sink briefly with minimal assist for hand washing, completed OFH at chair level with SBA  Feeding: NT  Bathing: NT  Toileting: Minimal assist to stand for hygiene.    Upper Extremity Dressing: NT  Lower Extremity Dressing: Max assist min. A for toileting and CGA for grooming standing at sink. Pt fatigues easily, requires verbal instructions and encouragement to take rest breaks.  Pt wants to continue however is visibly fatigued and required several verbal instructions to rest between ea feet   • BACK PAIN    • Back problem    • Urban's esophagus    • Blood disorder    • CHF (congestive heart failure) (HCC)    • Colon, diverticulosis    • Congestive heart disease (HCC)    • Disorder of thyroid    • Diverticulosis of large intestine    • Prior Level of Rankin: Pt lives at [facility], reports she required assist for dressing in the a.m. And p.m. And showers 1x/week. Modified independent with toileting.  Pt is limited in LUE shoulder and weak on L side from a stroke she had 4 years ago Standardized Score (AM-PAC Scale): 35.96  CMS Modifier (G-Code): CK    FUNCTIONAL TRANSFER ASSESSMENT  Supine to Sit : Minimum assistance(Increased time needed )  Sit to Stand: Minimum assistance-CGA w/RW   Toilet Transfer: min.  A w/RW  Shower Transfer: n/

## (undated) NOTE — LETTER
Hospital Discharge Documentation  Pt was discharged to [facility]     From: [address] Hospitalist's Office  Phone: [phone]    Patient discharged time/date: 8/12/2019  4:27 PM  Patient discharge disposition:  SNF       Discharge Summa - found on CXR 8/10  - continue on PO levaquin q 48 hours- for a total of 5 days   - high oxygen protocol      Chest Pain   - cards following.   - stress test shows a fixed defect- stable per cards.   - Troponin neg.   - no further CP now.   - coreg to 25m dronedarone HCl 400 MG Oral Tab  Take 1 tablet (400 mg total) by mouth 2 (two) times daily with meals. hydrALAzine HCl 25 MG Oral Tab  Take 1 tablet (25 mg total) by mouth every 8 (eight) hours.     isosorbide dinitrate 20 MG Oral Tab  Take 1 tablet (20 Commonly known as:  ELIQUIS      Take 1 tablet (5 mg total) by mouth 2 (two) times daily.    Quantity:  60 tablet  Refills:  3     dronedarone HCl 400 MG Tabs  Commonly known as:  MULTAQ      Take 1 tablet (400 mg total) by mouth 2 (two) times daily with me TAKE 1 TABLET BY MOUTH DAILY HOLD IF BP IS < 110/60   Quantity:  30 tablet  Refills:  11     ASPIRIN ADULT LOW STRENGTH 81 MG Tbec  Generic drug:  aspirin      TAKE 1 TABLET BY MOUTH DAILY   Quantity:  90 tablet  Refills:  3     cloNIDine HCl 0.1 MG Tabs · isosorbide dinitrate 20 MG Tabs  · levofloxacin 750 MG Tabs         Follow up: Follow-up Information     Priscila Marte MD. Schedule an appointment as soon as possible for a visit in 3 months.     Specialties:  Cardiovascular Diseases, CARDIOLOGY  Why

## (undated) NOTE — LETTER
Hospital Discharge Documentation  Pt was d/c to [facility] from [address] [place] [address] .      From: [address] Hospitalist's Office  Phone: [phone]    Patient discharged time/date: 12/12/2017  4:58 PM  Patient discharge disposition:  SNF       Discharge Summaries Trigger finger, right ring finger     Vitamin D deficiency     Risk for falls     Hypertriglyceridemia     Failed back surgical syndrome     Hyperglycemia     Nausea     Dizziness     Cardiac arrhythmia, unspecified cardiac arrhythmia type     Lower ext denies fever or chills, sick contacts, recent travel. She denies NSAID use. She presented to her primary care physician's office earlier today in follow-up.   She was still having significant abdominal pain so she was sent to the hospital for direct admis Take 1 tablet (25 mg total) by mouth 2x Daily(Beta Blocker). Refills:  0     sucralfate 1 g Tabs  Commonly known as:  CARAFATE      Take 1 tablet (1 g total) by mouth 4 (four) times daily before meals and nightly.    Refills:  0        CHANGE how you tc MIRALAX Powd  Generic drug:  Polyethylene Glycol 3350      Take 17 g by mouth daily as needed.  take (17G)  by oral route  every day mixed with 8 oz. water, juice, soda, coffee or tea   Refills:  0     Pantoprazole Sodium 40 MG Tbec  Commonly known as:  PRO

## (undated) NOTE — IP AVS SNAPSHOT
[facility]            (For Outpatient Use Only) Initial Admit Date: 8/17/2019   Inpt/Obs Admit Date: Inpt: 8/18/19 / Obs: N/A   Discharge Date:    Fabian Hansen:  [de-identified]   MRN: [de-identified]   CSN: [id number]   CEID: [id number]        PRECIOUS Group Number:  Insurance Type:    Subscriber Name:  Subscriber :    Subscriber ID:  Pt Rel to Subscriber:    Hospital Account Financial Class: Medicare    2019

## (undated) NOTE — ED AVS SNAPSHOT
Richard Allen   MRN: [id number]    Department:  [facility] Emergency Department   Date of Visit:  4/19/2019           Disclosure     Insurance plans vary and the physician(s) referred by the ER may not be covered by your plan.  Please contact within the next three months to obtain basic health screening including reassessment of your blood pressure.     IF THERE IS ANY CHANGE OR WORSENING OF YOUR CONDITION, CALL YOUR PRIMARY CARE PHYSICIAN AT ONCE OR RETURN IMMEDIATELY TO THE EMERGENCY DEPARTMEN

## (undated) NOTE — LETTER
Hospital Discharge Documentation  Pt was discharged to [facility] at [phone]. No discharge summary available at this time, below is the most recent progress note  for your review .       From: [address] Hospitalist's Office  Phone: [phone]    XOWZKL Musculoskeletal: Full range of motion of all extremities.  No swelling noted. Integument: No lesions. No erythema. Psychiatric: Appropriate mood and affect.   Assessment and Plan:      Weakness generalized  - Multifactorial due to deconditioning, Elevated

## (undated) NOTE — ED AVS SNAPSHOT
Last Crump   MRN: [id number]    Department:  [facility] Emergency Department   Date of Visit:  5/10/2019           Disclosure     Insurance plans vary and the physician(s) referred by the ER may not be covered by your plan.  Please contact within the next three months to obtain basic health screening including reassessment of your blood pressure.     IF THERE IS ANY CHANGE OR WORSENING OF YOUR CONDITION, CALL YOUR PRIMARY CARE PHYSICIAN AT ONCE OR RETURN IMMEDIATELY TO THE EMERGENCY DEPARTMEN

## (undated) NOTE — LETTER
Hospital Discharge Documentation  Please phone to schedule a hospital follow up appointment.     From: [address] Hospitalist's Office  Phone: [phone]    Patient discharged time/date: 7/24/2018  6:53 PM  Patient discharge disposition:  Home or Self - pain service on board  - continue with norco and cymbalta and gabapentin QID   [facility]       Suicidal ideation  Denies suicidal ideation   Psych on board  Can discontinue sitter      Other chronic problems   chronic renal insufficiency stage Take 1 tablet by mouth every 4 (four) hours as needed for Pain (max 6 per day).    Quantity:  30 tablet  Refills:  0     Vitamin D 1000 units Tabs  What changed:  · how much to take  · how to take this  · when to take this  · additional instructions      1 Take 17 g by mouth daily as needed. take (17G)  by oral route  every day mixed with 8 oz. water, juice, soda, coffee or tea   Refills:  0     omeprazole 20 MG Cpdr  Commonly known as:  PRILOSEC      Take 1 capsule (20 mg total) by mouth every morning.    Q Electronically signed by Julita Arriaza MD on 7/24/2018  3:31 PM   Attribution Key     RZ. 1 - Julita Arriaza MD on 7/24/2018  3:27 PM